# Patient Record
Sex: FEMALE | Race: BLACK OR AFRICAN AMERICAN | Employment: UNEMPLOYED | URBAN - METROPOLITAN AREA
[De-identification: names, ages, dates, MRNs, and addresses within clinical notes are randomized per-mention and may not be internally consistent; named-entity substitution may affect disease eponyms.]

---

## 2017-01-12 ENCOUNTER — APPOINTMENT (OUTPATIENT)
Dept: GENERAL RADIOLOGY | Age: 42
End: 2017-01-12
Attending: EMERGENCY MEDICINE
Payer: MEDICAID

## 2017-01-12 ENCOUNTER — HOSPITAL ENCOUNTER (EMERGENCY)
Age: 42
Discharge: HOME | End: 2017-01-13
Attending: EMERGENCY MEDICINE
Payer: MEDICAID

## 2017-01-12 VITALS
RESPIRATION RATE: 18 BRPM | SYSTOLIC BLOOD PRESSURE: 172 MMHG | BODY MASS INDEX: 25.4 KG/M2 | HEART RATE: 104 BPM | HEIGHT: 62 IN | TEMPERATURE: 98.6 F | DIASTOLIC BLOOD PRESSURE: 101 MMHG | OXYGEN SATURATION: 98 % | WEIGHT: 138 LBS

## 2017-01-12 DIAGNOSIS — R09.81 SINUS CONGESTION: ICD-10-CM
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
## 2017-01-12 DIAGNOSIS — M62.838 NECK MUSCLE SPASM: Primary | ICD-10-CM

## 2017-01-12 DIAGNOSIS — M54.5 LOW BACK PAIN, UNSPECIFIED BACK PAIN LATERALITY, UNSPECIFIED CHRONICITY, WITH SCIATICA PRESENCE UNSPECIFIED: ICD-10-CM

## 2017-01-12 LAB — HCG UR QL: NEGATIVE

## 2017-01-12 PROCEDURE — 74011250637 HC RX REV CODE- 250/637: Performed by: EMERGENCY MEDICINE

## 2017-01-12 PROCEDURE — 81025 URINE PREGNANCY TEST: CPT

## 2017-01-12 PROCEDURE — 72100 X-RAY EXAM L-S SPINE 2/3 VWS: CPT

## 2017-01-12 PROCEDURE — 72050 X-RAY EXAM NECK SPINE 4/5VWS: CPT

## 2017-01-12 PROCEDURE — 99284 EMERGENCY DEPT VISIT MOD MDM: CPT

## 2017-01-12 RX ORDER — DIAZEPAM 5 MG/1
5 TABLET ORAL
Status: COMPLETED | OUTPATIENT
Start: 2017-01-12 | End: 2017-01-12

## 2017-01-12 RX ORDER — NITROGLYCERIN 0.4 MG/1
TABLET SUBLINGUAL
COMMUNITY
End: 2017-10-06

## 2017-01-12 RX ORDER — LISINOPRIL 10 MG/1
TABLET ORAL DAILY
COMMUNITY
End: 2017-05-04

## 2017-01-12 RX ORDER — ASPIRIN 81 MG/1
81 TABLET ORAL DAILY
COMMUNITY
End: 2017-10-06

## 2017-01-12 RX ORDER — TRAMADOL HYDROCHLORIDE 50 MG/1
50 TABLET ORAL
Status: COMPLETED | OUTPATIENT
Start: 2017-01-12 | End: 2017-01-12

## 2017-01-12 RX ORDER — ACETAMINOPHEN 325 MG/1
650 TABLET ORAL ONCE
Status: COMPLETED | OUTPATIENT
Start: 2017-01-12 | End: 2017-01-12

## 2017-01-12 RX ADMIN — TRAMADOL HYDROCHLORIDE 50 MG: 50 TABLET, FILM COATED ORAL at 23:49

## 2017-01-12 RX ADMIN — ACETAMINOPHEN 650 MG: 325 TABLET ORAL at 23:49

## 2017-01-12 RX ADMIN — DIAZEPAM 5 MG: 5 TABLET ORAL at 23:49

## 2017-01-12 NOTE — LETTER
[facility] EMERGENCY DEPT 
[address] [facility] [address] [id number] [phone] Work/School Note Date: 1/12/2017 To Whom It May concern: 
 
Shakila Coulter was seen and treated today in the emergency room by the following provider(s): 
Attending Provider: Ming Dawkins MD. Shakila Coulter may return to work on 01/16/17. Sincerely, Ming Dawkins MD

## 2017-01-13 ENCOUNTER — TELEPHONE (OUTPATIENT)
Dept: CASE MANAGEMENT | Age: 42
End: 2017-01-13

## 2017-01-13 RX ORDER — AZITHROMYCIN 250 MG/1
TABLET, FILM COATED ORAL
Qty: 6 TAB | Refills: 0 | Status: SHIPPED | OUTPATIENT
Start: 2017-01-13 | End: 2017-05-04

## 2017-01-13 RX ORDER — TRAMADOL HYDROCHLORIDE 50 MG/1
50 TABLET ORAL
Qty: 16 TAB | Refills: 0 | Status: SHIPPED | OUTPATIENT
Start: 2017-01-13 | End: 2017-05-04

## 2017-01-13 RX ORDER — METHOCARBAMOL 750 MG/1
750 TABLET, FILM COATED ORAL 3 TIMES DAILY
Qty: 15 TAB | Refills: 0 | Status: SHIPPED | OUTPATIENT
Start: 2017-01-13 | End: 2017-05-04

## 2017-01-13 NOTE — ED NOTES
Pt arrived in ED by way of EMS w/ complaint of lower back pain that radiates to L side of buttocks, neck pain, and head pain X 1 month. Pt states her pain became aggravated when she made a sudden turn of her neck today. Pt is A&O X 4 and appears in no distress. Emergency Department Nursing Plan of Care       The Nursing Plan of Care is developed from the Nursing assessment and Emergency Department Attending provider initial evaluation. The plan of care may be reviewed in the ED Provider note.     The Plan of Care was developed with the following considerations:   Patient / Family readiness to learn indicated by:verbalized understanding  Persons(s) to be included in education: patient  Barriers to Learning/Limitations:No    Signed     Jacqui Miller RN    1/12/2017   11:16 PM
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
## 2017-01-13 NOTE — TELEPHONE ENCOUNTER
Care manager called patient re: after hours consult for PCP referral. Unable to reach patient, left voicemail.     Liliya Collins, MSW  [phone]

## 2017-01-13 NOTE — ED NOTES
Patient has been instructed that they have been given Valium which contains opioids, benzodiazepines, or other sedating drugs. Patient is aware that they  will need to refrain from driving or operating heavy machinery after taking this medication. Patient also instructed that they need to avoid drinking alcohol and using other products containing opioids, benzodiazepines, or other sedating drugs. Patient verbalized understanding.
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
## 2017-04-23 ENCOUNTER — HOSPITAL ENCOUNTER (EMERGENCY)
Age: 42
Discharge: HOME | End: 2017-04-23
Attending: STUDENT IN AN ORGANIZED HEALTH CARE EDUCATION/TRAINING PROGRAM
Payer: MEDICAID

## 2017-04-23 ENCOUNTER — APPOINTMENT (OUTPATIENT)
Dept: CT IMAGING | Age: 42
End: 2017-04-23
Attending: STUDENT IN AN ORGANIZED HEALTH CARE EDUCATION/TRAINING PROGRAM
Payer: MEDICAID

## 2017-04-23 VITALS
WEIGHT: 159.5 LBS | DIASTOLIC BLOOD PRESSURE: 91 MMHG | TEMPERATURE: 97.9 F | HEIGHT: 62 IN | RESPIRATION RATE: 25 BRPM | OXYGEN SATURATION: 98 % | HEART RATE: 86 BPM | SYSTOLIC BLOOD PRESSURE: 146 MMHG | BODY MASS INDEX: 29.35 KG/M2

## 2017-04-23 DIAGNOSIS — M54.10 RADICULOPATHY OF ARM: Primary | ICD-10-CM

## 2017-04-23 DIAGNOSIS — R51.9 ACUTE NONINTRACTABLE HEADACHE, UNSPECIFIED HEADACHE TYPE: ICD-10-CM

## 2017-04-23 LAB
ALBUMIN SERPL BCP-MCNC: 3.7 G/DL (ref 3.5–5)
ALBUMIN/GLOB SERPL: 1 {RATIO} (ref 1.1–2.2)
ALP SERPL-CCNC: 65 U/L (ref 45–117)
ALT SERPL-CCNC: 27 U/L (ref 12–78)
ANION GAP BLD CALC-SCNC: 6 MMOL/L (ref 5–15)
AST SERPL W P-5'-P-CCNC: 16 U/L (ref 15–37)
BASOPHILS # BLD AUTO: 0 K/UL (ref 0–0.1)
BASOPHILS # BLD: 1 % (ref 0–1)
BILIRUB SERPL-MCNC: 0.2 MG/DL (ref 0.2–1)
BUN SERPL-MCNC: 18 MG/DL (ref 6–20)
BUN/CREAT SERPL: 21 (ref 12–20)
CALCIUM SERPL-MCNC: 8.5 MG/DL (ref 8.5–10.1)
CHLORIDE SERPL-SCNC: 104 MMOL/L (ref 97–108)
CO2 SERPL-SCNC: 28 MMOL/L (ref 21–32)
CREAT SERPL-MCNC: 0.87 MG/DL (ref 0.55–1.02)
EOSINOPHIL # BLD: 0.2 K/UL (ref 0–0.4)
EOSINOPHIL NFR BLD: 2 % (ref 0–7)
ERYTHROCYTE [DISTWIDTH] IN BLOOD BY AUTOMATED COUNT: 12.6 % (ref 11.5–14.5)
GLOBULIN SER CALC-MCNC: 3.6 G/DL (ref 2–4)
GLUCOSE BLD STRIP.AUTO-MCNC: 99 MG/DL (ref 65–100)
GLUCOSE SERPL-MCNC: 96 MG/DL (ref 65–100)
HCT VFR BLD AUTO: 36.3 % (ref 35–47)
HGB BLD-MCNC: 12.6 G/DL (ref 11.5–16)
INR BLD: 1 (ref 0.9–1.2)
LYMPHOCYTES # BLD AUTO: 30 % (ref 12–49)
LYMPHOCYTES # BLD: 2.6 K/UL (ref 0.8–3.5)
MCH RBC QN AUTO: 31.6 PG (ref 26–34)
MCHC RBC AUTO-ENTMCNC: 34.7 G/DL (ref 30–36.5)
MCV RBC AUTO: 91 FL (ref 80–99)
MONOCYTES # BLD: 0.6 K/UL (ref 0–1)
MONOCYTES NFR BLD AUTO: 7 % (ref 5–13)
NEUTS SEG # BLD: 5.2 K/UL (ref 1.8–8)
NEUTS SEG NFR BLD AUTO: 60 % (ref 32–75)
PLATELET # BLD AUTO: 347 K/UL (ref 150–400)
POTASSIUM SERPL-SCNC: 3.5 MMOL/L (ref 3.5–5.1)
PROT SERPL-MCNC: 7.3 G/DL (ref 6.4–8.2)
RBC # BLD AUTO: 3.99 M/UL (ref 3.8–5.2)
SERVICE CMNT-IMP: NORMAL
SODIUM SERPL-SCNC: 138 MMOL/L (ref 136–145)
WBC # BLD AUTO: 8.6 K/UL (ref 3.6–11)

## 2017-04-23 PROCEDURE — 85025 COMPLETE CBC W/AUTO DIFF WBC: CPT | Performed by: STUDENT IN AN ORGANIZED HEALTH CARE EDUCATION/TRAINING PROGRAM

## 2017-04-23 PROCEDURE — 70450 CT HEAD/BRAIN W/O DYE: CPT

## 2017-04-23 PROCEDURE — 80053 COMPREHEN METABOLIC PANEL: CPT | Performed by: STUDENT IN AN ORGANIZED HEALTH CARE EDUCATION/TRAINING PROGRAM

## 2017-04-23 PROCEDURE — 36415 COLL VENOUS BLD VENIPUNCTURE: CPT | Performed by: STUDENT IN AN ORGANIZED HEALTH CARE EDUCATION/TRAINING PROGRAM

## 2017-04-23 PROCEDURE — 99285 EMERGENCY DEPT VISIT HI MDM: CPT

## 2017-04-23 PROCEDURE — 85610 PROTHROMBIN TIME: CPT

## 2017-04-23 PROCEDURE — 74011250636 HC RX REV CODE- 250/636: Performed by: STUDENT IN AN ORGANIZED HEALTH CARE EDUCATION/TRAINING PROGRAM

## 2017-04-23 PROCEDURE — 82962 GLUCOSE BLOOD TEST: CPT

## 2017-04-23 PROCEDURE — 96374 THER/PROPH/DIAG INJ IV PUSH: CPT

## 2017-04-23 PROCEDURE — 93005 ELECTROCARDIOGRAM TRACING: CPT

## 2017-04-23 RX ORDER — METOCLOPRAMIDE HYDROCHLORIDE 5 MG/ML
10 INJECTION INTRAMUSCULAR; INTRAVENOUS
Status: COMPLETED | OUTPATIENT
Start: 2017-04-23 | End: 2017-04-23

## 2017-04-23 RX ADMIN — METOCLOPRAMIDE 10 MG: 5 INJECTION, SOLUTION INTRAMUSCULAR; INTRAVENOUS at 19:56

## 2017-04-23 NOTE — ED PROVIDER NOTES
HPI Comments: 37 y.o. female with past medical history significant for HTN, murmur, cardiac arrhythmias, neuropathy, stroke, and chiari malformation who presents from home with chief complaint of numbness. Pt reportedly had left sided facial twitching and headache this morning around 0700. She states that she started developing numbness in her right 4th and 5th fingertips around 1730 (1 hour ago). Pt reports that she has been stuttering, which is unusual, but denies any other speech difficulty. She reports feeling fatigued for the past couple of days. There are no other acute medical concerns at this time. Social hx: everyday smoker, social EtOH use, no drug use  PCP: None    Note written by Susan Wheeler, as dictated by Tito Patel MD 6:52 PM    The history is provided by the patient. No  was used. Past Medical History:   Diagnosis Date    angina     Chiari malformation     Heart abnormalities     leaky heart valve, murmur    Hypertension     Other ill-defined conditions     cardiac arrhythmias    Other ill-defined conditions     neuropathy    Stroke ([facility] [address]     TIA       Past Surgical History:   Procedure Laterality Date    ABDOMEN SURGERY PROC UNLISTED      laporscopic surgery post ruptured fallopian tube    HX ORTHOPAEDIC      screws in left ankle    HX OTHER SURGICAL      surgery to back of head due to fractured skull    NEUROLOGICAL PROCEDURE UNLISTED           History reviewed. No pertinent family history. Social History     Social History    Marital status: SINGLE     Spouse name: N/A    Number of children: N/A    Years of education: N/A     Occupational History    Not on file.      Social History Main Topics    Smoking status: Current Every Day Smoker     Packs/day: 0.25     Years: 10.00    Smokeless tobacco: Not on file    Alcohol use No      Comment: social    Drug use: No    Sexual activity: Yes     Partners: Male     Other Topics Concern  Not on file     Social History Narrative         ALLERGIES: Fioricet [butalbital-acetaminophen-caff] and Toradol [ketorolac]    Review of Systems   Constitutional: Positive for fatigue. Negative for chills, diaphoresis and fever. HENT: Negative for congestion, postnasal drip, rhinorrhea and sore throat. Eyes: Negative for photophobia, discharge, redness and visual disturbance. Respiratory: Negative for cough, chest tightness, shortness of breath and wheezing. Cardiovascular: Negative for chest pain, palpitations and leg swelling. Gastrointestinal: Negative for abdominal distention, abdominal pain, blood in stool, constipation, diarrhea, nausea and vomiting. Genitourinary: Negative for difficulty urinating, dysuria, frequency, hematuria and urgency. Musculoskeletal: Negative for arthralgias, back pain, joint swelling and myalgias. Skin: Negative for color change and rash. Neurological: Positive for speech difficulty (stuttering), numbness (fingers) and headaches. Negative for dizziness, weakness and light-headedness. Facial twitching   Psychiatric/Behavioral: Negative for confusion. The patient is not nervous/anxious. All other systems reviewed and are negative. Vitals:    04/23/17 1848   BP: (!) 157/107   Pulse: 86   Resp: 16   SpO2: 99%   Weight: 72.3 kg (159 lb 8 oz)   Height: 5' 2\" (1.575 m)            Physical Exam   Constitutional: She is oriented to person, place, and time. She appears well-developed and well-nourished. HENT:   Head: Normocephalic and atraumatic. Eyes: Conjunctivae and EOM are normal. Pupils are equal, round, and reactive to light. Neck: Normal range of motion. Neck supple. Cardiovascular: Normal rate, regular rhythm and normal heart sounds. No murmur heard. Pulmonary/Chest: Effort normal and breath sounds normal. No respiratory distress. Abdominal: Soft. Bowel sounds are normal. She exhibits no distension. There is no tenderness.  There is no rebound. Musculoskeletal: Normal range of motion. She exhibits no edema. Neurological: She is alert and oriented to person, place, and time. No cranial nerve deficit. She exhibits normal muscle tone. Coordination normal.   NIH stroke scale: 0   Skin: Skin is warm and dry. No rash noted. Psychiatric: She has a normal mood and affect. Her behavior is normal.   Nursing note and vitals reviewed. Note written by Susan Lazar, as dictated by Tapan Wick MD 6:52 PM    [facility]  ED Course       Procedures  ED EKG interpretation:  Rhythm: normal sinus rhythm; and regular . Rate (approx.): 89; Axis: normal; ST/T wave: normal; no STEMI, no ischemia  Note written by Susan Lazar, as dictated by Tapan Wick MD 6:51 PM    CONSULT NOTE:  7:20 PM Tapan Wick MD spoke with Dr. Herlinda Chao, Consult for Neurology. Discussed available diagnostic tests and clinical findings. He is in agreement with care plans as outlined. Dr. Peri Cantrell recommends discharge home with PCP follow up as pt is not a TPA candidate. Dr. Peri Cantrell states that pt most likely has peripheral neuropathy or radiculopathy.

## 2017-04-23 NOTE — ED TRIAGE NOTES
Pt states that she was having facial twitching on the left side that started this morning and pt has been feeling fatigued for a couple of days. Pt states at 5:30pm she had a sudden onset on right finger numbness. Code stroke initiated.

## 2017-04-24 LAB
ATRIAL RATE: 89 BPM
CALCULATED P AXIS, ECG09: 45 DEGREES
CALCULATED R AXIS, ECG10: 23 DEGREES
CALCULATED T AXIS, ECG11: 24 DEGREES
DIAGNOSIS, 93000: NORMAL
P-R INTERVAL, ECG05: 156 MS
Q-T INTERVAL, ECG07: 376 MS
QRS DURATION, ECG06: 78 MS
QTC CALCULATION (BEZET), ECG08: 457 MS
VENTRICULAR RATE, ECG03: 89 BPM

## 2017-04-24 NOTE — DISCHARGE INSTRUCTIONS
We hope that we have addressed all of your medical concerns. The examination and treatment you received in the Emergency Department were for an emergent problem and were not intended as complete care. It is important that you follow up with your healthcare provider(s) for ongoing care. If your symptoms worsen or do not improve as expected, and you are unable to reach your usual health care provider(s), you should return to the Emergency Department. Today's healthcare is undergoing tremendous change, and patient satisfaction surveys are one of the many tools to assess the quality of medical care. You may receive a survey from the [employer] organization regarding your experience in the Emergency Department. I hope that your experience has been completely positive, particularly the medical care that I provided. As such, please participate in the survey; anything less than excellent does not meet my expectations or intentions. [facility] and 8 [facility] participate in nationally recognized quality of care measures. If your blood pressure is greater than 120/80, as reported below, we urge that you seek medical care to address the potential of high blood pressure, commonly known as hypertension. Hypertension can be hereditary or can be caused by certain medical conditions, pain, stress, or \"white coat syndrome. \"       Please make an appointment with your health care provider(s) for follow up of your Emergency Department visit. VITALS:   Patient Vitals for the past 8 hrs:   Pulse Resp BP SpO2   04/23/17 2015 88 17 (!) 152/105 98 %   04/23/17 1945 80 17 (!) 166/102 99 %   04/23/17 1848 86 16 (!) 157/107 99 %          Thank you for allowing us to provide you with medical care today. We realize that you have many choices for your emergency care needs. Please choose us in the future for any continued health care needs.       Yomaira Mcintyre Mayelin Mccormick, [address] [place], [place] [address].   Office: [phone]            Recent Results (from the past 24 hour(s))   GLUCOSE, POC    Collection Time: 04/23/17  6:48 PM   Result Value Ref Range    Glucose (POC) 99 65 - 100 mg/dL    Performed by Yasmin Garnica    POC INR    Collection Time: 04/23/17  6:50 PM   Result Value Ref Range    INR (POC) 1.0 <1.2     EKG, 12 LEAD, INITIAL    Collection Time: 04/23/17  6:51 PM   Result Value Ref Range    Ventricular Rate 89 BPM    Atrial Rate 89 BPM    P-R Interval 156 ms    QRS Duration 78 ms    Q-T Interval 376 ms    QTC Calculation (Bezet) 457 ms    Calculated P Axis 45 degrees    Calculated R Axis 23 degrees    Calculated T Axis 24 degrees    Diagnosis       Normal sinus rhythm  When compared with ECG of 11-MAY-2015 18:50,  No significant change was found     CBC WITH AUTOMATED DIFF    Collection Time: 04/23/17  6:53 PM   Result Value Ref Range    WBC 8.6 3.6 - 11.0 K/uL    RBC 3.99 3.80 - 5.20 M/uL    HGB 12.6 11.5 - 16.0 g/dL    HCT 36.3 35.0 - 47.0 %    MCV 91.0 80.0 - 99.0 FL    MCH 31.6 26.0 - 34.0 PG    MCHC 34.7 30.0 - 36.5 g/dL    RDW 12.6 11.5 - 14.5 %    PLATELET 808 692 - 229 K/uL    NEUTROPHILS 60 32 - 75 %    LYMPHOCYTES 30 12 - 49 %    MONOCYTES 7 5 - 13 %    EOSINOPHILS 2 0 - 7 %    BASOPHILS 1 0 - 1 %    ABS. NEUTROPHILS 5.2 1.8 - 8.0 K/UL    ABS. LYMPHOCYTES 2.6 0.8 - 3.5 K/UL    ABS. MONOCYTES 0.6 0.0 - 1.0 K/UL    ABS. EOSINOPHILS 0.2 0.0 - 0.4 K/UL    ABS.  BASOPHILS 0.0 0.0 - 0.1 K/UL   METABOLIC PANEL, COMPREHENSIVE    Collection Time: 04/23/17  6:53 PM   Result Value Ref Range    Sodium 138 136 - 145 mmol/L    Potassium 3.5 3.5 - 5.1 mmol/L    Chloride 104 97 - 108 mmol/L    CO2 28 21 - 32 mmol/L    Anion gap 6 5 - 15 mmol/L    Glucose 96 65 - 100 mg/dL    BUN 18 6 - 20 MG/DL    Creatinine 0.87 0.55 - 1.02 MG/DL    BUN/Creatinine ratio 21 (H) 12 - 20      GFR est AA >60 >60 ml/min/1.73m2    GFR est non-AA >60 >60 ml/min/1.73m2    Calcium 8.5 8.5 - 10.1 MG/DL    Bilirubin, total 0.2 0.2 - 1.0 MG/DL    ALT (SGPT) 27 12 - 78 U/L    AST (SGOT) 16 15 - 37 U/L    Alk. phosphatase 65 45 - 117 U/L    Protein, total 7.3 6.4 - 8.2 g/dL    Albumin 3.7 3.5 - 5.0 g/dL    Globulin 3.6 2.0 - 4.0 g/dL    A-G Ratio 1.0 (L) 1.1 - 2.2         Ct Code Neuro Head Wo Contrast    Result Date: 4/23/2017  EXAM: Brain CT without contrast. INDICATION: facial twitching on the left side that started this morning and pt has been feeling fatigued for a couple of days. Pt states at 5:30pm she had a sudden onset on right finger numbness. TECHNIQUE: Noncontrast CT of the brain is performed with 5 mm collimation. Bone algorithm axial and sagittal and coronal reconstructions performed and evaluated. CT dose reduction was achieved through use of a standardized protocol tailored for this examination and automatic exposure control for dose modulation. Adaptive statistical iterative reconstruction (ASIR) was utilized. COMPARISON: CT brain 9/4/2015. FINDINGS: There is no acute intracranial hemorrhage, mass, mass effect or herniation. Ventricular system is normal. The gray-white matter differentiation is well-preserved. The mastoid air cells are well pneumatized. The visualized paranasal sinuses are normal.     IMPRESSION: No acute intracranial hemorrhage, mass or infarct.

## 2017-05-04 ENCOUNTER — HOSPITAL ENCOUNTER (EMERGENCY)
Age: 42
Discharge: HOME | End: 2017-05-04
Attending: EMERGENCY MEDICINE | Admitting: EMERGENCY MEDICINE
Payer: MEDICAID

## 2017-05-04 VITALS
SYSTOLIC BLOOD PRESSURE: 157 MMHG | RESPIRATION RATE: 18 BRPM | HEART RATE: 91 BPM | DIASTOLIC BLOOD PRESSURE: 111 MMHG | WEIGHT: 142 LBS | BODY MASS INDEX: 26.13 KG/M2 | TEMPERATURE: 98.6 F | HEIGHT: 62 IN | OXYGEN SATURATION: 98 %

## 2017-05-04 DIAGNOSIS — J30.2 SEASONAL ALLERGIC RHINITIS, UNSPECIFIED ALLERGIC RHINITIS TRIGGER: Primary | ICD-10-CM

## 2017-05-04 DIAGNOSIS — Z72.0 TOBACCO ABUSE: ICD-10-CM

## 2017-05-04 PROCEDURE — 74011250637 HC RX REV CODE- 250/637: Performed by: EMERGENCY MEDICINE

## 2017-05-04 PROCEDURE — 99283 EMERGENCY DEPT VISIT LOW MDM: CPT

## 2017-05-04 RX ORDER — RISPERIDONE 0.25 MG/1
TABLET, FILM COATED ORAL
COMMUNITY
End: 2017-10-06

## 2017-05-04 RX ORDER — BUTALBITAL, ACETAMINOPHEN AND CAFFEINE 50; 325; 40 MG/1; MG/1; MG/1
1 TABLET ORAL
Status: COMPLETED | OUTPATIENT
Start: 2017-05-04 | End: 2017-05-04

## 2017-05-04 RX ORDER — FEXOFENADINE HCL 60 MG
60 TABLET ORAL 2 TIMES DAILY
Qty: 20 TAB | Status: SHIPPED | OUTPATIENT
Start: 2017-05-04 | End: 2017-06-07

## 2017-05-04 RX ORDER — BUTALBITAL, ASPIRIN, AND CAFFEINE 325; 50; 40 MG/1; MG/1; MG/1
1 CAPSULE ORAL
Qty: 8 CAP | Refills: 0 | Status: SHIPPED | OUTPATIENT
Start: 2017-05-04 | End: 2017-06-14

## 2017-05-04 RX ORDER — METOPROLOL TARTRATE 50 MG/1
50 TABLET ORAL 2 TIMES DAILY
Qty: 60 TAB | Refills: 0 | Status: SHIPPED | OUTPATIENT
Start: 2017-05-04 | End: 2017-10-06

## 2017-05-04 RX ORDER — METOPROLOL TARTRATE 50 MG/1
50 TABLET ORAL
Status: COMPLETED | OUTPATIENT
Start: 2017-05-04 | End: 2017-05-04

## 2017-05-04 RX ORDER — TRAZODONE HYDROCHLORIDE 50 MG/1
TABLET ORAL
COMMUNITY
End: 2017-10-06

## 2017-05-04 RX ORDER — DIVALPROEX SODIUM 125 MG/1
125 TABLET, DELAYED RELEASE ORAL 3 TIMES DAILY
COMMUNITY
End: 2017-10-06

## 2017-05-04 RX ADMIN — BUTALBITAL, ACETAMINOPHEN, AND CAFFEINE 1 TABLET: 50; 325; 40 TABLET ORAL at 07:29

## 2017-05-04 RX ADMIN — METOPROLOL TARTRATE 50 MG: 50 TABLET, FILM COATED ORAL at 07:29

## 2017-05-04 NOTE — DISCHARGE INSTRUCTIONS
Allergies: Care Instructions  Your Care Instructions  Allergies occur when your body's defense system (immune system) overreacts to certain substances. The immune system treats a harmless substance as if it were a harmful germ or virus. Many things can cause this overreaction, including pollens, medicine, food, dust, animal dander, and mold. Allergies can be mild or severe. Mild allergies can be managed with home treatment. But medicine may be needed to prevent problems. Managing your allergies is an important part of staying healthy. Your doctor may suggest that you have allergy testing to help find out what is causing your allergies. When you know what things trigger your symptoms, you can avoid them. This can prevent allergy symptoms and other health problems. For severe allergies that cause reactions that affect your whole body (anaphylactic reactions), your doctor may prescribe a shot of epinephrine to carry with you in case you have a severe reaction. Learn how to give yourself the shot and keep it with you at all times. Make sure it is not . Follow-up care is a key part of your treatment and safety. Be sure to make and go to all appointments, and call your doctor if you are having problems. It's also a good idea to know your test results and keep a list of the medicines you take. How can you care for yourself at home? · If you have been told by your doctor that dust or dust mites are causing your allergy, decrease the dust around your bed:  [facility] sheets, pillowcases, and other bedding in hot water every week. ¨ Use dust-proof covers for pillows, duvets, and mattresses. Avoid plastic covers because they tear easily and do not \"breathe. \" Wash as instructed on the label. ¨ Do not use any blankets and pillows that you do not need. ¨ Use blankets that you can wash in your washing machine. ¨ Consider removing drapes and carpets, which attract and hold dust, from your bedroom.   · If you are allergic to house dust and mites, do not use home humidifiers. Your doctor can suggest ways you can control dust and mites. · Look for signs of cockroaches. Cockroaches cause allergic reactions. Use cockroach baits to get rid of them. Then, clean your home well. Cockroaches like areas where grocery bags, newspapers, empty bottles, or cardboard boxes are stored. Do not keep these inside your home, and keep trash and food containers sealed. Seal off any spots where cockroaches might enter your home. · If you are allergic to mold, get rid of furniture, rugs, and drapes that smell musty. Check for mold in the bathroom. · If you are allergic to outdoor pollen or mold spores, use air-conditioning. Change or clean all filters every month. Keep windows closed. · If you are allergic to pollen, stay inside when pollen counts are high. Use a vacuum  with a HEPA filter or a double-thickness filter at least two times each week. · Stay inside when air pollution is bad. Avoid paint fumes, perfumes, and other strong odors. · Avoid conditions that make your allergies worse. Stay away from smoke. Do not smoke or let anyone else smoke in your house. Do not use fireplaces or wood-burning stoves. · If you are allergic to your pets, change the air filter in your furnace every month. Use high-efficiency filters. · If you are allergic to pet dander, keep pets outside or out of your bedroom. Old carpet and cloth furniture can hold a lot of animal dander. You may need to replace them. When should you call for help? Give an epinephrine shot if:  · You think you are having a severe allergic reaction. · You have symptoms in more than one body area, such as mild nausea and an itchy mouth. After giving an epinephrine shot call 911, even if you feel better. Call 911 if:  · You have symptoms of a severe allergic reaction. These may include:  ¨ Sudden raised, red areas (hives) all over your body.   ¨ Swelling of the throat, mouth, lips, or tongue. ¨ Trouble breathing. ¨ Passing out (losing consciousness). Or you may feel very lightheaded or suddenly feel weak, confused, or restless. · You have been given an epinephrine shot, even if you feel better. Call your doctor now or seek immediate medical care if:  · You have symptoms of an allergic reaction, such as:  ¨ A rash or hives (raised, red areas on the skin). ¨ Itching. ¨ Swelling. ¨ Belly pain, nausea, or vomiting. Watch closely for changes in your health, and be sure to contact your doctor if:  · You do not get better as expected. Where can you learn more? Go to http://augusto-norma.info/. Enter Q287 in the search box to learn more about \"Allergies: Care Instructions. \"  Current as of: February 12, 2016  Content Version: 11.2  © 0475-2464 [employer]. Care instructions adapted under license by [employer] (which disclaims liability or warranty for this information). If you have questions about a medical condition or this instruction, always ask your healthcare professional. [place] [address] any warranty or liability for your use of this information. Seasonal Allergies: Care Instructions  Your Care Instructions  Allergies occur when your body's defense system (immune system) overreacts to certain substances. The immune system treats a harmless substance as if it were a harmful germ or virus. Many things can cause this to happen. Examples include pollens, medicine, food, dust, animal dander, and mold. Your allergies are seasonal if you have symptoms just at certain times of the year. In that case, you are probably allergic to pollens from certain trees, grasses, or weeds. Allergies can be mild or severe. Over-the-counter allergy medicine may help with some symptoms. Read and follow all instructions on the label. Managing your allergies is an important part of staying healthy.  Your doctor may suggest that you have tests to help find the cause of your allergies. When you know what things trigger your symptoms, you can avoid them. This can prevent allergy symptoms and other health problems. In some cases, immunotherapy might help. For this treatment, you get shots or use pills that have a small amount of certain allergens in them. Your body \"gets used to\" the allergen, so you react less to it over time. This kind of treatment may help prevent or reduce some allergy symptoms. Follow-up care is a key part of your treatment and safety. Be sure to make and go to all appointments, and call your doctor if you are having problems. It's also a good idea to know your test results and keep a list of the medicines you take. How can you care for yourself at home? · Be safe with medicines. Take your medicines exactly as prescribed. Call your doctor if you think you are having a problem with your medicine. · During your allergy season, keep windows closed. If you need to use air-conditioning, change or clean all filters every month. Take a shower and change your clothes after you have been outside. · Stay inside when pollen counts are high. Vacuum once or twice a week. Use a vacuum  with a HEPA filter or a double-thickness filter. When should you call for help? Call 911 anytime you think you may need emergency care. For example, call if:  · You have symptoms of a severe allergic reaction. These may include:  ¨ Sudden raised, red areas (hives) all over your body. ¨ Swelling of the throat, mouth, lips, or tongue. ¨ Trouble breathing. ¨ Passing out (losing consciousness). Or you may feel very lightheaded or suddenly feel weak, confused, or restless. Watch closely for changes in your health, and be sure to contact your doctor if:  · You need help controlling your allergies. · You have questions about allergy testing. · You do not get better as expected. Where can you learn more?   Go to http://augusto-norma.info/. Enter J912 in the search box to learn more about \"Seasonal Allergies: Care Instructions. \"  Current as of: February 12, 2016  Content Version: 11.2  © 9867-9604 [employer]. Care instructions adapted under license by "[employer] (which disclaims liability or warranty for this information). If you have questions about a medical condition or this instruction, always ask your healthcare professional. [address] any warranty or liability for your use of this information. Learning About Benefits From Quitting Smoking  How does quitting smoking make you healthier? If you're thinking about quitting smoking, you may have a few reasons to be smoke-free. Your health may be one of them. · When you quit smoking, you lower your risks for cancer, lung disease, heart attack, stroke, blood vessel disease, and blindness from macular degeneration. · When you're smoke-free, you get sick less often, and you heal faster. You are less likely to get colds, flu, bronchitis, and pneumonia. · As a nonsmoker, you may find that your mood is better and you are less stressed. When and how will you feel healthier? Quitting has real health benefits that start from day 1 of being smoke-free. And the longer you stay smoke-free, the healthier you get and the better you feel. The first hours  · After just 20 minutes, your blood pressure and heart rate go down. That means there's less stress on your heart and blood vessels. · Within 12 hours, the level of carbon monoxide in your blood drops back to normal. That makes room for more oxygen. With more oxygen in your body, you may notice that you have more energy than when you smoked. After 2 weeks  · Your lungs start to work better. · Your risk of heart attack starts to drop. After 1 month  · When your lungs are clear, you cough less and breathe deeper, so it's easier to be active.   · Your sense of taste and smell return. That means you can enjoy food more than you have since you started smoking. Over the years  · After 1 year, your risk of heart disease is half what it would be if you kept smoking. · After 5 years, your risk of stroke starts to shrink. Within a few years after that, it's about the same as if you'd never smoked. · After 10 years, your risk of dying from lung cancer is cut by about half. And your risk for many other types of cancer is lower too. How would quitting help others in your life? When you quit smoking, you improve the health of everyone who now breathes in your smoke. · Their heart, lung, and cancer risks drop, much like yours. · They are sick less. For babies and small children, living smoke-free means they're less likely to have ear infections, pneumonia, and bronchitis. · If you're a woman who is or will be pregnant someday, quitting smoking means a healthier . · Children who are close to you are less likely to become adult smokers. Where can you learn more? Go to http://augusto-norma.info/. Enter [id number] [id number] [id number] in the search box to learn more about \"Learning About Benefits From Quitting Smoking. \"  Current as of: May 26, 2016  Content Version: 11.2  © 3111-1570 [employer]. Care instructions adapted under license by [employer] (which disclaims liability or warranty for this information). If you have questions about a medical condition or this instruction, always ask your healthcare professional. [place] [address] any warranty or liability for your use of this information.

## 2017-05-04 NOTE — ED NOTES
....Discharge summary and discharge medications reviewed with patient and appropriate educational materials and side effects teaching were provided. patient  Given 2 paper prescriptions and 1 electronic prescriptions sent to pt's listed pharmacy. Patient (s) verbalized understanding of the importance of discussing medications with his or her physician or clinic they will be following up with. No si/s of acute distress prior to discharge. Patient offered wheelchair from treatment area to hospital entrance, patient refused wheelchair. Pt reported 8/10 sinus/head pain. No other pt complaints. Pt discharged with pt's daughter.

## 2017-05-04 NOTE — ED PROVIDER NOTES
HPI Comments: Luis Escobar, 37 y.o. Female with PMHx of HTN, Arrhythmia, Angina, presents ambulatory to [facility] ED with cc of a constellation of symptoms such as BL ear pain, a sore throat, rhinorrhea, chills, a pressure frontal headache, coughing, and being diaphoretic that has progressively worsened since yesterday. She usually takes 50 mg Metoprolol b.i.d., but has run out so she had none this morning. She denies any known fevers. She also denies a recent PCP follow up or ever seeing a Cardiologist.    PCP: None    Social history significant for: Current Tobacco, Social EtOH, - Illicit Drug Use    There are no other complaints, changes, or physical findings at this time. Written by FABIAN Reveles, as dictated by Kalina Jerome MD.    The history is provided by the patient. No  was used. Past Medical History:   Diagnosis Date    angina     Chiari malformation     Heart abnormalities     leaky heart valve, murmur    Hypertension     Other ill-defined conditions     cardiac arrhythmias    Other ill-defined conditions     neuropathy    Stroke ([address]     TIA       Past Surgical History:   Procedure Laterality Date    ABDOMEN SURGERY PROC UNLISTED      laporscopic surgery post ruptured fallopian tube    HX ORTHOPAEDIC      screws in left ankle    HX OTHER SURGICAL      surgery to back of head due to fractured skull    NEUROLOGICAL PROCEDURE UNLISTED           History reviewed. No pertinent family history. Social History     Social History    Marital status: SINGLE     Spouse name: N/A    Number of children: N/A    Years of education: N/A     Occupational History    Not on file.      Social History Main Topics    Smoking status: Current Every Day Smoker     Packs/day: 0.25     Years: 10.00    Smokeless tobacco: Not on file      Comment: 6 cig/day    Alcohol use Yes      Comment: social    Drug use: No    Sexual activity: Yes     Partners: Male Birth control/ protection: None     Other Topics Concern    Not on file     Social History Narrative         ALLERGIES: Fioricet [butalbital-acetaminophen-caff] and Toradol [ketorolac]    Review of Systems   Constitutional: Positive for chills and diaphoresis. Negative for fever. HENT: Positive for ear pain, rhinorrhea and sore throat. Negative for congestion and sneezing. Eyes: Negative for redness and visual disturbance. Respiratory: Positive for cough. Negative for shortness of breath. Cardiovascular: Negative for leg swelling. Gastrointestinal: Negative for abdominal pain, nausea and vomiting. Genitourinary: Negative for difficulty urinating and frequency. Musculoskeletal: Negative for back pain, myalgias and neck stiffness. Skin: Negative for rash. Neurological: Positive for headaches. Negative for dizziness, syncope and weakness. Hematological: Negative for adenopathy. Patient Vitals for the past 12 hrs:   Temp Pulse Resp BP SpO2   05/04/17 0731 - 91 18 - 98 %   05/04/17 0722 - 87 16 (!) 157/111 99 %   05/04/17 0713 98.6 °F (37 °C) 86 16 (!) 176/108 95 %       Physical Exam   Constitutional: She is oriented to person, place, and time. She appears well-developed and well-nourished. HENT:   Head: Normocephalic and atraumatic. Mouth/Throat: Oropharynx is clear and moist.   Eyes: Conjunctivae and EOM are normal.   Neck: Normal range of motion and full passive range of motion without pain. Neck supple. Cardiovascular: Normal rate, regular rhythm, S1 normal, S2 normal, normal heart sounds, intact distal pulses and normal pulses. No murmur heard. Pulmonary/Chest: Effort normal and breath sounds normal. No respiratory distress. She has no wheezes. Abdominal: Soft. Normal appearance and bowel sounds are normal. She exhibits no distension. There is no tenderness. There is no rebound. Musculoskeletal: Normal range of motion.    Neurological: She is alert and oriented to person, place, and time. She has normal strength. Skin: Skin is warm, dry and intact. No rash noted. Psychiatric: She has a normal mood and affect. Her speech is normal and behavior is normal. Judgment and thought content normal.   Nursing note and vitals reviewed. MDM  Number of Diagnoses or Management Options  Seasonal allergic rhinitis, unspecified allergic rhinitis trigger: Tobacco abuse:   Diagnosis management comments: DDx: Sinusitis, Medication Noncompliance, Allergic Rhinitis, Tobacco Abuse       Amount and/or Complexity of Data Reviewed  Review and summarize past medical records: yes    Patient Progress  Patient progress: stable    ED Course       Procedures     Discussed the risks of smoking and the benefits of smoking cessation as well as the long term sequelae of smoking with the pt. The patient verbalized their understanding. Written by FABIAN Hernandezibsteffany, as dictated by Olga Temple MD.    7:22 AM  The pt was encouraged to follow up with her PCP for blood pressure management and medication refills. The patient conveys her understanding of this importance. Written by FABIAN Hernandez, as dictated by Olga Temple MD.    MEDICATIONS GIVEN:  Medications   butalbital-acetaminophen-caffeine (FIORICET, ESGIC) -40 mg per tablet 1 Tab (1 Tab Oral Given 5/4/17 0729)   metoprolol tartrate (LOPRESSOR) tablet 50 mg (50 mg Oral Given 5/4/17 0729)       IMPRESSION:  1. Seasonal allergic rhinitis, unspecified allergic rhinitis trigger    2. Tobacco abuse        PLAN:  1. Discharge Medication List as of 5/4/2017  7:32 AM      START taking these medications    Details   fexofenadine (ALLEGRA) 60 mg tablet Take 1 Tab by mouth two (2) times a day., Print, Disp-20 Tab, R-        butalbital-aspirin-caffeine (FIORINAL) capsule Take 1 Cap by mouth every four (4) hours as needed for Pain for up to 90 days. Max Daily Amount: 6 Caps.  Maximum dose 6 capsules daily, Print, Disp-8 Cap, R-0         CONTINUE these medications which have CHANGED    Details   metoprolol tartrate (LOPRESSOR) 50 mg tablet Take 1 Tab by mouth two (2) times a day., Normal, Disp-60 Tab, R-0         CONTINUE these medications which have NOT CHANGED    Details   risperiDONE (RISPERDAL) 0.25 mg tablet Take  by mouth., Historical Med      divalproex DR (DEPAKOTE) 125 mg tablet Take 125 mg by mouth three (3) times daily. , Historical Med      traZODone (DESYREL) 50 mg tablet Take  by mouth nightly., Historical Med      sodium chloride (OCEAN) 0.65 % nasal spray 1 [place] by Both Nostrils route as needed for Congestion. Indications: Nasal Congestion, Print, Disp-15 mL, R-0      aspirin delayed-release 81 mg tablet Take 81 mg by mouth daily. , Historical Med      nitroglycerin (NITROSTAT) 0.4 mg SL tablet by SubLINGual route every five (5) minutes as needed for Chest Pain., Historical Med         STOP taking these medications       azithromycin (ZITHROMAX) 250 mg tablet Comments:   Reason for Stopping:         methocarbamol (ROBAXIN) 750 mg tablet Comments:   Reason for Stopping:         traMADol (ULTRAM) 50 mg tablet Comments:   Reason for Stopping:         lisinopril (PRINIVIL, ZESTRIL) 10 mg tablet Comments:   Reason for Stoppin.   Follow-up Information     Follow up With Details Comments Contact Info    Your PCP. ..you need to call to make an appointment       [facility] EMERGENCY DEPT  As needed, If symptoms worsen Johan Tsering  [phone]        Return to ED if worse     Discharge Note:  7:32 AM  The pt is ready for discharge. The pt's signs, symptoms, diagnosis, and discharge instructions have been discussed and pt has conveyed their understanding. The pt is to follow up as recommended or return to ER should their symptoms worsen. Plan has been discussed and pt is in agreement. This note is prepared by Greyson Madden, acting as a Scribe for Naresh Felder MD.    Moo Hong. Miller Goldberg MD: The scribe's documentation has been prepared under my direction and personally reviewed by me in its entirety. I confirm that the notes above accurately reflects all work, treatment, procedures, and medical decision making performed by me.

## 2017-05-04 NOTE — ED NOTES
..  Emergency Department Nursing Plan of Care       The Nursing Plan of Care is developed from the Nursing assessment and Emergency Department Attending provider initial evaluation. The plan of care may be reviewed in the ED Provider note. The Plan of Care was developed with the following considerations:   Patient / Family readiness to learn indicated by:verbalized understanding and appropriate questions asked  Persons(s) to be included in education: patient  Barriers to Learning/Limitations:No    Signed     Noemy Joshi RN    5/4/2017   7:21 AM    .. Rai Ritchie Patient verbalized name and date of birth. Name and date of birth compared to chart to validate information. Patient verbalized that his/her armband contains the correct spelling of name and date of birth.

## 2017-06-07 ENCOUNTER — APPOINTMENT (OUTPATIENT)
Dept: GENERAL RADIOLOGY | Age: 42
End: 2017-06-07
Attending: EMERGENCY MEDICINE
Payer: MEDICAID

## 2017-06-07 ENCOUNTER — HOSPITAL ENCOUNTER (EMERGENCY)
Age: 42
Discharge: HOME | End: 2017-06-07
Attending: EMERGENCY MEDICINE | Admitting: EMERGENCY MEDICINE
Payer: MEDICAID

## 2017-06-07 VITALS
HEART RATE: 92 BPM | TEMPERATURE: 98.3 F | DIASTOLIC BLOOD PRESSURE: 88 MMHG | RESPIRATION RATE: 18 BRPM | SYSTOLIC BLOOD PRESSURE: 137 MMHG | HEIGHT: 62 IN | BODY MASS INDEX: 26.13 KG/M2 | WEIGHT: 142 LBS | OXYGEN SATURATION: 100 %

## 2017-06-07 DIAGNOSIS — S50.01XA CONTUSION OF RIGHT ELBOW, INITIAL ENCOUNTER: Primary | ICD-10-CM

## 2017-06-07 DIAGNOSIS — R09.81 SINUS CONGESTION: ICD-10-CM
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
## 2017-06-07 DIAGNOSIS — R10.32 LEFT GROIN PAIN: ICD-10-CM

## 2017-06-07 DIAGNOSIS — R03.0 ELEVATED BLOOD PRESSURE READING: ICD-10-CM

## 2017-06-07 LAB — HCG UR QL: NEGATIVE

## 2017-06-07 PROCEDURE — 73080 X-RAY EXAM OF ELBOW: CPT

## 2017-06-07 PROCEDURE — 74011250637 HC RX REV CODE- 250/637: Performed by: EMERGENCY MEDICINE

## 2017-06-07 PROCEDURE — 73502 X-RAY EXAM HIP UNI 2-3 VIEWS: CPT

## 2017-06-07 PROCEDURE — 99284 EMERGENCY DEPT VISIT MOD MDM: CPT

## 2017-06-07 PROCEDURE — A4565 SLINGS: HCPCS

## 2017-06-07 PROCEDURE — 81025 URINE PREGNANCY TEST: CPT

## 2017-06-07 RX ORDER — METHOCARBAMOL 500 MG/1
500 TABLET, FILM COATED ORAL 3 TIMES DAILY
Qty: 15 TAB | Refills: 0 | Status: SHIPPED | OUTPATIENT
Start: 2017-06-07 | End: 2017-10-06

## 2017-06-07 RX ORDER — HYDROCODONE BITARTRATE AND ACETAMINOPHEN 5; 325 MG/1; MG/1
1 TABLET ORAL
Status: COMPLETED | OUTPATIENT
Start: 2017-06-07 | End: 2017-06-07

## 2017-06-07 RX ORDER — CLONIDINE HYDROCHLORIDE 0.1 MG/1
0.1 TABLET ORAL
Status: DISCONTINUED | OUTPATIENT
Start: 2017-06-07 | End: 2017-06-07

## 2017-06-07 RX ORDER — FLUTICASONE PROPIONATE 50 MCG
2 SPRAY, SUSPENSION (ML) NASAL DAILY
Qty: 1 BOTTLE | Refills: 0 | Status: SHIPPED | OUTPATIENT
Start: 2017-06-07 | End: 2017-10-06

## 2017-06-07 RX ORDER — FEXOFENADINE HCL 60 MG
60 TABLET ORAL 2 TIMES DAILY
Qty: 10 TAB | Status: SHIPPED | OUTPATIENT
Start: 2017-06-07 | End: 2017-06-14

## 2017-06-07 RX ORDER — TRAMADOL HYDROCHLORIDE AND ACETAMINOPHEN 37.5; 325 MG/1; MG/1
1 TABLET ORAL
Qty: 12 TAB | Refills: 0 | Status: SHIPPED | OUTPATIENT
Start: 2017-06-07 | End: 2017-06-14

## 2017-06-07 RX ORDER — ACETAMINOPHEN 325 MG/1
650 TABLET ORAL ONCE
Status: COMPLETED | OUTPATIENT
Start: 2017-06-07 | End: 2017-06-07

## 2017-06-07 RX ORDER — CLONIDINE HYDROCHLORIDE 0.1 MG/1
0.1 TABLET ORAL
Status: COMPLETED | OUTPATIENT
Start: 2017-06-07 | End: 2017-06-07

## 2017-06-07 RX ADMIN — ACETAMINOPHEN 650 MG: 325 TABLET ORAL at 21:14

## 2017-06-07 RX ADMIN — HYDROCODONE BITARTRATE AND ACETAMINOPHEN 1 TABLET: 5; 325 TABLET ORAL at 22:18

## 2017-06-07 RX ADMIN — CLONIDINE HYDROCHLORIDE 0.1 MG: 0.1 TABLET ORAL at 21:14

## 2017-06-07 NOTE — LETTER
[facility] EMERGENCY DEPT 
[address] [address] [id number] 
[phone] Work/School Note Date: 6/7/2017 To Whom It May concern: 
 
Keaton Vanegas was seen and treated today in the emergency room by the following provider(s): 
Attending Provider: Micah Wolfe MD. Keaton Vanegas may return to work on 06/11/17 or sooner if better. Sincerely, Micah Wolfe MD

## 2017-06-08 NOTE — ED NOTES
Unable to obtain POC urine pregnancy test. Patient stated that she is unable to void at this time. Will attempt again when patient returns from x-ray.

## 2017-06-08 NOTE — DISCHARGE INSTRUCTIONS
Preventing Falls: Care Instructions  Your Care Instructions  Getting around your home safely can be a challenge if you have injuries or health problems that make it easy for you to fall. Loose rugs and furniture in walkways are among the dangers for many older people who have problems walking or who have poor eyesight. People who have conditions such as arthritis, osteoporosis, or dementia also have to be careful not to fall. You can make your home safer with a few simple measures. Follow-up care is a key part of your treatment and safety. Be sure to make and go to all appointments, and call your doctor if you are having problems. It's also a good idea to know your test results and keep a list of the medicines you take. How can you care for yourself at home? Taking care of yourself  · You may get dizzy if you do not drink enough water. To prevent dehydration, drink plenty of fluids, enough so that your urine is light yellow or clear like water. Choose water and other caffeine-free clear liquids. If you have kidney, heart, or liver disease and have to limit fluids, talk with your doctor before you increase the amount of fluids you drink. · Exercise regularly to improve your strength, muscle tone, and balance. Walk if you can. Swimming may be a good choice if you cannot walk easily. · Have your vision and hearing checked each year or any time you notice a change. If you have trouble seeing and hearing, you might not be able to avoid objects and could lose your balance. · Know the side effects of the medicines you take. Ask your doctor or pharmacist whether the medicines you take can affect your balance. Sleeping pills or sedatives can affect your balance. · Limit the amount of alcohol you drink. Alcohol can impair your balance and other senses. · Ask your doctor whether calluses or corns on your feet need to be removed.  If you wear loose-fitting shoes because of calluses or corns, you can lose your balance and fall. · Talk to your doctor if you have numbness in your feet. Preventing falls at home  · Remove raised doorway thresholds, throw rugs, and clutter. Repair loose carpet or raised areas in the floor. · Move furniture and electrical cords to keep them out of walking paths. · Use nonskid floor wax, and wipe up spills right away, especially on ceramic tile floors. · If you use a walker or cane, put rubber tips on it. If you use crutches, clean the bottoms of them regularly with an abrasive pad, such as steel wool. · Keep your house well lit, especially Mireya Hammers, and outside walkways. Use night-lights in areas such as hallways and bathrooms. Add extra light switches or use remote switches (such as switches that go on or off when you clap your hands) to make it easier to turn lights on if you have to get up during the night. · Install sturdy handrails on stairways. · Move items in your cabinets so that the things you use a lot are on the lower shelves (about waist level). · Keep a cordless phone and a flashlight with new batteries by your bed. If possible, put a phone in each of the main rooms of your house, or carry a cell phone in case you fall and cannot reach a phone. Or, you can wear a device around your neck or wrist. You push a button that sends a signal for help. · Wear low-heeled shoes that fit well and give your feet good support. Use footwear with nonskid soles. Check the heels and soles of your shoes for wear. Repair or replace worn heels or soles. · Do not wear socks without shoes on wood floors. · Walk on the grass when the sidewalks are slippery. If you live in an area that gets snow and ice in the winter, sprinkle salt on slippery steps and sidewalks. Preventing falls in the bath  · Install grab bars and nonskid mats inside and outside your shower or tub and near the toilet and sinks. · Use shower chairs and bath benches.   · Use a hand-held shower head that will allow you to sit while showering. · Get into a tub or shower by putting the weaker leg in first. Get out of a tub or shower with your strong side first.  · Repair loose toilet seats and consider installing a raised toilet seat to make getting on and off the toilet easier. · Keep your bathroom door unlocked while you are in the shower. Where can you learn more? Go to http://augusto-norma.info/. Enter 0476 [id number] in the search box to learn more about \"Preventing Falls: Care Instructions. \"  Current as of: August 4, 2016  Content Version: 11.2  © 9349-3707 [employer]. Care instructions adapted under license by [employer] (which disclaims liability or warranty for this information). If you have questions about a medical condition or this instruction, always ask your healthcare professional. [place] [address] any warranty or liability for your use of this information. Contusion: Care Instructions  Your Care Instructions  Contusion is the medical term for a bruise. It is the result of a direct blow or an impact, such as a fall. Contusions are common sports injuries. Most people think of a bruise as a black-and-blue spot. This happens when small blood vessels get torn and leak blood under the skin. But bones, muscles, and organs can also get bruised. This may damage deep tissues but not cause a bruise you can see. The doctor will do a physical exam to find the location of your contusion. You may also have tests to make sure you do not have a more serious injury, such as a broken bone or nerve damage. These may include X-rays or other imaging tests like a CT scan or MRI. Deep-tissue contusions may cause pain and swelling. But if there is no serious damage, they will often get better in a few weeks with home treatment. The doctor has checked you carefully, but problems can develop later.  If you notice any problems or new symptoms, get medical treatment right away.  Follow-up care is a key part of your treatment and safety. Be sure to make and go to all appointments, and call your doctor if you are having problems. It's also a good idea to know your test results and keep a list of the medicines you take. How can you care for yourself at home? · Put ice or a cold pack on the sore area for 10 to 20 minutes at a time to stop swelling. Put a thin cloth between the ice pack and your skin. · Be safe with medicines. Read and follow all instructions on the label. ¨ If the doctor gave you a prescription medicine for pain, take it as prescribed. ¨ If you are not taking a prescription pain medicine, ask your doctor if you can take an over-the-counter medicine. · If you can, prop up the sore area on pillows as much as possible for the next few days. Try to keep the sore area above the level of your heart. When should you call for help? Call your doctor now or seek immediate medical care if:  · Your pain gets worse. · You have new or worse swelling. · You have tingling, weakness, or numbness in the area near the contusion. · The area near the contusion is cold or pale. Watch closely for changes in your health, and be sure to contact your doctor if:  · You do not get better as expected. Where can you learn more? Go to http://augusto-norma.info/. Enter R823 in the search box to learn more about \"Contusion: Care Instructions. \"  Current as of: May 27, 2016  Content Version: 11.2  © 6206-2571 [employer]. Care instructions adapted under license by [employer] (which disclaims liability or warranty for this information). If you have questions about a medical condition or this instruction, always ask your healthcare professional. [place] [address] any warranty or liability for your use of this information. Learning About High Blood Pressure  What is high blood pressure?     Blood pressure is a measure of how hard the blood pushes against the walls of your arteries. It's normal for blood pressure to go up and down throughout the day, but if it stays up, you have high blood pressure. Another name for high blood pressure is hypertension. Two numbers tell you your blood pressure. The first number is the systolic pressure. It shows how hard the blood pushes when your heart is pumping. The second number is the diastolic pressure. It shows how hard the blood pushes between heartbeats, when your heart is relaxed and filling with blood. A blood pressure of less than 120/80 (say \"120 over 80\") is ideal for an adult. High blood pressure is 140/90 or higher. You have high blood pressure if your top number is 140 or higher or your bottom number is 90 or higher, or both. Many people fall into the category in between, called prehypertension. People with prehypertension need to make lifestyle changes to bring their blood pressure down and help prevent or delay high blood pressure. What happens when you have high blood pressure? · Blood flows through your arteries with too much force. Over time, this damages the walls of your arteries. But you can't feel it. High blood pressure usually doesn't cause symptoms. · Fat and calcium start to build up in your arteries. This buildup is called plaque. Plaque makes your arteries narrower and stiffer. Blood can't flow through them as easily. · This lack of good blood flow starts to damage some of the organs in your body. This can lead to problems such as coronary artery disease and heart attack, heart failure, stroke, kidney failure, and eye damage. How can you prevent high blood pressure? · Stay at a healthy weight. · Try to limit how much sodium you eat to less than 2,300 milligrams (mg) a day. If you limit your sodium to 1,500 mg a day, you can lower your blood pressure even more. ¨ Buy foods that are labeled \"unsalted,\" \"sodium-free,\" or \"low-sodium. \" Foods labeled \"reduced-sodium\" and \"light sodium\" may still have too much sodium. ¨ Flavor your food with garlic, lemon juice, onion, vinegar, herbs, and spices instead of salt. Do not use soy sauce, steak sauce, onion salt, garlic salt, mustard, or ketchup on your food. ¨ Use less salt (or none) when recipes call for it. You can often use half the salt a recipe calls for without losing flavor. · Be physically active. Get at least 30 minutes of exercise on most days of the week. Walking is a good choice. You also may want to do other activities, such as running, swimming, cycling, or playing tennis or team sports. · Limit alcohol to 2 drinks a day for men and 1 drink a day for women. · Eat plenty of fruits, vegetables, and low-fat dairy products. Eat less saturated and total fats. How is high blood pressure treated? · Your doctor will suggest making lifestyle changes. For example, your doctor may ask you to eat healthy foods, quit smoking, lose extra weight, and be more active. · If lifestyle changes don't help enough or your blood pressure is very high, you will have to take medicine every day. Follow-up care is a key part of your treatment and safety. Be sure to make and go to all appointments, and call your doctor if you are having problems. It's also a good idea to know your test results and keep a list of the medicines you take. Where can you learn more? Go to http://augusto-norma.info/. Enter P501 in the search box to learn more about \"Learning About High Blood Pressure. \"  Current as of: March 23, 2016  Content Version: 11.2  © 4310-3125 [employer]. Care instructions adapted under license by [employer] (which disclaims liability or warranty for this information). If you have questions about a medical condition or this instruction, always ask your healthcare professional. [place] [address] any warranty or liability for your use of this information.          Upper Respiratory Infection (Cold): Care Instructions  Your Care Instructions    An upper respiratory infection, or URI, is an infection of the nose, sinuses, or throat. URIs are spread by coughs, sneezes, and direct contact. The common cold is the most frequent kind of URI. The flu and sinus infections are other kinds of URIs. Almost all URIs are caused by viruses. Antibiotics won't cure them. But you can treat most infections with home care. This may include drinking lots of fluids and taking over-the-counter pain medicine. You will probably feel better in 4 to 10 days. The doctor has checked you carefully, but problems can develop later. If you notice any problems or new symptoms, get medical treatment right away. Follow-up care is a key part of your treatment and safety. Be sure to make and go to all appointments, and call your doctor if you are having problems. It's also a good idea to know your test results and keep a list of the medicines you take. How can you care for yourself at home? · To prevent dehydration, drink plenty of fluids, enough so that your urine is light yellow or clear like water. Choose water and other caffeine-free clear liquids until you feel better. If you have kidney, heart, or liver disease and have to limit fluids, talk with your doctor before you increase the amount of fluids you drink. · Take an over-the-counter pain medicine, such as acetaminophen (Tylenol), ibuprofen (Advil, Motrin), or naproxen (Aleve). Read and follow all instructions on the label. · Before you use cough and cold medicines, check the label. These medicines may not be safe for young children or for people with certain health problems. · Be careful when taking over-the-counter cold or flu medicines and Tylenol at the same time. Many of these medicines have acetaminophen, which is Tylenol. Read the labels to make sure that you are not taking more than the recommended dose.  Too much acetaminophen (Tylenol) can be harmful. · Get plenty of rest.  · Do not smoke or allow others to smoke around you. If you need help quitting, talk to your doctor about stop-smoking programs and medicines. These can increase your chances of quitting for good. When should you call for help? Call 911 anytime you think you may need emergency care. For example, call if:  · You have severe trouble breathing. Call your doctor now or seek immediate medical care if:  · You seem to be getting much sicker. · You have new or worse trouble breathing. · You have a new or higher fever. · You have a new rash. Watch closely for changes in your health, and be sure to contact your doctor if:  · You have a new symptom, such as a sore throat, an earache, or sinus pain. · You cough more deeply or more often, especially if you notice more mucus or a change in the color of your mucus. · You do not get better as expected. Where can you learn more? Go to http://augusto-norma.info/. Enter K206 in the search box to learn more about \"Upper Respiratory Infection (Cold): Care Instructions. \"  Current as of: June 30, 2016  Content Version: 11.2  © 6891-0613 [employer]. Care instructions adapted under license by [employer] (which disclaims liability or warranty for this information). If you have questions about a medical condition or this instruction, always ask your healthcare professional. [place] [address] any warranty or liability for your use of this information.

## 2017-06-08 NOTE — ED NOTES
Patient (s) given copy of dc instructions and 2 paper script(s) and 1 electronic scripts. Patient (s) verbalized understanding of instructions and script (s). Patient given a current medication reconciliation form and verbalized understanding of their medications. Patient (s) verbalized understanding of the importance of discussing medications with  his or her physician or clinic they will be following up with. Patient alert and oriented and in no acute distress. Patient offered wheelchair from treatment area to hospital entrance, patient declined wheelchair.

## 2017-06-08 NOTE — ED PROVIDER NOTES
HPI Comments: Yolanda Oakes is a 37 y.o. female with PMHx of HTN, presenting ambulatory to ED c/o constant right elbow pain s/p GLF at work yesterday. Pt notes she took naproxen without relief of pain. She reports she was in the kitchen at work yesterday, accidentally slipped, and then had GLF, during which she hit her right elbow. Pt denies hitting her head or LOC. She also c/o left groin/left hip pain for a while. Pt notes pain is worse with carrying heavy things. She also c/o sinus congestion and rhinorrhea for the past several days. Pt reports associated aching, diffuse HA. She notes history of URI. She endorses history of HTN and states she took her medication today. Pt specifically denies right shoulder/wrist pain, right elbow bleeding, and LOC. PCP: None  Social Hx: current every day smoker (0.25 ppd); + EtOH; - drug use. There are no other complaints, changes, or physical findings at this time. Written by FABIAN Valdivia, as dictated by Wei Mandel MD.          The history is provided by the patient. Past Medical History:   Diagnosis Date    angina     Chiari malformation     Heart abnormalities     leaky heart valve, murmur    Hypertension     Other ill-defined conditions     cardiac arrhythmias    Other ill-defined conditions     neuropathy    Stroke ([facility] [address]     TIA       Past Surgical History:   Procedure Laterality Date    ABDOMEN SURGERY PROC UNLISTED      laporscopic surgery post ruptured fallopian tube    HX ORTHOPAEDIC      screws in left ankle    HX OTHER SURGICAL      surgery to back of head due to fractured skull    NEUROLOGICAL PROCEDURE UNLISTED           No family history on file. Social History     Social History    Marital status: SINGLE     Spouse name: N/A    Number of children: N/A    Years of education: N/A     Occupational History    Not on file.      Social History Main Topics    Smoking status: Current Every Day Smoker     Packs/day: 0.25     Years: 10.00    Smokeless tobacco: Not on file      Comment: 6 cig/day    Alcohol use Yes      Comment: social    Drug use: No    Sexual activity: Yes     Partners: Male     Birth control/ protection: None     Other Topics Concern    Not on file     Social History Narrative         ALLERGIES: Toradol [ketorolac]    Review of Systems   Constitutional: Negative for chills and fever. HENT: Positive for congestion and rhinorrhea. Eyes: Negative for visual disturbance. Respiratory: Negative for cough and shortness of breath. Cardiovascular: Negative for chest pain. Gastrointestinal: Negative for abdominal pain, nausea and vomiting. Genitourinary: Negative for difficulty urinating and dysuria. Musculoskeletal: Positive for arthralgias (right elbow). Negative for back pain and neck pain. Denies right shoulder/wrist pain; denies right elbow bleeding; +left groin/hip pain;   Skin: Negative for color change and rash. Neurological: Positive for headaches (diffuse). Negative for dizziness, syncope and weakness. Denies hitting her head; All other systems reviewed and are negative. Vitals:    06/07/17 2050 06/07/17 2114   BP: (!) 178/112 (!) 180/105   Pulse: 98 92   Resp: 18    Temp: 98.3 °F (36.8 °C)    SpO2: 97%    Weight: 64.4 kg (142 lb)    Height: 5' 2\" (1.575 m)             Physical Exam   Constitutional: She is oriented to person, place, and time. She appears well-developed and well-nourished. HENT:   + swollen nasal mucosa; ears are clear;   Neck: Normal range of motion. Cardiovascular: Normal rate, regular rhythm, normal heart sounds and intact distal pulses. Pulmonary/Chest: Effort normal and breath sounds normal.   Abdominal: Soft. Bowel sounds are normal.   Musculoskeletal:   + tenderness to right posterior elbow; tenderness over left groin/hip area; Neurological: She is alert and oriented to person, place, and time. Skin: Skin is warm and dry.    Nursing note and vitals reviewed. MDM  Number of Diagnoses or Management Options  Contusion of right elbow, initial encounter:   Elevated blood pressure reading:   Fall, initial encounter:   Left groin pain:   Sinus congestion:   Diagnosis management comments: DDx: contusion, fracture, URI, sinusitis. Amount and/or Complexity of Data Reviewed  Clinical lab tests: ordered and reviewed  Tests in the radiology section of CPT®: ordered and reviewed  Review and summarize past medical records: yes  Independent visualization of images, tracings, or specimens: yes    Patient Progress  Patient progress: stable    Procedures    LABORATORY TESTS:  Recent Results (from the past 12 hour(s))   HCG URINE, QL. - POC    Collection Time: 06/07/17 10:09 PM   Result Value Ref Range    Pregnancy test,urine (POC) NEGATIVE  NEG         IMAGING RESULTS:  EXAM: XR HIP LT W OR WO PELV 2-3 VWS     INDICATION: Trauma glf yesterday, pain. Additional history: Patient complains of \"falling at work\" one day previously. Patient hypertensive. COMPARISON: X-ray of the left femur, 11/28/2013.     FINDINGS: An AP view of the pelvis and a frogleg lateral view of the left hip  demonstrate no fracture, dislocation or other acute abnormality. Calcifications  in the left side the pelvis suggesting phleboliths. Minimal degenerative change  in both hips and at the pubic symphysis.     IMPRESSION  IMPRESSION: No acute abnormality.                  EXAM: XR ELBOW RT MIN 3 V     INDICATION: fall pain yesterday. Additional history: Patient complains of \"falling at work\" one day previously. Patient hypertensive.   COMPARISON: None.     FINDINGS: Three views of the right elbow demonstrate no fracture, dislocation,  effusion or other acute abnormality.     IMPRESSION  IMPRESSION: No acute abnormality.               MEDICATIONS GIVEN:  Medications   cloNIDine HCl (CATAPRES) tablet 0.1 mg (0.1 mg Oral Given 6/7/17 2114)   acetaminophen (TYLENOL) tablet 650 mg (650 mg Oral Given 6/7/17 2114)   HYDROcodone-acetaminophen (NORCO) 5-325 mg per tablet 1 Tab (1 Tab Oral Given 6/7/17 2218)       IMPRESSION:  1. Contusion of right elbow, initial encounter    2. Left groin pain    3. Fall, initial encounter    4. Elevated blood pressure reading    5. Sinus congestion        PLAN:  1. Current Discharge Medication List      START taking these medications    Details   traMADol-acetaminophen (ULTRACET) 37.5-325 mg per tablet Take 1 Tab by mouth every six (6) hours as needed for Pain. Max Daily Amount: 4 Tabs. Qty: 12 Tab, Refills: 0      methocarbamol (ROBAXIN) 500 mg tablet Take 1 Tab by mouth three (3) times daily. Qty: 15 Tab, Refills: 0      fluticasone (FLONASE) 50 mcg/actuation nasal spray 2 Sprays by Both Nostrils route daily. Qty: 1 Bottle, Refills: 0         CONTINUE these medications which have CHANGED    Details   fexofenadine (ALLEGRA) 60 mg tablet Take 1 Tab by mouth two (2) times a day. Qty: 10 Tab, Refills:              2.   Follow-up Information     Follow up With Details Comments Contact Info    [facility] EMERGENCY DEPT  If symptoms worsen [address] [facility]    [facility] Schedule an appointment as soon as possible for a visit for primary care [address] [place] [address]  [phone]        Return to ED if worse     DISCHARGE NOTE  10:44 PM  The patient has been re-evaluated and is ready for discharge. Reviewed available results with patient. Counseled pt on diagnosis and care plan. Pt has expressed understanding, and all questions have been answered. Pt agrees with plan and agrees to F/U as recommended, or return to the ED if their sxs worsen. Discharge instructions have been provided and explained to the pt, along with reasons to return to the ED.   Written by Sheron Brar, ED Scribe, as dictated by Serafin Hoffman MD.    This note is prepared by Sheron Brar, acting as Scribe for Megan Perez MD.    Megan Perez MD: The scribe's documentation has been prepared under my direction and personally reviewed by me in its entirety. I confirm that the note above accurately reflects all work, treatment, procedures, and medical decision making performed by me.

## 2017-06-08 NOTE — ED NOTES
Patient has been instructed that they have been given Norco 1 TAB PO which contains opioids, benzodiazepines, or other sedating drugs. Patient is aware that they  will need to refrain from driving or operating heavy machinery after taking this medication. Patient also instructed that they need to avoid drinking alcohol and using other products containing opioids, benzodiazepines, or other sedating drugs. Patient verbalized understanding.

## 2017-06-13 ENCOUNTER — HOSPITAL ENCOUNTER (EMERGENCY)
Age: 42
Discharge: HOME | End: 2017-06-14
Attending: EMERGENCY MEDICINE
Payer: MEDICAID

## 2017-06-13 DIAGNOSIS — R51.9 HEADACHE, UNSPECIFIED HEADACHE TYPE: Primary | ICD-10-CM

## 2017-06-13 DIAGNOSIS — E87.6 ACUTE HYPOKALEMIA: ICD-10-CM

## 2017-06-13 DIAGNOSIS — I10 ELEVATED BLOOD PRESSURE READING WITH DIAGNOSIS OF HYPERTENSION: ICD-10-CM

## 2017-06-13 DIAGNOSIS — R07.9 CHEST PAIN, UNSPECIFIED TYPE: ICD-10-CM

## 2017-06-13 PROCEDURE — 99285 EMERGENCY DEPT VISIT HI MDM: CPT

## 2017-06-13 PROCEDURE — 96375 TX/PRO/DX INJ NEW DRUG ADDON: CPT

## 2017-06-13 PROCEDURE — 96365 THER/PROPH/DIAG IV INF INIT: CPT

## 2017-06-13 RX ORDER — DIAZEPAM 10 MG/2ML
2 INJECTION INTRAMUSCULAR
Status: COMPLETED | OUTPATIENT
Start: 2017-06-13 | End: 2017-06-14

## 2017-06-13 RX ORDER — BUTALBITAL, ACETAMINOPHEN AND CAFFEINE 50; 325; 40 MG/1; MG/1; MG/1
1 TABLET ORAL
Status: COMPLETED | OUTPATIENT
Start: 2017-06-13 | End: 2017-06-14

## 2017-06-13 RX ORDER — PROMETHAZINE HYDROCHLORIDE 25 MG/1
25 TABLET ORAL
Status: COMPLETED | OUTPATIENT
Start: 2017-06-13 | End: 2017-06-14

## 2017-06-13 NOTE — LETTER
[facility] EMERGENCY DEPT 
[address] [id number] 
[phone] Work/School Note Date: 6/13/2017 To Whom It May concern: 
 
Shelly Devine was seen and treated today in the emergency room by the following provider(s): 
Attending Provider: Al Rojo MD. Shelly Devine may return to work on 06/16/17 or sooner if better. Sincerely, Al Rojo MD

## 2017-06-14 ENCOUNTER — APPOINTMENT (OUTPATIENT)
Dept: GENERAL RADIOLOGY | Age: 42
End: 2017-06-14
Attending: EMERGENCY MEDICINE
Payer: MEDICAID

## 2017-06-14 ENCOUNTER — APPOINTMENT (OUTPATIENT)
Dept: CT IMAGING | Age: 42
End: 2017-06-14
Attending: EMERGENCY MEDICINE
Payer: MEDICAID

## 2017-06-14 VITALS
TEMPERATURE: 98.2 F | WEIGHT: 142 LBS | RESPIRATION RATE: 16 BRPM | DIASTOLIC BLOOD PRESSURE: 89 MMHG | SYSTOLIC BLOOD PRESSURE: 154 MMHG | HEIGHT: 62 IN | BODY MASS INDEX: 26.13 KG/M2 | HEART RATE: 85 BPM | OXYGEN SATURATION: 97 %

## 2017-06-14 LAB
AMPHET UR QL SCN: NEGATIVE
ANION GAP BLD CALC-SCNC: 6 MMOL/L (ref 5–15)
APPEARANCE UR: CLEAR
BACTERIA URNS QL MICRO: ABNORMAL /HPF
BARBITURATES UR QL SCN: NEGATIVE
BASOPHILS # BLD AUTO: 0 K/UL (ref 0–0.1)
BASOPHILS # BLD: 0 % (ref 0–1)
BENZODIAZ UR QL: NEGATIVE
BILIRUB UR QL: NEGATIVE
BUN SERPL-MCNC: 13 MG/DL (ref 6–20)
BUN/CREAT SERPL: 12 (ref 12–20)
CALCIUM SERPL-MCNC: 8.5 MG/DL (ref 8.5–10.1)
CANNABINOIDS UR QL SCN: NEGATIVE
CHLORIDE SERPL-SCNC: 106 MMOL/L (ref 97–108)
CK MB CFR SERPL CALC: NORMAL % (ref 0–2.5)
CK MB SERPL-MCNC: <1 NG/ML (ref 5–25)
CK SERPL-CCNC: 159 U/L (ref 26–192)
CO2 SERPL-SCNC: 29 MMOL/L (ref 21–32)
COCAINE UR QL SCN: NEGATIVE
COLOR UR: ABNORMAL
CREAT SERPL-MCNC: 1.08 MG/DL (ref 0.55–1.02)
DRUG SCRN COMMENT,DRGCM: NORMAL
EOSINOPHIL # BLD: 0.1 K/UL (ref 0–0.4)
EOSINOPHIL NFR BLD: 1 % (ref 0–7)
EPITH CASTS URNS QL MICRO: ABNORMAL /LPF
ERYTHROCYTE [DISTWIDTH] IN BLOOD BY AUTOMATED COUNT: 12.1 % (ref 11.5–14.5)
GLUCOSE SERPL-MCNC: 124 MG/DL (ref 65–100)
GLUCOSE UR STRIP.AUTO-MCNC: NEGATIVE MG/DL
HCG UR QL: NEGATIVE
HCT VFR BLD AUTO: 35.1 % (ref 35–47)
HGB BLD-MCNC: 12.2 G/DL (ref 11.5–16)
HGB UR QL STRIP: NEGATIVE
KETONES UR QL STRIP.AUTO: NEGATIVE MG/DL
LEUKOCYTE ESTERASE UR QL STRIP.AUTO: NEGATIVE
LYMPHOCYTES # BLD AUTO: 26 % (ref 12–49)
LYMPHOCYTES # BLD: 2.5 K/UL (ref 0.8–3.5)
MAGNESIUM SERPL-MCNC: 1.9 MG/DL (ref 1.6–2.4)
MCH RBC QN AUTO: 31.2 PG (ref 26–34)
MCHC RBC AUTO-ENTMCNC: 34.8 G/DL (ref 30–36.5)
MCV RBC AUTO: 89.8 FL (ref 80–99)
METHADONE UR QL: NEGATIVE
MONOCYTES # BLD: 0.7 K/UL (ref 0–1)
MONOCYTES NFR BLD AUTO: 7 % (ref 5–13)
NEUTS SEG # BLD: 6.3 K/UL (ref 1.8–8)
NEUTS SEG NFR BLD AUTO: 66 % (ref 32–75)
NITRITE UR QL STRIP.AUTO: NEGATIVE
OPIATES UR QL: NEGATIVE
PCP UR QL: NEGATIVE
PH UR STRIP: 7 [PH] (ref 5–8)
PLATELET # BLD AUTO: 428 K/UL (ref 150–400)
POTASSIUM SERPL-SCNC: 3.4 MMOL/L (ref 3.5–5.1)
PROT UR STRIP-MCNC: NEGATIVE MG/DL
RBC # BLD AUTO: 3.91 M/UL (ref 3.8–5.2)
RBC #/AREA URNS HPF: ABNORMAL /HPF (ref 0–5)
SODIUM SERPL-SCNC: 141 MMOL/L (ref 136–145)
SP GR UR REFRACTOMETRY: <1.005 (ref 1–1.03)
TROPONIN I BLD-MCNC: <0.04 NG/ML (ref 0–0.08)
TROPONIN I BLD-MCNC: <0.04 NG/ML (ref 0–0.08)
UA: UC IF INDICATED,UAUC: ABNORMAL
UROBILINOGEN UR QL STRIP.AUTO: 0.2 EU/DL (ref 0.2–1)
WBC # BLD AUTO: 9.5 K/UL (ref 3.6–11)
WBC URNS QL MICRO: ABNORMAL /HPF (ref 0–4)

## 2017-06-14 PROCEDURE — 87086 URINE CULTURE/COLONY COUNT: CPT | Performed by: EMERGENCY MEDICINE

## 2017-06-14 PROCEDURE — 74011250636 HC RX REV CODE- 250/636: Performed by: EMERGENCY MEDICINE

## 2017-06-14 PROCEDURE — 81001 URINALYSIS AUTO W/SCOPE: CPT | Performed by: EMERGENCY MEDICINE

## 2017-06-14 PROCEDURE — 36415 COLL VENOUS BLD VENIPUNCTURE: CPT | Performed by: EMERGENCY MEDICINE

## 2017-06-14 PROCEDURE — 82550 ASSAY OF CK (CPK): CPT | Performed by: EMERGENCY MEDICINE

## 2017-06-14 PROCEDURE — 81025 URINE PREGNANCY TEST: CPT

## 2017-06-14 PROCEDURE — 93005 ELECTROCARDIOGRAM TRACING: CPT

## 2017-06-14 PROCEDURE — 74011250637 HC RX REV CODE- 250/637: Performed by: EMERGENCY MEDICINE

## 2017-06-14 PROCEDURE — 70450 CT HEAD/BRAIN W/O DYE: CPT

## 2017-06-14 PROCEDURE — 80307 DRUG TEST PRSMV CHEM ANLYZR: CPT | Performed by: EMERGENCY MEDICINE

## 2017-06-14 PROCEDURE — 84484 ASSAY OF TROPONIN QUANT: CPT

## 2017-06-14 PROCEDURE — 85025 COMPLETE CBC W/AUTO DIFF WBC: CPT | Performed by: EMERGENCY MEDICINE

## 2017-06-14 PROCEDURE — 80048 BASIC METABOLIC PNL TOTAL CA: CPT | Performed by: EMERGENCY MEDICINE

## 2017-06-14 PROCEDURE — 71010 XR CHEST PORT: CPT

## 2017-06-14 PROCEDURE — 83735 ASSAY OF MAGNESIUM: CPT | Performed by: EMERGENCY MEDICINE

## 2017-06-14 RX ORDER — PROCHLORPERAZINE EDISYLATE 5 MG/ML
5 INJECTION INTRAMUSCULAR; INTRAVENOUS
Status: COMPLETED | OUTPATIENT
Start: 2017-06-14 | End: 2017-06-14

## 2017-06-14 RX ORDER — ZINC GLUCONATE 10 MG
1 LOZENGE ORAL
Qty: 7 TAB | Refills: 0 | Status: SHIPPED | OUTPATIENT
Start: 2017-06-14 | End: 2017-10-06

## 2017-06-14 RX ORDER — BUTALBITAL, ACETAMINOPHEN AND CAFFEINE 300; 40; 50 MG/1; MG/1; MG/1
1 CAPSULE ORAL
Qty: 12 CAP | Refills: 0 | Status: SHIPPED | OUTPATIENT
Start: 2017-06-14 | End: 2017-10-06

## 2017-06-14 RX ORDER — PROMETHAZINE HYDROCHLORIDE 25 MG/1
25 TABLET ORAL
Qty: 9 TAB | Refills: 0 | Status: SHIPPED | OUTPATIENT
Start: 2017-06-14 | End: 2017-10-06

## 2017-06-14 RX ORDER — MAGNESIUM SULFATE 1 G/100ML
1 INJECTION INTRAVENOUS
Status: COMPLETED | OUTPATIENT
Start: 2017-06-14 | End: 2017-06-14

## 2017-06-14 RX ORDER — DIPHENHYDRAMINE HYDROCHLORIDE 50 MG/ML
25 INJECTION, SOLUTION INTRAMUSCULAR; INTRAVENOUS
Status: COMPLETED | OUTPATIENT
Start: 2017-06-14 | End: 2017-06-14

## 2017-06-14 RX ORDER — POTASSIUM CHLORIDE 750 MG/1
10 TABLET, FILM COATED, EXTENDED RELEASE ORAL
Status: COMPLETED | OUTPATIENT
Start: 2017-06-14 | End: 2017-06-14

## 2017-06-14 RX ORDER — HYDRALAZINE HYDROCHLORIDE 20 MG/ML
10 INJECTION INTRAMUSCULAR; INTRAVENOUS
Status: COMPLETED | OUTPATIENT
Start: 2017-06-14 | End: 2017-06-14

## 2017-06-14 RX ORDER — MORPHINE SULFATE 2 MG/ML
2 INJECTION, SOLUTION INTRAMUSCULAR; INTRAVENOUS
Status: COMPLETED | OUTPATIENT
Start: 2017-06-14 | End: 2017-06-14

## 2017-06-14 RX ORDER — HYDRALAZINE HYDROCHLORIDE 20 MG/ML
10 INJECTION INTRAMUSCULAR; INTRAVENOUS
Status: DISCONTINUED | OUTPATIENT
Start: 2017-06-14 | End: 2017-06-14

## 2017-06-14 RX ADMIN — BUTALBITAL, ACETAMINOPHEN, AND CAFFEINE 1 TABLET: 50; 325; 40 TABLET ORAL at 00:06

## 2017-06-14 RX ADMIN — PROCHLORPERAZINE EDISYLATE 5 MG: 5 INJECTION INTRAMUSCULAR; INTRAVENOUS at 02:17

## 2017-06-14 RX ADMIN — DIPHENHYDRAMINE HYDROCHLORIDE 25 MG: 50 INJECTION INTRAMUSCULAR; INTRAVENOUS at 02:17

## 2017-06-14 RX ADMIN — MAGNESIUM SULFATE HEPTAHYDRATE 1 G: 1 INJECTION, SOLUTION INTRAVENOUS at 02:22

## 2017-06-14 RX ADMIN — POTASSIUM CHLORIDE 10 MEQ: 750 TABLET, FILM COATED, EXTENDED RELEASE ORAL at 02:16

## 2017-06-14 RX ADMIN — HYDRALAZINE HYDROCHLORIDE 10 MG: 20 INJECTION, SOLUTION INTRAMUSCULAR; INTRAVENOUS at 00:39

## 2017-06-14 RX ADMIN — PROMETHAZINE HYDROCHLORIDE 25 MG: 25 TABLET ORAL at 00:06

## 2017-06-14 RX ADMIN — DIAZEPAM 2 MG: 5 INJECTION, SOLUTION INTRAMUSCULAR; INTRAVENOUS at 00:39

## 2017-06-14 RX ADMIN — Medication 2 MG: at 00:56

## 2017-06-14 NOTE — ED PROVIDER NOTES
HPI Comments: Jm Syed is a 37 y.o. female with significant PMHx of hypertension, angina, neuropathy, and TIA, presents ambulatory to the ED with c/o acute onset of gradually worsening occipital headache x 1 hour. She states she had mid sternal chest pain at the same time as her headache. She notes the headache radiates into her neck and pain is exacerbated when she moves her head. Pt states she was at work, a  at [employer], when the headache began. She notes taking naproxen without relief. Pt reports she has a history of muscle spasm in her chest and noted similar symptoms today when her headache started. Pt reports she took her blood pressure medication today. Pt denies numbness, tingling, nausea, vomiting, fever, chills, fall, trauma to her head, or syncope. PCP: None    Social Hx: +tobacco (0.25 ppd), +EtOH (social)    There are no other complaints, changes or physical findings at this time. Written by Lina Reich ED Scribe, as dictated by Redd Kothari MD      The history is provided by the patient and medical records. No  was used. Past Medical History:   Diagnosis Date    angina     [facility] at [facility])     Chiari malformation     Heart abnormalities     leaky heart valve, murmur    Hypertension     Other ill-defined conditions     cardiac arrhythmias    Other ill-defined conditions     neuropathy    Stroke ([facility] [address]     TIA       Past Surgical History:   Procedure Laterality Date    ABDOMEN SURGERY PROC UNLISTED      laporscopic surgery post ruptured fallopian tube    HX ORTHOPAEDIC      screws in left ankle    HX OTHER SURGICAL      surgery to back of head due to fractured skull    NEUROLOGICAL PROCEDURE UNLISTED           History reviewed. No pertinent family history.     Social History     Social History    Marital status: SINGLE     Spouse name: N/A    Number of children: N/A    Years of education: N/A     Occupational History    Not on file.     Social History Main Topics    Smoking status: Current Every Day Smoker     Packs/day: 0.25     Years: 10.00    Smokeless tobacco: Not on file      Comment: 6 cig/day    Alcohol use Yes      Comment: social    Drug use: No    Sexual activity: Yes     Partners: Male     Birth control/ protection: None     Other Topics Concern    Not on file     Social History Narrative         ALLERGIES: Toradol [ketorolac]    Review of Systems   Constitutional: Negative for chills and fever. HENT: Negative for congestion and rhinorrhea. - head injury   Eyes: Negative for visual disturbance. Respiratory: Negative for cough and shortness of breath. Cardiovascular: Positive for chest pain (muscle spasm, mid sternal). Gastrointestinal: Negative for abdominal pain, nausea and vomiting. Genitourinary: Negative for difficulty urinating and dysuria. Musculoskeletal: Positive for neck pain (posterior). Negative for arthralgias and back pain. Skin: Negative for color change and rash. Neurological: Positive for headaches (occipital). Negative for dizziness, syncope, weakness and numbness. - tingling       Patient Vitals for the past 12 hrs:   Temp Pulse Resp BP SpO2   06/14/17 0230 - 85 16 - -   06/14/17 0200 - 90 14 - -   06/14/17 0130 - 91 19 154/89 -   06/14/17 0115 - 94 14 (!) 158/95 -   06/14/17 0100 - 97 16 (!) 156/98 -   06/14/17 0055 - 97 16 (!) 158/100 -   06/14/17 0036 - 90 20 (!) 204/120 -   06/14/17 0015 - 89 27 (!) 208/122 -   06/14/17 0011 - 90 16 (!) 211/127 -   06/14/17 0002 - 92 18 (!) 206/132 -   06/13/17 2344 98.2 °F (36.8 °C) 92 18 (!) 224/137 97 %              Physical Exam   Constitutional: She is oriented to person, place, and time. She appears well-developed and well-nourished. HENT:   Right Ear: Tympanic membrane normal.   Left Ear: Tympanic membrane normal.   Mouth/Throat: Oropharynx is clear and moist.   Eyes: Pupils are equal, round, and reactive to light.    Neck: Normal range of motion. Cardiovascular: Normal rate, regular rhythm, normal heart sounds and intact distal pulses. Pulmonary/Chest: Effort normal and breath sounds normal.   Abdominal: Soft. Bowel sounds are normal.   Musculoskeletal:   Paraspinal cervical tenderness. No meningeal signs   Neurological: She is alert and oriented to person, place, and time. No focal or motor deficits   Skin: Skin is warm and dry. Nursing note and vitals reviewed. MDM  Number of Diagnoses or Management Options  Chest pain, unspecified type:   Headache, unspecified headache type:   Uncontrolled hypertension:   Diagnosis management comments:       DDx: radiculopathy, occipital neurologia, uncontrolled hypertension, acute coronary syndrome         Amount and/or Complexity of Data Reviewed  Clinical lab tests: ordered and reviewed  Tests in the radiology section of CPT®: ordered and reviewed  Tests in the medicine section of CPT®: ordered and reviewed  Independent visualization of images, tracings, or specimens: yes    Critical Care  Total time providing critical care: 30-74 minutes    ED Course       Procedures      EKG interpretation: (Preliminary) 00:02  Rhythm: normal sinus rhythm; and regular . Rate (approx.): 88; Axis: normal; MS interval: normal; QRS interval: normal ; ST/T wave: normal; Other findings: anterior infract, borderline ekg; same as previous. Written by FABIAN Peacockibe, as dictated by Maura Lema MD.      Progress Note:  12:47 AM  Nurse informed pt had a negative troponin and the headache has not improved. Progress Note:  1:35 AM  Pt states she would like to go home and she wants to try a different medication for her pain. Will sign out to Arlin Garza MD and write up pt for discharge. Progress Note:  2:19 AM  Pt is feeling better and ready to be discharged.   Written by Nicolasa Martinez ED Scribe, as dictated by Adolph Gong MD.      CRITICAL CARE NOTE :    12:18 AM      IMPENDING DETERIORATION -Cardiovascular and CNS    ASSOCIATED RISK FACTORS - Dysrhythmia, Metabolic changes and CNS Decompensation    MANAGEMENT- Bedside Assessment and Supervision of Care    INTERPRETATION -  CT Scan and Blood Pressure, ecg    INTERVENTIONS - hemodynamic mngmt, iv meds    CASE REVIEW - Nursing and ED Attg Dr Mary Kay Garibay -Improved and Stable    PERFORMED BY - Self        NOTES   :      I have spent 45 minutes of critical care time involved in lab review, consultations with specialist, family decision- making, bedside attention and documentation. During this entire length of time I was immediately available to the patient . Sia Licea MD      SIGN OUT:  1:50 AM  Patient's presentation, labs/imaging and plan of care was reviewed with Kay Ba MD as part of sign out. They will wait for pain to improve and discharge as part of the plan discussed with the patient. Kay Ba MD's assistance in completion of this plan is greatly appreciated but it should be noted that I will be the provider of record for this patient. Sia Licea MD    This note is prepared by Jessica Watson, acting as Scribe for Sia Licea MD.    Sia Licea MD : The scribe's documentation has been prepared under my direction and personally reviewed by me in its entirety. I confirm that the note above accurately reflects all work, treatment, procedures, and medical decision making performed by me.       LABORATORY TESTS:  Recent Results (from the past 12 hour(s))   EKG, 12 LEAD, INITIAL    Collection Time: 06/14/17 12:02 AM   Result Value Ref Range    Ventricular Rate 88 BPM    Atrial Rate 88 BPM    P-R Interval 168 ms    QRS Duration 84 ms    Q-T Interval 366 ms    QTC Calculation (Bezet) 442 ms    Calculated P Axis 38 degrees    Calculated R Axis 30 degrees    Calculated T Axis 25 degrees    Diagnosis       Normal sinus rhythm  Cannot rule out Anterior infarct , age undetermined  Abnormal ECG  When compared with ECG of 23-APR-2017 18:51,  No significant change was found     HCG URINE, QL. - POC    Collection Time: 06/14/17 12:08 AM   Result Value Ref Range    Pregnancy test,urine (POC) NEGATIVE  NEG     CBC WITH AUTOMATED DIFF    Collection Time: 06/14/17 12:23 AM   Result Value Ref Range    WBC 9.5 3.6 - 11.0 K/uL    RBC 3.91 3.80 - 5.20 M/uL    HGB 12.2 11.5 - 16.0 g/dL    HCT 35.1 35.0 - 47.0 %    MCV 89.8 80.0 - 99.0 FL    MCH 31.2 26.0 - 34.0 PG    MCHC 34.8 30.0 - 36.5 g/dL    RDW 12.1 11.5 - 14.5 %    PLATELET 872 (H) 740 - 400 K/uL    NEUTROPHILS 66 32 - 75 %    LYMPHOCYTES 26 12 - 49 %    MONOCYTES 7 5 - 13 %    EOSINOPHILS 1 0 - 7 %    BASOPHILS 0 0 - 1 %    ABS. NEUTROPHILS 6.3 1.8 - 8.0 K/UL    ABS. LYMPHOCYTES 2.5 0.8 - 3.5 K/UL    ABS. MONOCYTES 0.7 0.0 - 1.0 K/UL    ABS. EOSINOPHILS 0.1 0.0 - 0.4 K/UL    ABS.  BASOPHILS 0.0 0.0 - 0.1 K/UL   METABOLIC PANEL, BASIC    Collection Time: 06/14/17 12:23 AM   Result Value Ref Range    Sodium 141 136 - 145 mmol/L    Potassium 3.4 (L) 3.5 - 5.1 mmol/L    Chloride 106 97 - 108 mmol/L    CO2 29 21 - 32 mmol/L    Anion gap 6 5 - 15 mmol/L    Glucose 124 (H) 65 - 100 mg/dL    BUN 13 6 - 20 MG/DL    Creatinine 1.08 (H) 0.55 - 1.02 MG/DL    BUN/Creatinine ratio 12 12 - 20      GFR est AA >60 >60 ml/min/1.73m2    GFR est non-AA 56 (L) >60 ml/min/1.73m2    Calcium 8.5 8.5 - 10.1 MG/DL   DRUG SCREEN, URINE    Collection Time: 06/14/17 12:23 AM   Result Value Ref Range    AMPHETAMINE NEGATIVE  NEG      BARBITURATES NEGATIVE  NEG      BENZODIAZEPINE NEGATIVE  NEG      COCAINE NEGATIVE  NEG      METHADONE NEGATIVE  NEG      OPIATES NEGATIVE  NEG      PCP(PHENCYCLIDINE) NEGATIVE  NEG      THC (TH-CANNABINOL) NEGATIVE  NEG      Drug screen comment (NOTE)    URINALYSIS W/ REFLEX CULTURE    Collection Time: 06/14/17 12:23 AM   Result Value Ref Range    Color YELLOW/STRAW      Appearance CLEAR CLEAR      Specific gravity <1.005 1.003 - 1.030    pH (UA) 7.0 5.0 - 8.0      Protein NEGATIVE  NEG mg/dL    Glucose NEGATIVE  NEG mg/dL    Ketone NEGATIVE  NEG mg/dL    Bilirubin NEGATIVE  NEG      Blood NEGATIVE  NEG      Urobilinogen 0.2 0.2 - 1.0 EU/dL    Nitrites NEGATIVE  NEG      Leukocyte Esterase NEGATIVE  NEG      WBC 0-4 0 - 4 /hpf    RBC 0-5 0 - 5 /hpf    Epithelial cells FEW FEW /lpf    Bacteria 1+ (A) NEG /hpf    UA:UC IF INDICATED URINE CULTURE ORDERED (A) CNI     CK W/ CKMB & INDEX    Collection Time: 06/14/17 12:23 AM   Result Value Ref Range     26 - 192 U/L    CK - MB <1.0 <3.6 NG/ML    CK-MB Index Cannot be calulated 0.0 - 2.5     MAGNESIUM    Collection Time: 06/14/17 12:23 AM   Result Value Ref Range    Magnesium 1.9 1.6 - 2.4 mg/dL   POC TROPONIN-I    Collection Time: 06/14/17 12:23 AM   Result Value Ref Range    Troponin-I (POC) <0.04 0.00 - 0.08 ng/mL   POC TROPONIN-I    Collection Time: 06/14/17  2:32 AM   Result Value Ref Range    Troponin-I (POC) <0.04 0.00 - 0.08 ng/mL   EKG, 12 LEAD, SUBSEQUENT    Collection Time: 06/14/17  2:33 AM   Result Value Ref Range    Ventricular Rate 84 BPM    Atrial Rate 84 BPM    P-R Interval 166 ms    QRS Duration 84 ms    Q-T Interval 388 ms    QTC Calculation (Bezet) 458 ms    Calculated P Axis 36 degrees    Calculated R Axis 10 degrees    Calculated T Axis 6 degrees    Diagnosis       Normal sinus rhythm  Possible Anterior infarct , age undetermined  Abnormal ECG  When compared with ECG of 14-JUN-2017 00:02,  MANUAL COMPARISON REQUIRED, DATA IS UNCONFIRMED         IMAGING RESULTS:  CT HEAD WO CONT   Final Result   INDICATION: post headache, elevated bp, h/o tia, chiari malformation     COMPARISON: April 23.     TECHNIQUE: Unenhanced CT of the head was performed using 5 mm images. Brain and  bone windows were generated. CT dose reduction was achieved through use of a  standardized protocol tailored for this examination and automatic exposure  control for dose modulation.    FINDINGS:  There is no extra-axial fluid collection hemorrhage shift or masses her.     IMPRESSION  IMPRESSION: Negative. XR CHEST PORT   Final Result   CXR Results  (Last 48 hours)               06/14/17 0038  XR CHEST PORT Final result    Impression:   impression: No acute changes. Narrative:  Clinical indication: Chest pain. Portable AP upright view of the chest is obtained. Comparison May 11, 2015. The   heart size is normal. There is no acute infiltrate or shift. MEDICATIONS GIVEN:   Medications   promethazine (PHENERGAN) tablet 25 mg (25 mg Oral Given 6/14/17 0006)   butalbital-acetaminophen-caffeine (FIORICET, ESGIC) -40 mg per tablet 1 Tab (1 Tab Oral Given 6/14/17 0006)   diazePAM (VALIUM) injection 2 mg (2 mg IntraVENous Given 6/14/17 0039)   hydrALAZINE (APRESOLINE) 20 mg/mL injection 10 mg (10 mg IntraVENous Given 6/14/17 0039)   morphine injection 2 mg (2 mg IntraVENous Given 6/14/17 0056)   magnesium sulfate 1 g/100 ml IVPB (premix or compounded) (0 g IntraVENous IV Completed 6/14/17 0335)   potassium chloride SR (KLOR-CON 10) tablet 10 mEq (10 mEq Oral Given 6/14/17 0216)   prochlorperazine (COMPAZINE) injection 5 mg (5 mg IntraVENous Given 6/14/17 0217)   diphenhydrAMINE (BENADRYL) injection 25 mg (25 mg IntraVENous Given 6/14/17 0217)       IMPRESSION:  1. Headache, unspecified headache type    2. Chest pain, unspecified type    3. Elevated blood pressure reading with diagnosis of hypertension    4. Acute hypokalemia        PLAN:  1. Discharge Medication List as of 6/14/2017  1:48 AM      START taking these medications    Details   promethazine (PHENERGAN) 25 mg tablet Take 1 Tab by mouth every eight (8) hours as needed.  Indications: Pain Treatment Adjunct, Normal, Disp-9 Tab, R-0      magnesium 250 mg tab Take 1 Tab by mouth daily as needed., Normal, Disp-7 Tab, R-0      butalbital-acetaminophen-caff (FIORICET) -40 mg per capsule Take 1 Cap by mouth every six (6) hours as needed for Pain or Headache. Max Daily Amount: 4 Caps. Indications: TENSION-TYPE HEADACHE, Print, Disp-12 Cap, R-0         CONTINUE these medications which have NOT CHANGED    Details   methocarbamol (ROBAXIN) 500 mg tablet Take 1 Tab by mouth three (3) times daily. , Normal, Disp-15 Tab, R-0      fluticasone (FLONASE) 50 mcg/actuation nasal spray 2 Sprays by Both Nostrils route daily. , Normal, Disp-1 Bottle, R-0      risperiDONE (RISPERDAL) 0.25 mg tablet Take  by mouth., Historical Med      divalproex DR (DEPAKOTE) 125 mg tablet Take 125 mg by mouth three (3) times daily. , Historical Med      traZODone (DESYREL) 50 mg tablet Take  by mouth nightly., Historical Med      metoprolol tartrate (LOPRESSOR) 50 mg tablet Take 1 Tab by mouth two (2) times a day., Normal, Disp-60 Tab, R-0      sodium chloride (OCEAN) 0.65 % nasal spray 1 [place] by Both Nostrils route as needed for Congestion. Indications: Nasal Congestion, Print, Disp-15 mL, R-0      aspirin delayed-release 81 mg tablet Take 81 mg by mouth daily. , Historical Med      nitroglycerin (NITROSTAT) 0.4 mg SL tablet by SubLINGual route every five (5) minutes as needed for Chest Pain., Historical Med         STOP taking these medications       traMADol-acetaminophen (ULTRACET) 37.5-325 mg per tablet Comments:   Reason for Stopping:         fexofenadine (ALLEGRA) 60 mg tablet Comments:   Reason for Stopping:         butalbital-aspirin-caffeine (FIORINAL) capsule Comments:   Reason for Stoppin.   Follow-up Information     Follow up With Details Comments Contact Info    [facility] EMERGENCY DEPT  If symptoms worsen [address] [facility]  [address] [address]  [phone]    [facility]  [address] [id number] [phone]    [facility]  [address] [place] [address]  [phone]        Return to ED if worse       Discharge Note:  3:45 AM  The pt is ready for discharge. The pt's signs, symptoms, diagnosis, and discharge instructions have been discussed and pt has conveyed their understanding. The pt is to follow up as recommended or return to ER should their symptoms worsen. Plan has been discussed and pt is in agreement. This note is prepared by Cristhian Calderon, acting as a Scribe for Truong Green MD.    Truong Green MD: The scribe's documentation has been prepared under my direction and personally reviewed by me in its entirety. I confirm that the notes above accurately reflects all work, treatment, procedures, and medical decision making performed by me.

## 2017-06-14 NOTE — ED NOTES
Pt in ED w/ complaint of headache X 4 hrs. Pt states she tried OTC Naproxen for her symptoms w/o relief. Pt's BP was elevated in triage. Pt states she has taken her HTN medication as prescribed. Pt is A&O X 4. Emergency Department Nursing Plan of Care       The Nursing Plan of Care is developed from the Nursing assessment and Emergency Department Attending provider initial evaluation. The plan of care may be reviewed in the ED Provider note.     The Plan of Care was developed with the following considerations:   Patient / Family readiness to learn indicated by:verbalized understanding  Persons(s) to be included in education: patient and family  Barriers to Learning/Limitations:No    Signed     Ariel Daigle RN    6/14/2017   12:30 AM

## 2017-06-14 NOTE — ED NOTES
Patient (s) was given copy of dc instructions and one paper script(s) and two electronic scripts. Patient (s) has verbalized understanding of instructions and script (s). Patient was given a current medication reconciliation form and verbalized understanding of their medications. Patient (s) has verbalized understanding of the importance of discussing medications with  his or her physician or clinic they will be following up with. Patient alert and oriented and in no acute distress. Patient offered wheelchair from treatment area to hospital entrance, patient declined wheelchair. Patient left ED with spouse.

## 2017-06-14 NOTE — ED NOTES
Pt resting in bed w/ spouse at pt's bedside, pt appears to be in no distress, will continue to monitor pt.

## 2017-06-14 NOTE — ED NOTES
Pt sleeping in bed w/ spouse at pt's bedside, pt appears to be in no distress, will continue to monitor pt.

## 2017-06-14 NOTE — DISCHARGE INSTRUCTIONS
Head or Face Pain: Care Instructions  Your Care Instructions  Common causes of head or face pain are allergies, stress, and injuries. Other causes include tooth problems and sinus infections. Eating certain foods, such as chocolate or cheese, or drinking certain liquids, such as coffee or cola, can cause head pain for some people. If you have mild head pain, you may not need treatment. It is important to watch your symptoms and talk to your doctor if your pain continues or gets worse. Follow-up care is a key part of your treatment and safety. Be sure to make and go to all appointments, and call your doctor if you are having problems. It's also a good idea to know your test results and keep a list of the medicines you take. How can you care for yourself at home? · Take pain medicines exactly as directed. ¨ If the doctor gave you a prescription medicine for pain, take it as prescribed. ¨ If you are not taking a prescription pain medicine, ask your doctor if you can take an over-the-counter pain medicine. · Take it easy for the next few days or longer if you are not feeling well. · Use a warm, moist towel or heating pad set on low to relax tight muscles in your shoulder and neck. Have someone gently massage your neck and shoulders. · Put ice or a cold pack on the area for 10 to 20 minutes at a time. Put a thin cloth between the ice and your skin. When should you call for help? Call 911 anytime you think you may need emergency care. For example, call if:  · You have twitching, jerking, or a seizure. · You passed out (lost consciousness). · You have symptoms of a stroke. These may include:  ¨ Sudden numbness, tingling, weakness, or loss of movement in your face, arm, or leg, especially on only one side of your body. ¨ Sudden vision changes. ¨ Sudden trouble speaking. ¨ Sudden confusion or trouble understanding simple statements. ¨ Sudden problems with walking or balance.   ¨ A sudden, severe headache that is different from past headaches. · You have jaw pain and pain in your chest, shoulder, neck, or arm. Call your doctor now or seek immediate medical care if:  · You have a fever with a stiff neck or a severe headache. · You have nausea and vomiting, or you cannot keep food or liquids down. Watch closely for changes in your health, and be sure to contact your doctor if:  · Your head or face pain does not get better as expected. Where can you learn more? Go to http://augusto-norma.info/. Enter P568 in the search box to learn more about \"Head or Face Pain: Care Instructions. \"  Current as of: May 27, 2016  Content Version: 11.2  © 4066-7041 [employer]. Care instructions adapted under license by [employer] (which disclaims liability or warranty for this information). If you have questions about a medical condition or this instruction, always ask your healthcare professional. [place] [address] any warranty or liability for your use of this information. Chest Pain: Care Instructions  Your Care Instructions  There are many things that can cause chest pain. Some are not serious and will get better on their own in a few days. But some kinds of chest pain need more testing and treatment. Your doctor may have recommended a follow-up visit in the next 8 to 12 hours. If you are not getting better, you may need more tests or treatment. Even though your doctor has released you, you still need to watch for any problems. The doctor carefully checked you, but sometimes problems can develop later. If you have new symptoms or if your symptoms do not get better, get medical care right away. If you have worse or different chest pain or pressure that lasts more than 5 minutes or you passed out (lost consciousness), call 911 or seek other emergency help right away. A medical visit is only one step in your treatment.  Even if you feel better, you still need to do what your doctor recommends, such as going to all suggested follow-up appointments and taking medicines exactly as directed. This will help you recover and help prevent future problems. How can you care for yourself at home? · Rest until you feel better. · Take your medicine exactly as prescribed. Call your doctor if you think you are having a problem with your medicine. · Do not drive after taking a prescription pain medicine. When should you call for help? Call 911 if:  · You passed out (lost consciousness). · You have severe difficulty breathing. · You have symptoms of a heart attack. These may include:  ¨ Chest pain or pressure, or a strange feeling in your chest.  ¨ Sweating. ¨ Shortness of breath. ¨ Nausea or vomiting. ¨ Pain, pressure, or a strange feeling in your back, neck, jaw, or upper belly or in one or both shoulders or arms. ¨ Lightheadedness or sudden weakness. ¨ A fast or irregular heartbeat. After you call 911, the  may tell you to chew 1 adult-strength or 2 to 4 low-dose aspirin. Wait for an ambulance. Do not try to drive yourself. Call your doctor today if:  · You have any trouble breathing. · Your chest pain gets worse. · You are dizzy or lightheaded, or you feel like you may faint. · You are not getting better as expected. · You are having new or different chest pain. Where can you learn more? Go to http://augusto-norma.info/. Enter A120 in the search box to learn more about \"Chest Pain: Care Instructions. \"  Current as of: May 27, 2016  Content Version: 11.2  © 2135-8751 [employer]. Care instructions adapted under license by [employer] (which disclaims liability or warranty for this information). If you have questions about a medical condition or this instruction, always ask your healthcare professional. [address] any warranty or liability for your use of this information.          High Blood Pressure: Care Instructions  Your Care Instructions  If your blood pressure is usually above 140/90, you have high blood pressure, or hypertension. That means the top number is 140 or higher or the bottom number is 90 or higher, or both. Despite what a lot of people think, high blood pressure usually doesn't cause headaches or make you feel dizzy or lightheaded. It usually has no symptoms. But it does increase your risk for heart attack, stroke, and kidney or eye damage. The higher your blood pressure, the more your risk increases. Your doctor will give you a goal for your blood pressure. Your goal will be based on your health and your age. An example of a goal is to keep your blood pressure below 140/90. Lifestyle changes, such as eating healthy and being active, are always important to help lower blood pressure. You might also take medicine to reach your blood pressure goal.  Follow-up care is a key part of your treatment and safety. Be sure to make and go to all appointments, and call your doctor if you are having problems. It's also a good idea to know your test results and keep a list of the medicines you take. How can you care for yourself at home? Medical treatment  · If you stop taking your medicine, your blood pressure will go back up. You may take one or more types of medicine to lower your blood pressure. Be safe with medicines. Take your medicine exactly as prescribed. Call your doctor if you think you are having a problem with your medicine. · Talk to your doctor before you start taking aspirin every day. Aspirin can help certain people lower their risk of a heart attack or stroke. But taking aspirin isn't right for everyone, because it can cause serious bleeding. · See your doctor regularly. You may need to see the doctor more often at first or until your blood pressure comes down.   · If you are taking blood pressure medicine, talk to your doctor before you take decongestants or anti-inflammatory medicine, such as ibuprofen. Some of these medicines can raise blood pressure. · Learn how to check your blood pressure at home. Lifestyle changes  · Stay at a healthy weight. This is especially important if you put on weight around the waist. Losing even 10 pounds can help you lower your blood pressure. · If your doctor recommends it, get more exercise. Walking is a good choice. Bit by bit, increase the amount you walk every day. Try for at least 30 minutes on most days of the week. You also may want to swim, bike, or do other activities. · Avoid or limit alcohol. Talk to your doctor about whether you can drink any alcohol. · Try to limit how much sodium you eat to less than 2,300 milligrams (mg) a day. Your doctor may ask you to try to eat less than 1,500 mg a day. · Eat plenty of fruits (such as bananas and oranges), vegetables, legumes, whole grains, and low-fat dairy products. · Lower the amount of saturated fat in your diet. Saturated fat is found in animal products such as milk, cheese, and meat. Limiting these foods may help you lose weight and also lower your risk for heart disease. · Do not smoke. Smoking increases your risk for heart attack and stroke. If you need help quitting, talk to your doctor about stop-smoking programs and medicines. These can increase your chances of quitting for good. When should you call for help? Call 911 anytime you think you may need emergency care. This may mean having symptoms that suggest that your blood pressure is causing a serious heart or blood vessel problem. Your blood pressure may be over 180/110. For example, call 911 if:  · You have symptoms of a heart attack. These may include:  ¨ Chest pain or pressure, or a strange feeling in the chest.  ¨ Sweating. ¨ Shortness of breath. ¨ Nausea or vomiting. ¨ Pain, pressure, or a strange feeling in the back, neck, jaw, or upper belly or in one or both shoulders or arms.   ¨ Lightheadedness or sudden weakness. ¨ A fast or irregular heartbeat. · You have symptoms of a stroke. These may include:  ¨ Sudden numbness, tingling, weakness, or loss of movement in your face, arm, or leg, especially on only one side of your body. ¨ Sudden vision changes. ¨ Sudden trouble speaking. ¨ Sudden confusion or trouble understanding simple statements. ¨ Sudden problems with walking or balance. ¨ A sudden, severe headache that is different from past headaches. · You have severe back or belly pain. Do not wait until your blood pressure comes down on its own. Get help right away. Call your doctor now or seek immediate care if:  · Your blood pressure is much higher than normal (such as 180/110 or higher), but you don't have symptoms. · You think high blood pressure is causing symptoms, such as:  ¨ Severe headache. ¨ Blurry vision. Watch closely for changes in your health, and be sure to contact your doctor if:  · Your blood pressure measures 140/90 or higher at least 2 times. That means the top number is 140 or higher or the bottom number is 90 or higher, or both. · You think you may be having side effects from your blood pressure medicine. · Your blood pressure is usually normal, but it goes above normal at least 2 times. Where can you learn more? Go to http://augusto-norma.info/. Enter G935 in the search box to learn more about \"High Blood Pressure: Care Instructions. \"  Current as of: August 8, 2016  Content Version: 11.2  © 6199-5957 [employer]. Care instructions adapted under license by [employer] (which disclaims liability or warranty for this information). If you have questions about a medical condition or this instruction, always ask your healthcare professional. [place] [address] any warranty or liability for your use of this information.

## 2017-06-15 LAB
ATRIAL RATE: 84 BPM
ATRIAL RATE: 88 BPM
BACTERIA SPEC CULT: NORMAL
CALCULATED P AXIS, ECG09: 36 DEGREES
CALCULATED P AXIS, ECG09: 38 DEGREES
CALCULATED R AXIS, ECG10: 10 DEGREES
CALCULATED R AXIS, ECG10: 30 DEGREES
CALCULATED T AXIS, ECG11: 25 DEGREES
CALCULATED T AXIS, ECG11: 6 DEGREES
CC UR VC: NORMAL
DIAGNOSIS, 93000: NORMAL
DIAGNOSIS, 93000: NORMAL
P-R INTERVAL, ECG05: 166 MS
P-R INTERVAL, ECG05: 168 MS
Q-T INTERVAL, ECG07: 366 MS
Q-T INTERVAL, ECG07: 388 MS
QRS DURATION, ECG06: 84 MS
QRS DURATION, ECG06: 84 MS
QTC CALCULATION (BEZET), ECG08: 442 MS
QTC CALCULATION (BEZET), ECG08: 458 MS
SERVICE CMNT-IMP: NORMAL
VENTRICULAR RATE, ECG03: 84 BPM
VENTRICULAR RATE, ECG03: 88 BPM

## 2017-09-28 ENCOUNTER — HOSPITAL ENCOUNTER (OUTPATIENT)
Dept: MAMMOGRAPHY | Age: 42
Discharge: HOME | End: 2017-09-28
Attending: GENERAL PRACTICE
Payer: MEDICAID

## 2017-09-28 DIAGNOSIS — Z12.31 VISIT FOR SCREENING MAMMOGRAM: ICD-10-CM

## 2017-09-28 PROCEDURE — 77067 SCR MAMMO BI INCL CAD: CPT

## 2017-10-06 ENCOUNTER — HOSPITAL ENCOUNTER (EMERGENCY)
Age: 42
Discharge: HOME | End: 2017-10-06
Attending: EMERGENCY MEDICINE
Payer: MEDICAID

## 2017-10-06 ENCOUNTER — APPOINTMENT (OUTPATIENT)
Dept: GENERAL RADIOLOGY | Age: 42
End: 2017-10-06
Attending: NURSE PRACTITIONER
Payer: MEDICAID

## 2017-10-06 VITALS
WEIGHT: 138 LBS | DIASTOLIC BLOOD PRESSURE: 104 MMHG | OXYGEN SATURATION: 95 % | SYSTOLIC BLOOD PRESSURE: 154 MMHG | RESPIRATION RATE: 18 BRPM | HEART RATE: 94 BPM | TEMPERATURE: 98.8 F | BODY MASS INDEX: 25.4 KG/M2 | HEIGHT: 62 IN
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
## 2017-10-06 DIAGNOSIS — S13.9XXA NECK SPRAIN, INITIAL ENCOUNTER: ICD-10-CM

## 2017-10-06 PROCEDURE — 72050 X-RAY EXAM NECK SPINE 4/5VWS: CPT

## 2017-10-06 PROCEDURE — 74011250637 HC RX REV CODE- 250/637: Performed by: NURSE PRACTITIONER

## 2017-10-06 PROCEDURE — 99284 EMERGENCY DEPT VISIT MOD MDM: CPT

## 2017-10-06 RX ORDER — HYDROCODONE BITARTRATE AND ACETAMINOPHEN 5; 325 MG/1; MG/1
1 TABLET ORAL
Status: COMPLETED | OUTPATIENT
Start: 2017-10-06 | End: 2017-10-06

## 2017-10-06 RX ORDER — HYDROCODONE BITARTRATE AND ACETAMINOPHEN 10; 325 MG/1; MG/1
1 TABLET ORAL
Qty: 20 TAB | Refills: 0 | Status: SHIPPED | OUTPATIENT
Start: 2017-10-06 | End: 2017-12-09

## 2017-10-06 RX ORDER — METHOCARBAMOL 500 MG/1
500 TABLET, FILM COATED ORAL 4 TIMES DAILY
Qty: 30 TAB | Refills: 0 | Status: SHIPPED | OUTPATIENT
Start: 2017-10-06 | End: 2017-12-09

## 2017-10-06 RX ADMIN — HYDROCODONE BITARTRATE AND ACETAMINOPHEN 1 TABLET: 5; 325 TABLET ORAL at 18:14

## 2017-10-06 NOTE — ED NOTES
César Hernandez NP at bedside reviewing patient's discharge instructions and reviewing medications. Patient ambulatory home. Patient in no apparent distress.
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
## 2017-10-06 NOTE — ED NOTES
Emergency Department Nursing Plan of Care       The Nursing Plan of Care is developed from the Nursing assessment and Emergency Department Attending provider initial evaluation. The plan of care may be reviewed in the ED Provider note.     The Plan of Care was developed with the following considerations:   Patient / Family readiness to learn indicated by:verbalized understanding  Persons(s) to be included in education: patient  Barriers to Learning/Limitations:No    Signed     Aaliyah Lorenzo RN    10/6/2017   6:06 PM
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
## 2017-10-06 NOTE — DISCHARGE INSTRUCTIONS
Neck Strain: Care Instructions  Your Care Instructions  You have strained the muscles and ligaments in your neck. A sudden, awkward movement can strain the neck. This often occurs with falls or car accidents or during certain sports. Everyday activities like working on a computer or sleeping can also cause neck strain if they force you to hold your neck in an awkward position for a long time. It is common for neck pain to get worse for a day or two after an injury, but it should start to feel better after that. You may have more pain and stiffness for several days before it gets better. This is expected. It may take a few weeks or longer for it to heal completely. Good home treatment can help you get better faster and avoid future neck problems. Follow-up care is a key part of your treatment and safety. Be sure to make and go to all appointments, and call your doctor if you are having problems. It's also a good idea to know your test results and keep a list of the medicines you take. How can you care for yourself at home? · If you were given a neck brace (cervical collar) to limit neck motion, wear it as instructed for as many days as your doctor tells you to. Do not wear it longer than you were told to. Wearing a brace for too long can make neck stiffness worse and weaken the neck muscles. · You can try using heat or ice to see if it helps. ¨ Try using a heating pad on a low or medium setting for 15 to 20 minutes every 2 to 3 hours. Try a warm shower in place of one session with the heating pad. You can also buy single-use heat wraps that last up to 8 hours. ¨ You can also try an ice pack for 10 to 15 minutes every 2 to 3 hours. · Take pain medicines exactly as directed. ¨ If the doctor gave you a prescription medicine for pain, take it as prescribed. ¨ If you are not taking a prescription pain medicine, ask your doctor if you can take an over-the-counter medicine.   · Gently rub the area to relieve pain and help with blood flow. Do not massage the area if it hurts to do so. · Do not do anything that makes the pain worse. Take it easy for a couple of days. You can do your usual activities if they do not hurt your neck or put it at risk for more stress or injury. · Try sleeping on a special neck pillow. Place it under your neck, not under your head. Placing a tightly rolled-up towel under your neck while you sleep will also work. If you use a neck pillow or rolled towel, do not use your regular pillow at the same time. · To prevent future neck pain, do exercises to stretch and strengthen your neck and back. Learn how to use good posture, safe lifting techniques, and proper body mechanics. When should you call for help? Call 911 anytime you think you may need emergency care. For example, call if:  · You are unable to move an arm or a leg at all. Call your doctor now or seek immediate medical care if:  · You have new or worse symptoms in your arms, legs, chest, belly, or buttocks. Symptoms may include:  ¨ Numbness or tingling. ¨ Weakness. ¨ Pain. · You lose bladder or bowel control. Watch closely for changes in your health, and be sure to contact your doctor if:  · You are not getting better as expected. Where can you learn more? Go to http://augusto-norma.info/. Enter M253 in the search box to learn more about \"Neck Strain: Care Instructions. \"  Current as of: March 21, 2017  Content Version: 11.3  © 1566-4332 [employer]. Care instructions adapted under license by [employer] (which disclaims liability or warranty for this information). If you have questions about a medical condition or this instruction, always ask your healthcare professional. [address] any warranty or liability for your use of this information.

## 2017-10-06 NOTE — ED NOTES
Pt walked out of ED, states, \"I'll be right back. \" Pt's friend is in the room along with her belongings.
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
## 2017-11-15 ENCOUNTER — HOSPITAL ENCOUNTER (EMERGENCY)
Age: 42
Discharge: HOME | End: 2017-11-15
Attending: EMERGENCY MEDICINE
Payer: MEDICAID

## 2017-11-15 DIAGNOSIS — R10.2 PELVIC PAIN: Primary | ICD-10-CM

## 2017-11-15 DIAGNOSIS — N89.8 VAGINAL DISCHARGE: ICD-10-CM

## 2017-11-15 DIAGNOSIS — A64 STD (SEXUALLY TRANSMITTED DISEASE): ICD-10-CM

## 2017-11-15 LAB
APPEARANCE UR: CLEAR
BACTERIA URNS QL MICRO: NEGATIVE /HPF
BILIRUB UR QL: NEGATIVE
C TRACH DNA SPEC QL NAA+PROBE: POSITIVE
CLUE CELLS VAG QL WET PREP: NORMAL
COLOR UR: ABNORMAL
EPITH CASTS URNS QL MICRO: ABNORMAL /LPF
GLUCOSE UR STRIP.AUTO-MCNC: NEGATIVE MG/DL
HCG UR QL: NEGATIVE
HGB UR QL STRIP: NEGATIVE
KETONES UR QL STRIP.AUTO: NEGATIVE MG/DL
KOH PREP SPEC: NORMAL
LEUKOCYTE ESTERASE UR QL STRIP.AUTO: ABNORMAL
N GONORRHOEA DNA SPEC QL NAA+PROBE: NEGATIVE
NITRITE UR QL STRIP.AUTO: NEGATIVE
PH UR STRIP: 6 [PH] (ref 5–8)
PROT UR STRIP-MCNC: NEGATIVE MG/DL
RBC #/AREA URNS HPF: ABNORMAL /HPF (ref 0–5)
SAMPLE TYPE: ABNORMAL
SERVICE CMNT-IMP: ABNORMAL
SERVICE CMNT-IMP: NORMAL
SP GR UR REFRACTOMETRY: 1.01 (ref 1–1.03)
SPECIMEN SOURCE: ABNORMAL
T VAGINALIS VAG QL WET PREP: NORMAL
UROBILINOGEN UR QL STRIP.AUTO: 0.2 EU/DL (ref 0.2–1)
WBC URNS QL MICRO: ABNORMAL /HPF (ref 0–4)

## 2017-11-15 PROCEDURE — 81001 URINALYSIS AUTO W/SCOPE: CPT

## 2017-11-15 PROCEDURE — 87210 SMEAR WET MOUNT SALINE/INK: CPT

## 2017-11-15 PROCEDURE — 99283 EMERGENCY DEPT VISIT LOW MDM: CPT

## 2017-11-15 PROCEDURE — 81025 URINE PREGNANCY TEST: CPT

## 2017-11-15 PROCEDURE — 99282 EMERGENCY DEPT VISIT SF MDM: CPT

## 2017-11-15 PROCEDURE — 87491 CHLMYD TRACH DNA AMP PROBE: CPT

## 2017-11-15 RX ORDER — METRONIDAZOLE 500 MG/1
500 TABLET ORAL 2 TIMES DAILY
Qty: 14 TAB | Refills: 0 | Status: SHIPPED | OUTPATIENT
Start: 2017-11-15 | End: 2017-12-09

## 2017-11-15 RX ORDER — CEFTRIAXONE 250 MG/8ML
INJECTION, POWDER, FOR SOLUTION INTRAMUSCULAR; INTRAVENOUS
Status: DISCONTINUED
Start: 2017-11-15 | End: 2017-11-15 | Stop reason: HOSPADM

## 2017-11-15 RX ORDER — AZITHROMYCIN 250 MG/1
TABLET, FILM COATED ORAL
Status: DISCONTINUED
Start: 2017-11-15 | End: 2017-11-15 | Stop reason: HOSPADM

## 2017-11-15 NOTE — LETTER
11/16/2017 Octaviano Garcia [address] [facility] 7 [id number] Dear Ms. Prado You were seen in the Emergency Department of [address] [place] [address] on 11/15/17 and had lab and/or radiology tests performed. We would like to discuss these results with you . Please call the Emergency Department at your earliest convenience at [phone], to speak with one of our providers. The chlamydia from your Emergency Department visit on 11/15/17 was positive. [facility]. Your partner needs to be treated. If you have any questions please contact the Emergency Department at [phone]. Sincerely, SHAWN Morris [facility] EMERGENCY DEPT 
[address] [place] [id number] [phone]

## 2017-11-15 NOTE — ED PROVIDER NOTES
[address] [place] [address]  EMERGENCY DEPARTMENT HISTORY AND PHYSICAL EXAM         Date of Service: 11/15/2017   Patient Name: Sue Martinez   YOB: 1975  Medical Record Number: [id number]    History of Presenting Illness     No chief complaint on file. History Provided By:  patient    Additional History:   Sue Martinez is a 40 y.o. female with PMhx significant for HTN who presents ambulatory to the ED with cc of persistent foul smelling vaginal discharge and vaginal itching x 3 days. She reports associated mild pelvic pain. Pt adds she is concerned for possible STD because she recently had unprotected sexual intercourse with someone other than her  while they were . Pt specifically denies any recent dysuria, difficulty urinating, or N/V/D. Social Hx: + Tobacco, + EtOH, - Illicit Drugs    There are no other complaints, changes or physical findings at this time. Primary Care Provider: Claire Aguirre MD     Past History   Past Medical History:   Past Medical History:   Diagnosis Date    angina     Angina at rest [facility])     Chiari malformation     Heart abnormalities     leaky heart valve, murmur    Hypertension     Other ill-defined conditions(799.89)     cardiac arrhythmias    Other ill-defined conditions(799.89)     neuropathy    Stroke ([facility] [address]     TIA        Past Surgical History:   Past Surgical History:   Procedure Laterality Date    ABDOMEN SURGERY PROC UNLISTED      laporscopic surgery post ruptured fallopian tube    HX ORTHOPAEDIC      screws in left ankle    HX OTHER SURGICAL      surgery to back of head due to fractured skull    NEUROLOGICAL PROCEDURE UNLISTED          Family History:   No family history on file.      Social History:   Social History   Substance Use Topics    Smoking status: Current Every Day Smoker     Packs/day: 0.25     Years: 10.00    Smokeless tobacco: Never Used      Comment: 6 cig/day    Alcohol use Yes Comment: social        Allergies: Allergies   Allergen Reactions    Toradol [Ketorolac] Hives        Review of Systems   Review of Systems   Constitutional: Negative. Negative for chills, fever and unexpected weight change. HENT: Negative. Negative for congestion and trouble swallowing. Eyes: Negative for discharge. Respiratory: Negative. Negative for cough, chest tightness and shortness of breath. Cardiovascular: Negative. Negative for chest pain. Gastrointestinal: Negative. Negative for abdominal distention, abdominal pain, constipation, diarrhea and nausea. Endocrine: Negative. Genitourinary: Positive for pelvic pain and vaginal discharge. Negative for difficulty urinating, dysuria, frequency and urgency. +vaginal itching   Musculoskeletal: Negative. Negative for arthralgias and myalgias. Skin: Negative. Negative for color change. Allergic/Immunologic: Negative. Neurological: Negative. Negative for dizziness, speech difficulty and headaches. Hematological: Negative. Psychiatric/Behavioral: Negative. Negative for agitation and confusion. All other systems reviewed and are negative. Physical Exam  Physical Exam   Constitutional: She is oriented to person, place, and time. She appears well-developed and well-nourished. HENT:   Head: Normocephalic and atraumatic. Eyes: Conjunctivae and EOM are normal.   Neck: Neck supple. Cardiovascular: Normal rate, regular rhythm and intact distal pulses. Pulmonary/Chest: Effort normal. No respiratory distress. Abdominal: Soft. There is no tenderness. Genitourinary:   Genitourinary Comments: Pelvic exam: clumpy thick discharge around cervix. No CMT. No adnexal or uterine tenderness. Musculoskeletal: Normal range of motion. She exhibits no deformity. Neurological: She is alert and oriented to person, place, and time. Skin: Skin is warm and dry. Psychiatric: She has a normal mood and affect.  Her behavior is normal. Thought content normal.   Vitals reviewed. Medical Decision Making   I am the first provider for this patient. I reviewed the vital signs, available nursing notes, past medical history, past surgical history, family history and social history. ED Course:  12:50 AM   Initial assessment performed. The patients presenting problems have been discussed, and they are in agreement with the care plan formulated and outlined with them. I have encouraged them to ask questions as they arise throughout their visit. Progress Notes:  2:14 AM   Reviewed lab results with pt. Will continue to monitor BP.     2:29 AM  Repeat /61. Stable for discharge. Procedure Note - Pelvic Exam:    1:45 AM  Performed by: Cheryl Duncan. Roxanne Palmer MD  Chaperoned by: Reji Cantrell  Pelvic exam was performed using bimanual and speculum. Further findings noted in physical exam.   The procedure took 1-15 minutes, and pt tolerated well.     Diagnostic Study Results   Labs -    Recent Results (from the past 12 hour(s))   WENDY, OTHER SOURCES    Collection Time: 11/15/17  1:34 AM   Result Value Ref Range    Special Requests: NO SPECIAL REQUESTS      KOH NO YEAST SEEN     WET PREP    Collection Time: 11/15/17  1:34 AM   Result Value Ref Range    Clue cells CLUE CELLS PRESENT      Wet prep NO TRICHOMONAS SEEN     URINALYSIS W/MICROSCOPIC    Collection Time: 11/15/17  1:34 AM   Result Value Ref Range    Color YELLOW/STRAW      Appearance CLEAR CLEAR      Specific gravity 1.015 1.003 - 1.030      pH (UA) 6.0 5.0 - 8.0      Protein NEGATIVE  NEG mg/dL    Glucose NEGATIVE  NEG mg/dL    Ketone NEGATIVE  NEG mg/dL    Bilirubin NEGATIVE  NEG      Blood NEGATIVE  NEG      Urobilinogen 0.2 0.2 - 1.0 EU/dL    Nitrites NEGATIVE  NEG      Leukocyte Esterase TRACE (A) NEG      WBC 0-4 0 - 4 /hpf    RBC 0-5 0 - 5 /hpf    Epithelial cells FEW FEW /lpf    Bacteria NEGATIVE  NEG /hpf   HCG URINE, QL    Collection Time: 11/15/17  1:34 AM   Result Value Ref Range HCG urine, Ql. NEGATIVE  NEG         Vital Signs-Reviewed the patient's vital signs. 0050: Temp 98F, /128, Pulse 96, Resp 20, O2 Sat 98%  0200: /124, Resp 18, O2 Sat 100%  0230: 162/61, Resp 18, O2 Sat 100%      Medications Given in the ED:  Medications   Clonidine 0.2mg administered 0135   azithromycin (ZITHROMAX) 250 mg tablet administered 0146   cefTRIAXone (ROCEPHIN) 250 mg injection administered 0145       Diagnosis:  Clinical Impression:   1. Pelvic pain    2. STD (sexually transmitted disease)    3. Vaginal discharge         Plan:  1:   Follow-up Information     Follow up With Details Comments [address], [place] Schedule an appointment as soon as possible for a visit  [address]      [facility] EMERGENCY DEPT  As needed, If symptoms worsen Johan Tsering  [phone]          2:   Discharge Medication List as of 11/15/2017 12:58 AM      START taking these medications    Details   metroNIDAZOLE (FLAGYL) 500 mg tablet Take 1 Tab by mouth two (2) times a day., Normal, Disp-14 Tab, R-0         CONTINUE these medications which have NOT CHANGED    Details   OTHER Historical Med      methocarbamol (ROBAXIN) 500 mg tablet Take 1 Tab by mouth four (4) times daily. , Normal, Disp-30 Tab, R-0      HYDROcodone-acetaminophen (NORCO)  mg tablet Take 1 Tab by mouth every six (6) hours as needed for Pain. Max Daily Amount: 4 Tabs., Print, Disp-20 Tab, R-0           Return to ED if worse. Discharge Note:  2:30 AM  The patient has been re-evaluated and is ready for discharge. Reviewed available results with patient. Counseled patient on diagnosis and care plan. Patient has expressed understanding, and all questions have been answered. Patient agrees with plan and agrees to follow up as recommended, or to return to the ED if their symptoms worsen.  Discharge instructions have been provided and explained to the patient, along with reasons to return to the ED.  _______________________________   Attestations: This note is prepared by Chengodilon Nguyễn, acting as Scribe for Salomón Phelps MD.      The scribe's documentation has been prepared under my direction and personally reviewed by me in its entirety. I confirm that the note above accurately reflects all work, treatment, procedures, and medical decision making performed by me. Salomón Phelps MD  _______________________________     Pt noted to receive care in the ED during monthly downtime. Imaging and lab work may not be readily available in EMR. Hard copy print outs of labs have been reviewed, imaging results (via \"sticky notes\" from Radiologist) have also been reviewed.

## 2017-11-15 NOTE — ED NOTES
The documentation for this period is being entered following the guidelines as defined in the [facility] policy by Yousuf Abdul RN. See paper chart.

## 2017-11-15 NOTE — DISCHARGE INSTRUCTIONS
Exposure to Sexually Transmitted Infections: Care Instructions  Your Care Instructions    Sexually transmitted infections (STIs) are those diseases spread by sexual contact. There are at least 20 different STIs, including chlamydia, gonorrhea, syphilis, and human immunodeficiency virus (HIV), which causes AIDS. Bacteria-caused STIs can be treated and cured. STIs caused by viruses, such as HIV, can be treated but not cured. Some STIs can reduce a woman's chances of getting pregnant in the future. STIs are spread during sexual contact, such as vaginal intercourse and oral or anal sex. Follow-up care is a key part of your treatment and safety. Be sure to make and go to all appointments, and call your doctor if you are having problems. It's also a good idea to know your test results and keep a list of the medicines you take. How can you care for yourself at home? · Your doctor may have given you a shot of antibiotics. If your doctor prescribed antibiotic pills, take them as directed. Do not stop taking them just because you feel better. You need to take the full course of antibiotics. · Do not have sexual contact while you have symptoms of an STI or are being treated for an STI. · Tell your sex partner (or partners) that he or she will need treatment. · If you are a woman, do not douche. Douching changes the normal balance of bacteria in the vagina and may spread an infection up into your reproductive organs. To prevent exposure to STIs in the future  · Use latex condoms every time you have sex. Use them from the beginning to the end of sexual contact. · Talk to your partner before you have sex. Find out if he or she has or is at risk for any STI. Keep in mind that a person may be able to spread an STI even if he or she does not have symptoms. · Do not have sex if you are being treated for an STI. · Do not have sex with anyone who has symptoms of an STI, such as sores on the genitals or mouth.   · Having one sex partner (who does not have STIs and does not have sex with anyone else) is a good way to avoid STIs. When should you call for help? Call your doctor now or seek immediate medical care if:  ? · You have new pain in your belly or pelvis. ? · You have symptoms of a urinary tract infection. These may include:  ¨ Pain or burning when you urinate. ¨ A frequent need to urinate without being able to pass much urine. ¨ Pain in the flank, which is just below the rib cage and above the waist on either side of the back. ¨ Blood in your urine. ¨ A fever. ? · You have new or worsening pain or swelling in the scrotum. ? Watch closely for changes in your health, and be sure to contact your doctor if:  ? · You have unusual vaginal bleeding. ? · You have a discharge from the vagina or penis. ? · You have any new symptoms, such as sores, bumps, rashes, blisters, or warts. ? · You have itching, tingling, pain, or burning in the genital or anal area. ? · You think you may have an STI. Where can you learn more? Go to http://augusto-norma.info/. Enter W060 in the search box to learn more about \"Exposure to Sexually Transmitted Infections: Care Instructions. \"  Current as of: March 20, 2017  Content Version: 11.4  © 6787-3033 [employer]. Care instructions adapted under license by [employer] (which disclaims liability or warranty for this information). If you have questions about a medical condition or this instruction, always ask your healthcare professional. [place] [address] any warranty or liability for your use of this information. Vaginitis: Care Instructions  Your Care Instructions    Vaginitis is soreness or infection of the vagina. This common problem can cause itching and burning. And it can cause a change in vaginal discharge. Sometimes it can cause pain during sex. Vaginitis may be caused by bacteria, yeast, or other germs.  Some infections that cause it are caught from a sexual partner. Bath products, spermicides, and douches can irritate the vagina too. Some women have this problem during and after menopause. A drop in estrogen levels during this time can cause dryness, soreness, and pain during sex. Your doctor can give you medicine to treat an infection. And home care may help you feel better. For certain types of infections, your sex partner must be treated too. Follow-up care is a key part of your treatment and safety. Be sure to make and go to all appointments, and call your doctor if you are having problems. It's also a good idea to know your test results and keep a list of the medicines you take. How can you care for yourself at home? · If your doctor prescribed antibiotics, take them as directed. Do not stop taking them just because you feel better. You need to take the full course of antibiotics. · Take your medicines exactly as prescribed. Call your doctor if you think you are having a problem with your medicine. · Do not eat or drink anything that has alcohol if you are taking metronidazole (Flagyl). · If you have a yeast infection, use over-the-counter products as your doctor tells you to. Or take medicine your doctor prescribes exactly as directed. · Wash your vaginal area daily with water. You also can use a mild, unscented soap if you want. · Do not use scented bath products. And do not use vaginal sprays or douches. · Put a washcloth soaked in cool water on the area to relieve itching. Or you can take cool baths. · If you have dryness because of menopause, use estrogen cream or pills that your doctor prescribes. · Ask your doctor about when it is okay to have sex. · Use a personal lubricant before sex if you have dryness. Examples are Astroglide, K-Y Jelly, and Wet Lubricant Gel. · Ask your doctor if your sex partner also needs treatment. When should you call for help?   Call your doctor now or seek immediate medical care if:  ? · You have a fever and pelvic pain. ? Watch closely for changes in your health, and be sure to contact your doctor if:  ? · You have bleeding other than your period. ? · You do not get better as expected. Where can you learn more? Go to http://augusto-norma.info/. Enter R106 in the search box to learn more about \"Vaginitis: Care Instructions. \"  Current as of: October 13, 2016  Content Version: 11.4  © 5684-8573 [employer]. Care instructions adapted under license by [employer] (which disclaims liability or warranty for this information). If you have questions about a medical condition or this instruction, always ask your healthcare professional. [address] any warranty or liability for your use of this information.

## 2017-12-09 ENCOUNTER — HOSPITAL ENCOUNTER (EMERGENCY)
Age: 42
Discharge: HOME | End: 2017-12-09
Attending: EMERGENCY MEDICINE
Payer: MEDICAID

## 2017-12-09 VITALS
WEIGHT: 129 LBS | RESPIRATION RATE: 18 BRPM | OXYGEN SATURATION: 97 % | SYSTOLIC BLOOD PRESSURE: 167 MMHG | TEMPERATURE: 98.2 F | BODY MASS INDEX: 23.74 KG/M2 | HEIGHT: 62 IN | HEART RATE: 97 BPM | DIASTOLIC BLOOD PRESSURE: 110 MMHG

## 2017-12-09 DIAGNOSIS — Z86.19 H/O CHLAMYDIA INFECTION: Primary | ICD-10-CM

## 2017-12-09 PROCEDURE — 74011250636 HC RX REV CODE- 250/636: Performed by: EMERGENCY MEDICINE

## 2017-12-09 PROCEDURE — 96372 THER/PROPH/DIAG INJ SC/IM: CPT

## 2017-12-09 PROCEDURE — 99283 EMERGENCY DEPT VISIT LOW MDM: CPT

## 2017-12-09 PROCEDURE — 74011250637 HC RX REV CODE- 250/637: Performed by: EMERGENCY MEDICINE

## 2017-12-09 PROCEDURE — 74011000250 HC RX REV CODE- 250: Performed by: EMERGENCY MEDICINE

## 2017-12-09 RX ORDER — PROMETHAZINE HYDROCHLORIDE 25 MG/1
25 TABLET ORAL
Qty: 12 TAB | Refills: 0 | Status: SHIPPED | OUTPATIENT
Start: 2017-12-09 | End: 2017-12-27

## 2017-12-09 RX ORDER — AZITHROMYCIN 250 MG/1
1000 TABLET, FILM COATED ORAL
Status: COMPLETED | OUTPATIENT
Start: 2017-12-09 | End: 2017-12-09

## 2017-12-09 RX ORDER — ASPIRIN 81 MG/1
TABLET ORAL DAILY
COMMUNITY
End: 2019-12-05

## 2017-12-09 RX ORDER — FLUCONAZOLE 150 MG/1
150 TABLET ORAL DAILY
Qty: 1 TAB | Refills: 0 | Status: SHIPPED | OUTPATIENT
Start: 2017-12-09 | End: 2017-12-10

## 2017-12-09 RX ORDER — PROMETHAZINE HYDROCHLORIDE 25 MG/1
25 TABLET ORAL
Status: COMPLETED | OUTPATIENT
Start: 2017-12-09 | End: 2017-12-09

## 2017-12-09 RX ORDER — MICONAZOLE NITRATE 100 MG/1
100 SUPPOSITORY VAGINAL
Qty: 7 SUPPOSITORY | Refills: 0 | Status: SHIPPED | OUTPATIENT
Start: 2017-12-09 | End: 2017-12-16

## 2017-12-09 RX ADMIN — AZITHROMYCIN 1000 MG: 250 TABLET, FILM COATED ORAL at 22:12

## 2017-12-09 RX ADMIN — LIDOCAINE HYDROCHLORIDE 250 MG: 10 INJECTION, SOLUTION EPIDURAL; INFILTRATION; INTRACAUDAL; PERINEURAL at 22:11

## 2017-12-09 RX ADMIN — PROMETHAZINE HYDROCHLORIDE 25 MG: 25 TABLET ORAL at 22:11

## 2017-12-10 NOTE — ED PROVIDER NOTES
EMERGENCY DEPARTMENT HISTORY AND PHYSICAL EXAM      Date: 12/9/2017  Patient Name: Sue Martinez    History of Presenting Illness     Chief Complaint   Patient presents with    Abdominal Pain     Pt with c/o lower abd pain and discharge. Pt states that she was seen here two weeks ago and tested for STDs and she just found the letter from [employer] stating that she is + chlamydia. History Provided By: Patient    HPI: Sue Martinez, 40 y.o. female with PMHx significant for HTN, TIA, angina, leaky heart valve, and murmur, presents ambulatory to the [facility] ED with cc of vaginal discharge with associated mild pelvic pain and nausea. Pt is here for treatment of chlamydia, for which she was seen here on 11/15/17; she was treated in the ED with Zithromax and Rocephin and was discharged with Flagyl. However, pt states she did not see the letter from [email or web address] containing her positive chlamydia results until after she had resumed unprotected sexual intercourse with the same partner that had passed her the STD. Pt thinks she also has a yeast infection and states she has been treating it as well. She specifically denies vomiting, fever, and chills. PCP: Claire Aguirre MD    There are no other complaints, changes, or physical findings at this time. Current Outpatient Prescriptions   Medication Sig Dispense Refill    aspirin delayed-release 81 mg tablet Take  by mouth daily.  OTHER Indications: unknown HTN med      fluconazole (DIFLUCAN) 150 mg tablet Take 1 Tab by mouth daily for 1 day. FDA advises cautious prescribing of oral fluconazole in pregnancy. 1 Tab 0    miconazole (MICOTIN) 100 mg vaginal suppository Insert 1 Suppository into vagina nightly for 7 days. 7 Suppository 0    promethazine (PHENERGAN) 25 mg tablet Take 1 Tab by mouth every six (6) hours as needed.  Indications: nausea 12 Tab 0       Past History     Past Medical History:  Past Medical History:   Diagnosis Date    angina     [facility]ina [facility] rest [facility])     Chiari malformation     Heart abnormalities     leaky heart valve, murmur    Hypertension     Other ill-defined conditions(799.89)     cardiac arrhythmias    Other ill-defined conditions(799.89)     neuropathy    Stroke ([facility] [address]     TIA       Past Surgical History:  Past Surgical History:   Procedure Laterality Date    ABDOMEN SURGERY PROC UNLISTED      laporscopic surgery post ruptured fallopian tube    HX ORTHOPAEDIC      screws in left ankle    HX OTHER SURGICAL      surgery to back of head due to fractured skull    NEUROLOGICAL PROCEDURE UNLISTED         Family History:  History reviewed. No pertinent family history. Social History:  Social History   Substance Use Topics    Smoking status: Current Every Day Smoker     Packs/day: 0.25     Years: 10.00    Smokeless tobacco: Never Used      Comment: 6 cig/day    Alcohol use Yes      Comment: social       Allergies: Allergies   Allergen Reactions    Toradol [Ketorolac] Hives         Review of Systems   Review of Systems   Constitutional: Negative for chills and fever. HENT: Negative for congestion and rhinorrhea. Eyes: Negative for visual disturbance. Respiratory: Negative for cough and shortness of breath. Cardiovascular: Negative for chest pain. Gastrointestinal: Positive for nausea. Negative for abdominal pain and vomiting. Genitourinary: Positive for pelvic pain (mild) and vaginal discharge. Negative for difficulty urinating and dysuria. Musculoskeletal: Negative for arthralgias, back pain and neck pain. Skin: Negative for color change and rash. Neurological: Negative for dizziness, weakness and headaches. All other systems reviewed and are negative. Physical Exam   Physical Exam   Constitutional: She is oriented to person, place, and time. She appears well-developed and well-nourished. Neck: Normal range of motion.    Cardiovascular: Normal rate, regular rhythm, normal heart sounds and intact distal pulses. Pulmonary/Chest: Effort normal and breath sounds normal.   Abdominal: Soft. Bowel sounds are normal.   Neurological: She is alert and oriented to person, place, and time. Skin: Skin is warm and dry. Nursing note and vitals reviewed. Diagnostic Study Results     N/A    Medical Decision Making   I am the first provider for this patient. I reviewed the vital signs, available nursing notes, past medical history, past surgical history, family history and social history. Vital Signs-Reviewed the patient's vital signs. Patient Vitals for the past 12 hrs:   Temp Pulse Resp BP SpO2   12/09/17 2124 98.2 °F (36.8 °C) 97 18 (!) 167/110 97 %       Pulse Oximetry Analysis - 97% on RA    Cardiac Monitor:   Rate: 97 bpm       Records Reviewed: Old Medical Records    Provider Notes (Medical Decision Making):   DDx: Partially treated STD, recurrent STD, UTI, vaginitis. ED Course:   Initial assessment performed. The patients presenting problems have been discussed, and they are in agreement with the care plan formulated and outlined with them. I have encouraged them to ask questions as they arise throughout their visit. Disposition:  DISCHARGE NOTE  10:03 PM  The patient has been re-evaluated and is ready for discharge. Reviewed available results with patient. Counseled patient on diagnosis and care plan. Patient has expressed understanding, and all questions have been answered. Patient agrees with plan and agrees to follow up as recommended, or return to the ED if their symptoms worsen. Discharge instructions have been provided and explained to the patient, along with reasons to return to the ED. PLAN:  1. Discharge Medication List as of 12/9/2017  9:59 PM      START taking these medications    Details   fluconazole (DIFLUCAN) 150 mg tablet Take 1 Tab by mouth daily for 1 day. FDA advises cautious prescribing of oral fluconazole in pregnancy. , Normal, Disp-1 Tab, R-0      miconazole (MICOTIN) 100 mg vaginal suppository Insert 1 Suppository into vagina nightly for 7 days. , Normal, Disp-7 Suppository, R-0      promethazine (PHENERGAN) 25 mg tablet Take 1 Tab by mouth every six (6) hours as needed. Indications: nausea, Normal, Disp-12 Tab, R-0         CONTINUE these medications which have NOT CHANGED    Details   aspirin delayed-release 81 mg tablet Take  by mouth daily. , Historical Med      OTHER Indications: unknown HTN med, Historical Med           2. Follow-up Information     Follow up With Details Comments Contact Info    [facility] EMERGENCY DEPT  If symptoms worsen [address] [facility]    Vahe Jolly MD Schedule an appointment as soon as possible for a visit  [facility] [address]      [address] [facility] Schedule an appointment as soon as possible for a visit  [facility]  [phone]        Return to ED if worse     Diagnosis     Clinical Impression:   1. H/O chlamydia infection        Attestations: This note is prepared by Padma Peralta, acting as Scribe for Bridget Todd MD.      The scribe's documentation has been prepared under my direction and personally reviewed by me in its entirety. I confirm that the note above accurately reflects all work, treatment, procedures, and medical decision making performed by me.   Bridget Todd MD

## 2017-12-10 NOTE — ED NOTES
Pt presents to ED ambulatory complaining of vaginal discharge and testing positive for chlamydia. Pt reports being treated but having unprotected sex with the same partner. Pt is alert and oriented x 4, RR even and unlabored, skin is warm and dry. Assessment completed and pt updated on plan of care. Emergency Department Nursing Plan of Care       The Nursing Plan of Care is developed from the Nursing assessment and Emergency Department Attending provider initial evaluation. The plan of care may be reviewed in the ED Provider note.     The Plan of Care was developed with the following considerations:   Patient / Family readiness to learn indicated by:verbalized understanding  Persons(s) to be included in education: patient  Barriers to Learning/Limitations:No    Signed     Rasheed Blanc RN    12/9/2017   10:23 PM

## 2017-12-10 NOTE — DISCHARGE INSTRUCTIONS
Chlamydia: Care Instructions  Your Care Instructions  Chlamydia is a bacterial infection spread through sexual contact. It is one of the most common sexually transmitted infections (STIs). Most people who get chlamydia do not have symptoms, but they can still infect their sex partners. If chlamydia in women is not treated, it can cause pelvic inflammatory disease (PID), a severe pelvic infection. PID can make it hard for a woman to get pregnant. Antibiotics can cure chlamydia. Both sex partners need treatment to keep from passing the infection back and forth. Certain antibiotics should not be used in pregnancy. If you were not tested for pregnancy during this visit, tell your doctor if you might be pregnant. Follow-up care is a key part of your treatment and safety. Be sure to make and go to all appointments, and call your doctor if you are having problems. It's also a good idea to know your test results and keep a list of the medicines you take. How can you care for yourself at home? · Chlamydia often is treated with a single dose of antibiotics in the doctor's office. If your doctor prescribed antibiotics to take at home, take them as directed. Do not stop taking them just because you feel better. You need to take the full course of antibiotics. · Do not have sex with anyone while you are being treated. If your treatment is a single dose of antibiotics, wait at least 7 days after you take the dose before you have sex. Even if you use a condom, you and your partner may pass the infection back and forth. · Make sure to tell your sex partner or partners that you have chlamydia. They should get treated, even if they do not have symptoms. · Your doctor may have done tests for other STIs. If so, call back in 3 or 4 days for those results. · Your doctor may advise you to be tested again for chlamydia in 3 or 4 months. How can you prevent chlamydia and other STIs in the future?   · Use latex condoms every time you have sex. Use them from the beginning to the end of sexual contact. · Talk to your partner before you have sex. Find out if he or she has or is at risk for chlamydia or any other STI. Keep in mind that a person may be able to spread an STI even if he or she does not have symptoms. · Do not have sex while you are being treated for chlamydia or any other STI. · Do not have sex with anyone who has symptoms of an STI, such as sores on the genitals or mouth. · Having one sex partner (who does not have STIs and does not have sex with anyone else) is a good way to avoid STIs. When should you call for help? Call 911 anytime you think you may need emergency care. For example, call if:  ? · You have sudden, severe pain in your belly or pelvis. ?Call your doctor now or seek immediate medical care if:  ? · You have new belly or pelvic pain. ? · You have a fever. ? · You have new or increased burning or pain with urination, or you cannot urinate. ? · You have pain, swelling, or tenderness in the scrotum. ? Watch closely for changes in your health, and be sure to contact your doctor if:  ? · You have unusual vaginal bleeding. ? · You have a discharge from the vagina or penis. ? · You think you may have been exposed to another STI. ? · Your symptoms get worse or have not improved within 1 week after starting treatment. ? · You have any new symptoms, such as sores, bumps, rashes, blisters, or warts in the genital or anal area. Where can you learn more? Go to http://augusto-norma.info/. Enter E918 in the search box to learn more about \"Chlamydia: Care Instructions. \"  Current as of: March 20, 2017  Content Version: 11.4  © 0291-4728 [employer]. Care instructions adapted under license by [employer] (which disclaims liability or warranty for this information).  If you have questions about a medical condition or this instruction, always ask your healthcare professional. [address] any warranty or liability for your use of this information.

## 2017-12-27 ENCOUNTER — HOSPITAL ENCOUNTER (EMERGENCY)
Age: 42
Discharge: HOME | End: 2017-12-27
Attending: EMERGENCY MEDICINE
Payer: MEDICAID

## 2017-12-27 VITALS
RESPIRATION RATE: 16 BRPM | SYSTOLIC BLOOD PRESSURE: 165 MMHG | DIASTOLIC BLOOD PRESSURE: 109 MMHG | HEART RATE: 99 BPM | WEIGHT: 126.5 LBS | BODY MASS INDEX: 23.28 KG/M2 | TEMPERATURE: 98.8 F | HEIGHT: 62 IN | OXYGEN SATURATION: 99 %

## 2017-12-27 DIAGNOSIS — S61.309A: Primary | ICD-10-CM

## 2017-12-27 DIAGNOSIS — L03.019 FELON OF FINGER: ICD-10-CM

## 2017-12-27 DIAGNOSIS — I10 ESSENTIAL HYPERTENSION: ICD-10-CM

## 2017-12-27 PROCEDURE — 99283 EMERGENCY DEPT VISIT LOW MDM: CPT

## 2017-12-27 RX ORDER — BENAZEPRIL HYDROCHLORIDE AND HYDROCHLOROTHIAZIDE 10; 12.5 MG/1; MG/1
1 TABLET ORAL DAILY
Qty: 30 TAB | Refills: 0 | Status: SHIPPED | OUTPATIENT
Start: 2017-12-27 | End: 2018-01-26

## 2017-12-27 RX ORDER — SULFAMETHOXAZOLE AND TRIMETHOPRIM 800; 160 MG/1; MG/1
1 TABLET ORAL 2 TIMES DAILY
Qty: 14 TAB | Refills: 0 | Status: SHIPPED | OUTPATIENT
Start: 2017-12-27 | End: 2018-01-03

## 2017-12-27 RX ORDER — IBUPROFEN 600 MG/1
600 TABLET ORAL
Qty: 30 TAB | Refills: 0 | Status: SHIPPED | OUTPATIENT
Start: 2017-12-27 | End: 2018-05-08

## 2017-12-27 RX ORDER — CEPHALEXIN 500 MG/1
500 CAPSULE ORAL 4 TIMES DAILY
Qty: 28 CAP | Refills: 0 | Status: SHIPPED | OUTPATIENT
Start: 2017-12-27 | End: 2018-01-03

## 2017-12-27 NOTE — ED TRIAGE NOTES
Patient informed of elevated BP reading today. States she takes medications for HTN but did not take it today. States she is going through a lot of personal stress and forgot to take BP meds today.

## 2017-12-27 NOTE — ED PROVIDER NOTES
EMERGENCY DEPARTMENT HISTORY AND PHYSICAL EXAM      Date: 12/27/2017  Patient Name: Maria Isabel Alfaro    History of Presenting Illness     Chief Complaint   Patient presents with    Finger Pain       History Provided By: Patient    HPI: Maria Isabel Alfaro, 40 y.o. female with PMHx significant for HTN, TIA, and neuropathy, presents ambulatory to the ED with cc of right thumb pain today. Pt reports her \"nail fell off\" her right thumb this morning, and states she has had pain ever since. 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 Pt states she is right hand dominant. She notes she is currently out of her prescribed antihypertensives, and has not had a dose today. Pt states she is allergic to Toradol. She specifically denies fever and chills. Pt reports tobacco, alcohol, and drug use. She denies chance of pregnancy. PCP: Radha Mas MD    There are no other complaints, changes, or physical findings at this time. Current Outpatient Prescriptions   Medication Sig Dispense Refill    aspirin delayed-release 81 mg tablet Take  by mouth daily.       OTHER Indications: unknown HTN med         Past History     Past Medical History:  Past Medical History:   Diagnosis Date    angina     [facility]ina at [facility])     Chiari malformation     Heart abnormalities     leaky heart valve, murmur    Hypertension     Other ill-defined conditions(799.89)     cardiac arrhythmias    Other ill-defined conditions(799.89)     neuropathy    Stroke ([facility] [address]     TIA       Past Surgical History:  Past Surgical History:   Procedure Laterality Date    ABDOMEN SURGERY PROC UNLISTED      laporscopic surgery post ruptured fallopian tube    HX ORTHOPAEDIC      screws in left ankle    HX OTHER SURGICAL      surgery to back of head due to fractured skull    NEUROLOGICAL PROCEDURE UNLISTED         Family History:  History reviewed. No pertinent family history. Social History:  Social History   Substance Use Topics    Smoking status: Current Every Day Smoker     Packs/day: 0.25     Years: 10.00    Smokeless tobacco: Never Used      Comment: 6 cig/day    Alcohol use Yes      Comment: social       Allergies: Allergies   Allergen Reactions    Toradol [Ketorolac] Hives         Review of Systems   Review of Systems   Constitutional: Negative for chills and fever. HENT: Negative for congestion, rhinorrhea, sneezing and sore throat. Eyes: Negative for redness and visual disturbance. Respiratory: Negative for shortness of breath. Cardiovascular: Negative for leg swelling. Gastrointestinal: Negative for abdominal pain, nausea and vomiting. Genitourinary: Negative for difficulty urinating and frequency. Musculoskeletal: Negative for back pain, myalgias and neck stiffness. +right thumb pain   Skin: Negative for rash. Neurological: Negative for dizziness, syncope, weakness and headaches. Hematological: Negative for adenopathy. All other systems reviewed and are negative. Physical Exam   Physical Exam   Constitutional: She is oriented to person, place, and time. She appears well-developed and well-nourished. HENT:   Head: Normocephalic and atraumatic. Mouth/Throat: Oropharynx is clear and moist.   Eyes: Conjunctivae and EOM are normal.   Neck: Normal range of motion and full passive range of motion without pain. Neck supple. Cardiovascular: Normal rate, regular rhythm, S1 normal, S2 normal, normal heart sounds, intact distal pulses and normal pulses. No murmur heard. Pulmonary/Chest: Effort normal and breath sounds normal. No respiratory distress. She has no wheezes. Abdominal: Soft. Normal appearance and bowel sounds are normal. She exhibits no distension. There is no tenderness. There is no rebound. Musculoskeletal: Normal range of motion.    Blister to distal right thumb, with partial right thumbnail avulsion   Neurological: She is alert and oriented to person, place, and time. She has normal strength. Skin: Skin is warm, dry and intact. No rash noted. Psychiatric: She has a normal mood and affect. Her speech is normal and behavior is normal. Judgment and thought content normal.   Nursing note and vitals reviewed. Medical Decision Making   I am the first provider for this patient. I reviewed the vital signs, available nursing notes, past medical history, past surgical history, family history and social history. Vital Signs-Reviewed the patient's vital signs. Patient Vitals for the past 12 hrs:   Temp Pulse Resp BP SpO2   12/27/17 1537 - 95 17 (!) 187/129 97 %   12/27/17 1449 98.8 °F (37.1 °C) 97 17 (!) 179/100 99 %       Pulse Oximetry Analysis - 99% on RA    Records Reviewed: Nursing Notes and Old Medical Records    Provider Notes (Medical Decision Making):     DDx: paronychia felon, cellulitis    ED Course:   Initial assessment performed. The patients presenting problems have been discussed, and they are in agreement with the care plan formulated and outlined with them. I have encouraged them to ask questions as they arise throughout their visit. No drainable abscess. Will treat with antibiotics. Disposition:  DISCHARGE NOTE:  3:13 PM  The patient is ready for discharge. The patients signs, symptoms, diagnosis, and instructions for discharge have been discussed and the pt has conveyed their understanding. The patient is to follow up as recommended or return to the ER should their symptoms worsen. Plan has been discussed and patient has conveyed their agreement. PLAN:  1. Current Discharge Medication List      START taking these medications    Details   trimethoprim-sulfamethoxazole (BACTRIM DS) 160-800 mg per tablet Take 1 Tab by mouth two (2) times a day for 7 days.   Qty: 14 Tab, Refills: 0      cephALEXin (KEFLEX) 500 mg capsule Take 1 Cap by mouth four (4) times daily for 7 days. Qty: 28 Cap, Refills: 0      ibuprofen (MOTRIN) 600 mg tablet Take 1 Tab by mouth every eight (8) hours as needed for Pain. Qty: 30 Tab, Refills: 0      benazepril-hydroCHLOROthiazide (LOTENSIN HCT) 10-12.5 mg per tablet Take 1 Tab by mouth daily for 30 days. Qty: 30 Tab, Refills: 0           2. Follow-up Information     Follow up With Details Comments [address], [place] Schedule an appointment as soon as possible for a visit  [address]      Mary Marroquin MD Schedule an appointment as soon as possible for a visit if symptoms persist [address]  [address] [facility] [address] [facility] EMERGENCY DEPT  As needed, If symptoms worsen [address] [facility]  [phone]        Return to ED if worse     Diagnosis     Clinical Impression:   1. Nontraumatic avulsion of nail plate, initial encounter    2. Felon of finger    3. Essential hypertension        Attestations: This note is prepared by Jasper Blount, acting as Scribe for Tej Lara MD.    Tej Lara MD: The scribe's documentation has been prepared under my direction and personally reviewed by me in its entirety. I confirm that the note above accurately reflects all work, treatment, procedures, and medical decision making performed by me.

## 2017-12-27 NOTE — LETTER
[facility] EMERGENCY DEPT 
[address] [facility] [address] [id number] 
[phone] Work/School Note Date: 12/27/2017 To Whom It May concern: 
 
Asim Kang was seen and treated today in the emergency room by the following provider(s): 
Attending Provider: Ina Arana MD. Asim Kang may return to work on 12/29/2017.  
 
Sincerely, 
 
 
 
 
Ina Arana MD

## 2017-12-27 NOTE — ED NOTES
Emergency Department Nursing Plan of Care       The Nursing Plan of Care is developed from the Nursing assessment and Emergency Department Attending provider initial evaluation. The plan of care may be reviewed in the ED Provider note.     The Plan of Care was developed with the following considerations:   Patient / Family readiness to learn indicated by:verbalized understanding  Persons(s) to be included in education: patient  Barriers to Learning/Limitations:No    Signed     Marium Stauffer RN    12/27/2017   3:03 PM

## 2017-12-27 NOTE — ED NOTES
Pt in with c/o R thumb pain. States that her nail had got a fungus, turned yellow and then fell off. Thumb now swollen and painful. Blister noted to the tip of the digit. Voices no additional needs or concerns.

## 2017-12-27 NOTE — ED NOTES
Dr buddy md, was notified of pt's repeat high BP-ok to discharge. .. Brandy Porter Discharge summary and discharge medications reviewed with patient and appropriate educational materials and side effects teaching were provided. patient  Given 0 paper prescriptions and 4 electronic prescriptions sent to pt's listed pharmacy. Patient (s) verbalized understanding of the importance of discussing medications with his or her physician or clinic they will be following up with. No si/s of acute distress prior to discharge. Patient offered wheelchair from treatment area to hospital entrance, patient refused wheelchair. 10/10 right thumb pain, Nonverbal pain of 0/10. No other pt complaints.

## 2017-12-27 NOTE — DISCHARGE INSTRUCTIONS
Felon Infection: Care Instructions  Your Care Instructions  An infection of the pad of the finger is called a felon. The finger is made up of several small areas of tissue. Because of this, pus from an infection can build up with no place to go. Then the infection can spread deeper into the finger. Sometimes it can spread into the bone. A finger infection can happen after a cut, a scrape, a puncture, or some other injury. Sometimes the cause isn't known. Your finger may be painful and red. Mild finger infections may be treated with antibiotics alone. You also may soak your finger in warm water. If the infection is deeper or there is a lot of pus, the doctor may open the area to drain the pus. This is sometimes done in an operating room. Follow-up care is a key part of your treatment and safety. Be sure to make and go to all appointments, and call your doctor if you are having problems. It's also a good idea to know your test results and keep a list of the medicines you take. How can you care for yourself at home? · If your doctor prescribed antibiotics, take them as directed. Do not stop taking them just because you feel better. You need to take the full course of antibiotics. · Be safe with medicines. Read and follow all instructions on the label. ¨ If the doctor gave you a prescription medicine for pain, take it as prescribed. ¨ If you are not taking a prescription pain medicine, ask your doctor if you can take an over-the-counter medicine. · Prop up your hand on a pillow anytime you sit or lie down during the next 3 days. Try to keep the area above the level of your heart. This will help reduce swelling. · If your doctor told you how to care for your wound, follow your doctor's instructions. If you did not get instructions, follow this general advice:  ¨ Wash the area with clean water 2 times a day. Don't use hydrogen peroxide or alcohol, which can slow healing.   ¨ You may cover the wound with a thin layer of petroleum jelly, such as Vaseline, and a nonstick bandage. · If the area was packed with gauze:  ¨ Go to your follow-up appointments to have the gauze changed or removed. ¨ Your doctor may ask you to remove the gauze. If so, gently pull out all of the gauze when your doctor tells you to. When should you call for help? Call your doctor now or seek immediate medical care if:  ? · You have signs that the infection is getting worse, such as:  ¨ Increased pain, swelling, warmth, or redness. ¨ Red streaks leading from the area. ¨ Pus draining from the area. ¨ A fever. ? Watch closely for changes in your health, and be sure to contact your doctor if:  ? · You do not get better as expected. Where can you learn more? Go to http://augustoApplied DNA Sciencesnorma.info/. Enter B966 in the search box to learn more about \"Felon Infection: Care Instructions. \"  Current as of: March 21, 2017  Content Version: 11.4  © 2677-1076 [employer]. Care instructions adapted under license by [employer] (which disclaims liability or warranty for this information). If you have questions about a medical condition or this instruction, always ask your healthcare professional. [address] any warranty or liability for your use of this information. High Blood Pressure: Care Instructions  Your Care Instructions    If your blood pressure is usually above 140/90, you have high blood pressure, or hypertension. That means the top number is 140 or higher or the bottom number is 90 or higher, or both. Despite what a lot of people think, high blood pressure usually doesn't cause headaches or make you feel dizzy or lightheaded. It usually has no symptoms. But it does increase your risk for heart attack, stroke, and kidney or eye damage. The higher your blood pressure, the more your risk increases. Your doctor will give you a goal for your blood pressure.  Your goal will be based on your health and your age. An example of a goal is to keep your blood pressure below 140/90. Lifestyle changes, such as eating healthy and being active, are always important to help lower blood pressure. You might also take medicine to reach your blood pressure goal.  Follow-up care is a key part of your treatment and safety. Be sure to make and go to all appointments, and call your doctor if you are having problems. It's also a good idea to know your test results and keep a list of the medicines you take. How can you care for yourself at home? Medical treatment  · If you stop taking your medicine, your blood pressure will go back up. You may take one or more types of medicine to lower your blood pressure. Be safe with medicines. Take your medicine exactly as prescribed. Call your doctor if you think you are having a problem with your medicine. · Talk to your doctor before you start taking aspirin every day. Aspirin can help certain people lower their risk of a heart attack or stroke. But taking aspirin isn't right for everyone, because it can cause serious bleeding. · See your doctor regularly. You may need to see the doctor more often at first or until your blood pressure comes down. · If you are taking blood pressure medicine, talk to your doctor before you take decongestants or anti-inflammatory medicine, such as ibuprofen. Some of these medicines can raise blood pressure. · Learn how to check your blood pressure at home. Lifestyle changes  · Stay at a healthy weight. This is especially important if you put on weight around the waist. Losing even 10 pounds can help you lower your blood pressure. · If your doctor recommends it, get more exercise. Walking is a good choice. Bit by bit, increase the amount you walk every day. Try for at least 30 minutes on most days of the week. You also may want to swim, bike, or do other activities. · Avoid or limit alcohol.  Talk to your doctor about whether you can drink any alcohol. · Try to limit how much sodium you eat to less than 2,300 milligrams (mg) a day. Your doctor may ask you to try to eat less than 1,500 mg a day. · Eat plenty of fruits (such as bananas and oranges), vegetables, legumes, whole grains, and low-fat dairy products. · Lower the amount of saturated fat in your diet. Saturated fat is found in animal products such as milk, cheese, and meat. Limiting these foods may help you lose weight and also lower your risk for heart disease. · Do not smoke. Smoking increases your risk for heart attack and stroke. If you need help quitting, talk to your doctor about stop-smoking programs and medicines. These can increase your chances of quitting for good. When should you call for help? Call 911 anytime you think you may need emergency care. This may mean having symptoms that suggest that your blood pressure is causing a serious heart or blood vessel problem. Your blood pressure may be over 180/110. ? For example, call 911 if:  ? · You have symptoms of a heart attack. These may include:  ¨ Chest pain or pressure, or a strange feeling in the chest.  ¨ Sweating. ¨ Shortness of breath. ¨ Nausea or vomiting. ¨ Pain, pressure, or a strange feeling in the back, neck, jaw, or upper belly or in one or both shoulders or arms. ¨ Lightheadedness or sudden weakness. ¨ A fast or irregular heartbeat. ? · You have symptoms of a stroke. These may include:  ¨ Sudden numbness, tingling, weakness, or loss of movement in your face, arm, or leg, especially on only one side of your body. ¨ Sudden vision changes. ¨ Sudden trouble speaking. ¨ Sudden confusion or trouble understanding simple statements. ¨ Sudden problems with walking or balance. ¨ A sudden, severe headache that is different from past headaches. ? · You have severe back or belly pain. ?Do not wait until your blood pressure comes down on its own. Get help right away.   ?Call your doctor now or seek immediate care if:  ? · Your blood pressure is much higher than normal (such as 180/110 or higher), but you don't have symptoms. ? · You think high blood pressure is causing symptoms, such as:  ¨ Severe headache. ¨ Blurry vision. ? Watch closely for changes in your health, and be sure to contact your doctor if:  ? · Your blood pressure measures 140/90 or higher at least 2 times. That means the top number is 140 or higher or the bottom number is 90 or higher, or both. ? · You think you may be having side effects from your blood pressure medicine. ? · Your blood pressure is usually normal, but it goes above normal at least 2 times. Where can you learn more? Go to http://augusto-norma.info/. Enter L414 in the search box to learn more about \"High Blood Pressure: Care Instructions. \"  Current as of: September 21, 2016  Content Version: 11.4  © 0325-6980 [employer]. Care instructions adapted under license by [employer] (which disclaims liability or warranty for this information). If you have questions about a medical condition or this instruction, always ask your healthcare professional. [place] [address] any warranty or liability for your use of this information. Learning About High Blood Pressure  What is high blood pressure? Blood pressure is a measure of how hard the blood pushes against the walls of your arteries. It's normal for blood pressure to go up and down throughout the day, but if it stays up, you have high blood pressure. Another name for high blood pressure is hypertension. Two numbers tell you your blood pressure. The first number is the systolic pressure. It shows how hard the blood pushes when your heart is pumping. The second number is the diastolic pressure. It shows how hard the blood pushes between heartbeats, when your heart is relaxed and filling with blood.   A blood pressure of less than 120/80 (say \"120 over 80\") is ideal for an adult. High blood pressure is 140/90 or higher. You have high blood pressure if your top number is 140 or higher or your bottom number is 90 or higher, or both. Many people fall into the category in between, called prehypertension. People with prehypertension need to make lifestyle changes to bring their blood pressure down and help prevent or delay high blood pressure. What happens when you have high blood pressure? · Blood flows through your arteries with too much force. Over time, this damages the walls of your arteries. But you can't feel it. High blood pressure usually doesn't cause symptoms. · Fat and calcium start to build up in your arteries. This buildup is called plaque. Plaque makes your arteries narrower and stiffer. Blood can't flow through them as easily. · This lack of good blood flow starts to damage some of the organs in your body. This can lead to problems such as coronary artery disease and heart attack, heart failure, stroke, kidney failure, and eye damage. How can you prevent high blood pressure? · Stay at a healthy weight. · Try to limit how much sodium you eat to less than 2,300 milligrams (mg) a day. If you limit your sodium to 1,500 mg a day, you can lower your blood pressure even more. ¨ Buy foods that are labeled \"unsalted,\" \"sodium-free,\" or \"low-sodium. \" Foods labeled \"reduced-sodium\" and \"light sodium\" may still have too much sodium. ¨ Flavor your food with garlic, lemon juice, onion, vinegar, herbs, and spices instead of salt. Do not use soy sauce, steak sauce, onion salt, garlic salt, mustard, or ketchup on your food. ¨ Use less salt (or none) when recipes call for it. You can often use half the salt a recipe calls for without losing flavor. · Be physically active. Get at least 30 minutes of exercise on most days of the week. Walking is a good choice.  You also may want to do other activities, such as running, swimming, cycling, or playing tennis or team sports. · Limit alcohol to 2 drinks a day for men and 1 drink a day for women. · Eat plenty of fruits, vegetables, and low-fat dairy products. Eat less saturated and total fats. How is high blood pressure treated? · Your doctor will suggest making lifestyle changes. For example, your doctor may ask you to eat healthy foods, quit smoking, lose extra weight, and be more active. · If lifestyle changes don't help enough or your blood pressure is very high, you will have to take medicine every day. Follow-up care is a key part of your treatment and safety. Be sure to make and go to all appointments, and call your doctor if you are having problems. It's also a good idea to know your test results and keep a list of the medicines you take. Where can you learn more? Go to http://augusto-norma.info/. Enter P501 in the search box to learn more about \"Learning About High Blood Pressure. \"  Current as of: September 21, 2016  Content Version: 11.4  © 7449-2467 [employer]. Care instructions adapted under license by [employer] (which disclaims liability or warranty for this information). If you have questions about a medical condition or this instruction, always ask your healthcare professional. [place] [address] any warranty or liability for your use of this information.

## 2018-02-14 ENCOUNTER — HOSPITAL ENCOUNTER (EMERGENCY)
Age: 43
Discharge: HOME | End: 2018-02-14
Attending: EMERGENCY MEDICINE
Payer: SELF-PAY

## 2018-02-14 VITALS
HEIGHT: 62 IN | RESPIRATION RATE: 17 BRPM | BODY MASS INDEX: 23.55 KG/M2 | WEIGHT: 128 LBS | TEMPERATURE: 97.9 F | DIASTOLIC BLOOD PRESSURE: 108 MMHG | SYSTOLIC BLOOD PRESSURE: 168 MMHG | OXYGEN SATURATION: 99 %

## 2018-02-14 DIAGNOSIS — H57.9 SENSATION OF FOREIGN BODY IN EYE: Primary | ICD-10-CM

## 2018-02-14 DIAGNOSIS — H10.32 ACUTE CONJUNCTIVITIS OF LEFT EYE, UNSPECIFIED ACUTE CONJUNCTIVITIS TYPE: ICD-10-CM

## 2018-02-14 PROCEDURE — 74011000250 HC RX REV CODE- 250: Performed by: EMERGENCY MEDICINE

## 2018-02-14 PROCEDURE — 99282 EMERGENCY DEPT VISIT SF MDM: CPT

## 2018-02-14 RX ORDER — TETRACAINE HYDROCHLORIDE 5 MG/ML
1 SOLUTION OPHTHALMIC
Status: COMPLETED | OUTPATIENT
Start: 2018-02-14 | End: 2018-02-14

## 2018-02-14 RX ORDER — POLYMYXIN B SULFATE AND TRIMETHOPRIM 1; 10000 MG/ML; [USP'U]/ML
1 SOLUTION OPHTHALMIC EVERY 4 HOURS
Qty: 10 ML | Refills: 0 | Status: SHIPPED | OUTPATIENT
Start: 2018-02-14 | End: 2018-05-08

## 2018-02-14 RX ADMIN — FLUORESCEIN SODIUM 1 STRIP: 1 STRIP OPHTHALMIC at 05:09

## 2018-02-14 RX ADMIN — TETRACAINE HYDROCHLORIDE 1 DROP: 5 SOLUTION OPHTHALMIC at 05:09

## 2018-02-14 NOTE — DISCHARGE INSTRUCTIONS
Pinkeye: Care Instructions  Your Care Instructions    Pinkeye is redness and swelling of the eye surface and the conjunctiva (the lining of the eyelid and the covering of the white part of the eye). Pinkeye is also called conjunctivitis. Pinkeye is often caused by infection with bacteria or a virus. Dry air, allergies, smoke, and chemicals are other common causes. Pinkeye often clears on its own in 7 to 10 days. Antibiotics only help if the pinkeye is caused by bacteria. Pinkeye caused by infection spreads easily. If an allergy or chemical is causing pinkeye, it will not go away unless you can avoid whatever is causing it. Follow-up care is a key part of your treatment and safety. Be sure to make and go to all appointments, and call your doctor if you are having problems. It's also a good idea to know your test results and keep a list of the medicines you take. How can you care for yourself at home? · Wash your hands often. Always wash them before and after you treat pinkeye or touch your eyes or face. · Use moist cotton or a clean, wet cloth to remove crust. Wipe from the inside corner of the eye to the outside. Use a clean part of the cloth for each wipe. · Put cold or warm wet cloths on your eye a few times a day if the eye hurts. · Do not wear contact lenses or eye makeup until the pinkeye is gone. Throw away any eye makeup you were using when you got pinkeye. Clean your contacts and storage case. If you wear disposable contacts, use a new pair when your eye has cleared and it is safe to wear contacts again. · If the doctor gave you antibiotic ointment or eyedrops, use them as directed. Use the medicine for as long as instructed, even if your eye starts looking better soon. Keep the bottle tip clean, and do not let it touch the eye area. · To put in eyedrops or ointment:  ¨ Tilt your head back, and pull your lower eyelid down with one finger.   ¨ Drop or squirt the medicine inside the lower lid.  ¨ Close your eye for 30 to 60 seconds to let the drops or ointment move around. ¨ Do not touch the ointment or dropper tip to your eyelashes or any other surface. · Do not share towels, pillows, or washcloths while you have pinkeye. When should you call for help? Call your doctor now or seek immediate medical care if:  ? · You have pain in your eye, not just irritation on the surface. ? · You have a change in vision or loss of vision. ? · You have an increase in discharge from the eye.   ? · Your eye has not started to improve or begins to get worse within 48 hours after you start using antibiotics. ? · Pinkeye lasts longer than 7 days. ? Watch closely for changes in your health, and be sure to contact your doctor if you have any problems. Where can you learn more? Go to http://augusto-norma.info/. Enter Y392 in the search box to learn more about \"Pinkeye: Care Instructions. \"  Current as of: March 20, 2017  Content Version: 11.4  © 0830-8610 [employer]. Care instructions adapted under license by [employer] (which disclaims liability or warranty for this information). If you have questions about a medical condition or this instruction, always ask your healthcare professional. [place] [address] any warranty or liability for your use of this information. Feeling of an Object in the Eye: Care Instructions  Your Care Instructions    Sometimes people feel like there is something in their eye. This is called a foreign body sensation. A doctor may not find anything wrong with your eye. If you had something very small in your eye, like a speck of dirt, tears may have washed it out. Or you may have a small scratch on the surface of the eye (cornea), which can make it feel as if something is still in your eye. The doctor will check your vision and examine your eye. Your eye may be numbed with drops.  Sometimes a drop of colored fluid is put in the eye. This lets the doctor have a better view of the surface of the eye. You may get drops to put in your eye after you go home. Or you may just need to watch for a change in your symptoms. Follow-up care is a key part of your treatment and safety. Be sure to make and go to all appointments, and call your doctor if you are having problems. It's also a good idea to know your test results and keep a list of the medicines you take. How can you care for yourself at home? · Do not rub your eye. · If the doctor prescribed eyedrops or ointment, use them as directed. Be sure the dropper or bottle tip is clean. · To put in eyedrops or ointment:  ¨ Tilt your head back, and pull your lower eyelid down with one finger. ¨ Drop or squirt the medicine inside the lower lid. ¨ Close your eye for 30 to 60 seconds to let the drops or ointment move around. ¨ Do not touch the ointment or dropper tip to your eyelashes or any other surface. When should you call for help? Call your doctor now or seek immediate medical care if:  ? · You have new or worse eye pain. ? · Light hurts your eye.   ? · You have new or worse redness in your eye.   ? · You have symptoms of an eye infection, such as:  ¨ Pus or thick discharge coming from the eye. ¨ Redness or swelling around the eye. ¨ A fever. ? · You have vision changes. ? Watch closely for changes in your health, and be sure to contact your doctor if:  ? · You do not get better as expected. Where can you learn more? Go to http://augusto-norma.info/. Enter L977 in the search box to learn more about \"Feeling of an Object in the Eye: Care Instructions. \"  Current as of: March 20, 2017  Content Version: 11.4  © 6496-4754 [employer]. Care instructions adapted under license by [employer] (which disclaims liability or warranty for this information).  If you have questions about a medical condition or this instruction, always ask your healthcare professional. [address] any warranty or liability for your use of this information.

## 2018-02-14 NOTE — ED NOTES
Patient refused stefani lens. Dr. Patel  made aware of patient refusal. Dr. Jorge King is at the bedside reviewing the plan of care with the patient.

## 2018-02-14 NOTE — ED NOTES
Patient educated on discharge instructions and one prescription . Patient verbalized understanding of eduction. Patient given discharge instructions and one prescription. Patient ambulated out of ED  No acute distress noted. Emergency Department Nursing Plan of Care       The Nursing Plan of Care is developed from the Nursing assessment and Emergency Department Attending provider initial evaluation. The plan of care may be reviewed in the ED Provider note.     The Plan of Care was developed with the following considerations:   Patient / Family readiness to learn indicated by:verbalized understanding  Persons(s) to be included in education: patient  Barriers to Learning/Limitations:No    Signed     Enoc Garzon RN    2/14/2018   5:40 AM

## 2018-02-14 NOTE — ED PROVIDER NOTES
EMERGENCY DEPARTMENT HISTORY AND PHYSICAL EXAM      Date: 2/14/2018  Patient Name: Cam Morrell    History of Presenting Illness     Chief Complaint   Patient presents with    Foreign Body in Eye     Patient thinks her contact is still her left eye. History Provided By: Patient    HPI: Cam Morrell, 40 y.o. female with PMHx significant for HTN, angina, cardiac arrhythmias, neuropathy, TIA who presents ambulatory to the ED with cc of sudden onset of scratching burning left eye pain that onset PTA. She reports associated left eye redness. Pt denies use of medication to modify her symptoms. She stats that put her contact lenses in earlier today and was unable to get her left contact out when her symptoms onset. Pt denies a hx of similar symptoms. She specifically denies any vision changes, photophobia, HA, nausea, vomiting, fevers, chills or HA.    + tobacco use (0.25 ppd), + EtOH use, - Illicit drug use    PCP: Una Sepulveda MD    There are no other complaints, changes, or physical findings at this time. Current Outpatient Prescriptions   Medication Sig Dispense Refill    trimethoprim-polymyxin b (POLYTRIM) ophthalmic solution Administer 1 Drop to left eye every four (4) hours. 10 mL 0    ibuprofen (MOTRIN) 600 mg tablet Take 1 Tab by mouth every eight (8) hours as needed for Pain. 30 Tab 0    aspirin delayed-release 81 mg tablet Take  by mouth daily.       OTHER Indications: unknown HTN med         Past History     Past Medical History:  Past Medical History:   Diagnosis Date    angina     Angina at rest [facility])     Chiari malformation     Heart abnormalities     leaky heart valve, murmur    Hypertension     Other ill-defined conditions(799.89)     cardiac arrhythmias    Other ill-defined conditions(799.89)     neuropathy    Stroke ([facility] [address]     TIA       Past Surgical History:  Past Surgical History:   Procedure Laterality Date    ABDOMEN SURGERY PROC UNLISTED      laporscopic surgery post ruptured fallopian tube    HX ORTHOPAEDIC      screws in left ankle    HX OTHER SURGICAL      surgery to back of head due to fractured skull    NEUROLOGICAL PROCEDURE UNLISTED         Family History:  No family history on file. Social History:  Social History   Substance Use Topics    Smoking status: Current Every Day Smoker     Packs/day: 0.25     Years: 10.00    Smokeless tobacco: Never Used      Comment: 6 cig/day    Alcohol use Yes      Comment: social       Allergies: Allergies   Allergen Reactions    Toradol [Ketorolac] Hives         Review of Systems   Review of Systems   Constitutional: Negative. Negative for chills, fever and unexpected weight change. HENT: Positive for congestion. Negative for trouble swallowing. Eyes: Positive for pain (left) and redness (left). Negative for photophobia, discharge and visual disturbance. Respiratory: Negative. Negative for cough, chest tightness and shortness of breath. Cardiovascular: Negative. Negative for chest pain. Gastrointestinal: Negative. Negative for abdominal distention, abdominal pain, constipation, diarrhea, nausea and vomiting. Endocrine: Negative. Genitourinary: Negative. Negative for difficulty urinating, dysuria, frequency and urgency. Musculoskeletal: Negative. Negative for arthralgias and myalgias. Skin: Negative. Negative for color change. Allergic/Immunologic: Negative. Neurological: Negative. Negative for dizziness, speech difficulty and headaches. Hematological: Negative. Psychiatric/Behavioral: Negative. Negative for agitation and confusion. All other systems reviewed and are negative. Physical Exam   Physical Exam   Constitutional: She is oriented to person, place, and time. She appears well-developed and well-nourished. HENT:   Head: Normocephalic and atraumatic. Eyes: EOM are normal.   Lateral conjunctival injection   Neck: Neck supple.    Cardiovascular: Normal rate, regular rhythm and intact distal pulses. Pulmonary/Chest: Effort normal. No respiratory distress. Abdominal: Soft. There is no tenderness. Musculoskeletal: Normal range of motion. She exhibits no deformity. Neurological: She is alert and oriented to person, place, and time. Skin: Skin is warm and dry. Psychiatric: She has a normal mood and affect. Her behavior is normal. Thought content normal.   Vitals reviewed. Medical Decision Making   I am the first provider for this patient. I reviewed the vital signs, available nursing notes, past medical history, past surgical history, family history and social history. Vital Signs-Reviewed the patient's vital signs. Patient Vitals for the past 12 hrs:   Temp Resp BP SpO2   02/14/18 0452 97.9 °F (36.6 °C) 17 (!) 168/108 99 %     Records Reviewed: Nursing Notes and Old Medical Records    Provider Notes (Medical Decision Making):     Foreign body, abrasion of the inner eye lid, conjunctivitis    ED Course:   Initial assessment performed. The patients presenting problems have been discussed, and they are in agreement with the care plan formulated and outlined with them. I have encouraged them to ask questions as they arise throughout their visit. Procedure Note - Wood's lamp exam:  4:57 AM  Performed by: Christiane Brower MD  Pts Left eye was anesthetized with tetracaine, stained with fluorescein, and examined with a Wood's lamp, using lid eversion. Foreign body: no  Fluorescein uptake: no. Swabbed under upper and lower eye lids with no foreign boy visualized. The procedure took 1-15 minutes, and pt tolerated well. PROGRESS NOTE:  5:27 AM  Pt has been re-evaluated. Nursing states that pt refused [facility]. Pt stats her eye was crusted over when she woke up. Will cover pt for conjunctivitis.   Written by Memory Friends, ED scribe, as dictated by Christiane Brower MD    Disposition:    DISCHARGE NOTE  5:31 AM  The patient has been re-evaluated and is ready for discharge. Reviewed available results with patient. Counseled patient on diagnosis and care plan. Patient has expressed understanding, and all questions have been answered. Patient agrees with plan and agrees to follow up as recommended, or return to the ED if their symptoms worsen. Discharge instructions have been provided and explained to the patient, along with reasons to return to the ED. PLAN:  1. Current Discharge Medication List      START taking these medications    Details   trimethoprim-polymyxin b (POLYTRIM) ophthalmic solution Administer 1 Drop to left eye every four (4) hours. Qty: 10 mL, Refills: 0           2. Follow-up Information     Follow up With Details Comments [address], [place] Schedule an appointment as soon as possible for a visit As needed [address]      Rad Hurst MD Schedule an appointment as soon as possible for a visit in 1 day  [address] [place] [id number]          Return to ED if worse     Diagnosis     Clinical Impression:   1. Sensation of foreign body in eye    2. Acute conjunctivitis of left eye, unspecified acute conjunctivitis type        Attestations: This note is prepared by Nori Collado, acting as Scribe for Emily Thrasher MD.    Emily Thrasher MD: The scribe's documentation has been prepared under my direction and personally reviewed by me in its entirety. I confirm that the note above accurately reflects all work, treatment, procedures, and medical decision making performed by me.

## 2018-02-14 NOTE — ED NOTES
Patient presented to the ED today for complaints of foreign body in left eye. Patient says symptoms started today. Pateint says she think her contact is in left eye. Gabriela Willis

## 2018-05-08 ENCOUNTER — HOSPITAL ENCOUNTER (EMERGENCY)
Age: 43
Discharge: LEFT BEFORE DISCHARGE | End: 2018-05-08
Attending: EMERGENCY MEDICINE
Payer: MEDICAID

## 2018-05-08 VITALS
DIASTOLIC BLOOD PRESSURE: 108 MMHG | TEMPERATURE: 98.1 F | WEIGHT: 129 LBS | OXYGEN SATURATION: 99 % | BODY MASS INDEX: 23.74 KG/M2 | RESPIRATION RATE: 18 BRPM | HEIGHT: 62 IN | SYSTOLIC BLOOD PRESSURE: 176 MMHG | HEART RATE: 65 BPM

## 2018-05-08 LAB — HCG UR QL: POSITIVE

## 2018-05-08 PROCEDURE — 81025 URINE PREGNANCY TEST: CPT

## 2018-05-08 PROCEDURE — 75810000275 HC EMERGENCY DEPT VISIT NO LEVEL OF CARE
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
## 2018-06-11 ENCOUNTER — HOSPITAL ENCOUNTER (OUTPATIENT)
Dept: MAMMOGRAPHY | Age: 43
Discharge: HOME | End: 2018-06-11
Attending: GENERAL PRACTICE
Payer: MEDICAID

## 2018-06-11 ENCOUNTER — HOSPITAL ENCOUNTER (OUTPATIENT)
Dept: ULTRASOUND IMAGING | Age: 43
Discharge: HOME | End: 2018-06-11
Attending: GENERAL PRACTICE
Payer: MEDICAID

## 2018-06-11 DIAGNOSIS — R92.8 ABNORMAL MAMMOGRAM: ICD-10-CM

## 2018-06-11 PROCEDURE — 76642 ULTRASOUND BREAST LIMITED: CPT

## 2018-06-11 PROCEDURE — 77065 DX MAMMO INCL CAD UNI: CPT

## 2018-08-08 ENCOUNTER — APPOINTMENT (OUTPATIENT)
Dept: GENERAL RADIOLOGY | Age: 43
End: 2018-08-08
Attending: PHYSICIAN ASSISTANT
Payer: MEDICAID

## 2018-08-08 ENCOUNTER — HOSPITAL ENCOUNTER (EMERGENCY)
Age: 43
Discharge: HOME | End: 2018-08-08
Attending: EMERGENCY MEDICINE
Payer: MEDICAID

## 2018-08-08 VITALS
OXYGEN SATURATION: 100 % | SYSTOLIC BLOOD PRESSURE: 177 MMHG | DIASTOLIC BLOOD PRESSURE: 101 MMHG | HEIGHT: 62 IN | BODY MASS INDEX: 23.55 KG/M2 | HEART RATE: 87 BPM | WEIGHT: 128 LBS | TEMPERATURE: 98.7 F | RESPIRATION RATE: 17 BRPM
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
## 2018-08-08 DIAGNOSIS — S39.012A STRAIN OF LUMBAR REGION, INITIAL ENCOUNTER: ICD-10-CM

## 2018-08-08 DIAGNOSIS — S46.811A STRAIN OF RIGHT TRAPEZIUS MUSCLE, INITIAL ENCOUNTER: ICD-10-CM

## 2018-08-08 LAB — HCG UR QL: NEGATIVE

## 2018-08-08 PROCEDURE — 81025 URINE PREGNANCY TEST: CPT

## 2018-08-08 PROCEDURE — 74011250637 HC RX REV CODE- 250/637: Performed by: PHYSICIAN ASSISTANT

## 2018-08-08 PROCEDURE — 72100 X-RAY EXAM L-S SPINE 2/3 VWS: CPT

## 2018-08-08 PROCEDURE — 73030 X-RAY EXAM OF SHOULDER: CPT

## 2018-08-08 PROCEDURE — 72220 X-RAY EXAM SACRUM TAILBONE: CPT

## 2018-08-08 PROCEDURE — 99283 EMERGENCY DEPT VISIT LOW MDM: CPT

## 2018-08-08 RX ORDER — METHOCARBAMOL 500 MG/1
500 TABLET, FILM COATED ORAL 3 TIMES DAILY
Qty: 15 TAB | Refills: 0 | Status: SHIPPED | OUTPATIENT
Start: 2018-08-08 | End: 2019-11-18

## 2018-08-08 RX ORDER — ACETAMINOPHEN 325 MG/1
650 TABLET ORAL
Status: COMPLETED | OUTPATIENT
Start: 2018-08-08 | End: 2018-08-08

## 2018-08-08 RX ORDER — ACETAMINOPHEN 325 MG/1
650 TABLET ORAL
Qty: 20 TAB | Refills: 0 | Status: SHIPPED | OUTPATIENT
Start: 2018-08-08 | End: 2019-05-29

## 2018-08-08 RX ADMIN — ACETAMINOPHEN 650 MG: 325 TABLET, FILM COATED ORAL at 12:24
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
## 2018-08-08 NOTE — DISCHARGE INSTRUCTIONS
Back Strain: Care Instructions  Your Care Instructions    Back strain happens when you overstretch, or pull, a muscle in your back. You may hurt your back in an accident or when you exercise or lift something. Most back pain will get better with rest and time. You can take care of yourself at home to help your back heal.  Follow-up care is a key part of your treatment and safety. Be sure to make and go to all appointments, and call your doctor if you are having problems. It's also a good idea to know your test results and keep a list of the medicines you take. How can you care for yourself at home? · Try to stay as active as you can, but stop or reduce any activity that causes pain. · Put ice or a cold pack on the sore muscle for 10 to 20 minutes at a time to stop swelling. Try this every 1 to 2 hours for 3 days (when you are awake) or until the swelling goes down. Put a thin cloth between the ice pack and your skin. · After 2 or 3 days, apply a heating pad on low or a warm cloth to your back. Some doctors suggest that you go back and forth between hot and cold treatments. · Take pain medicines exactly as directed. ¨ If the doctor gave you a prescription medicine for pain, take it as prescribed. ¨ If you are not taking a prescription pain medicine, ask your doctor if you can take an over-the-counter medicine. · Try sleeping on your side with a pillow between your legs. Or put a pillow under your knees when you lie on your back. These measures can ease pain in your lower back. · Return to your usual level of activity slowly. When should you call for help? Call 911 anytime you think you may need emergency care. For example, call if:    · You are unable to move a leg at all.   [facility] your doctor now or seek immediate medical care if:    · You have new or worse symptoms in your legs, belly, or buttocks. Symptoms may include:  ¨ Numbness or tingling. ¨ Weakness.   ¨ Pain.     · You lose bladder or bowel control.    Watch closely for changes in your health, and be sure to contact your doctor if:    · You have a fever, lose weight, or don't feel well.     · You are not getting better as expected. Where can you learn more? Go to http://augusto-norma.info/. Enter G402 in the search box to learn more about \"Back Strain: Care Instructions. \"  Current as of: November 29, 2017  Content Version: 11.7  © 6821-8853 "[employer]. Care instructions adapted under license by [employer] (which disclaims liability or warranty for this information). If you have questions about a medical condition or this instruction, always ask your healthcare professional. [address] any warranty or liability for your use of this information. Motor Vehicle Accident: Care Instructions  Your Care Instructions    You were seen by a doctor after a motor vehicle accident. Because of the accident, you may be sore for several days. Over the next few days, you may hurt more than you did just after the accident. The doctor has checked you carefully, but problems can develop later. If you notice any problems or new symptoms, get medical treatment right away. Follow-up care is a key part of your treatment and safety. Be sure to make and go to all appointments, and call your doctor if you are having problems. It's also a good idea to know your test results and keep a list of the medicines you take. How can you care for yourself at home? · Keep track of any new symptoms or changes in your symptoms. · Take it easy for the next few days, or longer if you are not feeling well. Do not try to do too much. · Put ice or a cold pack on any sore areas for 10 to 20 minutes at a time to stop swelling. Put a thin cloth between the ice pack and your skin. Do this several times a day for the first 2 days. · Be safe with medicines. Take pain medicines exactly as directed.   ¨ If the doctor gave you a prescription medicine for pain, take it as prescribed. ¨ If you are not taking a prescription pain medicine, ask your doctor if you can take an over-the-counter medicine. · Do not drive after taking a prescription pain medicine. · Do not do anything that makes the pain worse. · Do not drink any alcohol for 24 hours or until your doctor tells you it is okay. When should you call for help? Call 911 if:    · You passed out (lost consciousness).    Call your doctor now or seek immediate medical care if:    · You have new or worse belly pain.     · You have new or worse trouble breathing.     · You have new or worse head pain.     · You have new pain, or your pain gets worse.     · You have new symptoms, such as numbness or vomiting.    Watch closely for changes in your health, and be sure to contact your doctor if:    · You are not getting better as expected. Where can you learn more? Go to http://augusto-norma.info/. Enter Z755 in the search box to learn more about \"Motor Vehicle Accident: Care Instructions. \"  Current as of: November 20, 2017  Content Version: 11.7  © 1836-8988 [employer]. Care instructions adapted under license by [employer] (which disclaims liability or warranty for this information). If you have questions about a medical condition or this instruction, always ask your healthcare professional. [address] any warranty or liability for your use of this information.

## 2018-08-08 NOTE — LETTER
[facility] EMERGENCY DEPT 
[address] [facility] [address] [phone] 
[phone] Work/School Note Date: 8/8/2018 To Whom It May concern: 
 
Yahaira Rodriguez was seen and treated today in the emergency room by the following provider(s): 
Attending Provider: Suzie Lemus MD 
Physician Assistant: Chela Galicia. Yahaira Rodriguez may return to work on 8/10/2018. Sincerely, ARCHIE Galicia

## 2018-11-05 ENCOUNTER — EMERGENCY (EMERGENCY)
Facility: HOSPITAL | Age: 43
LOS: 1 days | Discharge: HOME | End: 2018-11-05
Attending: EMERGENCY MEDICINE | Admitting: EMERGENCY MEDICINE
Payer: MEDICAID

## 2018-11-05 VITALS
DIASTOLIC BLOOD PRESSURE: 107 MMHG | OXYGEN SATURATION: 100 % | TEMPERATURE: 99 F | SYSTOLIC BLOOD PRESSURE: 151 MMHG | RESPIRATION RATE: 18 BRPM | HEART RATE: 74 BPM

## 2018-11-05 VITALS
TEMPERATURE: 98 F | HEART RATE: 88 BPM | SYSTOLIC BLOOD PRESSURE: 164 MMHG | DIASTOLIC BLOOD PRESSURE: 108 MMHG | RESPIRATION RATE: 18 BRPM | OXYGEN SATURATION: 100 %

## 2018-11-05 DIAGNOSIS — F60.9 PERSONALITY DISORDER, UNSPECIFIED: ICD-10-CM

## 2018-11-05 DIAGNOSIS — F43.21 ADJUSTMENT DISORDER WITH DEPRESSED MOOD: ICD-10-CM

## 2018-11-05 DIAGNOSIS — F43.10 POST-TRAUMATIC STRESS DISORDER, UNSPECIFIED: ICD-10-CM

## 2018-11-05 PROCEDURE — 99283 EMERGENCY DEPT VISIT LOW MDM: CPT

## 2018-11-05 PROCEDURE — 90792 PSYCH DIAG EVAL W/MED SRVCS: CPT

## 2018-11-05 RX ORDER — ACETAMINOPHEN 500 MG
650 TABLET ORAL ONCE
Qty: 0 | Refills: 0 | Status: COMPLETED | OUTPATIENT
Start: 2018-11-05 | End: 2018-11-05

## 2018-11-05 RX ORDER — RISPERIDONE 4 MG/1
1 TABLET ORAL ONCE
Qty: 0 | Refills: 0 | Status: COMPLETED | OUTPATIENT
Start: 2018-11-05 | End: 2018-11-05

## 2018-11-05 RX ORDER — AMLODIPINE BESYLATE 2.5 MG/1
5 TABLET ORAL ONCE
Qty: 0 | Refills: 0 | Status: COMPLETED | OUTPATIENT
Start: 2018-11-05 | End: 2018-11-05

## 2018-11-05 RX ORDER — IBUPROFEN 200 MG
600 TABLET ORAL ONCE
Qty: 0 | Refills: 0 | Status: DISCONTINUED | OUTPATIENT
Start: 2018-11-05 | End: 2018-11-09

## 2018-11-05 RX ORDER — METOPROLOL TARTRATE 50 MG
50 TABLET ORAL ONCE
Qty: 0 | Refills: 0 | Status: COMPLETED | OUTPATIENT
Start: 2018-11-05 | End: 2018-11-05

## 2018-11-05 RX ADMIN — Medication 650 MILLIGRAM(S): at 17:20

## 2018-11-05 RX ADMIN — AMLODIPINE BESYLATE 5 MILLIGRAM(S): 2.5 TABLET ORAL at 17:20

## 2018-11-05 RX ADMIN — Medication 650 MILLIGRAM(S): at 18:41

## 2018-11-05 RX ADMIN — Medication 50 MILLIGRAM(S): at 17:20

## 2018-11-05 RX ADMIN — RISPERIDONE 1 MILLIGRAM(S): 4 TABLET ORAL at 15:30

## 2018-11-05 RX ADMIN — Medication 1 TABLET(S): at 19:38

## 2018-11-05 RX ADMIN — Medication 1 TABLET(S): at 18:45

## 2018-11-05 NOTE — ED BEHAVIORAL HEALTH ASSESSMENT NOTE - OTHER PAST PSYCHIATRIC HISTORY (INCLUDE DETAILS REGARDING ONSET, COURSE OF ILLNESS, INPATIENT/OUTPATIENT TREATMENT)
multiple prior admissions, carries a diagnosis of "schizoaffective bipolar", multiple medication trials, multiple suicide attempts/gestures. last medication regimen risperdal ?5mg, depakote, hydroxyzine

## 2018-11-05 NOTE — ED BEHAVIORAL HEALTH ASSESSMENT NOTE - PROFESSIONAL COLLATERAL RELATIONSHIP
[facility] room

## 2018-11-05 NOTE — ED BEHAVIORAL HEALTH ASSESSMENT NOTE - DESCRIPTION
calm, cooperative, requesting medications, given Risperdal 1mg PO  Vital Signs Last 24 Hrs  T(C): 36.7 (05 Nov 2018 11:28), Max: 36.7 (05 Nov 2018 11:28)  T(F): 98.1 (05 Nov 2018 11:28), Max: 98.1 (05 Nov 2018 11:28)  HR: 88 (05 Nov 2018 11:28) (88 - 88)  BP: 164/108 (05 Nov 2018 11:28) (164/108 - 164/108)  BP(mean): --  RR: 18 (05 Nov 2018 11:28) (18 - 18)  SpO2: 100% (05 Nov 2018 11:28) (100% - 100%) HTN lives in shelter. unemployed.

## 2018-11-05 NOTE — ED BEHAVIORAL HEALTH ASSESSMENT NOTE - RISK ASSESSMENT
acute risks is lack of outpatient treatment, living in shelter. chronic risks include trauma history, history of substance abuse, history of arrest, history of multiple admissions and suicide attempts/gesture. protective factors include no current substance abuse, no known access to weapons, no current suicidal ideation or homicidal ideation, no global insomnia or acute anxiety, no acute mood or psychotic episode. Chronic risk is moderate but imminent risk is low at this time. Appropriate for outpatient level of care.

## 2018-11-05 NOTE — ED PROVIDER NOTE - PROGRESS NOTE DETAILS
Saige ARAIZA: Patient reassessed - she is complaining of a tension headache, states that she usually takes Fioricet or excedrin for her headaches. No chest pain, sob, focal weakness. Patient was given her home meds for her high blood pressure. She was seen by psych/ SW. She is stable for discharge after receiving her medication for headache.

## 2018-11-05 NOTE — ED BEHAVIORAL HEALTH NOTE - BEHAVIORAL HEALTH NOTE
Patient is a 43 year old female who was brought in to the ED by EMS.  Writer met with patient who states she was sent to the emergency room by her 17 year old son's high school at [facility] because she was crying.  She reports a history of crack/cocaine use but has been clean since 2010.  She reports  treatment history for PTSD and Bipolar disorder.  She states she was living in [place] and moved to [place] September 2018.  She states she had her treatment providers in [facility] recommend she not go to a large shelter in [place] because she cannot be in a loud environment.  Patient was placed in [facility] in [place].  She states it is a big house with other women who are using drugs outside.  She states the house is too loud and is requesting assistance in obtaining quieter housing.  She reports going to [facility] for chest pains Sept 25, 2018 and was discharged, she reports going to [facility] 11/1/18 seeking treatment and alternate housing.  She states [facility] gave her one dose of Depakote and Risperdal and discharged her.  Pt states she has twisted screws in her foot that need to come out.    Writer called [facility] psychiatric emergency room  to obtain collateral regarding November 1, 2018 visit.  Writer spoke to Hakan hurtado who provided the following information.  Patient presented to the ED 10/31/18 for foot and leg pain complaining of pins in her ankle that needed to be removed.  She reported taking Lisinopril and Metropolol for blood pressure.    He states patient presented on 11/1/18 was seen by psych liason and resident for stress and anxiety.  She reported traveling to multiple [facility] wanting to seek single placement occupancy.   Currently residing in [facility].  Pt disclosed a History of schizoaffective bipolar type.  Pt reported Prescriptions Depakote 500, Hydroxyzine, benadryl 50mg were at local pharmacy but hadn't picked them up yet.  She was given hydroxyzine in the ED with good effect and discharged.    Writer called [facility]  and spoke to psychologist Brittney Cardozo who states. Pt went in for a meeting with her 17 year old son.  Pt was withdrawn, tearful reporting difficulty focussing and difficulty completing ADL's.   Pt reported a history of abuse and substance abuse.  Pt reported being abstinent for 8 years.  Pt reported being in an unstable living situation and triggered by drugs and alcohol in the surrounding area to her shelter.  Pt told her she needed counseling.  She denied suicidal ideation and reported, "i'm on the edge".
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
## 2018-11-05 NOTE — ED BEHAVIORAL HEALTH NOTE - BEHAVIORAL HEALTH NOTE
Writer provided patient with a [email or web address] referral list, and information for [facility], along with psychoeducation regarding services offered. She stated she is able to reach her shelter. She was worried about missing an appointment at [facility] today, and being admitted into her shelter in [place]. Writer advised her to show staff at the shelter he hospital discharge papers to be admitted at the shelter if there is a question of being provided with her bed for tonight.
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
## 2018-11-05 NOTE — ED PROVIDER NOTE - MEDICAL DECISION MAKING DETAILS
here for depression/ not able to take meds. pt w/no other complaints - denied cp/sob/f/c/n/v/headache/rash. discussed with psych. psych to evaluate.

## 2018-11-05 NOTE — ED PROVIDER NOTE - OBJECTIVE STATEMENT
Patient is a 44 yo F with history of HTN, cardiac arrythmia, angina here for evaluation of depression. Patient went to her son's school today and spoke to a psychologist in an effort to get help. She stats she feels overwhelmed, difficult to get up in the morning. She has been in a shelter and feels that people have been 'messing' with her. She also states she has been out of her medications for about 2 months. She states she is supposed to be on Depakote, Risperdal, and ativan. She states she has had difficulty getting her medications because they were sent under the wrong name. Patient is a 42 yo F with history of HTN, cardiac arrythmia, angina here for evaluation of depression. Patient went to her son's school today and spoke to a psychologist in an effort to get help. She stats she feels overwhelmed, difficult to get up in the morning and 'feels on edge'. She has been in a shelter and feels that people have been 'messing' with her. She also states she has been out of her medications for about 2 months. She states she is supposed to be on Depakote, Risperdal, and ativan. She states she has had difficulty getting her medications because they were sent under the wrong name.
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
## 2018-11-08 ENCOUNTER — OUTPATIENT (OUTPATIENT)
Dept: OUTPATIENT SERVICES | Facility: HOSPITAL | Age: 43
LOS: 1 days | Discharge: HOME | End: 2018-11-08

## 2018-11-08 PROBLEM — I10 ESSENTIAL (PRIMARY) HYPERTENSION: Chronic | Status: ACTIVE | Noted: 2018-11-05

## 2018-11-14 DIAGNOSIS — F14.90 COCAINE USE, UNSPECIFIED, UNCOMPLICATED: ICD-10-CM

## 2018-11-14 PROBLEM — Z00.00 ENCOUNTER FOR PREVENTIVE HEALTH EXAMINATION: Status: ACTIVE | Noted: 2018-11-14

## 2018-11-28 ENCOUNTER — APPOINTMENT (OUTPATIENT)
Dept: ORTHOPEDIC SURGERY | Facility: CLINIC | Age: 43
End: 2018-11-28

## 2018-12-03 ENCOUNTER — EMERGENCY (EMERGENCY)
Facility: HOSPITAL | Age: 43
LOS: 1 days | Discharge: HOME | End: 2018-12-03
Attending: EMERGENCY MEDICINE | Admitting: EMERGENCY MEDICINE
Payer: SELF-PAY

## 2018-12-03 VITALS
HEART RATE: 93 BPM | TEMPERATURE: 98 F | DIASTOLIC BLOOD PRESSURE: 91 MMHG | HEIGHT: 62 IN | SYSTOLIC BLOOD PRESSURE: 182 MMHG | OXYGEN SATURATION: 98 % | RESPIRATION RATE: 16 BRPM | WEIGHT: 128.09 LBS

## 2018-12-03 VITALS — HEART RATE: 79 BPM | DIASTOLIC BLOOD PRESSURE: 98 MMHG | SYSTOLIC BLOOD PRESSURE: 165 MMHG

## 2018-12-03 DIAGNOSIS — M54.5 LOW BACK PAIN: ICD-10-CM

## 2018-12-03 PROCEDURE — 99283 EMERGENCY DEPT VISIT LOW MDM: CPT

## 2018-12-03 RX ORDER — RISPERIDONE 4 MG/1
0 TABLET ORAL
Qty: 0 | Refills: 0 | COMMUNITY

## 2018-12-03 RX ORDER — METOPROLOL TARTRATE 50 MG
0 TABLET ORAL
Qty: 0 | Refills: 0 | COMMUNITY

## 2018-12-03 RX ORDER — OXYCODONE AND ACETAMINOPHEN 5; 325 MG/1; MG/1
2 TABLET ORAL ONCE
Qty: 0 | Refills: 0 | Status: DISCONTINUED | OUTPATIENT
Start: 2018-12-03 | End: 2018-12-03

## 2018-12-03 RX ORDER — DIPHENHYDRAMINE HCL 50 MG
25 CAPSULE ORAL ONCE
Qty: 0 | Refills: 0 | Status: COMPLETED | OUTPATIENT
Start: 2018-12-03 | End: 2018-12-03

## 2018-12-03 RX ORDER — LIDOCAINE 4 G/100G
1 CREAM TOPICAL ONCE
Qty: 0 | Refills: 0 | Status: COMPLETED | OUTPATIENT
Start: 2018-12-03 | End: 2018-12-03

## 2018-12-03 RX ORDER — ASPIRIN/CALCIUM CARB/MAGNESIUM 324 MG
0 TABLET ORAL
Qty: 0 | Refills: 0 | COMMUNITY

## 2018-12-03 RX ORDER — HYDROXYZINE HCL 10 MG
0 TABLET ORAL
Qty: 0 | Refills: 0 | COMMUNITY

## 2018-12-03 RX ORDER — AMLODIPINE BESYLATE 2.5 MG/1
0 TABLET ORAL
Qty: 0 | Refills: 0 | COMMUNITY

## 2018-12-03 RX ORDER — CYCLOBENZAPRINE HYDROCHLORIDE 10 MG/1
1 TABLET, FILM COATED ORAL
Qty: 15 | Refills: 0 | OUTPATIENT
Start: 2018-12-03 | End: 2018-12-07

## 2018-12-03 RX ADMIN — Medication 25 MILLIGRAM(S): at 20:59

## 2018-12-03 RX ADMIN — Medication 60 MILLIGRAM(S): at 20:16

## 2018-12-03 RX ADMIN — OXYCODONE AND ACETAMINOPHEN 2 TABLET(S): 5; 325 TABLET ORAL at 20:16

## 2018-12-03 RX ADMIN — LIDOCAINE 1 PATCH: 4 CREAM TOPICAL at 20:17
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
## 2018-12-03 NOTE — ED PROVIDER NOTE - MUSCULOSKELETAL, MLM
no vert tenderness or step off deformities. soft tissue tenderness to left lumbar paraspinals and left gluteals. full ROM of back and lower extremities

## 2018-12-03 NOTE — ED PROVIDER NOTE - MEDICAL DECISION MAKING DETAILS
left sided low back pain since yesterday, worse today. worse with movement. no red flags. suspect musculoskeletal in nature. declines injection. will tx with oral percocet, prednisone, and lidocaine patch left sided low back pain since yesterday, worse today. worse with movement. no red flags. do not suspect infection or cord compression. suspect musculoskeletal in nature. declines injection. will tx with oral percocet, prednisone, and lidocaine patch

## 2018-12-03 NOTE — ED PROVIDER NOTE - NEUROLOGICAL, MLM
Alert and oriented, no focal deficits, no motor or sensory deficits. sensation to lower ext equal bilaterally. no toe or foot drop

## 2018-12-03 NOTE — ED ADULT NURSE NOTE - NSIMPLEMENTINTERV_GEN_ALL_ED
Implemented All Universal Safety Interventions:  [place] to call system. Call bell, personal items and telephone within reach. Instruct patient to call for assistance. Room bathroom lighting operational. Non-slip footwear when patient is off stretcher. Physically safe environment: no spills, clutter or unnecessary equipment. Stretcher in lowest position, wheels locked, appropriate side rails in place.

## 2018-12-03 NOTE — ED ADULT NURSE NOTE - OBJECTIVE STATEMENT
Presents to ED via amb. Pt c/o lower back pain since yesterday. Non radiating. Denies trauma. More painful with certain positions.

## 2018-12-03 NOTE — ED PROVIDER NOTE - ATTENDING CONTRIBUTION TO CARE
Miguelangel Valderrama MD: I have personally performed a face to face diagnostic evaluation on this patient.  I have reviewed the PA note and agree with the history, exam, and plan of care, except as noted.  History and Exam by me shows same findings as documented  Attending Note: Patient with back pain, mostly with movement. History of same. No signs or symptoms of spinal cord involvement. Agree with plan

## 2018-12-03 NOTE — ED PROVIDER NOTE - PROGRESS NOTE DETAILS
pain overall improved after percocet, prednisone, and lidocaine patch. full ROM. ambulatory with steady gait. eating and drinking. does not appear to be in any distress.  blood pressure rechecked at discharge and elevated at 177/97. no headache, chest pain, blurred vision, or dizziness. will continue to closely monitor and follow up with PCP. referral provided. has metoprolol and amlodipine at home

## 2018-12-03 NOTE — ED PROVIDER NOTE - CHPI ED SYMPTOMS NEG
no bowel dysfunction/no bladder dysfunction/no neck tenderness/no numbness/no difficulty bearing weight/no motor function loss/no tingling/no fatigue/no anorexia/no constipation

## 2018-12-05 ENCOUNTER — APPOINTMENT (OUTPATIENT)
Dept: ORTHOPEDIC SURGERY | Facility: CLINIC | Age: 43
End: 2018-12-05

## 2018-12-19 ENCOUNTER — EMERGENCY (EMERGENCY)
Facility: HOSPITAL | Age: 43
LOS: 1 days | Discharge: HOME | End: 2018-12-19
Attending: EMERGENCY MEDICINE | Admitting: EMERGENCY MEDICINE
Payer: SELF-PAY

## 2018-12-19 VITALS
SYSTOLIC BLOOD PRESSURE: 175 MMHG | OXYGEN SATURATION: 97 % | HEIGHT: 62 IN | DIASTOLIC BLOOD PRESSURE: 111 MMHG | WEIGHT: 119.93 LBS | TEMPERATURE: 98 F | HEART RATE: 105 BPM | RESPIRATION RATE: 18 BRPM

## 2018-12-19 PROCEDURE — 99284 EMERGENCY DEPT VISIT MOD MDM: CPT

## 2018-12-19 PROCEDURE — 99283 EMERGENCY DEPT VISIT LOW MDM: CPT | Mod: 25

## 2018-12-19 PROCEDURE — 71046 X-RAY EXAM CHEST 2 VIEWS: CPT

## 2018-12-19 PROCEDURE — 71046 X-RAY EXAM CHEST 2 VIEWS: CPT | Mod: 26

## 2018-12-19 RX ORDER — ACETAMINOPHEN 500 MG
650 TABLET ORAL ONCE
Qty: 0 | Refills: 0 | Status: COMPLETED | OUTPATIENT
Start: 2018-12-19 | End: 2018-12-19

## 2018-12-19 RX ORDER — CYCLOBENZAPRINE HYDROCHLORIDE 10 MG/1
10 TABLET, FILM COATED ORAL ONCE
Qty: 0 | Refills: 0 | Status: COMPLETED | OUTPATIENT
Start: 2018-12-19 | End: 2018-12-19

## 2018-12-19 RX ORDER — CYCLOBENZAPRINE HYDROCHLORIDE 10 MG/1
1 TABLET, FILM COATED ORAL
Qty: 15 | Refills: 0 | OUTPATIENT
Start: 2018-12-19 | End: 2018-12-23

## 2018-12-19 RX ADMIN — Medication 100 MILLIGRAM(S): at 06:04

## 2018-12-19 RX ADMIN — CYCLOBENZAPRINE HYDROCHLORIDE 10 MILLIGRAM(S): 10 TABLET, FILM COATED ORAL at 06:04

## 2018-12-19 RX ADMIN — Medication 650 MILLIGRAM(S): at 06:04

## 2018-12-19 RX ADMIN — Medication 650 MILLIGRAM(S): at 06:27

## 2018-12-19 NOTE — ED PROVIDER NOTE - OBJECTIVE STATEMENT
44yo female bib ems with back pain and cough.  pt states she was doing heavy lifting and tonite started to cough and felt a pull in her back, left sided, lower, radiating to buttock, no sob, no fever, chills, pt took alieve with no relief.

## 2018-12-19 NOTE — ED ADULT NURSE NOTE - NSIMPLEMENTINTERV_GEN_ALL_ED
Implemented All Universal Safety Interventions:  [place] to call system. Call bell, personal items and telephone within reach. Instruct patient to call for assistance. Room bathroom lighting operational. Non-slip footwear when patient is off stretcher. Physically safe environment: no spills, clutter or unnecessary equipment. Stretcher in lowest position, wheels locked, appropriate side rails in place.

## 2018-12-19 NOTE — ED ADULT NURSE NOTE - CHPI ED NUR SYMPTOMS NEG
no bladder dysfunction/no bowel dysfunction/no motor function loss/no anorexia/no tingling/no constipation/no difficulty bearing weight/no fatigue/no neck tenderness/no numbness

## 2018-12-19 NOTE — ED ADULT NURSE NOTE - OBJECTIVE STATEMENT
43 yr old female c/o left lower back pain today and cough; pt states she was doing heavy lifting yesterday.

## 2019-01-24 ENCOUNTER — APPOINTMENT (OUTPATIENT)
Dept: ORTHOPEDIC SURGERY | Facility: CLINIC | Age: 44
End: 2019-01-24
Payer: MEDICAID

## 2019-01-24 VITALS
BODY MASS INDEX: 23.55 KG/M2 | WEIGHT: 128 LBS | HEIGHT: 62 IN | SYSTOLIC BLOOD PRESSURE: 176 MMHG | HEART RATE: 87 BPM | DIASTOLIC BLOOD PRESSURE: 116 MMHG

## 2019-01-24 DIAGNOSIS — S82.892D OTHER FRACTURE OF LEFT LOWER LEG, SUBSEQUENT ENCOUNTER FOR CLOSED FRACTURE WITH ROUTINE HEALING: ICD-10-CM

## 2019-01-24 DIAGNOSIS — M21.6X2 OTHER ACQUIRED DEFORMITIES OF LEFT FOOT: ICD-10-CM

## 2019-01-24 DIAGNOSIS — M24.9 JOINT DERANGEMENT, UNSPECIFIED: ICD-10-CM

## 2019-01-24 DIAGNOSIS — M25.572 PAIN IN LEFT ANKLE AND JOINTS OF LEFT FOOT: ICD-10-CM

## 2019-01-24 PROCEDURE — 73610 X-RAY EXAM OF ANKLE: CPT | Mod: LT

## 2019-01-24 PROCEDURE — 99203 OFFICE O/P NEW LOW 30 MIN: CPT

## 2019-02-04 PROBLEM — M21.6X2 GASTROCNEMIUS EQUINUS OF LEFT LOWER EXTREMITY: Status: ACTIVE | Noted: 2019-02-04

## 2019-02-04 PROBLEM — M24.9 INTERNAL DERANGEMENT OF ANKLE: Status: ACTIVE | Noted: 2019-02-04

## 2019-02-04 PROBLEM — S82.892D CLOSED FRACTURE OF LEFT ANKLE WITH ROUTINE HEALING, SUBSEQUENT ENCOUNTER: Status: ACTIVE | Noted: 2019-01-24

## 2019-05-29 ENCOUNTER — HOSPITAL ENCOUNTER (EMERGENCY)
Age: 44
Discharge: HOME | End: 2019-05-29
Attending: EMERGENCY MEDICINE
Payer: SELF-PAY

## 2019-05-29 VITALS
TEMPERATURE: 98.1 F | HEIGHT: 62 IN | RESPIRATION RATE: 16 BRPM | DIASTOLIC BLOOD PRESSURE: 110 MMHG | SYSTOLIC BLOOD PRESSURE: 185 MMHG | OXYGEN SATURATION: 96 % | HEART RATE: 89 BPM | BODY MASS INDEX: 26.13 KG/M2 | WEIGHT: 142 LBS

## 2019-05-29 DIAGNOSIS — I10 ESSENTIAL HYPERTENSION: ICD-10-CM

## 2019-05-29 DIAGNOSIS — Z20.2 POSSIBLE EXPOSURE TO STD: ICD-10-CM

## 2019-05-29 DIAGNOSIS — N76.0 BV (BACTERIAL VAGINOSIS): Primary | ICD-10-CM

## 2019-05-29 DIAGNOSIS — B96.89 BV (BACTERIAL VAGINOSIS): Primary | ICD-10-CM

## 2019-05-29 LAB
APPEARANCE UR: CLEAR
BACTERIA URNS QL MICRO: NEGATIVE /HPF
BILIRUB UR QL: NEGATIVE
CLUE CELLS VAG QL WET PREP: NORMAL
COLOR UR: NORMAL
EPITH CASTS URNS QL MICRO: NORMAL /LPF
GLUCOSE UR STRIP.AUTO-MCNC: NEGATIVE MG/DL
HCG UR QL: NEGATIVE
HGB UR QL STRIP: NEGATIVE
KETONES UR QL STRIP.AUTO: NEGATIVE MG/DL
KOH PREP SPEC: NORMAL
LEUKOCYTE ESTERASE UR QL STRIP.AUTO: NEGATIVE
NITRITE UR QL STRIP.AUTO: NEGATIVE
PH UR STRIP: 6.5 [PH] (ref 5–8)
PROT UR STRIP-MCNC: NEGATIVE MG/DL
RBC #/AREA URNS HPF: NORMAL /HPF (ref 0–5)
SERVICE CMNT-IMP: NORMAL
SP GR UR REFRACTOMETRY: <1.005 (ref 1–1.03)
T VAGINALIS VAG QL WET PREP: NORMAL
UA: UC IF INDICATED,UAUC: NORMAL
UROBILINOGEN UR QL STRIP.AUTO: 0.2 EU/DL (ref 0.2–1)
WBC URNS QL MICRO: NORMAL /HPF (ref 0–4)

## 2019-05-29 PROCEDURE — 87210 SMEAR WET MOUNT SALINE/INK: CPT

## 2019-05-29 PROCEDURE — 96372 THER/PROPH/DIAG INJ SC/IM: CPT

## 2019-05-29 PROCEDURE — 74011250637 HC RX REV CODE- 250/637: Performed by: PHYSICIAN ASSISTANT

## 2019-05-29 PROCEDURE — 87491 CHLMYD TRACH DNA AMP PROBE: CPT

## 2019-05-29 PROCEDURE — 81001 URINALYSIS AUTO W/SCOPE: CPT

## 2019-05-29 PROCEDURE — 99284 EMERGENCY DEPT VISIT MOD MDM: CPT

## 2019-05-29 PROCEDURE — 81025 URINE PREGNANCY TEST: CPT

## 2019-05-29 PROCEDURE — 74011250636 HC RX REV CODE- 250/636: Performed by: PHYSICIAN ASSISTANT

## 2019-05-29 RX ORDER — METRONIDAZOLE 500 MG/1
500 TABLET ORAL 2 TIMES DAILY
Qty: 14 TAB | Refills: 0 | Status: SHIPPED | OUTPATIENT
Start: 2019-05-29 | End: 2019-06-05

## 2019-05-29 RX ORDER — CLONIDINE HYDROCHLORIDE 0.1 MG/1
0.1 TABLET ORAL
Status: COMPLETED | OUTPATIENT
Start: 2019-05-29 | End: 2019-05-29

## 2019-05-29 RX ORDER — AZITHROMYCIN 500 MG/1
1000 TABLET, FILM COATED ORAL
Status: COMPLETED | OUTPATIENT
Start: 2019-05-29 | End: 2019-05-29

## 2019-05-29 RX ORDER — AMLODIPINE BESYLATE 10 MG/1
10 TABLET ORAL DAILY
Qty: 10 TAB | Refills: 0 | Status: SHIPPED | OUTPATIENT
Start: 2019-05-29 | End: 2019-06-08

## 2019-05-29 RX ADMIN — LIDOCAINE HYDROCHLORIDE 250 MG: 10 INJECTION, SOLUTION EPIDURAL; INFILTRATION; INTRACAUDAL; PERINEURAL at 22:36

## 2019-05-29 RX ADMIN — CLONIDINE HYDROCHLORIDE 0.1 MG: 0.1 TABLET ORAL at 22:36

## 2019-05-29 RX ADMIN — AZITHROMYCIN 1000 MG: 500 TABLET, FILM COATED ORAL at 22:35

## 2019-05-30 NOTE — ED TRIAGE NOTES
Pt presents to the ED with c/o pelvic pain and discharge x 3 days. Pt stated being told that \"her  has had an infection for several years and shes just finding out. \" reports burning when she pees. Pt stated she took OTC medications without relief. Reports a history of BV. Pt reports having HTN but isnt taking any medications due to not having insurance.
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
## 2019-05-30 NOTE — DISCHARGE INSTRUCTIONS
Patient Education        Exposure to Sexually Transmitted Infections: Care Instructions  Your Care Instructions  Sexually transmitted infections (STIs) are those diseases spread by sexual contact. There are at least 20 different STIs, including chlamydia, gonorrhea, syphilis, and human immunodeficiency virus (HIV), which causes AIDS. Bacteria-caused STIs can be treated and cured. STIs caused by viruses, such as HIV, can be treated but not cured. Some STIs can reduce a woman's chances of getting pregnant in the future. STIs are spread during sexual contact, such as vaginal intercourse and oral or anal sex. Follow-up care is a key part of your treatment and safety. Be sure to make and go to all appointments, and call your doctor if you are having problems. It's also a good idea to know your test results and keep a list of the medicines you take. How can you care for yourself at home? · Your doctor may have given you a shot of antibiotics. If your doctor prescribed antibiotic pills, take them as directed. Do not stop taking them just because you feel better. You need to take the full course of antibiotics. · Do not have sexual contact while you have symptoms of an STI or are being treated for an STI. · Tell your sex partner (or partners) that he or she will need treatment. · If you are a woman, do not douche. Douching changes the normal balance of bacteria in the vagina and may spread an infection up into your reproductive organs. To prevent exposure to STIs in the future  · Use latex condoms every time you have sex. Use them from the beginning to the end of sexual contact. · Talk to your partner before you have sex. Find out if he or she has or is at risk for any STI. Keep in mind that a person may be able to spread an STI even if he or she does not have symptoms. · Do not have sex if you are being treated for an STI.   · Do not have sex with anyone who has symptoms of an STI, such as sores on the genitals or mouth.  · Having one sex partner (who does not have STIs and does not have sex with anyone else) is a good way to avoid STIs. When should you call for help? Call your doctor now or seek immediate medical care if:    · You have new pain in your belly or pelvis.     · You have symptoms of a urinary tract infection. These may include:  ? Pain or burning when you urinate. ? A frequent need to urinate without being able to pass much urine. ? Pain in the flank, which is just below the rib cage and above the waist on either side of the back. ? Blood in your urine. ? A fever.     · You have new or worsening pain or swelling in the scrotum.    Watch closely for changes in your health, and be sure to contact your doctor if:    · You have unusual vaginal bleeding.     · You have a discharge from the vagina or penis.     · You have any new symptoms, such as sores, bumps, rashes, blisters, or warts.     · You have itching, tingling, pain, or burning in the genital or anal area.     · You think you may have an STI. Where can you learn more? Go to http://augusto-norma.info/. Enter X547 in the search box to learn more about \"Exposure to Sexually Transmitted Infections: Care Instructions. \"  Current as of: September 11, 2018  Content Version: 11.9  © 2871-5655 [employer]. Care instructions adapted under license by [employer] (which disclaims liability or warranty for this information). If you have questions about a medical condition or this instruction, always ask your healthcare professional. [place] [address] any warranty or liability for your use of this information. Patient Education        Bacterial Vaginosis: Care Instructions  Your Care Instructions    Bacterial vaginosis is a type of vaginal infection. It is caused by excess growth of certain bacteria that are normally found in the vagina.  Symptoms can include itching, swelling, pain when you urinate or have sex, and a gray or yellow discharge with a \"fishy\" odor. It is not considered an infection that is spread through sexual contact. Although symptoms can be annoying and uncomfortable, bacterial vaginosis does not usually cause other health problems. However, if you have it while you are pregnant, it can cause complications. While the infection may go away on its own, most doctors use antibiotics to treat it. You may have been prescribed pills or vaginal cream. With treatment, bacterial vaginosis usually clears up in 5 to 7 days. Follow-up care is a key part of your treatment and safety. Be sure to make and go to all appointments, and call your doctor if you are having problems. It's also a good idea to know your test results and keep a list of the medicines you take. How can you care for yourself at home? · Take your antibiotics as directed. Do not stop taking them just because you feel better. You need to take the full course of antibiotics. · Do not eat or drink anything that contains alcohol if you are taking metronidazole (Flagyl). · Keep using your medicine if you start your period. Use pads instead of tampons while using a vaginal cream or suppository. Tampons can absorb the medicine. · Wear loose cotton clothing. Do not wear nylon and other materials that hold body heat and moisture close to the skin. · Do not scratch. Relieve itching with a cold pack or a cool bath. · Do not wash your vaginal area more than once a day. Use plain water or a mild, unscented soap. Do not douche. When should you call for help? Watch closely for changes in your health, and be sure to contact your doctor if:    · You have unexpected vaginal bleeding.     · You have a fever.     · You have new or increased pain in your vagina or pelvis.     · You are not getting better after 1 week.     · Your symptoms return after you finish the course of your medicine. Where can you learn more?   Go to http://augusto-norma.info/. Ky Batista in the search box to learn more about \"Bacterial Vaginosis: Care Instructions. \"  Current as of: May 14, 2018  Content Version: 11.9  © 6788-6853 [employer]. Care instructions adapted under license by [employer] (which disclaims liability or warranty for this information). If you have questions about a medical condition or this instruction, always ask your healthcare professional. [address] any warranty or liability for your use of this information.

## 2019-05-31 LAB
C TRACH DNA SPEC QL NAA+PROBE: NEGATIVE
N GONORRHOEA DNA SPEC QL NAA+PROBE: NEGATIVE
SAMPLE TYPE: NORMAL
SERVICE CMNT-IMP: NORMAL
SPECIMEN SOURCE: NORMAL

## 2019-09-11 ENCOUNTER — HOSPITAL ENCOUNTER (EMERGENCY)
Age: 44
Discharge: HOME | End: 2019-09-11
Attending: EMERGENCY MEDICINE
Payer: SELF-PAY

## 2019-09-11 ENCOUNTER — APPOINTMENT (OUTPATIENT)
Dept: CT IMAGING | Age: 44
End: 2019-09-11
Attending: NURSE PRACTITIONER
Payer: SELF-PAY

## 2019-09-11 VITALS
HEIGHT: 62 IN | TEMPERATURE: 98.7 F | DIASTOLIC BLOOD PRESSURE: 104 MMHG | SYSTOLIC BLOOD PRESSURE: 156 MMHG | BODY MASS INDEX: 26.13 KG/M2 | HEART RATE: 89 BPM | WEIGHT: 142 LBS | OXYGEN SATURATION: 99 % | RESPIRATION RATE: 19 BRPM

## 2019-09-11 DIAGNOSIS — I10 ESSENTIAL HYPERTENSION: Primary | ICD-10-CM

## 2019-09-11 LAB
ALBUMIN SERPL-MCNC: 3.6 G/DL (ref 3.5–5)
ALBUMIN/GLOB SERPL: 1 {RATIO} (ref 1.1–2.2)
ALP SERPL-CCNC: 55 U/L (ref 45–117)
ALT SERPL-CCNC: 21 U/L (ref 12–78)
ANION GAP SERPL CALC-SCNC: 9 MMOL/L (ref 5–15)
APPEARANCE UR: CLEAR
AST SERPL-CCNC: 14 U/L (ref 15–37)
BACTERIA URNS QL MICRO: NEGATIVE /HPF
BASOPHILS # BLD: 0 K/UL (ref 0–0.1)
BASOPHILS NFR BLD: 0 % (ref 0–1)
BILIRUB SERPL-MCNC: 0.3 MG/DL (ref 0.2–1)
BILIRUB UR QL: NEGATIVE
BUN SERPL-MCNC: 19 MG/DL (ref 6–20)
BUN/CREAT SERPL: 19 (ref 12–20)
CALCIUM SERPL-MCNC: 9.1 MG/DL (ref 8.5–10.1)
CHLORIDE SERPL-SCNC: 106 MMOL/L (ref 97–108)
CLUE CELLS VAG QL WET PREP: NORMAL
CO2 SERPL-SCNC: 26 MMOL/L (ref 21–32)
COLOR UR: NORMAL
CREAT SERPL-MCNC: 1.02 MG/DL (ref 0.55–1.02)
DIFFERENTIAL METHOD BLD: NORMAL
EOSINOPHIL # BLD: 0 K/UL (ref 0–0.4)
EOSINOPHIL NFR BLD: 0 % (ref 0–7)
EPITH CASTS URNS QL MICRO: NORMAL /LPF
ERYTHROCYTE [DISTWIDTH] IN BLOOD BY AUTOMATED COUNT: 12.2 % (ref 11.5–14.5)
GLOBULIN SER CALC-MCNC: 3.5 G/DL (ref 2–4)
GLUCOSE SERPL-MCNC: 96 MG/DL (ref 65–100)
GLUCOSE UR STRIP.AUTO-MCNC: NEGATIVE MG/DL
HCG UR QL: NEGATIVE
HCT VFR BLD AUTO: 37.3 % (ref 35–47)
HGB BLD-MCNC: 12.9 G/DL (ref 11.5–16)
HGB UR QL STRIP: NEGATIVE
IMM GRANULOCYTES # BLD AUTO: 0 K/UL (ref 0–0.04)
IMM GRANULOCYTES NFR BLD AUTO: 0 % (ref 0–0.5)
KETONES UR QL STRIP.AUTO: NEGATIVE MG/DL
KOH PREP SPEC: NORMAL
LEUKOCYTE ESTERASE UR QL STRIP.AUTO: NEGATIVE
LYMPHOCYTES # BLD: 2.2 K/UL (ref 0.8–3.5)
LYMPHOCYTES NFR BLD: 27 % (ref 12–49)
MCH RBC QN AUTO: 32.1 PG (ref 26–34)
MCHC RBC AUTO-ENTMCNC: 34.6 G/DL (ref 30–36.5)
MCV RBC AUTO: 92.8 FL (ref 80–99)
MONOCYTES # BLD: 0.6 K/UL (ref 0–1)
MONOCYTES NFR BLD: 8 % (ref 5–13)
NEUTS SEG # BLD: 5.4 K/UL (ref 1.8–8)
NEUTS SEG NFR BLD: 65 % (ref 32–75)
NITRITE UR QL STRIP.AUTO: NEGATIVE
NRBC # BLD: 0 K/UL (ref 0–0.01)
NRBC BLD-RTO: 0 PER 100 WBC
PH UR STRIP: 5 [PH] (ref 5–8)
PLATELET # BLD AUTO: 357 K/UL (ref 150–400)
PMV BLD AUTO: 9.8 FL (ref 8.9–12.9)
POTASSIUM SERPL-SCNC: 3.9 MMOL/L (ref 3.5–5.1)
PROT SERPL-MCNC: 7.1 G/DL (ref 6.4–8.2)
PROT UR STRIP-MCNC: NEGATIVE MG/DL
RBC # BLD AUTO: 4.02 M/UL (ref 3.8–5.2)
RBC #/AREA URNS HPF: NORMAL /HPF (ref 0–5)
SERVICE CMNT-IMP: NORMAL
SODIUM SERPL-SCNC: 141 MMOL/L (ref 136–145)
SP GR UR REFRACTOMETRY: 1.02 (ref 1–1.03)
T VAGINALIS VAG QL WET PREP: NORMAL
UA: UC IF INDICATED,UAUC: NORMAL
UROBILINOGEN UR QL STRIP.AUTO: 0.2 EU/DL (ref 0.2–1)
WBC # BLD AUTO: 8.3 K/UL (ref 3.6–11)
WBC URNS QL MICRO: NORMAL /HPF (ref 0–4)

## 2019-09-11 PROCEDURE — 99285 EMERGENCY DEPT VISIT HI MDM: CPT

## 2019-09-11 PROCEDURE — 81025 URINE PREGNANCY TEST: CPT

## 2019-09-11 PROCEDURE — 74177 CT ABD & PELVIS W/CONTRAST: CPT

## 2019-09-11 PROCEDURE — 96374 THER/PROPH/DIAG INJ IV PUSH: CPT

## 2019-09-11 PROCEDURE — 87210 SMEAR WET MOUNT SALINE/INK: CPT

## 2019-09-11 PROCEDURE — 74011250636 HC RX REV CODE- 250/636: Performed by: NURSE PRACTITIONER

## 2019-09-11 PROCEDURE — 80053 COMPREHEN METABOLIC PANEL: CPT

## 2019-09-11 PROCEDURE — 87491 CHLMYD TRACH DNA AMP PROBE: CPT

## 2019-09-11 PROCEDURE — 36415 COLL VENOUS BLD VENIPUNCTURE: CPT

## 2019-09-11 PROCEDURE — 81001 URINALYSIS AUTO W/SCOPE: CPT

## 2019-09-11 PROCEDURE — 74011636320 HC RX REV CODE- 636/320: Performed by: EMERGENCY MEDICINE

## 2019-09-11 PROCEDURE — 85025 COMPLETE CBC W/AUTO DIFF WBC: CPT

## 2019-09-11 RX ORDER — AMLODIPINE BESYLATE 10 MG/1
10 TABLET ORAL DAILY
Qty: 20 TAB | Refills: 0 | Status: SHIPPED | OUTPATIENT
Start: 2019-09-11 | End: 2019-10-01

## 2019-09-11 RX ORDER — LABETALOL HYDROCHLORIDE 5 MG/ML
20 INJECTION, SOLUTION INTRAVENOUS
Status: DISCONTINUED | OUTPATIENT
Start: 2019-09-11 | End: 2019-09-11

## 2019-09-11 RX ORDER — LABETALOL HCL 20 MG/4 ML
10 SYRINGE (ML) INTRAVENOUS
Status: COMPLETED | OUTPATIENT
Start: 2019-09-11 | End: 2019-09-11

## 2019-09-11 RX ORDER — CLONIDINE HYDROCHLORIDE 0.1 MG/1
0.2 TABLET ORAL
Status: DISCONTINUED | OUTPATIENT
Start: 2019-09-11 | End: 2019-09-11

## 2019-09-11 RX ORDER — SODIUM CHLORIDE 0.9 % (FLUSH) 0.9 %
10 SYRINGE (ML) INJECTION
Status: COMPLETED | OUTPATIENT
Start: 2019-09-11 | End: 2019-09-11

## 2019-09-11 RX ADMIN — SODIUM CHLORIDE 1000 ML: 900 INJECTION, SOLUTION INTRAVENOUS at 15:34

## 2019-09-11 RX ADMIN — Medication 10 ML: at 16:33

## 2019-09-11 RX ADMIN — IOPAMIDOL 100 ML: 755 INJECTION, SOLUTION INTRAVENOUS at 16:33

## 2019-09-11 RX ADMIN — LABETALOL 20 MG/4 ML (5 MG/ML) INTRAVENOUS SYRINGE 10 MG: at 15:38

## 2019-09-11 NOTE — ED NOTES
Pt reporting lower abd pain and vaginal discharge x2wks. Pt also hypertensive in triage. Pt diagnosed with HTN but not taking any meds because of financial reasons. Pt reports thick white vaginal discharge. Denies odor to discharge but reports odor to urine. Abd is distended upon inspection. Last BM today normal. Pt is passing flatus. Emergency Department Nursing Plan of Care       The Nursing Plan of Care is developed from the Nursing assessment and Emergency Department Attending provider initial evaluation. The plan of care may be reviewed in the ED Provider note.     The Plan of Care was developed with the following considerations:   Patient / Family readiness to learn indicated by:verbalized understanding  Persons(s) to be included in education: patient  Barriers to Learning/Limitations:No    Signed     Socorro Mathur RN    9/11/2019   5:17 PM

## 2019-09-11 NOTE — ED PROVIDER NOTES
EMERGENCY DEPARTMENT HISTORY AND PHYSICAL EXAM      Date: 9/11/2019  Patient Name: Miguelito Reed    History of Presenting Illness     Chief Complaint   Patient presents with    Vaginal Discharge    Hypertension       History Provided By: Patient    HPI: Miguelito Reed, 38 y.o. female presents by POV to the ED with cc of elevated blood pressure and yellowish discharge. Patient states over the past couple of months she has been experiencing extreme headaches and muscle tension in her neck. Patient states she has been taking multiple doses of over-the-counter Aleve and other types of tension medications per patient however, the Aleve and other medications only last for a few hours. Patient also states she had a TIA last month and was discharged from the hospital.  However, states she has not followed up or been back to the hospital or doctor's office because she does not have insurance. Patient also describes a yellowish discharge without odor or itching. She also complains of abdominal bloating and distention with intermittent pain for approximately 1 week. States the discharge symptoms started approximately 1 week ago as well. There are no other complaints, changes, or physical findings at this time. PCP: None    No current facility-administered medications on file prior to encounter. No current outpatient medications on file prior to encounter. Past History     Past Medical History:  History reviewed. No pertinent past medical history. Past Surgical History:  History reviewed. No pertinent surgical history. Family History:  History reviewed. No pertinent family history. Social History:  Social History     Tobacco Use    Smoking status: Not on file   Substance Use Topics    Alcohol use: Not on file    Drug use: Not on file       Allergies:   Allergies   Allergen Reactions    Toradol [Ketorolac] Itching         Review of Systems   Review of Systems   Constitutional: Negative for chills and fever. HENT: Negative for congestion, rhinorrhea and sore throat. Respiratory: Negative for cough and shortness of breath. Cardiovascular: Negative for chest pain. Gastrointestinal: Negative for abdominal pain, nausea and vomiting. Endocrine: Negative for polyuria. Genitourinary: Negative for dysuria and frequency. Musculoskeletal: Positive for myalgias and neck pain. Negative for arthralgias. Skin: Negative for rash. Neurological: Positive for headaches. Negative for dizziness and weakness. All other systems reviewed and are negative. Physical Exam   Physical Exam   Constitutional: She is oriented to person, place, and time. She appears well-developed and well-nourished. No distress. 38 y.o. female   HENT:   Head: Normocephalic and atraumatic. Eyes: Pupils are equal, round, and reactive to light. Conjunctivae are normal.   Neck: Normal range of motion. Neck supple. No JVD present. No tracheal deviation present. No thyromegaly present. Cardiovascular: Normal rate, regular rhythm and normal heart sounds. No murmur heard. Pulmonary/Chest: Effort normal and breath sounds normal. No stridor. No respiratory distress. She has no wheezes. Abdominal: Soft. Bowel sounds are normal. She exhibits distension. She exhibits no mass. There is no tenderness. There is no rebound and no guarding. Musculoskeletal: Normal range of motion. She exhibits no edema, tenderness or deformity. Neurological: She is alert and oriented to person, place, and time. No cranial nerve deficit. Coordination normal.   Skin: Skin is warm and dry. No rash noted. She is not diaphoretic. No erythema. No pallor. Psychiatric: She has a normal mood and affect. Her behavior is normal.   Nursing note and vitals reviewed.       Diagnostic Study Results     Labs -     Recent Results (from the past 12 hour(s))   WENDY, OTHER SOURCES    Collection Time: 09/11/19  3:13 PM   Result Value Ref Range    Special Requests: NO SPECIAL REQUESTS      KOH NO YEAST SEEN     WET PREP    Collection Time: 09/11/19  3:13 PM   Result Value Ref Range    Clue cells CLUE CELLS ABSENT      Wet prep NO TRICHOMONAS SEEN     URINALYSIS W/ REFLEX CULTURE    Collection Time: 09/11/19  3:13 PM   Result Value Ref Range    Color YELLOW/STRAW      Appearance CLEAR CLEAR      Specific gravity 1.025 1.003 - 1.030      pH (UA) 5.0 5.0 - 8.0      Protein NEGATIVE  NEG mg/dL    Glucose NEGATIVE  NEG mg/dL    Ketone NEGATIVE  NEG mg/dL    Bilirubin NEGATIVE  NEG      Blood NEGATIVE  NEG      Urobilinogen 0.2 0.2 - 1.0 EU/dL    Nitrites NEGATIVE  NEG      Leukocyte Esterase NEGATIVE  NEG      WBC 0-4 0 - 4 /hpf    RBC 0-5 0 - 5 /hpf    Epithelial cells FEW FEW /lpf    Bacteria NEGATIVE  NEG /hpf    UA:UC IF INDICATED CULTURE NOT INDICATED BY UA RESULT CNI     HCG URINE, QL. - POC    Collection Time: 09/11/19  3:15 PM   Result Value Ref Range    Pregnancy test,urine (POC) NEGATIVE  NEG     CBC WITH AUTOMATED DIFF    Collection Time: 09/11/19  3:30 PM   Result Value Ref Range    WBC 8.3 3.6 - 11.0 K/uL    RBC 4.02 3.80 - 5.20 M/uL    HGB 12.9 11.5 - 16.0 g/dL    HCT 37.3 35.0 - 47.0 %    MCV 92.8 80.0 - 99.0 FL    MCH 32.1 26.0 - 34.0 PG    MCHC 34.6 30.0 - 36.5 g/dL    RDW 12.2 11.5 - 14.5 %    PLATELET 468 823 - 618 K/uL    MPV 9.8 8.9 - 12.9 FL    NRBC 0.0 0  WBC    ABSOLUTE NRBC 0.00 0.00 - 0.01 K/uL    NEUTROPHILS 65 32 - 75 %    LYMPHOCYTES 27 12 - 49 %    MONOCYTES 8 5 - 13 %    EOSINOPHILS 0 0 - 7 %    BASOPHILS 0 0 - 1 %    IMMATURE GRANULOCYTES 0 0.0 - 0.5 %    ABS. NEUTROPHILS 5.4 1.8 - 8.0 K/UL    ABS. LYMPHOCYTES 2.2 0.8 - 3.5 K/UL    ABS. MONOCYTES 0.6 0.0 - 1.0 K/UL    ABS. EOSINOPHILS 0.0 0.0 - 0.4 K/UL    ABS. BASOPHILS 0.0 0.0 - 0.1 K/UL    ABS. IMM.  GRANS. 0.0 0.00 - 0.04 K/UL    DF AUTOMATED     METABOLIC PANEL, COMPREHENSIVE    Collection Time: 09/11/19  3:30 PM   Result Value Ref Range    Sodium 141 136 - 145 mmol/L Potassium 3.9 3.5 - 5.1 mmol/L    Chloride 106 97 - 108 mmol/L    CO2 26 21 - 32 mmol/L    Anion gap 9 5 - 15 mmol/L    Glucose 96 65 - 100 mg/dL    BUN 19 6 - 20 MG/DL    Creatinine 1.02 0.55 - 1.02 MG/DL    BUN/Creatinine ratio 19 12 - 20      GFR est AA >60 >60 ml/min/1.73m2    GFR est non-AA 59 (L) >60 ml/min/1.73m2    Calcium 9.1 8.5 - 10.1 MG/DL    Bilirubin, total 0.3 0.2 - 1.0 MG/DL    ALT (SGPT) 21 12 - 78 U/L    AST (SGOT) 14 (L) 15 - 37 U/L    Alk. phosphatase 55 45 - 117 U/L    Protein, total 7.1 6.4 - 8.2 g/dL    Albumin 3.6 3.5 - 5.0 g/dL    Globulin 3.5 2.0 - 4.0 g/dL    A-G Ratio 1.0 (L) 1.1 - 2.2         Radiologic Studies -   CT ABD PELV W CONT   Final Result   IMPRESSION:   No acute abnormality        CT Results  (Last 48 hours)               09/11/19 1636  CT ABD PELV W CONT Final result    Impression:  IMPRESSION:   No acute abnormality       Narrative:  EXAM: CT ABD PELV W CONT       INDICATION: Abdominal distension       COMPARISON: None        CONTRAST: 100 mL of Isovue-370. TECHNIQUE:    Following the uneventful intravenous administration of contrast, thin axial   images were obtained through the abdomen and pelvis. Coronal and sagittal   reconstructions were generated. Oral contrast was not administered. CT dose   reduction was achieved through use of a standardized protocol tailored for this   examination and automatic exposure control for dose modulation. FINDINGS:    LUNG BASES: Atelectasis left base   INCIDENTALLY IMAGED HEART AND MEDIASTINUM: Unremarkable. LIVER: 7 mm hypodensity segment 5-8 measures 46 Hounsfield units. It is   indeterminate   GALLBLADDER: Unremarkable. SPLEEN: No mass. PANCREAS: No mass or ductal dilatation. ADRENALS: Unremarkable. KIDNEYS: No mass, calculus, or hydronephrosis. STOMACH: Unremarkable. SMALL BOWEL: No dilatation or wall thickening. COLON: No dilatation or wall thickening. APPENDIX: Unremarkable.    PERITONEUM: No ascites or pneumoperitoneum. RETROPERITONEUM: No lymphadenopathy or aortic aneurysm. REPRODUCTIVE ORGANS: Uterus is normal in size. Small follicle right ovary. No   adnexal mass   URINARY BLADDER: No mass or calculus. BONES: No destructive bone lesion. ADDITIONAL COMMENTS: N/A               CXR Results  (Last 48 hours)    None            Medical Decision Making   I am the first provider for this patient. I reviewed the vital signs, available nursing notes, past medical history, past surgical history, family history and social history. Vital Signs-Reviewed the patient's vital signs. Patient Vitals for the past 12 hrs:   Temp Pulse Resp BP SpO2   09/11/19 1615 -- 89 19 (!) 156/104 99 %   09/11/19 1600 -- 88 19 (!) 163/114 100 %   09/11/19 1554 -- 88 22 (!) 168/113 98 %   09/11/19 1456 98.7 °F (37.1 °C) 93 18 (!) 202/129 98 %       Records Reviewed: Nursing Notes, Old Medical Records, Previous Radiology Studies and Previous Laboratory Studies    Provider Notes (Medical Decision Making):   Differential diagnoses include STI infection, vaginal yeast infection, bacterial vaginosis, acute appendicitis, constipation. ED Course:   Initial assessment performed. The patients presenting problems have been discussed, and they are in agreement with the care plan formulated and outlined with them. I have encouraged them to ask questions as they arise throughout their visit. Discussed negative CT and lab results with the patient. I advised patient to follow-up with her primary care doctor in OB/GYN if symptoms of discharge were persistent. Also discussed elevated blood pressure with the patient. Patient states she is supposed to be on hypertension medications, she does not know the name of them, but she states she does not have insurance and cannot afford her medications or to see the doctor at this time.   I discussed the risk of  Having elevated blood pressure such as cardiovascular disease, heart attack, stroke symptoms, kidney injury, etc. also advised patient to discontinue use of medications such as Aleve, Advil, and ibuprofen or any other NSAIDs. Educated that taking NSAIDs with elevated blood pressure could put her at higher risk for symptoms related to myocardial infarction, stroke, kidney disease. Advised patient she should begin as soon as possible on the Norvasc 10 mg daily that is prescribed in the emergency department today and to follow-up with her primary care doctor within 3 to 5 days for further evaluation of her vaginal discharge and elevated blood pressure. Also advised patient to keep a log of her blood pressure daily at the same time so that she could verify it when blood pressures are elevated. Advised patient that if she could not get into see a medical provider within the next 30 days that she should come back to the emergency room for medication refill. Patient acknowledged and agree. Critical Care Time: None    Disposition:  DISCHARGE NOTE:  5:13 PM  The pt is ready for discharge. The pt's signs, symptoms, diagnosis, and discharge instructions have been discussed and pt has conveyed their understanding. The pt is to follow up as recommended or return to ER should their symptoms worsen. Plan has been discussed and pt is in agreement. Jairo Villalpando NP  9/11/2019      PLAN:  1. Current Discharge Medication List      START taking these medications    Details   amLODIPine (NORVASC) 10 mg tablet Take 1 Tab by mouth daily for 20 days. Qty: 20 Tab, Refills: 0           2.    Follow-up Information     Follow up With Specialties Details Why [address] [facility] EMERGENCY DEPT Emergency Medicine Go in 1 week As needed, If symptoms worsen, For wound re-check [address] [facility] Internal Medicine Schedule an appointment as soon as possible for a visit in 3 days As needed, If symptoms worsen, For wound re-check [address] [facility] [address]    Taylor Tesfaye MD Obstetrics & Gynecology Schedule an appointment as soon as possible for a visit in 2 days As needed, If symptoms worsen, For wound re-check [facility] [address]  [phone]          Return to ED if worse     Diagnosis     Clinical Impression:   1. Essential hypertension          Please note that this dictation was completed with [employer], the computer voice recognition software. Quite often unanticipated grammatical, syntax, homophones, and other interpretive errors are inadvertently transcribed by the computer software. Please disregards these errors. Please excuse any errors that have escaped final proofreading. This note will not be viewable in [address]

## 2019-09-11 NOTE — ED NOTES
Patient given copy of dc instructions and 1 script(s). Patient verbalized understanding of instructions and script (s). Patient given a current medication reconciliation form and verbalized understanding of their medications. Patient verbalized understanding of the importance of discussing medications with  his or her physician or clinic they will be following up with. Patient alert and oriented and in no acute distress. Patient discharged home ambulatory.

## 2019-09-11 NOTE — DISCHARGE INSTRUCTIONS
Patient Education        DASH Diet: Care Instructions  Your Care Instructions    The DASH diet is an eating plan that can help lower your blood pressure. DASH stands for Dietary Approaches to Stop Hypertension. Hypertension is high blood pressure. The DASH diet focuses on eating foods that are high in calcium, potassium, and magnesium. These nutrients can lower blood pressure. The foods that are highest in these nutrients are fruits, vegetables, low-fat dairy products, nuts, seeds, and legumes. But taking calcium, potassium, and magnesium supplements instead of eating foods that are high in those nutrients does not have the same effect. The DASH diet also includes whole grains, fish, and poultry. The DASH diet is one of several lifestyle changes your doctor may recommend to lower your high blood pressure. Your doctor may also want you to decrease the amount of sodium in your diet. Lowering sodium while following the DASH diet can lower blood pressure even further than just the DASH diet alone. Follow-up care is a key part of your treatment and safety. Be sure to make and go to all appointments, and call your doctor if you are having problems. It's also a good idea to know your test results and keep a list of the medicines you take. How can you care for yourself at home? Following the DASH diet  · Eat 4 to 5 servings of fruit each day. A serving is 1 medium-sized piece of fruit, ½ cup chopped or canned fruit, 1/4 cup dried fruit, or 4 ounces (½ cup) of fruit juice. Choose fruit more often than fruit juice. · Eat 4 to 5 servings of vegetables each day. A serving is 1 cup of lettuce or raw leafy vegetables, ½ cup of chopped or cooked vegetables, or 4 ounces (½ cup) of vegetable juice. Choose vegetables more often than vegetable juice. · Get 2 to 3 servings of low-fat and fat-free dairy each day. A serving is 8 ounces of milk, 1 cup of yogurt, or 1 ½ ounces of cheese. · Eat 6 to 8 servings of grains each day.  A serving is 1 slice of bread, 1 ounce of dry cereal, or ½ cup of cooked rice, pasta, or cooked cereal. Try to choose whole-grain products as much as possible. · Limit lean meat, poultry, and fish to 2 servings each day. A serving is 3 ounces, about the size of a deck of cards. · Eat 4 to 5 servings of nuts, seeds, and legumes (cooked dried beans, lentils, and split peas) each week. A serving is 1/3 cup of nuts, 2 tablespoons of seeds, or ½ cup of cooked beans or peas. · Limit fats and oils to 2 to 3 servings each day. A serving is 1 teaspoon of vegetable oil or 2 tablespoons of salad dressing. · Limit sweets and added sugars to 5 servings or less a week. A serving is 1 tablespoon jelly or jam, ½ cup sorbet, or 1 cup of lemonade. · Eat less than 2,300 milligrams (mg) of sodium a day. If you limit your sodium to 1,500 mg a day, you can lower your blood pressure even more. Tips for success  · Start small. Do not try to make dramatic changes to your diet all at once. You might feel that you are missing out on your favorite foods and then be more likely to not follow the plan. Make small changes, and stick with them. Once those changes become habit, add a few more changes. · Try some of the following:  ? Make it a goal to eat a fruit or vegetable at every meal and at snacks. This will make it easy to get the recommended amount of fruits and vegetables each day. ? Try yogurt topped with fruit and nuts for a snack or healthy dessert. ? Add lettuce, tomato, cucumber, and onion to sandwiches. ? Combine a ready-made pizza crust with low-fat mozzarella cheese and lots of vegetable toppings. Try using tomatoes, squash, spinach, broccoli, carrots, cauliflower, and onions. ? Have a variety of cut-up vegetables with a low-fat dip as an appetizer instead of chips and dip. ? Sprinkle sunflower seeds or chopped almonds over salads. Or try adding chopped walnuts or almonds to cooked vegetables.   ? Try some vegetarian meals using beans and peas. Add garbanzo or kidney beans to salads. Make burritos and tacos with mashed henry beans or black beans. Where can you learn more? Go to http://augusto-norma.info/. Enter C285 in the search box to learn more about \"DASH Diet: Care Instructions. \"  Current as of: July 22, 2018  Content Version: 12.1  © 3253-9773 [employer]. Care instructions adapted under license by [employer] (which disclaims liability or warranty for this information). If you have questions about a medical condition or this instruction, always ask your healthcare professional. [address] any warranty or liability for your use of this information. Patient Education        Home Blood Pressure Test: About This Test  What is it? A home blood pressure test allows you to keep track of your blood pressure at home. Blood pressure is a measure of the force of blood against the walls of your arteries. Blood pressure readings include two numbers, such as 130/80 (say \"130 over 80\"). The first number is the systolic pressure. The second number is the diastolic pressure. Why is this test done? You may do this test at home to:  · Find out if you have high blood pressure. · Track your blood pressure if you have high blood pressure. · Track how well medicine is working to reduce high blood pressure. · Check how lifestyle changes, such as weight loss and exercise, are affecting blood pressure. How can you prepare for the test?  · Do not use caffeine, tobacco, or medicines known to raise blood pressure (such as nasal decongestant sprays) for at least 30 minutes before taking your blood pressure. · Do not exercise for at least 30 minutes before taking your blood pressure. What happens before the test?  Take your blood pressure while you feel comfortable and relaxed.  Sit quietly with both feet on the floor for at least 5 minutes before the test.  What happens during the test?  · Sit with your arm slightly bent and resting on a table so that your upper arm is at the same level as your heart. · Roll up your sleeve or take off your shirt to expose your upper arm. · Wrap the blood pressure cuff around your upper arm so that the lower edge of the cuff is about 1 inch above the bend of your elbow. Proceed with the following steps depending on if you are using an automatic or manual pressure monitor. Automatic blood pressure monitors  · Press the on/off button on the automatic monitor and wait until the ready-to-measure \"heart\" symbol appears next to zero in the display window. · Press the start button. The cuff will inflate and deflate by itself. · Your blood pressure numbers will appear on the screen. · Write your numbers in your log book, along with the date and time. Manual blood pressure monitors  · Place the earpieces of a stethoscope in your ears, and place the bell of the stethoscope over the artery, just below the cuff. · Close the valve on the rubber inflating bulb. · Squeeze the bulb rapidly with your opposite hand to inflate the cuff until the dial or column of mercury reads about 30 mm Hg higher than your usual systolic pressure. If you do not know your usual pressure, inflate the cuff to 210 mm Hg or until the pulse at your wrist disappears. · Open the pressure valve just slightly by twisting or pressing the valve on the bulb. · As you watch the pressure slowly fall, note the level on the dial at which you first start to hear a pulsing or tapping sound through the stethoscope. This is your systolic blood pressure. · Continue letting the air out slowly. The sounds will become muffled and will finally disappear. Note the pressure when the sounds completely disappear. This is your diastolic blood pressure. Let out all the remaining air. · Write your numbers in your log book, along with the date and time.   What else should you know about the test?  It is more accurate to take the average of several readings made throughout the day than to rely on a single reading. It's normal for blood pressure to go up and down throughout the day. Follow-up care is a key part of your treatment and safety. Be sure to make and go to all appointments, and call your doctor if you are having problems. It's also a good idea to keep a list of the medicines you take. Where can you learn more? Go to http://augusto-norma.info/. Enter C427 in the search box to learn more about \"Home Blood Pressure Test: About This Test.\"  Current as of: July 22, 2018  Content Version: 12.1  © 1670-4256 [employer]. Care instructions adapted under license by [employer] (which disclaims liability or warranty for this information). If you have questions about a medical condition or this instruction, always ask your healthcare professional. [place] [address] any warranty or liability for your use of this information. Patient Education        Learning About Diuretics for High Blood Pressure  Introduction  Diuretics help to lower blood pressure. This reduces your risk of a heart attack and stroke. It also reduces your risk of kidney disease. Diuretics cause your kidneys to remove sodium and water. They also relax the blood vessel walls. These help lower your blood pressure. Examples  · Chlorthalidone  · Hydrochlorothiazide  Possible side effects  There are some common side effects. They are:  · Too little potassium. · Feeling dizzy. · Rash. · Urinating a lot. · High blood sugar. (But this is not common.)  You may have other side effects. Check the information that comes with your medicine. What to know about taking this medicine  · You may take other medicines for blood pressure. Diuretics can help those work better. They can also prevent extra fluid in your body. · You may need to take potassium pills.  Or you may have to watch how much potassium is in your food. Ask your doctor about this. · You may need blood tests to check your kidneys and your potassium level. · Take your medicines exactly as prescribed. Call your doctor if you think you are having a problem with your medicine. · Check with your doctor or pharmacist before you use any other medicines. This includes over-the-counter medicines. Make sure your doctor knows all of the medicines, vitamins, herbal products, and supplements you take. Taking some medicines together can cause problems. Where can you learn more? Go to http://augusto-norma.info/. Enter E516 in the search box to learn more about \"Learning About Diuretics for High Blood Pressure. \"  Current as of: July 22, 2018  Content Version: 12.1  © 8078-7210 [employer], [employer]. Care instructions adapted under license by [employer] (which disclaims liability or warranty for this information). If you have questions about a medical condition or this instruction, always ask your healthcare professional. [place] [address] any warranty or liability for your use of this information. Patient Education        High Blood Pressure: Care Instructions  Overview    It's normal for blood pressure to go up and down throughout the day. But if it stays up, you have high blood pressure. Another name for high blood pressure is hypertension. Despite what a lot of people think, high blood pressure usually doesn't cause headaches or make you feel dizzy or lightheaded. It usually has no symptoms. But it does increase your risk of stroke, heart attack, and other problems. You and your doctor will talk about your risks of these problems based on your blood pressure. Your doctor will give you a goal for your blood pressure. Your goal will be based on your health and your age. Lifestyle changes, such as eating healthy and being active, are always important to help lower blood pressure.  You might also take medicine to reach your blood pressure goal.  Follow-up care is a key part of your treatment and safety. Be sure to make and go to all appointments, and call your doctor if you are having problems. It's also a good idea to know your test results and keep a list of the medicines you take. How can you care for yourself at home? Medical treatment  · If you stop taking your medicine, your blood pressure will go back up. You may take one or more types of medicine to lower your blood pressure. Be safe with medicines. Take your medicine exactly as prescribed. Call your doctor if you think you are having a problem with your medicine. · Talk to your doctor before you start taking aspirin every day. Aspirin can help certain people lower their risk of a heart attack or stroke. But taking aspirin isn't right for everyone, because it can cause serious bleeding. · See your doctor regularly. You may need to see the doctor more often at first or until your blood pressure comes down. · If you are taking blood pressure medicine, talk to your doctor before you take decongestants or anti-inflammatory medicine, such as ibuprofen. Some of these medicines can raise blood pressure. · Learn how to check your blood pressure at home. Lifestyle changes  · Stay at a healthy weight. This is especially important if you put on weight around the waist. Losing even 10 pounds can help you lower your blood pressure. · If your doctor recommends it, get more exercise. Walking is a good choice. Bit by bit, increase the amount you walk every day. Try for at least 30 minutes on most days of the week. You also may want to swim, bike, or do other activities. · Avoid or limit alcohol. Talk to your doctor about whether you can drink any alcohol. · Try to limit how much sodium you eat to less than 2,300 milligrams (mg) a day. Your doctor may ask you to try to eat less than 1,500 mg a day.   · Eat plenty of fruits (such as bananas and oranges), vegetables, legumes, whole grains, and low-fat dairy products. · Lower the amount of saturated fat in your diet. Saturated fat is found in animal products such as milk, cheese, and meat. Limiting these foods may help you lose weight and also lower your risk for heart disease. · Do not smoke. Smoking increases your risk for heart attack and stroke. If you need help quitting, talk to your doctor about stop-smoking programs and medicines. These can increase your chances of quitting for good. When should you call for help? Call 911 anytime you think you may need emergency care. This may mean having symptoms that suggest that your blood pressure is causing a serious heart or blood vessel problem. Your blood pressure may be over 180/120.   For example, call 911 if:    · You have symptoms of a heart attack. These may include:  ? Chest pain or pressure, or a strange feeling in the chest.  ? Sweating. ? Shortness of breath. ? Nausea or vomiting. ? Pain, pressure, or a strange feeling in the back, neck, jaw, or upper belly or in one or both shoulders or arms. ? Lightheadedness or sudden weakness. ? A fast or irregular heartbeat.     · You have symptoms of a stroke. These may include:  ? Sudden numbness, tingling, weakness, or loss of movement in your face, arm, or leg, especially on only one side of your body. ? Sudden vision changes. ? Sudden trouble speaking. ? Sudden confusion or trouble understanding simple statements. ? Sudden problems with walking or balance. ? A sudden, severe headache that is different from past headaches.     · You have severe back or belly pain.    Do not wait until your blood pressure comes down on its own.  Get help right away.   Call your doctor now or seek immediate care if:    · Your blood pressure is much higher than normal (such as 180/120 or higher), but you don't have symptoms.     · You think high blood pressure is causing symptoms, such as:  ? Severe headache.  ? Blurry vision.    Watch closely for changes in your health, and be sure to contact your doctor if:    · Your blood pressure measures higher than your doctor recommends at least 2 times. That means the top number is higher or the bottom number is higher, or both.     · You think you may be having side effects from your blood pressure medicine. Where can you learn more? Go to http://augusto-norma.info/. Enter X353 in the search box to learn more about \"High Blood Pressure: Care Instructions. \"  Current as of: July 22, 2018  Content Version: 12.1  © 2500-9675 Healthwise[employer]. Care instructions adapted under license by [employer] (which disclaims liability or warranty for this information). If you have questions about a medical condition or this instruction, always ask your healthcare professional. [address] any warranty or liability for your use of this information.

## 2019-09-11 NOTE — LETTER
NOTIFICATION RETURN TO WORK / SCHOOL 
 
9/11/2019 5:19 PM 
 
Ms. Nitza Barker [address] [place] [id number] To Whom It May Concern: 
 
Anne Jewell is currently under the care of [facility] EMERGENCY [facility]PT. She will return to work/school on: 09/12/2019 If there are questions or concerns please have the patient contact our office.  
 
 
 
Sincerely, 
 
 
Tabatha GOMES

## 2019-09-11 NOTE — ED TRIAGE NOTES
Pt reports lower abdominal pain and pelvic pain. Pt reports white thick vaginal discharge. Pt reports urinary odor. Pt reports taking 2 metronidazole.

## 2019-11-18 ENCOUNTER — APPOINTMENT (OUTPATIENT)
Dept: CT IMAGING | Age: 44
End: 2019-11-18
Attending: EMERGENCY MEDICINE
Payer: MEDICAID

## 2019-11-18 ENCOUNTER — HOSPITAL ENCOUNTER (EMERGENCY)
Age: 44
Discharge: HOME | End: 2019-11-18
Attending: EMERGENCY MEDICINE | Admitting: EMERGENCY MEDICINE
Payer: MEDICAID

## 2019-11-18 ENCOUNTER — APPOINTMENT (OUTPATIENT)
Dept: GENERAL RADIOLOGY | Age: 44
End: 2019-11-18
Attending: EMERGENCY MEDICINE
Payer: MEDICAID

## 2019-11-18 VITALS
TEMPERATURE: 98.7 F | WEIGHT: 143.5 LBS | BODY MASS INDEX: 26.41 KG/M2 | OXYGEN SATURATION: 95 % | HEART RATE: 90 BPM | SYSTOLIC BLOOD PRESSURE: 179 MMHG | DIASTOLIC BLOOD PRESSURE: 116 MMHG | RESPIRATION RATE: 17 BRPM | HEIGHT: 62 IN

## 2019-11-18 DIAGNOSIS — Z91.14 NON COMPLIANCE W MEDICATION REGIMEN: ICD-10-CM

## 2019-11-18 DIAGNOSIS — I10 ESSENTIAL HYPERTENSION: ICD-10-CM

## 2019-11-18 DIAGNOSIS — G44.209 ACUTE NON INTRACTABLE TENSION-TYPE HEADACHE: Primary | ICD-10-CM

## 2019-11-18 LAB
ALBUMIN SERPL-MCNC: 3.6 G/DL (ref 3.5–5)
ALBUMIN/GLOB SERPL: 1 {RATIO} (ref 1.1–2.2)
ALP SERPL-CCNC: 59 U/L (ref 45–117)
ALT SERPL-CCNC: 23 U/L (ref 12–78)
ANION GAP SERPL CALC-SCNC: 6 MMOL/L (ref 5–15)
AST SERPL-CCNC: 16 U/L (ref 15–37)
BASOPHILS # BLD: 0 K/UL (ref 0–0.1)
BASOPHILS NFR BLD: 0 % (ref 0–1)
BILIRUB SERPL-MCNC: 0.2 MG/DL (ref 0.2–1)
BUN SERPL-MCNC: 19 MG/DL (ref 6–20)
BUN/CREAT SERPL: 17 (ref 12–20)
CALCIUM SERPL-MCNC: 8.8 MG/DL (ref 8.5–10.1)
CHLORIDE SERPL-SCNC: 106 MMOL/L (ref 97–108)
CO2 SERPL-SCNC: 27 MMOL/L (ref 21–32)
CREAT SERPL-MCNC: 1.12 MG/DL (ref 0.55–1.02)
DIFFERENTIAL METHOD BLD: ABNORMAL
EOSINOPHIL # BLD: 0 K/UL (ref 0–0.4)
EOSINOPHIL NFR BLD: 0 % (ref 0–7)
ERYTHROCYTE [DISTWIDTH] IN BLOOD BY AUTOMATED COUNT: 11.9 % (ref 11.5–14.5)
GLOBULIN SER CALC-MCNC: 3.5 G/DL (ref 2–4)
GLUCOSE SERPL-MCNC: 110 MG/DL (ref 65–100)
HCG UR QL: NEGATIVE
HCT VFR BLD AUTO: 33.9 % (ref 35–47)
HGB BLD-MCNC: 11.8 G/DL (ref 11.5–16)
IMM GRANULOCYTES # BLD AUTO: 0 K/UL (ref 0–0.04)
IMM GRANULOCYTES NFR BLD AUTO: 0 % (ref 0–0.5)
LYMPHOCYTES # BLD: 2.7 K/UL (ref 0.8–3.5)
LYMPHOCYTES NFR BLD: 30 % (ref 12–49)
MCH RBC QN AUTO: 32 PG (ref 26–34)
MCHC RBC AUTO-ENTMCNC: 34.8 G/DL (ref 30–36.5)
MCV RBC AUTO: 91.9 FL (ref 80–99)
MONOCYTES # BLD: 0.7 K/UL (ref 0–1)
MONOCYTES NFR BLD: 7 % (ref 5–13)
NEUTS SEG # BLD: 5.8 K/UL (ref 1.8–8)
NEUTS SEG NFR BLD: 63 % (ref 32–75)
NRBC # BLD: 0 K/UL (ref 0–0.01)
NRBC BLD-RTO: 0 PER 100 WBC
PLATELET # BLD AUTO: 405 K/UL (ref 150–400)
PMV BLD AUTO: 9.5 FL (ref 8.9–12.9)
POTASSIUM SERPL-SCNC: 3.4 MMOL/L (ref 3.5–5.1)
PROT SERPL-MCNC: 7.1 G/DL (ref 6.4–8.2)
RBC # BLD AUTO: 3.69 M/UL (ref 3.8–5.2)
SODIUM SERPL-SCNC: 139 MMOL/L (ref 136–145)
TROPONIN I SERPL-MCNC: <0.05 NG/ML
WBC # BLD AUTO: 9.3 K/UL (ref 3.6–11)

## 2019-11-18 PROCEDURE — 81025 URINE PREGNANCY TEST: CPT

## 2019-11-18 PROCEDURE — 84484 ASSAY OF TROPONIN QUANT: CPT

## 2019-11-18 PROCEDURE — 74011250636 HC RX REV CODE- 250/636: Performed by: EMERGENCY MEDICINE

## 2019-11-18 PROCEDURE — 93005 ELECTROCARDIOGRAM TRACING: CPT

## 2019-11-18 PROCEDURE — 36415 COLL VENOUS BLD VENIPUNCTURE: CPT

## 2019-11-18 PROCEDURE — 96374 THER/PROPH/DIAG INJ IV PUSH: CPT

## 2019-11-18 PROCEDURE — 71046 X-RAY EXAM CHEST 2 VIEWS: CPT

## 2019-11-18 PROCEDURE — 99285 EMERGENCY DEPT VISIT HI MDM: CPT

## 2019-11-18 PROCEDURE — 80053 COMPREHEN METABOLIC PANEL: CPT

## 2019-11-18 PROCEDURE — 96375 TX/PRO/DX INJ NEW DRUG ADDON: CPT

## 2019-11-18 PROCEDURE — 74011250637 HC RX REV CODE- 250/637: Performed by: EMERGENCY MEDICINE

## 2019-11-18 PROCEDURE — 70450 CT HEAD/BRAIN W/O DYE: CPT

## 2019-11-18 PROCEDURE — 85025 COMPLETE CBC W/AUTO DIFF WBC: CPT

## 2019-11-18 RX ORDER — METOPROLOL TARTRATE 50 MG/1
50 TABLET ORAL
Status: COMPLETED | OUTPATIENT
Start: 2019-11-18 | End: 2019-11-18

## 2019-11-18 RX ORDER — PROCHLORPERAZINE EDISYLATE 5 MG/ML
10 INJECTION INTRAMUSCULAR; INTRAVENOUS
Status: COMPLETED | OUTPATIENT
Start: 2019-11-18 | End: 2019-11-18

## 2019-11-18 RX ORDER — DIPHENHYDRAMINE HYDROCHLORIDE 50 MG/ML
25 INJECTION, SOLUTION INTRAMUSCULAR; INTRAVENOUS
Status: COMPLETED | OUTPATIENT
Start: 2019-11-18 | End: 2019-11-18

## 2019-11-18 RX ORDER — METOPROLOL TARTRATE 25 MG/1
25 TABLET, FILM COATED ORAL 2 TIMES DAILY
Qty: 60 TAB | Refills: 0 | Status: SHIPPED | OUTPATIENT
Start: 2019-11-18 | End: 2020-01-13

## 2019-11-18 RX ORDER — ACETAMINOPHEN 500 MG
1000 TABLET ORAL ONCE
Status: COMPLETED | OUTPATIENT
Start: 2019-11-18 | End: 2019-11-18

## 2019-11-18 RX ORDER — AMLODIPINE BESYLATE 10 MG/1
10 TABLET ORAL DAILY
Qty: 30 TAB | Refills: 0 | Status: SHIPPED | OUTPATIENT
Start: 2019-11-18 | End: 2020-01-13

## 2019-11-18 RX ORDER — AMLODIPINE BESYLATE 5 MG/1
10 TABLET ORAL
Status: COMPLETED | OUTPATIENT
Start: 2019-11-18 | End: 2019-11-18

## 2019-11-18 RX ADMIN — DIPHENHYDRAMINE HYDROCHLORIDE 25 MG: 50 INJECTION, SOLUTION INTRAMUSCULAR; INTRAVENOUS at 20:09

## 2019-11-18 RX ADMIN — ACETAMINOPHEN 1000 MG: 500 TABLET, FILM COATED ORAL at 19:29

## 2019-11-18 RX ADMIN — SODIUM CHLORIDE 1000 ML: 900 INJECTION, SOLUTION INTRAVENOUS at 20:10

## 2019-11-18 RX ADMIN — AMLODIPINE BESYLATE 10 MG: 5 TABLET ORAL at 19:29

## 2019-11-18 RX ADMIN — PROCHLORPERAZINE EDISYLATE 10 MG: 5 INJECTION INTRAMUSCULAR; INTRAVENOUS at 20:09

## 2019-11-18 RX ADMIN — METOPROLOL TARTRATE 50 MG: 50 TABLET, FILM COATED ORAL at 21:00

## 2019-11-18 NOTE — ED PROVIDER NOTES
EMERGENCY DEPARTMENT HISTORY AND PHYSICAL EXAM      Date: 11/18/2019  Patient Name: Viry Roth    History of Presenting Illness     Chief Complaint   Patient presents with    Headache     pt c/o headache,chest pain,dizzy, x 2 days,       History Provided By: Patient    HPI: Viry Roth, 38 y.o. female with PMHx significant for hypertension, TIA, heart murmur, angina, who presents with a chief complaint of headache, chest pain, and dizziness. Patient states that she last had an episode of midsternal chest pain yesterday but none today. She does have a history of headaches but states that her headache today is worse than normal.  She describes it as a throbbing pressure behind her right eye and in the back of her head which is been progressively worsening for the last few hours. She did not take any medications for her symptoms. He supposed to take antihypertensives but has not taken them in quite some time due to insurance issues. Also notes intermittent palpitations which are chronic and have not changed. Denies any one-sided weakness, speech difficulty or vision changes. Does note some numbness in her R fingers which has been intermittent for \"a while. \"      PCP: Lorena Kincaid MD    There are no other complaints, changes, or physical findings at this time. Current Outpatient Medications   Medication Sig Dispense Refill    amLODIPine (NORVASC) 10 mg tablet Take 1 Tab by mouth daily. 30 Tab 0    metoprolol tartrate (LOPRESSOR) 25 mg tablet Take 1 Tab by mouth two (2) times a day. 60 Tab 0    aspirin delayed-release 81 mg tablet Take  by mouth daily.        Past History     Past Medical History:  Past Medical History:   Diagnosis Date    angina     Angina at rest [facility])     Breast pain     since 2016, right breast, on and off    Chiari malformation     Heart abnormalities     leaky heart valve, murmur    Hypertension     Other ill-defined conditions(799.89)     cardiac arrhythmias    Other ill-defined conditions(799.89)     neuropathy    Stroke ([facility] [address]     TIA     Past Surgical History:  Past Surgical History:   Procedure Laterality Date    ABDOMEN SURGERY PROC UNLISTED      laporscopic surgery post ruptured fallopian tube    HX ORTHOPAEDIC      screws in left ankle    HX OTHER SURGICAL      surgery to back of head due to fractured skull    NEUROLOGICAL PROCEDURE UNLISTED       Family History:  History reviewed. No pertinent family history. Social History:  Social History     Tobacco Use    Smoking status: Current Some Day Smoker     Packs/day: 0.25     Years: 10.00     Pack years: 2.50    Smokeless tobacco: Current User    Tobacco comment: 6 cig/day   Substance Use Topics    Alcohol use: Yes     Comment: social    Drug use: No     Allergies: Allergies   Allergen Reactions    Toradol [Ketorolac] Hives     Review of Systems   Review of Systems   Constitutional: Negative for chills and fever. HENT: Negative for congestion, rhinorrhea and sore throat. Respiratory: Negative for cough and shortness of breath. Cardiovascular: Positive for chest pain and palpitations. Gastrointestinal: Negative for abdominal pain, nausea and vomiting. Genitourinary: Negative for dysuria and urgency. Skin: Negative for rash. Neurological: Positive for dizziness, numbness and headaches. Negative for light-headedness. All other systems reviewed and are negative. Physical Exam   Physical Exam   Constitutional: She is oriented to person, place, and time. She appears well-developed and well-nourished. No distress. HENT:   Head: Normocephalic and atraumatic. Eyes: Pupils are equal, round, and reactive to light. Conjunctivae and EOM are normal.   Neck: Normal range of motion. Cardiovascular: Normal rate, regular rhythm and intact distal pulses. Pulmonary/Chest: Effort normal and breath sounds normal. No stridor. No respiratory distress. Abdominal: Soft. She exhibits no distension.  There is no tenderness. Musculoskeletal: Normal range of motion. Neurological: She is alert and oriented to person, place, and time. She has normal strength. No cranial nerve deficit or sensory deficit. GCS eye subscore is 4. GCS verbal subscore is 5. GCS motor subscore is 6. No pronator drift, normal finger to nose, walks with a steady gait   Skin: Skin is warm and dry. Psychiatric: She has a normal mood and affect. Nursing note and vitals reviewed. Diagnostic Study Results   Labs -     Recent Results (from the past 12 hour(s))   EKG, 12 LEAD, INITIAL    Collection Time: 11/18/19  6:59 PM   Result Value Ref Range    Ventricular Rate 95 BPM    Atrial Rate 95 BPM    P-R Interval 158 ms    QRS Duration 80 ms    Q-T Interval 364 ms    QTC Calculation (Bezet) 457 ms    Calculated P Axis 49 degrees    Calculated R Axis 33 degrees    Calculated T Axis 12 degrees    Diagnosis       Normal sinus rhythm  Normal ECG  When compared with ECG of 14-JUN-2017 02:33,  Nonspecific T wave abnormality, improved in Anterolateral leads     CBC WITH AUTOMATED DIFF    Collection Time: 11/18/19  7:16 PM   Result Value Ref Range    WBC 9.3 3.6 - 11.0 K/uL    RBC 3.69 (L) 3.80 - 5.20 M/uL    HGB 11.8 11.5 - 16.0 g/dL    HCT 33.9 (L) 35.0 - 47.0 %    MCV 91.9 80.0 - 99.0 FL    MCH 32.0 26.0 - 34.0 PG    MCHC 34.8 30.0 - 36.5 g/dL    RDW 11.9 11.5 - 14.5 %    PLATELET 988 (H) 569 - 400 K/uL    MPV 9.5 8.9 - 12.9 FL    NRBC 0.0 0  WBC    ABSOLUTE NRBC 0.00 0.00 - 0.01 K/uL    NEUTROPHILS 63 32 - 75 %    LYMPHOCYTES 30 12 - 49 %    MONOCYTES 7 5 - 13 %    EOSINOPHILS 0 0 - 7 %    BASOPHILS 0 0 - 1 %    IMMATURE GRANULOCYTES 0 0.0 - 0.5 %    ABS. NEUTROPHILS 5.8 1.8 - 8.0 K/UL    ABS. LYMPHOCYTES 2.7 0.8 - 3.5 K/UL    ABS. MONOCYTES 0.7 0.0 - 1.0 K/UL    ABS. EOSINOPHILS 0.0 0.0 - 0.4 K/UL    ABS. BASOPHILS 0.0 0.0 - 0.1 K/UL    ABS. IMM.  GRANS. 0.0 0.00 - 0.04 K/UL    DF AUTOMATED     METABOLIC PANEL, COMPREHENSIVE    Collection Time: 11/18/19  7:16 PM   Result Value Ref Range    Sodium 139 136 - 145 mmol/L    Potassium 3.4 (L) 3.5 - 5.1 mmol/L    Chloride 106 97 - 108 mmol/L    CO2 27 21 - 32 mmol/L    Anion gap 6 5 - 15 mmol/L    Glucose 110 (H) 65 - 100 mg/dL    BUN 19 6 - 20 MG/DL    Creatinine 1.12 (H) 0.55 - 1.02 MG/DL    BUN/Creatinine ratio 17 12 - 20      GFR est AA >60 >60 ml/min/1.73m2    GFR est non-AA 53 (L) >60 ml/min/1.73m2    Calcium 8.8 8.5 - 10.1 MG/DL    Bilirubin, total 0.2 0.2 - 1.0 MG/DL    ALT (SGPT) 23 12 - 78 U/L    AST (SGOT) 16 15 - 37 U/L    Alk. phosphatase 59 45 - 117 U/L    Protein, total 7.1 6.4 - 8.2 g/dL    Albumin 3.6 3.5 - 5.0 g/dL    Globulin 3.5 2.0 - 4.0 g/dL    A-G Ratio 1.0 (L) 1.1 - 2.2     TROPONIN I    Collection Time: 11/18/19  7:16 PM   Result Value Ref Range    Troponin-I, Qt. <0.05 <0.05 ng/mL   HCG URINE, QL. - POC    Collection Time: 11/18/19  7:28 PM   Result Value Ref Range    Pregnancy test,urine (POC) NEGATIVE  NEG         Radiologic Studies -   XR CHEST PA LAT   Final Result   Impression: No acute process or change compared to the prior exam.         CT HEAD WO CONT   Final Result   IMPRESSION: No acute process or change compared to the prior exam.              Xr Chest Pa Lat    Result Date: 11/18/2019  Impression: No acute process or change compared to the prior exam.     Ct Head Wo Cont    Result Date: 11/18/2019  IMPRESSION: No acute process or change compared to the prior exam.     Medical Decision Making   I am the first provider for this patient. I reviewed the vital signs, available nursing notes, past medical history, past surgical history, family history and social history. Vital Signs-Reviewed the patient's vital signs.   Patient Vitals for the past 12 hrs:   Temp Pulse Resp BP SpO2   11/18/19 2134 -- 90 17 -- 95 %   11/18/19 2100 -- 97 22 (!) 179/116 98 %   11/18/19 2034 -- 91 -- (!) 184/118 --   11/18/19 1929 -- 90 -- (!) 189/111 --   11/18/19 1850 98.7 °F (37.1 °C) 97 17 (!) 211/122 98 %       Pulse Oximetry Analysis - 95% on RA    Cardiac Monitor:   Rate: 90 bpm  Rhythm: Normal Sinus Rhythm      ED EKG interpretation:  Rhythm: normal sinus rhythm; and regular . Rate (approx.): 95; Axis: normal; P wave: normal; QRS interval: normal ; ST/T wave: normal; Other findings: normal. This EKG was interpreted by LYUDMILA Cuba MD,ED Provider. Records Reviewed: Nursing Notes and Old Medical Records    Provider Notes (Medical Decision Making):   Patient presents with headache, elevated blood pressure. Currently not having any chest pain or shortness of breath. No nausea, or vomiting. No abdominal pain. On exam, she is overall well-appearing. Her neuro exam is nonfocal.  Given significantly elevated blood pressure in the setting of recent chest pain and headache will check basic lab work, troponin, EKG, CT head, chest x-ray. We will treat her headache and get antihypertensives here. On review of records patient is previously been prescribed Toprol and amlodipine    ED Course:   Initial assessment performed. The patients presenting problems have been discussed, and they are in agreement with the care plan formulated and outlined with them. I have encouraged them to ask questions as they arise throughout their visit. ED Course as of Nov 18 2156   Mon Nov 18, 2019 2114 Feeling better, BP improved. Discussed the importance of follow up with PCP and taking BP meds at home    [LANA]      ED Course User Index  Pelon Ulrich MD     Discussed the metoprolol is on the $4 list at [facility] and that amlodipine is free at [facility]. Critical Care:  none    Disposition:  Discharge Note:  9:17 PM  The patient has been re-evaluated and is ready for discharge. Reviewed available results with patient. Counseled patient on diagnosis and care plan. Patient has expressed understanding, and all questions have been answered.  Patient agrees with plan and agrees to follow up as recommended, or to return to the ED if their symptoms worsen. Discharge instructions have been provided and explained to the patient, along with reasons to return to the ED. PLAN:  1. Discharge Medication List as of 11/18/2019  9:17 PM      START taking these medications    Details   amLODIPine (NORVASC) 10 mg tablet Take 1 Tab by mouth daily. , Print, Disp-30 Tab, R-0      metoprolol tartrate (LOPRESSOR) 25 mg tablet Take 1 Tab by mouth two (2) times a day., Print, Disp-60 Tab, R-0         CONTINUE these medications which have NOT CHANGED    Details   aspirin delayed-release 81 mg tablet Take  by mouth daily. , Historical Med           2. Follow-up Information     Follow up With Specialties Details Why [address]  \[facility]\"", [address] [facility] [address]    Robson Velasco MD General Practice   [address] [address] [facility] B[facility]    [address] [address]  [phone]    [facility] EMERGENCY DEPT Emergency Medicine  As needed, If symptoms worsen [address] [facility]  [phone]        Return to ED if worse     Diagnosis     Clinical Impression:   1. Acute non intractable tension-type headache    2. Essential hypertension    3. Non compliance w medication regimen        This note will not be viewable in [employer]. Please note that this dictation was completed with [employer], the computer voice recognition software. Quite often unanticipated grammatical, syntax, homophones, and other interpretive errors are inadvertently transcribed by the computer software. Please disregard these errors.   Please excuse any errors that have escaped final proofreading

## 2019-11-18 NOTE — ED TRIAGE NOTES
Pt BP high,hx of HTN,previos mini stroke,pt not taking BP meds for a while due to health insurance,ED provider Dr Chago Clemons made aware.

## 2019-11-19 LAB
ATRIAL RATE: 95 BPM
CALCULATED P AXIS, ECG09: 49 DEGREES
CALCULATED R AXIS, ECG10: 33 DEGREES
CALCULATED T AXIS, ECG11: 12 DEGREES
DIAGNOSIS, 93000: NORMAL
P-R INTERVAL, ECG05: 158 MS
Q-T INTERVAL, ECG07: 364 MS
QRS DURATION, ECG06: 80 MS
QTC CALCULATION (BEZET), ECG08: 457 MS
VENTRICULAR RATE, ECG03: 95 BPM

## 2019-11-19 NOTE — ED NOTES
Patient has been instructed that they have been given benadryl which contains opioids, benzodiazepines, or other sedating drugs. Patient is aware that they  will need to refrain from driving or operating heavy machinery after taking this medication. Patient also instructed that they need to avoid drinking alcohol and using other products containing opioids, benzodiazepines, or other sedating drugs. Patient verbalized understanding. Pt's boyfriend is at bedside and is driving her home.

## 2019-11-19 NOTE — DISCHARGE INSTRUCTIONS

## 2019-11-19 NOTE — ED NOTES
Pt presents ambulatory to ED complaining of L sided chest pain (intermittent, pt reports hx of angina), R sided numbness in fingers  x 1 day, and R-sided posterior headache x 2 hours (pt reports hx of migraines, but \"this feels different\"). Pt has not taken BP medications in 3 + months. Pt also reporting a painful cyst in R breast. Pt hypertensive in triage and in room. Pt is alert and oriented x 4, RR even and unlabored, skin is warm and dry. Assesment completed and pt updated on plan of care. Emergency Department Nursing Plan of Care       The Nursing Plan of Care is developed from the Nursing assessment and Emergency Department Attending provider initial evaluation. The plan of care may be reviewed in the ED Provider note.     The Plan of Care was developed with the following considerations:   Patient / Family readiness to learn indicated by:verbalized understanding  Persons(s) to be included in education: patient  Barriers to Learning/Limitations:No    Signed     [address], RN    11/18/2019   7:05 PM

## 2019-11-28 ENCOUNTER — HOSPITAL ENCOUNTER (EMERGENCY)
Age: 44
Discharge: HOME | End: 2019-11-29
Attending: EMERGENCY MEDICINE
Payer: SELF-PAY

## 2019-11-28 ENCOUNTER — APPOINTMENT (OUTPATIENT)
Dept: GENERAL RADIOLOGY | Age: 44
End: 2019-11-28
Attending: EMERGENCY MEDICINE
Payer: SELF-PAY

## 2019-11-28 VITALS
TEMPERATURE: 98 F | OXYGEN SATURATION: 98 % | SYSTOLIC BLOOD PRESSURE: 162 MMHG | HEART RATE: 86 BPM | BODY MASS INDEX: 26.87 KG/M2 | HEIGHT: 62 IN | RESPIRATION RATE: 18 BRPM | DIASTOLIC BLOOD PRESSURE: 106 MMHG | WEIGHT: 146 LBS

## 2019-11-28 DIAGNOSIS — I10 HYPERTENSION, UNSPECIFIED TYPE: ICD-10-CM

## 2019-11-28 DIAGNOSIS — R07.89 CHEST WALL PAIN: Primary | ICD-10-CM

## 2019-11-28 LAB
ALBUMIN SERPL-MCNC: 3.8 G/DL (ref 3.4–5)
ALBUMIN/GLOB SERPL: 1.2 {RATIO} (ref 0.8–1.7)
ALP SERPL-CCNC: 61 U/L (ref 45–117)
ALT SERPL-CCNC: 25 U/L (ref 13–56)
ANION GAP SERPL CALC-SCNC: 10 MMOL/L (ref 3–18)
AST SERPL-CCNC: 18 U/L (ref 10–38)
BASOPHILS # BLD: 0 K/UL (ref 0–0.1)
BASOPHILS NFR BLD: 0 % (ref 0–2)
BILIRUB SERPL-MCNC: 0.3 MG/DL (ref 0.2–1)
BUN SERPL-MCNC: 15 MG/DL (ref 7–18)
BUN/CREAT SERPL: 14 (ref 12–20)
CALCIUM SERPL-MCNC: 8.7 MG/DL (ref 8.5–10.1)
CHLORIDE SERPL-SCNC: 106 MMOL/L (ref 100–111)
CK MB CFR SERPL CALC: 0.8 % (ref 0–4)
CK MB SERPL-MCNC: 1.5 NG/ML (ref 5–25)
CK SERPL-CCNC: 188 U/L (ref 26–192)
CO2 SERPL-SCNC: 24 MMOL/L (ref 21–32)
CREAT SERPL-MCNC: 1.11 MG/DL (ref 0.6–1.3)
D DIMER PPP FEU-MCNC: 0.28 UG/ML(FEU)
DIFFERENTIAL METHOD BLD: ABNORMAL
EOSINOPHIL # BLD: 0.1 K/UL (ref 0–0.4)
EOSINOPHIL NFR BLD: 1 % (ref 0–5)
ERYTHROCYTE [DISTWIDTH] IN BLOOD BY AUTOMATED COUNT: 12.4 % (ref 11.6–14.5)
GLOBULIN SER CALC-MCNC: 3.3 G/DL (ref 2–4)
GLUCOSE SERPL-MCNC: 95 MG/DL (ref 74–99)
HCG SERPL QL: NEGATIVE
HCT VFR BLD AUTO: 35.4 % (ref 35–45)
HGB BLD-MCNC: 12.2 G/DL (ref 12–16)
LYMPHOCYTES # BLD: 2.4 K/UL (ref 0.9–3.6)
LYMPHOCYTES NFR BLD: 35 % (ref 21–52)
MCH RBC QN AUTO: 31.2 PG (ref 24–34)
MCHC RBC AUTO-ENTMCNC: 34.5 G/DL (ref 31–37)
MCV RBC AUTO: 90.5 FL (ref 74–97)
MONOCYTES # BLD: 0.6 K/UL (ref 0.05–1.2)
MONOCYTES NFR BLD: 8 % (ref 3–10)
NEUTS SEG # BLD: 3.8 K/UL (ref 1.8–8)
NEUTS SEG NFR BLD: 56 % (ref 40–73)
PLATELET # BLD AUTO: 418 K/UL (ref 135–420)
PMV BLD AUTO: 9.3 FL (ref 9.2–11.8)
POTASSIUM SERPL-SCNC: 3.7 MMOL/L (ref 3.5–5.5)
PROT SERPL-MCNC: 7.1 G/DL (ref 6.4–8.2)
RBC # BLD AUTO: 3.91 M/UL (ref 4.2–5.3)
SODIUM SERPL-SCNC: 140 MMOL/L (ref 136–145)
TROPONIN I SERPL-MCNC: <0.02 NG/ML (ref 0–0.04)
WBC # BLD AUTO: 7 K/UL (ref 4.6–13.2)

## 2019-11-28 PROCEDURE — 85025 COMPLETE CBC W/AUTO DIFF WBC: CPT

## 2019-11-28 PROCEDURE — 84703 CHORIONIC GONADOTROPIN ASSAY: CPT

## 2019-11-28 PROCEDURE — 80053 COMPREHEN METABOLIC PANEL: CPT

## 2019-11-28 PROCEDURE — 82550 ASSAY OF CK (CPK): CPT

## 2019-11-28 PROCEDURE — 99284 EMERGENCY DEPT VISIT MOD MDM: CPT

## 2019-11-28 PROCEDURE — 85379 FIBRIN DEGRADATION QUANT: CPT

## 2019-11-28 PROCEDURE — 71046 X-RAY EXAM CHEST 2 VIEWS: CPT

## 2019-11-28 PROCEDURE — 93005 ELECTROCARDIOGRAM TRACING: CPT

## 2019-11-28 RX ORDER — KETOROLAC TROMETHAMINE 30 MG/ML
10 INJECTION, SOLUTION INTRAMUSCULAR; INTRAVENOUS ONCE
Status: DISCONTINUED | OUTPATIENT
Start: 2019-11-28 | End: 2019-11-29

## 2019-11-28 RX ORDER — ACETAMINOPHEN 500 MG
1000 TABLET ORAL
Qty: 30 TAB | Refills: 0 | Status: SHIPPED | OUTPATIENT
Start: 2019-11-28

## 2019-11-28 RX ORDER — LIDOCAINE 50 MG/G
PATCH TOPICAL
Qty: 1 EACH | Refills: 0 | Status: SHIPPED | OUTPATIENT
Start: 2019-11-28

## 2019-11-28 RX ORDER — AMLODIPINE BESYLATE 5 MG/1
5 TABLET ORAL DAILY
Qty: 30 TAB | Refills: 0 | Status: SHIPPED | OUTPATIENT
Start: 2019-11-28 | End: 2019-12-28

## 2019-11-28 RX ORDER — LIDOCAINE 4 G/100G
1 PATCH TOPICAL EVERY 24 HOURS
Status: DISCONTINUED | OUTPATIENT
Start: 2019-11-28 | End: 2019-11-29

## 2019-11-28 RX ORDER — AMLODIPINE BESYLATE 5 MG/1
5 TABLET ORAL
Status: DISCONTINUED | OUTPATIENT
Start: 2019-11-28 | End: 2019-11-29

## 2019-11-28 RX ORDER — IBUPROFEN 400 MG/1
400 TABLET ORAL
Qty: 20 TAB | Refills: 0 | Status: SHIPPED | OUTPATIENT
Start: 2019-11-28

## 2019-11-28 RX ORDER — DIPHENHYDRAMINE HCL 25 MG
25 CAPSULE ORAL ONCE
Status: DISCONTINUED | OUTPATIENT
Start: 2019-11-28 | End: 2019-11-29

## 2019-11-29 NOTE — DISCHARGE INSTRUCTIONS
Please follow up at the [facility] clinic. Your blood pressure was high. Please continue taking Amlodipine and follow up with your PCP or the [facility] clinic for blood pressure mangement. Thank you for allowing us to participate in your care. Please call your primary care doctor to schedule a follow up appointment as soon as possible. You should schedule any additional follow up appointments as directed in your discharge paperwork. If you are having difficulty connecting to the care or services you need please ask us about our  program.    Please return to the ER if your symptoms persist, get worse, or if you have other concerns. We are always available to re-evaluate you and to make sure you are doing OK!

## 2019-11-29 NOTE — ED PROVIDER NOTES
EMERGENCY DEPARTMENT HISTORY AND PHYSICAL EXAM    Date: 11/28/2019  Patient Name: Yanelis Will    History of Presenting Illness     Chief Complaint   Patient presents with    Chest Pain         History Provided By: Patient    2010  Yanelis Will is a 38 y.o. female with PMHX of angina, HTN, murmur who presents to the emergency department C/O right pleuritic chest pain. She states she has had pain for 2 weeks under her right breast and that she can feel a lump that is inferior to her breast.  Patient describes pain along right costal margin that goes anterior to lateral.  Also complains some numbness in her shoulder. States shortness of breath is only secondary to pain from deep inspiration. Denies PE risk factors. Denies trauma. PCP: None        Past History     Past Medical History:  History reviewed. No pertinent past medical history. Past Surgical History:  History reviewed. No pertinent surgical history. Family History:  History reviewed. No pertinent family history. Social History:  Social History     Tobacco Use    Smoking status: Never Smoker    Smokeless tobacco: Never Used   Substance Use Topics    Alcohol use: Not Currently    Drug use: Never       Allergies: Allergies   Allergen Reactions    Toradol [Ketorolac] Itching         Review of Systems   Review of Systems   Constitutional: Negative for fatigue and fever. Respiratory: Positive for shortness of breath (2/2 pain). Cardiovascular: Positive for chest pain. Negative for palpitations and leg swelling. Gastrointestinal: Negative for abdominal pain. All other systems reviewed and are negative.         Physical Exam     Vitals:    11/28/19 1946 11/28/19 2030   BP: (!) 166/106 (!) 162/106   Pulse: 94 86   Resp: 15 18   Temp: 98 °F (36.7 °C)    SpO2: 96% 98%   Weight: 66.2 kg (146 lb)    Height: 5' 2\" (1.575 m)      Physical Exam    Nursing notes and vital signs reviewed    Constitutional: Non toxic appearing, no acute distress  Head: Normocephalic, Atraumatic  Eyes: Pupils are equal, round, and reactive to light, EOMI  Neck: Supple  Cardiovascular: Regular rate and rhythm,  Chest: Normal work of breathing and chest excursion bilaterally, tenderness over right anterior inferior costal margin, no erythema, crepitus, or signs of trauma  Lungs: Clear to ausculation bilaterally  Abdomen: Soft, non tender, non distended, normoactive bowel sounds  Back: No evidence of trauma or deformity  Extremities: No evidence of trauma or deformity, no LE edema  Skin: Warm and dry, normal cap refill  Neuro: Alert and appropriate, CN intact, normal speech,  Psychiatric: Normal mood and affect      Diagnostic Study Results     Labs -     Recent Results (from the past 12 hour(s))   EKG, 12 LEAD, INITIAL    Collection Time: 11/28/19  7:42 PM   Result Value Ref Range    Ventricular Rate 89 BPM    Atrial Rate 89 BPM    P-R Interval 152 ms    QRS Duration 80 ms    Q-T Interval 380 ms    QTC Calculation (Bezet) 462 ms    Calculated P Axis 46 degrees    Calculated R Axis 30 degrees    Calculated T Axis 37 degrees    Diagnosis       Normal sinus rhythm  Normal ECG  No previous ECGs available     CBC WITH AUTOMATED DIFF    Collection Time: 11/28/19  8:00 PM   Result Value Ref Range    WBC 7.0 4.6 - 13.2 K/uL    RBC 3.91 (L) 4.20 - 5.30 M/uL    HGB 12.2 12.0 - 16.0 g/dL    HCT 35.4 35.0 - 45.0 %    MCV 90.5 74.0 - 97.0 FL    MCH 31.2 24.0 - 34.0 PG    MCHC 34.5 31.0 - 37.0 g/dL    RDW 12.4 11.6 - 14.5 %    PLATELET 941 496 - 838 K/uL    MPV 9.3 9.2 - 11.8 FL    NEUTROPHILS 56 40 - 73 %    LYMPHOCYTES 35 21 - 52 %    MONOCYTES 8 3 - 10 %    EOSINOPHILS 1 0 - 5 %    BASOPHILS 0 0 - 2 %    ABS. NEUTROPHILS 3.8 1.8 - 8.0 K/UL    ABS. LYMPHOCYTES 2.4 0.9 - 3.6 K/UL    ABS. MONOCYTES 0.6 0.05 - 1.2 K/UL    ABS. EOSINOPHILS 0.1 0.0 - 0.4 K/UL    ABS.  BASOPHILS 0.0 0.0 - 0.1 K/UL    DF AUTOMATED     METABOLIC PANEL, COMPREHENSIVE    Collection Time: 11/28/19  8:00 PM Result Value Ref Range    Sodium 140 136 - 145 mmol/L    Potassium 3.7 3.5 - 5.5 mmol/L    Chloride 106 100 - 111 mmol/L    CO2 24 21 - 32 mmol/L    Anion gap 10 3.0 - 18 mmol/L    Glucose 95 74 - 99 mg/dL    BUN 15 7.0 - 18 MG/DL    Creatinine 1.11 0.6 - 1.3 MG/DL    BUN/Creatinine ratio 14 12 - 20      GFR est AA >60 >60 ml/min/1.73m2    GFR est non-AA 54 (L) >60 ml/min/1.73m2    Calcium 8.7 8.5 - 10.1 MG/DL    Bilirubin, total 0.3 0.2 - 1.0 MG/DL    ALT (SGPT) 25 13 - 56 U/L    AST (SGOT) 18 10 - 38 U/L    Alk. phosphatase 61 45 - 117 U/L    Protein, total 7.1 6.4 - 8.2 g/dL    Albumin 3.8 3.4 - 5.0 g/dL    Globulin 3.3 2.0 - 4.0 g/dL    A-G Ratio 1.2 0.8 - 1.7     CARDIAC PANEL,(CK, CKMB & TROPONIN)    Collection Time: 11/28/19  8:00 PM   Result Value Ref Range     26 - 192 U/L    CK - MB 1.5 <3.6 ng/ml    CK-MB Index 0.8 0.0 - 4.0 %    Troponin-I, QT <0.02 0.0 - 0.045 NG/ML   D DIMER    Collection Time: 11/28/19  8:00 PM   Result Value Ref Range    D DIMER 0.28 <0.46 ug/ml(FEU)   HCG QL SERUM    Collection Time: 11/28/19  8:00 PM   Result Value Ref Range    HCG, Ql. NEGATIVE  NEG         Radiologic Studies -   XR CHEST PA LAT    (Results Pending)     CT Results  (Last 48 hours)    None        CXR Results  (Last 48 hours)    None          Medications given in the ED-  Medications   ketorolac (TORADOL) injection 10 mg (has no administration in time range)   lidocaine 4 % patch 1 Patch (has no administration in time range)   diphenhydrAMINE (BENADRYL) capsule 25 mg (has no administration in time range)   amLODIPine (NORVASC) tablet 5 mg (has no administration in time range)         Medical Decision Making   I am the first provider for this patient. I reviewed the vital signs, available nursing notes, past medical history, past surgical history, family history and social history. Vital Signs-Reviewed the patient's vital signs.     Pulse Oximetry Analysis - 100% on RA     Cardiac Monitor:  Rate: 89 bpm  Rhythm: NSR    EKG interpretation: (Preliminary)  EKG read by Dr. Octaviano Reyes at 0496   Sinus rhythm, rate of 89, normal intervals, normal axis, unremarkable EKG    Records Reviewed: Nursing Notes    Provider Notes (Medical Decision Making): Alvin Ennis is a 38 y.o. female right intercostal pain. Will check labs, d-dimer. Heart Score of 1 low suspicion for ACS. Procedures:  Procedures    ED Course:   8:53 PM  Shows no acute pathology per my read. Blood work is benign. D-dimer negative. Will treat patient for chest wall pain. No indication for to set cardiac rule out as symptoms have been ongoing for 2 weeks. Will refer to primary care for patient's uncontrolled hypertension. She was previously prescribed amlodipine in the ER which I will restart her on.    9:29 PM  Notified by nursing staff that patient did not wait for discharge paperwork. Patient ripped out IV and left emergency department without notifying anyone. I had previously counseled patient about her test results and that she has been ruled out for threatening conditions such as an acute coronary event and PE. Patient was very upset that there was no diagnosis for the \"lump\" on her ribs. Patient exam was completely benign. Advised patient that she should follow-up with her primary care provider and should restart amlodipine. Unfortunately patient left before receiving any paperwork or her prescriptions. Diagnosis and Disposition     Critical Care:     DISCHARGE NOTE:    [place] [facility]  results have been reviewed with her. She has been counseled regarding her diagnosis, treatment, and plan. She verbally conveys understanding and agreement of the signs, symptoms, diagnosis, treatment and prognosis and additionally agrees to follow up as discussed. She also agrees with the care-plan and conveys that all of her questions have been answered.   I have also provided discharge instructions for her that include: educational information regarding their diagnosis and treatment, and list of reasons why they would want to return to the ED prior to their follow-up appointment, should her condition change. She has been provided with education for proper emergency department utilization. CLINICAL IMPRESSION:    1. Chest wall pain    2. Hypertension, unspecified type        PLAN:  1. D/C Home  2. Current Discharge Medication List      START taking these medications    Details   acetaminophen (TYLENOL) 500 mg tablet Take 2 Tabs by mouth every eight (8) hours as needed for Pain. Qty: 30 Tab, Refills: 0      ibuprofen (MOTRIN) 400 mg tablet Take 1 Tab by mouth every six (6) hours as needed for Pain. Qty: 20 Tab, Refills: 0      lidocaine (LIDODERM) 5 % Apply patch to the affected area for 12 hours a day and remove for 12 hours a day. Qty: 1 Each, Refills: 0      amLODIPine (NORVASC) 5 mg tablet Take 1 Tab by mouth daily for 30 days. Qty: 30 Tab, Refills: 0           3. Follow-up Information     Follow up With Specialties Details Why [address]    [facility] Emergency Medicine  If symptoms worsen [address] [address]  [phone]    [address] [facility]    [id number] [place] [address], [address] [place] [address] Tremayne Vel[address]  [phone]        _______________________________      Please note that this dictation was completed with [employer], the computer voice recognition software. Quite often unanticipated grammatical, syntax, homophones, and other interpretive errors are inadvertently transcribed by the computer software. Please disregard these errors. Please excuse any errors that have escaped final proofreading.

## 2019-12-01 LAB
ATRIAL RATE: 89 BPM
CALCULATED P AXIS, ECG09: 46 DEGREES
CALCULATED R AXIS, ECG10: 30 DEGREES
CALCULATED T AXIS, ECG11: 37 DEGREES
DIAGNOSIS, 93000: NORMAL
P-R INTERVAL, ECG05: 152 MS
Q-T INTERVAL, ECG07: 380 MS
QRS DURATION, ECG06: 80 MS
QTC CALCULATION (BEZET), ECG08: 462 MS
VENTRICULAR RATE, ECG03: 89 BPM

## 2019-12-03 ENCOUNTER — HOSPITAL ENCOUNTER (EMERGENCY)
Age: 44
Discharge: HOME | End: 2019-12-03
Attending: EMERGENCY MEDICINE
Payer: MEDICAID

## 2019-12-03 VITALS
DIASTOLIC BLOOD PRESSURE: 104 MMHG | TEMPERATURE: 98.5 F | BODY MASS INDEX: 26.87 KG/M2 | SYSTOLIC BLOOD PRESSURE: 154 MMHG | OXYGEN SATURATION: 95 % | WEIGHT: 146 LBS | HEART RATE: 86 BPM | HEIGHT: 62 IN | RESPIRATION RATE: 16 BRPM

## 2019-12-03 DIAGNOSIS — N64.4 BREAST PAIN, RIGHT: Primary | ICD-10-CM

## 2019-12-03 PROCEDURE — 99282 EMERGENCY DEPT VISIT SF MDM: CPT

## 2019-12-03 RX ORDER — NAPROXEN 500 MG/1
500 TABLET ORAL 2 TIMES DAILY WITH MEALS
Qty: 10 TAB | Refills: 0 | Status: SHIPPED | OUTPATIENT
Start: 2019-12-03 | End: 2019-12-05

## 2019-12-03 RX ORDER — AMOXICILLIN AND CLAVULANATE POTASSIUM 875; 125 MG/1; MG/1
1 TABLET, FILM COATED ORAL 2 TIMES DAILY
Qty: 14 TAB | Refills: 0 | Status: SHIPPED | OUTPATIENT
Start: 2019-12-03 | End: 2019-12-05

## 2019-12-03 NOTE — BSMART NOTE
Patient presents to the ER due to a lump with pain and admitted to staff that she has had suicidal ideation recently and was in agreement to speak to a counselor. Patient reports a long psychiatric history and she is currently not on medication but would like to get back into services. She reports that she has struggled with mood swings, highs and lows, and impulsive behavior. Based on descriptions it sounds like Bipolar though the patient reports she has had multiple diagnoses over the years. She reports not current thoughts of harming herself or others or hallucinations. She reports suicidal ideation a few weeks ago but her boyfriend helped keep her safe. She is asking for referrals for counseling and medication and recommendations for shelter. Discussed [facility] which has rapid access as well as [facility] that takes first come first served daily or she can schedule an appointment. Also recommended [facility] for medical care and dental until patient gets insurance. Also discussed [facility] for housing needs. Patient denies needing admission or other services other than the referrals.     Odalis Robbins 425

## 2019-12-03 NOTE — ED PROVIDER NOTES
EMERGENCY DEPARTMENT HISTORY AND PHYSICAL EXAM      Date: 12/3/2019  Patient Name: Dixon Bustos    History of Presenting Illness     Chief Complaint   Patient presents with    Breast pain       History Provided By: Patient    HPI: Dixon Bustos, 38 y.o. female with PMHx of breast pain since 2016, heart. Patient states that she has had pain under her right breast for the past couple of days and has intensified. She states that she feels some type of wart under her right breast and she denies any nipple discharge, nipple abnormalities at this time in comparison with her left. She does state that she has had a mammogram within the past 2 years was mention as normal.  She does state that she has a family history of breast cancer. Denies any other acute complaints at this time. Please note for examination and HPI were completed I did introduce myself as the physician assistant. Please note that this dictation was completed with [employer] voice recognition software. Quite often unanticipated grammatical, syntax, homophones, and other interpretive errors are inadvertently transcribed by the computer software. Please disregard these errors. Please excuse any errors that have escaped final proofreading. For further clarification on any chart please contact myself. Thank you. There are no other complaints, changes, or physical findings at this time. PCP: Mynor Hicks MD    No current facility-administered medications on file prior to encounter. Current Outpatient Medications on File Prior to Encounter   Medication Sig Dispense Refill    amLODIPine (NORVASC) 10 mg tablet Take 1 Tab by mouth daily. 30 Tab 0    metoprolol tartrate (LOPRESSOR) 25 mg tablet Take 1 Tab by mouth two (2) times a day. 60 Tab 0    aspirin delayed-release 81 mg tablet Take  by mouth daily.          Past History     Past Medical History:  Past Medical History:   Diagnosis Date    angina     [facility] [address] [address]     Breast pain     since 2016, right breast, on and off    Chiari malformation     Heart abnormalities     leaky heart valve, murmur    Hypertension     Other ill-defined conditions(799.89)     cardiac arrhythmias    Other ill-defined conditions(799.89)     neuropathy    Stroke ([facility] [address]     TIA       Past Surgical History:  Past Surgical History:   Procedure Laterality Date    ABDOMEN SURGERY PROC UNLISTED      laporscopic surgery post ruptured fallopian tube    HX ORTHOPAEDIC      screws in left ankle    HX OTHER SURGICAL      surgery to back of head due to fractured skull    NEUROLOGICAL PROCEDURE UNLISTED         Family History:  No family history on file. Social History:  Social History     Tobacco Use    Smoking status: Current Some Day Smoker     Packs/day: 0.25     Years: 10.00     Pack years: 2.50    Smokeless tobacco: Current User    Tobacco comment: 6 cig/day   Substance Use Topics    Alcohol use: Yes     Comment: social    Drug use: No       Allergies: Allergies   Allergen Reactions    Toradol [Ketorolac] Hives         Review of Systems   Review of Systems   Musculoskeletal:        Right breast pain   All other systems reviewed and are negative. Physical Exam   Physical Exam  Vitals signs and nursing note reviewed. Exam conducted with a chaperone present. Constitutional:       Appearance: She is well-developed. HENT:      Head: Normocephalic and atraumatic. Eyes:      Conjunctiva/sclera: Conjunctivae normal.      Pupils: Pupils are equal, round, and reactive to light. Neck:      Musculoskeletal: Normal range of motion and neck supple. Thyroid: No thyromegaly. Cardiovascular:      Rate and Rhythm: Normal rate and regular rhythm. Heart sounds: Normal heart sounds. No murmur. Pulmonary:      Effort: Pulmonary effort is normal. No respiratory distress. Breath sounds: Normal breath sounds. No stridor. No wheezing. Comments:  There is no obvious area of abscess formation. Patient does have some tenderness over under her right breast with slight palpable cord appreciated to the right lateral aspect of right breast.  There is no evidence of any cellulitis, mastitis, peau d'orange, nipple discharge or any inverted nipple. There is no appreciable lymph nodes noted to the axilla. RN Ramona Almaguer was present during all aspects of breast examination. Abdominal:      General: Bowel sounds are normal.      Palpations: Abdomen is soft. Tenderness: There is no tenderness. Musculoskeletal: Normal range of motion. General: No tenderness. Lymphadenopathy:      Cervical: No cervical adenopathy. Skin:     General: Skin is warm. Neurological:      Mental Status: She is alert and oriented to person, place, and time. Deep Tendon Reflexes: Reflexes are normal and symmetric. Psychiatric:         Judgment: Judgment normal.         Diagnostic Study Results     Labs -   No results found for this or any previous visit (from the past 12 hour(s)). Radiologic Studies -   No orders to display     CT Results  (Last 48 hours)    None        CXR Results  (Last 48 hours)    None            Medical Decision Making   I am the first provider for this patient. I reviewed the vital signs, available nursing notes, past medical history, past surgical history, family history and social history. Vital Signs-Reviewed the patient's vital signs. Patient Vitals for the past 12 hrs:   Temp Pulse Resp BP SpO2   12/03/19 1606 98.5 °F (36.9 °C) 86 16 (!) 154/104 95 %       Records Reviewed: Nursing Notes    Provider Notes (Medical Decision Making): At this time I did advise that there is no evidence of any appreciable abscess formation at this time and will go ahead and instruct patient to use warm compresses, antibiotics for any underlying abscess and follow-up with OB/GYN and breast surgeon.   We did give patient consultation to case management which patient did have questions answered about following up with specialist.  At this time patient was given follow-up with specialist and also mammogram under case management discharged in stable condition to follow-up on outpatient basis with OB/GYN. ED Course:   Initial assessment performed. The patients presenting problems have been discussed, and they are in agreement with the care plan formulated and outlined with them. I have encouraged them to ask questions as they arise throughout their visit. Critical Care Time: None    Disposition:  Disposition for home    PLAN:  1. Current Discharge Medication List        2. Follow-up Information     Follow up With Specialties Details Why Gaston Valadez MD Obstetrics & Gynecology On 12/4/2019 I will call you with appointment date and time. [id number] [place] [address]  On 12/6/2019 Your appointment time is 9:30, Bring  INS card, picture ID,and discharge papers, Please keep this appointment [place] [id number] [phone]        Return to ED if worse     Diagnosis     Clinical Impression: No diagnosis found. Please note that this dictation was completed with [employer], the computer voice recognition software. Quite often unanticipated grammatical, syntax, homophones, and other interpretive errors are inadvertently transcribed by the computer software. Please disregards these errors. Please excuse any errors that have escaped final proofreading. This note will not be viewable in [address]

## 2019-12-03 NOTE — ED NOTES
BSMART provided patient with resources for mental health follow up.  scheduled a mammogram for the patient next week and will schedule PCP follow up. PA discharged patient. She left with a friend in no acute distress.
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
## 2019-12-03 NOTE — DISCHARGE INSTRUCTIONS
Patient Education        Breast Pain: Care Instructions  Your Care Instructions    Breast tenderness and pain may come and go with your monthly periods (cyclic), or it may not follow any pattern (noncyclic). Breast pain is rarely caused by a serious health problem. You may need tests to find the cause. Follow-up care is a key part of your treatment and safety. Be sure to make and go to all appointments, and call your doctor if you are having problems. It's also a good idea to know your test results and keep a list of the medicines you take. How can you care for yourself at home? · If your doctor gave you medicine, take it exactly as prescribed. Call your doctor if you think you are having a problem with your medicine. · Take an over-the-counter pain medicine, such as acetaminophen (Tylenol), ibuprofen (Advil, Motrin), or naproxen (Aleve), to relieve pain and swelling. Read and follow all instructions on the label. · Do not take two or more pain medicines at the same time unless the doctor told you to. Many pain medicines have acetaminophen, which is Tylenol. Too much acetaminophen (Tylenol) can be harmful. · Wear a supportive bra, such as a sports bra or a jog bra. · Cut down on the amount of fat in your diet. If you need help planning healthy meals, see a dietitian. · Get at least 30 minutes of exercise on most days of the week. Walking is a good choice. You also may want to do other activities, such as running, swimming, cycling, or playing tennis or team sports. · Keep a healthy sleep pattern. Go to bed at the same time every night, and get up at the same time every day. When should you call for help? Call your doctor now or seek immediate medical care if:    · You have new changes in a breast, such as:  ? A lump or thickening in your breast or armpit. ? A change in the breast's size or shape. ? Skin changes, such as dimples or puckers. ? Nipple discharge.   ? A change in the color or feel of the skin of your breast or the darker area around the nipple (areola). ? A change in the shape of the nipple (it may look like it's being pulled into the breast).     · You have symptoms of a breast infection, such as:  ? Increased pain, swelling, redness, or warmth around a breast.  ? Red streaks extending from the breast.  ? Pus draining from a breast.  ? A fever.    Watch closely for changes in your health, and be sure to contact your doctor if:    · Your breast pain does not get better after 1 week.     · You have a lump or thickening in your breast or armpit. Where can you learn more? Go to http://augusto-norma.info/. Enter A001 in the search box to learn more about \"Breast Pain: Care Instructions. \"  Current as of: June 26, 2019  Content Version: 12.2  © 7279-0118 [employer]. Care instructions adapted under license by [employer] (which disclaims liability or warranty for this information). If you have questions about a medical condition or this instruction, always ask your healthcare professional. [address] any warranty or liability for your use of this information.
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
## 2019-12-05 ENCOUNTER — HOSPITAL ENCOUNTER (EMERGENCY)
Age: 44
Discharge: HOME | End: 2019-12-05
Attending: EMERGENCY MEDICINE
Payer: MEDICAID

## 2019-12-05 VITALS
OXYGEN SATURATION: 98 % | DIASTOLIC BLOOD PRESSURE: 102 MMHG | HEIGHT: 62 IN | TEMPERATURE: 98.2 F | SYSTOLIC BLOOD PRESSURE: 143 MMHG | HEART RATE: 69 BPM | RESPIRATION RATE: 16 BRPM | WEIGHT: 146 LBS | BODY MASS INDEX: 26.87 KG/M2

## 2019-12-05 DIAGNOSIS — R11.2 NON-INTRACTABLE VOMITING WITH NAUSEA, UNSPECIFIED VOMITING TYPE: Primary | ICD-10-CM

## 2019-12-05 DIAGNOSIS — R19.7 DIARRHEA, UNSPECIFIED TYPE: ICD-10-CM

## 2019-12-05 LAB
ALBUMIN SERPL-MCNC: 3.9 G/DL (ref 3.5–5)
ALBUMIN/GLOB SERPL: 1.1 {RATIO} (ref 1.1–2.2)
ALP SERPL-CCNC: 57 U/L (ref 45–117)
ALT SERPL-CCNC: 28 U/L (ref 12–78)
ANION GAP SERPL CALC-SCNC: 8 MMOL/L (ref 5–15)
APPEARANCE UR: CLEAR
AST SERPL-CCNC: 24 U/L (ref 15–37)
BASOPHILS # BLD: 0 K/UL (ref 0–0.1)
BASOPHILS NFR BLD: 1 % (ref 0–1)
BILIRUB SERPL-MCNC: 0.4 MG/DL (ref 0.2–1)
BILIRUB UR QL: NEGATIVE
BUN SERPL-MCNC: 14 MG/DL (ref 6–20)
BUN/CREAT SERPL: 16 (ref 12–20)
CALCIUM SERPL-MCNC: 8.6 MG/DL (ref 8.5–10.1)
CHLORIDE SERPL-SCNC: 104 MMOL/L (ref 97–108)
CO2 SERPL-SCNC: 28 MMOL/L (ref 21–32)
COLOR UR: NORMAL
CREAT SERPL-MCNC: 0.89 MG/DL (ref 0.55–1.02)
DIFFERENTIAL METHOD BLD: ABNORMAL
EOSINOPHIL # BLD: 0 K/UL (ref 0–0.4)
EOSINOPHIL NFR BLD: 0 % (ref 0–7)
ERYTHROCYTE [DISTWIDTH] IN BLOOD BY AUTOMATED COUNT: 11.8 % (ref 11.5–14.5)
GLOBULIN SER CALC-MCNC: 3.4 G/DL (ref 2–4)
GLUCOSE SERPL-MCNC: 101 MG/DL (ref 65–100)
GLUCOSE UR STRIP.AUTO-MCNC: NEGATIVE MG/DL
HCG UR QL: NEGATIVE
HCT VFR BLD AUTO: 37 % (ref 35–47)
HGB BLD-MCNC: 12.9 G/DL (ref 11.5–16)
HGB UR QL STRIP: NEGATIVE
IMM GRANULOCYTES # BLD AUTO: 0 K/UL (ref 0–0.04)
IMM GRANULOCYTES NFR BLD AUTO: 0 % (ref 0–0.5)
KETONES UR QL STRIP.AUTO: NEGATIVE MG/DL
LEUKOCYTE ESTERASE UR QL STRIP.AUTO: NEGATIVE
LIPASE SERPL-CCNC: 100 U/L (ref 73–393)
LYMPHOCYTES # BLD: 1.8 K/UL (ref 0.8–3.5)
LYMPHOCYTES NFR BLD: 24 % (ref 12–49)
MCH RBC QN AUTO: 31.8 PG (ref 26–34)
MCHC RBC AUTO-ENTMCNC: 34.9 G/DL (ref 30–36.5)
MCV RBC AUTO: 91.1 FL (ref 80–99)
MONOCYTES # BLD: 0.6 K/UL (ref 0–1)
MONOCYTES NFR BLD: 8 % (ref 5–13)
NEUTS SEG # BLD: 5 K/UL (ref 1.8–8)
NEUTS SEG NFR BLD: 67 % (ref 32–75)
NITRITE UR QL STRIP.AUTO: NEGATIVE
NRBC # BLD: 0 K/UL (ref 0–0.01)
NRBC BLD-RTO: 0 PER 100 WBC
PH UR STRIP: 6.5 [PH] (ref 5–8)
PLATELET # BLD AUTO: 401 K/UL (ref 150–400)
PMV BLD AUTO: 9.6 FL (ref 8.9–12.9)
POTASSIUM SERPL-SCNC: 3.9 MMOL/L (ref 3.5–5.1)
PROT SERPL-MCNC: 7.3 G/DL (ref 6.4–8.2)
PROT UR STRIP-MCNC: NEGATIVE MG/DL
RBC # BLD AUTO: 4.06 M/UL (ref 3.8–5.2)
SODIUM SERPL-SCNC: 140 MMOL/L (ref 136–145)
SP GR UR REFRACTOMETRY: 1.02 (ref 1–1.03)
UROBILINOGEN UR QL STRIP.AUTO: 0.2 EU/DL (ref 0.2–1)
WBC # BLD AUTO: 7.5 K/UL (ref 3.6–11)

## 2019-12-05 PROCEDURE — 85025 COMPLETE CBC W/AUTO DIFF WBC: CPT

## 2019-12-05 PROCEDURE — 81003 URINALYSIS AUTO W/O SCOPE: CPT

## 2019-12-05 PROCEDURE — 99284 EMERGENCY DEPT VISIT MOD MDM: CPT

## 2019-12-05 PROCEDURE — 36415 COLL VENOUS BLD VENIPUNCTURE: CPT

## 2019-12-05 PROCEDURE — 96360 HYDRATION IV INFUSION INIT: CPT

## 2019-12-05 PROCEDURE — 74011250636 HC RX REV CODE- 250/636: Performed by: EMERGENCY MEDICINE

## 2019-12-05 PROCEDURE — 74011250637 HC RX REV CODE- 250/637: Performed by: EMERGENCY MEDICINE

## 2019-12-05 PROCEDURE — 81025 URINE PREGNANCY TEST: CPT

## 2019-12-05 PROCEDURE — 83690 ASSAY OF LIPASE: CPT

## 2019-12-05 PROCEDURE — 80053 COMPREHEN METABOLIC PANEL: CPT

## 2019-12-05 PROCEDURE — 96374 THER/PROPH/DIAG INJ IV PUSH: CPT

## 2019-12-05 RX ORDER — ONDANSETRON 4 MG/1
4 TABLET, ORALLY DISINTEGRATING ORAL
Status: COMPLETED | OUTPATIENT
Start: 2019-12-05 | End: 2019-12-05

## 2019-12-05 RX ORDER — ONDANSETRON 4 MG/1
4 TABLET, ORALLY DISINTEGRATING ORAL
Qty: 10 TAB | Refills: 0 | OUTPATIENT
Start: 2019-12-05 | End: 2020-01-13

## 2019-12-05 RX ADMIN — SODIUM CHLORIDE 1000 ML: 900 INJECTION, SOLUTION INTRAVENOUS at 09:16

## 2019-12-05 RX ADMIN — ONDANSETRON 4 MG: 4 TABLET, ORALLY DISINTEGRATING ORAL at 08:43

## 2019-12-05 NOTE — ED NOTES
Emergency Department Nursing Plan of Care       The Nursing Plan of Care is developed from the Nursing assessment and Emergency Department Attending provider initial evaluation. The plan of care may be reviewed in the ED Provider note.     The Plan of Care was developed with the following considerations:   Patient / Family readiness to learn indicated by:verbalized understanding  Persons(s) to be included in education: patient  Barriers to Learning/Limitations:No    Signed     Darin Serrano RN    12/5/2019   10:14 AM

## 2019-12-05 NOTE — PROGRESS NOTES
CM receive consult. CM able to schedule appointment for mammogram for 12/6/2019 @ 8:30am inform patient and voice understanding.     [address] [place] [address]  [phone]

## 2019-12-05 NOTE — ED NOTES
Pt ambulated to restroom with no assistance, pt alert and oriented, in no acute distress, resting in bed with bed in low position and wheels locked, call bell in reach.

## 2019-12-05 NOTE — DISCHARGE INSTRUCTIONS

## 2019-12-05 NOTE — PROGRESS NOTES
Spiritual Care Partner Volunteer visited patient in ED at [facility] on 12/05/2019.   Documented by:  Mason Steward, [facility], Spiritual Care Intern

## 2019-12-05 NOTE — ED NOTES
Pt given printed discharge instructions and 1 script(s). Pt verbalized understanding of instructions and script(s). Pt verbalized importance of following up with PCP and for her mammogram, both tomorrow morning at [facility]. Pt alert and oriented, in no acute distress, ambulatory with self.

## 2019-12-06 ENCOUNTER — HOSPITAL ENCOUNTER (OUTPATIENT)
Dept: MAMMOGRAPHY | Age: 44
Discharge: HOME | End: 2019-12-06
Attending: GENERAL PRACTICE

## 2019-12-06 DIAGNOSIS — Z12.31 VISIT FOR SCREENING MAMMOGRAM: ICD-10-CM

## 2019-12-07 NOTE — ED PROVIDER NOTES
EMERGENCY DEPARTMENT HISTORY AND PHYSICAL EXAM      Date: 12/5/2019  Patient Name: Adebayo Mares    History of Presenting Illness     Chief Complaint   Patient presents with    Abdominal Pain     N/V/D after eating scallops. According to pt store called to inform her that the food was removed from the shelve       History Provided By: Patient    HPI: Adebayo Mares, 38 y.o. female with PMHx significant for no medical history, presents by private vehicle to the ED with cc of nausea vomiting and diarrhea. This is a 31-year-old female who believes she got ill from eating seafood that may have been bad. She has no urinary symptoms. She has no abdominal pain. She was seen here he yesterday for a nodule on her breast that had nothing to do with this nausea vomiting diarrhea illness of today. There are no other complaints, changes, or physical findings at this time. PCP: Gilma Meek MD    Current Outpatient Medications   Medication Sig Dispense Refill    ondansetron (ZOFRAN ODT) 4 mg disintegrating tablet Take 1 Tab by mouth every eight (8) hours as needed for Nausea. 10 Tab 0    amLODIPine (NORVASC) 10 mg tablet Take 1 Tab by mouth daily. 30 Tab 0    metoprolol tartrate (LOPRESSOR) 25 mg tablet Take 1 Tab by mouth two (2) times a day.  61 Tab 0       Past History     Past Medical History:  Past Medical History:   Diagnosis Date    angina     Angina at rest [facility])     Breast pain     since 2016, right breast, on and off    Chiari malformation     Heart abnormalities     leaky heart valve, murmur    Hypertension     Other ill-defined conditions(799.89)     cardiac arrhythmias    Other ill-defined conditions(799.89)     neuropathy    Stroke ([facility] [address]     TIA       Past Surgical History:  Past Surgical History:   Procedure Laterality Date    ABDOMEN SURGERY PROC UNLISTED      laporscopic surgery post ruptured fallopian tube    HX ORTHOPAEDIC      screws in left ankle    HX OTHER SURGICAL surgery to back of head due to fractured skull    NEUROLOGICAL PROCEDURE UNLISTED         Family History:  History reviewed. No pertinent family history. Social History:  Social History     Tobacco Use    Smoking status: Current Some Day Smoker     Packs/day: 0.25     Years: 10.00     Pack years: 2.50    Smokeless tobacco: Current User    Tobacco comment: 6 cig/day   Substance Use Topics    Alcohol use: Yes     Comment: social    Drug use: No       Allergies: Allergies   Allergen Reactions    Toradol [Ketorolac] Hives         Review of Systems   Review of Systems   Constitutional: Negative for chills and fever. HENT: Negative for congestion, rhinorrhea, sneezing and sore throat. Respiratory: Negative for shortness of breath. Cardiovascular: Negative for chest pain. Gastrointestinal: Positive for diarrhea, nausea and vomiting. Negative for abdominal pain. Musculoskeletal: Negative for back pain, myalgias and neck stiffness. Skin: Negative for rash. Neurological: Negative for dizziness, weakness and headaches. All other systems reviewed and are negative. Physical Exam   Physical Exam  Vitals signs and nursing note reviewed. Constitutional:       Appearance: Normal appearance. She is well-developed. HENT:      Head: Normocephalic and atraumatic. Eyes:      Conjunctiva/sclera: Conjunctivae normal.   Neck:      Musculoskeletal: Full passive range of motion without pain, normal range of motion and neck supple. Cardiovascular:      Rate and Rhythm: Normal rate and regular rhythm. Pulses: Normal pulses. Heart sounds: Normal heart sounds, S1 normal and S2 normal. No murmur. Pulmonary:      Effort: Pulmonary effort is normal. No respiratory distress. Breath sounds: Normal breath sounds. No wheezing. Abdominal:      General: Bowel sounds are normal. There is no distension. Palpations: Abdomen is soft. Tenderness: There is no tenderness. There is no rebound. Musculoskeletal: Normal range of motion. Skin:     General: Skin is warm and dry. Findings: No rash. Neurological:      Mental Status: She is alert and oriented to person, place, and time. Psychiatric:         Speech: Speech normal.         Behavior: Behavior normal.         Thought Content: Thought content normal.         Judgment: Judgment normal.         Diagnostic Study Results     Labs -  Results for Charli Sargent (MRN [id number]) as of 12/6/2019 19:05   Ref.  Range 12/5/2019 09:14 12/5/2019 10:35   Appearance Latest Ref Range: CLEAR   CLEAR    Specific gravity Latest Ref Range: 1.003 - 1.030   1.020    pH (UA) Latest Ref Range: 5.0 - 8.0   6.5    Protein Latest Ref Range: NEG mg/dL NEGATIVE    Glucose Latest Ref Range: NEG mg/dL NEGATIVE    Ketone Latest Ref Range: NEG mg/dL NEGATIVE    Blood Latest Ref Range: NEG   NEGATIVE    Bilirubin Latest Ref Range: NEG   NEGATIVE    Urobilinogen Latest Ref Range: 0.2 - 1.0 EU/dL 0.2    Nitrites Latest Ref Range: NEG   NEGATIVE    Leukocyte Esterase Latest Ref Range: NEG   NEGATIVE    Sodium Latest Ref Range: 136 - 145 mmol/L 140    Potassium Latest Ref Range: 3.5 - 5.1 mmol/L 3.9    Chloride Latest Ref Range: 97 - 108 mmol/L 104    CO2 Latest Ref Range: 21 - 32 mmol/L 28    Anion gap Latest Ref Range: 5 - 15 mmol/L 8    Glucose Latest Ref Range: 65 - 100 mg/dL 101 (H)    BUN Latest Ref Range: 6 - 20 MG/DL 14    Creatinine Latest Ref Range: 0.55 - 1.02 MG/DL 0.89    BUN/Creatinine ratio Latest Ref Range: 12 - 20   16    Calcium Latest Ref Range: 8.5 - 10.1 MG/DL 8.6    GFR est non-AA Latest Ref Range: >60 ml/min/1.73m2 >60    GFR est AA Latest Ref Range: >60 ml/min/1.73m2 >60    Bilirubin, total Latest Ref Range: 0.2 - 1.0 MG/DL 0.4    Protein, total Latest Ref Range: 6.4 - 8.2 g/dL 7.3    Albumin Latest Ref Range: 3.5 - 5.0 g/dL 3.9    Globulin Latest Ref Range: 2.0 - 4.0 g/dL 3.4    A-G Ratio Latest Ref Range: 1.1 - 2.2   1.1    ALT (SGPT) Latest Ref Range: 12 - 78 U/L 28    AST Latest Ref Range: 15 - 37 U/L 24    Alk. phosphatase Latest Ref Range: 45 - 117 U/L 57    Lipase Latest Ref Range: 73 - 393 U/L 100      Radiologic Studies -   No orders to display     CT Results  (Last 48 hours)    None        CXR Results  (Last 48 hours)    None            Medical Decision Making   I am the first provider for this patient. I reviewed the vital signs, available nursing notes, past medical history, past surgical history, family history and social history. Vital Signs-Reviewed the patient's vital signs. Visit Vitals  BP (!) 143/102 (BP 1 Location: Left arm, BP Patient Position: At rest;Sitting)   Pulse 69   Temp 98.2 °F (36.8 °C)   Resp 16   Ht 5' 2\" (1.575 m)   Wt 66.2 kg (146 lb)   SpO2 98%   BMI 26.70 kg/m²         Records Reviewed: Nursing Notes    Provider Notes (Medical Decision Making):   Food poisoning versus gastroenteritis. ED Course:   Initial assessment performed. The patients presenting problems have been discussed, and they are in agreement with the care plan formulated and outlined with them. I have encouraged them to ask questions as they arise throughout their visit. Patient's labs were noted to be normal and she felt much better after liter of normal saline and some Zofran. She was discharged home with prescription for Zofran. Disposition:  Patient informed of results of workup and is comfortable with discharge to home to follow up with PCP. They are instructed to return as needed for worsening condition. PLAN:  1. Discharge Medication List as of 12/5/2019 10:50 AM        2.    Follow-up Information     Follow up With Specialties Details Why Contact Info    Farhat Briseno MD General Practice On 12/6/2019 Your appointment time is 9:30, Please keep this appointment, Bring  INS card, picture ID,and discharge papers [address] [phone]      [address] Breast Surgery On 12/6/2019 Your appointment time is 8:30, Please arrive 15 mins early, Bring  INS card, picture ID,and discharge papers, Please keep this appointment [address] [place] [address]  [phone]        Return to ED if worse     Diagnosis     Clinical Impression:   1. Non-intractable vomiting with nausea, unspecified vomiting type    2.  Diarrhea, unspecified type

## 2020-01-13 ENCOUNTER — HOSPITAL ENCOUNTER (EMERGENCY)
Age: 45
Discharge: HOME | End: 2020-01-13
Attending: EMERGENCY MEDICINE
Payer: COMMERCIAL

## 2020-01-13 VITALS
WEIGHT: 140.87 LBS | HEIGHT: 62 IN | TEMPERATURE: 98.5 F | OXYGEN SATURATION: 100 % | BODY MASS INDEX: 25.92 KG/M2 | SYSTOLIC BLOOD PRESSURE: 160 MMHG | HEART RATE: 80 BPM | RESPIRATION RATE: 14 BRPM | DIASTOLIC BLOOD PRESSURE: 107 MMHG

## 2020-01-13 DIAGNOSIS — F41.8 ANXIETY ASSOCIATED WITH DEPRESSION: Primary | ICD-10-CM

## 2020-01-13 DIAGNOSIS — I10 ESSENTIAL HYPERTENSION: ICD-10-CM

## 2020-01-13 PROCEDURE — 99282 EMERGENCY DEPT VISIT SF MDM: CPT

## 2020-01-13 RX ORDER — METOPROLOL TARTRATE 25 MG/1
25 TABLET, FILM COATED ORAL 2 TIMES DAILY
Qty: 60 TAB | Refills: 0 | Status: ON HOLD | OUTPATIENT
Start: 2020-01-13 | End: 2021-05-11 | Stop reason: SDUPTHER

## 2020-01-13 RX ORDER — HYDROXYZINE 25 MG/1
25 TABLET, FILM COATED ORAL
Qty: 15 TAB | Refills: 0 | Status: SHIPPED | OUTPATIENT
Start: 2020-01-13 | End: 2020-07-21 | Stop reason: SDUPTHER

## 2020-01-13 RX ORDER — AMLODIPINE BESYLATE 10 MG/1
10 TABLET ORAL DAILY
Qty: 30 TAB | Refills: 0 | Status: ON HOLD | OUTPATIENT
Start: 2020-01-13 | End: 2021-05-11 | Stop reason: SDUPTHER

## 2020-01-13 NOTE — DISCHARGE INSTRUCTIONS
Patient Education        Learning About Anxiety Disorders  What are anxiety disorders? Anxiety disorders are a type of medical problem. They cause severe anxiety. When you feel anxious, you feel that something bad is about to happen. This feeling interferes with your life. These disorders include:  · Generalized anxiety disorder. You feel worried and stressed about many everyday events and activities. This goes on for several months and disrupts your life on most days. · Panic disorder. You have repeated panic attacks. A panic attack is a sudden, intense fear or anxiety. It may make you feel short of breath. Your heart may pound. · Social anxiety disorder. You feel very anxious about what you will say or do in front of people. For example, you may be scared to talk or eat in public. This problem affects your daily life. · Phobias. You are very scared of a specific object, situation, or activity. For example, you may fear spiders, high places, or small spaces. What are the symptoms? Generalized anxiety disorder  Symptoms may include:  · Feeling worried and stressed about many things almost every day. · Feeling tired or irritable. You may have a hard time concentrating. · Having headaches or muscle aches. · Having a hard time getting to sleep or staying asleep. Panic disorder  You may have repeated panic attacks when there is no reason for feeling afraid. You may change your daily activities because you worry that you will have another attack. Symptoms may include:  · Intense fear, terror, or anxiety. · Trouble breathing or very fast breathing. · Chest pain or tightness. · A heartbeat that races or is not regular. Social anxiety disorder  Symptoms may include:  · Fear about a social situation, such as eating in front of others or speaking in public. You may worry a lot. Or you may be afraid that something bad will happen. · Anxiety that can cause you to blush, sweat, and feel shaky.   · A heartbeat that is faster than normal.  · A hard time focusing. Phobias  Symptoms may include:  · More fear than most people of being around an object, being in a situation, or doing an activity. You might also be stressed about the chance of being around the thing you fear. · Worry about losing control, panicking, fainting, or having physical symptoms like a faster heartbeat when you are around the situation or object. How are these disorders treated? Anxiety disorders can be treated with medicines or counseling. A combination of both may be used. Medicines may include:  · Antidepressants. These may help your symptoms by keeping chemicals in your brain in balance. · Benzodiazepines. These may give you short-term relief of your symptoms. Some people use cognitive-behavioral therapy. A therapist helps you learn to change stressful or bad thoughts into helpful thoughts. Lead a healthy lifestyle  A healthy lifestyle may help you feel better. · Get at least 30 minutes of exercise on most days of the week. Walking is a good choice. · Eat a healthy diet. Include fruits, vegetables, lean proteins, and whole grains in your diet each day. · Try to go to bed at the same time every night. Try for 8 hours of sleep a night. · Find ways to manage stress. Try relaxation exercises. · Avoid alcohol and illegal drugs. Follow-up care is a key part of your treatment and safety. Be sure to make and go to all appointments, and call your doctor if you are having problems. It's also a good idea to know your test results and keep a list of the medicines you take. Where can you learn more? Go to http://augusto-norma.info/. Enter R772 in the search box to learn more about \"Learning About Anxiety Disorders. \"  Current as of: May 28, 2019  Content Version: 12.2  © 2660-9679 [employer].  Care instructions adapted under license by [employer] (which disclaims liability or warranty for this information). If you have questions about a medical condition or this instruction, always ask your healthcare professional. [place] [address] any warranty or liability for your use of this information.

## 2020-01-13 NOTE — ED TRIAGE NOTES
Reports anxiety and depression related to life circumstances. Denies any thoughts of harming self or others.

## 2020-01-13 NOTE — PROGRESS NOTES
CM receives consult patient needs follow-up appointment for PCP and Mental Health. Patient providers are with [facility]. Dr. Sameer Manrique [phone] CM schedule PCP for 1/15/20 @ 1045. Attempt to schedule appointment with Mental Health department but unable CM left VM to call back or the patient.     [address] [place] [address]  [phone]

## 2020-01-13 NOTE — ED NOTES
States she has been off her medications for awhile and is seeking resources so she can get back on the medications that work for her.

## 2020-01-13 NOTE — PROGRESS NOTES
Spiritual Care Partner Volunteer visited patient in ED at [address] [place] [address] on 01/13/2020.   Documented by:  Jhonathan Morales, [facility], Spiritual Care Intern

## 2020-01-13 NOTE — ED PROVIDER NOTES
EMERGENCY DEPARTMENT HISTORY AND PHYSICAL EXAM    Date: 1/13/2020  Patient Name: Freddie Bergeron    History of Presenting Illness     Chief Complaint   Patient presents with    Anxiety         History Provided By: Patient  HPI: Freddie Bergeron is a 38 y.o. female with a PMH of Hypertension, TIA, neuropathy, anxiety, depression who presents with anxiety. Patient reports she feels overwhelmed anxious and depressed. States she has a lot going on. Patient reports a history of depression and anxiety stating that she was on hydroxyzine and risperidone and Wellbutrin in the past.  States she did have a psychiatrist she saw however she moved to [place]. States she moved back and did not have insurance. States she last took medication greater than 2 months ago. Reports being seen in the past by crisis and also had a  but no longer has this. States she called multiple locations that provide mental health counseling today. He states 1 of the offices called her prior to this provider walking into the room. Patient also does not have a PCP stating her doctor's office close. PCP: None    Current Outpatient Medications   Medication Sig Dispense Refill    hydrOXYzine HCl (ATARAX) 25 mg tablet Take 1 Tab by mouth every eight (8) hours as needed for Anxiety. 15 Tab 0    amLODIPine (NORVASC) 10 mg tablet Take 1 Tab by mouth daily. 30 Tab 0    metoprolol tartrate (LOPRESSOR) 25 mg tablet Take 1 Tab by mouth two (2) times a day. 60 Tab 0    risperidone (RISPERDAL PO) Take  by mouth.  bupropion HCl (WELLBUTRIN SR PO) Take  by mouth.          Past History     Past Medical History:  Past Medical History:   Diagnosis Date    angina     [facility]ina at rest [facility])     Breast pain     since 2016, right breast, on and off    Chiari malformation     Heart abnormalities     leaky heart valve, murmur    Hypertension     Other ill-defined conditions(799.89)     cardiac arrhythmias    Other ill-defined conditions(799.89)     neuropathy    Psychiatric disorder     anxiety and depression    Stroke ([facility] [address]     TIA       Past Surgical History:  Past Surgical History:   Procedure Laterality Date    ABDOMEN SURGERY PROC UNLISTED      laporscopic surgery post ruptured fallopian tube    HX ORTHOPAEDIC      screws in left ankle    HX OTHER SURGICAL      surgery to back of head due to fractured skull    NEUROLOGICAL PROCEDURE UNLISTED         Family History:  History reviewed. No pertinent family history. Social History:  Social History     Tobacco Use    Smoking status: Current Some Day Smoker     Packs/day: 0.25     Years: 10.00     Pack years: 2.50    Smokeless tobacco: Current User    Tobacco comment: 6 cig/day   Substance Use Topics    Alcohol use: Yes     Comment: social    Drug use: No       Allergies: Allergies   Allergen Reactions    Toradol [Ketorolac] Hives         Review of Systems   Review of Systems   Constitutional: Negative for appetite change, chills, fatigue and fever. HENT: Negative for congestion, ear pain and rhinorrhea. Eyes: Negative for pain and itching. Respiratory: Negative for cough, chest tightness, shortness of breath and wheezing. Cardiovascular: Negative for chest pain, palpitations and leg swelling. Gastrointestinal: Negative for abdominal pain, nausea and vomiting. Genitourinary: Negative for dysuria, frequency and urgency. Musculoskeletal: Negative for arthralgias, back pain and joint swelling. Skin: Negative for color change and rash. Neurological: Negative for dizziness, numbness and headaches. Psychiatric/Behavioral: Positive for decreased concentration and sleep disturbance. Negative for agitation, behavioral problems, hallucinations and suicidal ideas. The patient is nervous/anxious. All other systems reviewed and are negative.       Physical Exam     Vitals:    01/13/20 1235 01/13/20 1251   BP:  (!) 160/107   Pulse:  80   Resp:  14   Temp:  98.5 °F (36.9 °C)   SpO2:  100%   Weight: 63.9 kg (140 lb 14 oz)    Height: 5' 2.4\" (1.585 m)      Physical Exam  Vitals signs and nursing note reviewed. Constitutional:       General: She is not in acute distress. Appearance: She is well-developed. HENT:      Head: Normocephalic and atraumatic. Right Ear: Tympanic membrane and ear canal normal.      Left Ear: Tympanic membrane and ear canal normal.      Nose: Nose normal.      Mouth/Throat:      Mouth: Mucous membranes are moist.      Pharynx: Oropharynx is clear. Cardiovascular:      Rate and Rhythm: Normal rate and regular rhythm. Heart sounds: Normal heart sounds. Pulmonary:      Effort: Pulmonary effort is normal.      Breath sounds: Normal breath sounds. Skin:     General: Skin is warm and dry. Neurological:      Mental Status: She is alert and oriented to person, place, and time. Deep Tendon Reflexes: Reflexes are normal and symmetric. Psychiatric:         Attention and Perception: Attention and perception normal.         Mood and Affect: Mood is depressed. Affect is flat. Speech: Speech normal.         Behavior: Behavior normal. Behavior is cooperative. Thought Content: Thought content normal.         Cognition and Memory: Cognition and memory normal.         Judgment: Judgment normal.           Diagnostic Study Results     Labs -   No results found for this or any previous visit (from the past 12 hour(s)). Radiologic Studies -   No orders to display     CT Results  (Last 48 hours)    None        CXR Results  (Last 48 hours)    None            Medical Decision Making   I am the first provider for this patient. I reviewed the vital signs, available nursing notes, past medical history, past surgical history, family history and social history. Vital Signs-Reviewed the patient's vital signs.     Records Reviewed: Nursing Notes, Old Medical Records, Previous Radiology Studies and Previous Laboratory Studies 50-year-old female with complaints of depression and anxiety exhibiting flat affect. Patient reports having a pending appointment with an unknown psychiatric clinic at this time that notified her during this visit. Will DC home with hydroxyzine in addition to BP refill.  provided PCP referral and and patient informed. Disposition:  Discharge     DISCHARGE NOTE:   1:42 PM        Care plan outlined and precautions discussed. Patient has no new complaints, changes, or physical findings. . All of pt's questions and concerns were addressed. Patient was instructed and agrees to follow up with PCP, as well as to return to the ED upon further deterioration. Patient is ready to go home. Follow-up Information     Follow up With Specialties Details Why [facility]  On 1/15/2020 Dr. Cachorro Denise, , Your appointment time is 10:30, Please arrive 15 mins early, Bring  INS card, picture ID,and discharge papers [address]    [facility]    [facility] facility will call you for an appointment date and time to see someone a the office for anxiety. [id number] [address] [place] [address]          Current Discharge Medication List      CONTINUE these medications which have CHANGED    Details   hydrOXYzine HCl (ATARAX) 25 mg tablet Take 1 Tab by mouth every eight (8) hours as needed for Anxiety. Qty: 15 Tab, Refills: 0      amLODIPine (NORVASC) 10 mg tablet Take 1 Tab by mouth daily. Qty: 30 Tab, Refills: 0      metoprolol tartrate (LOPRESSOR) 25 mg tablet Take 1 Tab by mouth two (2) times a day. Qty: 60 Tab, Refills: 0         CONTINUE these medications which have NOT CHANGED    Details   risperidone (RISPERDAL PO) Take  by mouth. bupropion HCl (WELLBUTRIN SR PO) Take  by mouth.          STOP taking these medications       ondansetron (ZOFRAN ODT) 4 mg disintegrating tablet Comments: Reason for Stopping:               Provider Notes (Medical Decision Making):   DDX: Anxiety, depression, situational stress, hypertension    Procedures:  Procedures    Please note that this dictation was completed with Dragon, computer voice recognition software. Quite often unanticipated grammatical, syntax, homophones, and other interpretive errors are inadvertently transcribed by the computer software. Please disregard these errors. Additionally, please excuse any errors that have escaped final proofreading. Diagnosis     Clinical Impression:   1. Anxiety associated with depression    2.  Essential hypertension

## 2020-07-15 ENCOUNTER — HOSPITAL ENCOUNTER (OUTPATIENT)
Dept: NON INVASIVE DIAGNOSTICS | Age: 45
Discharge: HOME | End: 2020-07-15
Attending: FAMILY MEDICINE
Payer: MEDICAID

## 2020-07-15 DIAGNOSIS — Q24.8 ABNORMALITY OF HEART VALVE: ICD-10-CM

## 2020-07-15 LAB
ECHO AO ROOT DIAM: 3.21 CM
ECHO AR MAX VEL PISA: 476.49 CM/S
ECHO AV AREA PEAK VELOCITY: 2.26 CM2
ECHO AV AREA PEAK VELOCITY: 2.29 CM2
ECHO AV PEAK GRADIENT: 9.07 MMHG
ECHO AV PEAK VELOCITY: 150.37 CM/S
ECHO AV REGURGITANT PHT: 0.56 S
ECHO EST RA PRESSURE: 10 MMHG
ECHO LA AREA 4C: 9.34 CM2
ECHO LA MAJOR AXIS: 1.84 CM
ECHO LA TO AORTIC ROOT RATIO: 1.64
ECHO LA VOL 4C: 18.04 ML (ref 22–52)
ECHO LV INTERNAL DIMENSION DIASTOLIC: 5.1 CM (ref 3.9–5.3)
ECHO LV INTERNAL DIMENSION SYSTOLIC: 3.69 CM
ECHO LV IVSD: 1.41 CM (ref 0.6–0.9)
ECHO LV MASS 2D: 224.7 G (ref 67–162)
ECHO LV POSTERIOR WALL DIASTOLIC: 0.87 CM (ref 0.6–0.9)
ECHO LVOT DIAM: 1.81 CM
ECHO LVOT PEAK GRADIENT: 6.97 MMHG
ECHO LVOT PEAK GRADIENT: 7.14 MMHG
ECHO LVOT PEAK VELOCITY: 131.98 CM/S
ECHO LVOT PEAK VELOCITY: 133.59 CM/S
ECHO LVOT SV: 74.2 ML
ECHO LVOT VTI: 28.81 CM
ECHO MV A VELOCITY: 67.49 CM/S
ECHO MV A VELOCITY: 7.54 CM/S
ECHO MV AREA VTI: 2.11 CM2
ECHO MV E DECELERATION TIME (DT): 0.18 S
ECHO MV E VELOCITY: 11.09 CM/S
ECHO MV E VELOCITY: 74.5 CM/S
ECHO MV MAX VELOCITY: 101.97 CM/S
ECHO MV MEAN GRADIENT: 1.69 MMHG
ECHO MV PEAK GRADIENT: 4.16 MMHG
ECHO MV VTI: 35.09 CM
ECHO PV REGURGITANT MAX VELOCITY: 149.7 CM/S
ECHO RA AREA 4C: 8.78 CM2
ECHO RIGHT VENTRICULAR SYSTOLIC PRESSURE (RVSP): 36.94 MMHG
ECHO TV REGURGITANT MAX VELOCITY: 259.53 CM/S
ECHO TV REGURGITANT PEAK GRADIENT: 26.94 MMHG
LVOT MG: 4.11 MMHG

## 2020-07-15 PROCEDURE — 93306 TTE W/DOPPLER COMPLETE: CPT

## 2020-07-21 ENCOUNTER — HOSPITAL ENCOUNTER (EMERGENCY)
Age: 45
Discharge: HOME | End: 2020-07-21
Attending: EMERGENCY MEDICINE
Payer: MEDICAID

## 2020-07-21 ENCOUNTER — APPOINTMENT (OUTPATIENT)
Dept: CT IMAGING | Age: 45
End: 2020-07-21
Attending: EMERGENCY MEDICINE
Payer: MEDICAID

## 2020-07-21 VITALS
TEMPERATURE: 98.5 F | HEART RATE: 77 BPM | HEIGHT: 62 IN | DIASTOLIC BLOOD PRESSURE: 78 MMHG | RESPIRATION RATE: 16 BRPM | BODY MASS INDEX: 25.76 KG/M2 | WEIGHT: 140 LBS | SYSTOLIC BLOOD PRESSURE: 123 MMHG | OXYGEN SATURATION: 99 %

## 2020-07-21 DIAGNOSIS — R51.9 FRONTAL HEADACHE: Primary | ICD-10-CM

## 2020-07-21 PROCEDURE — 74011250637 HC RX REV CODE- 250/637: Performed by: EMERGENCY MEDICINE

## 2020-07-21 PROCEDURE — 99283 EMERGENCY DEPT VISIT LOW MDM: CPT

## 2020-07-21 PROCEDURE — 70450 CT HEAD/BRAIN W/O DYE: CPT

## 2020-07-21 RX ORDER — ACETAMINOPHEN 325 MG/1
650 TABLET ORAL ONCE
Status: COMPLETED | OUTPATIENT
Start: 2020-07-21 | End: 2020-07-21

## 2020-07-21 RX ORDER — DIAZEPAM 5 MG/1
2.5 TABLET ORAL
Status: COMPLETED | OUTPATIENT
Start: 2020-07-21 | End: 2020-07-21

## 2020-07-21 RX ORDER — HYDROXYZINE 25 MG/1
25 TABLET, FILM COATED ORAL
Qty: 15 TAB | Refills: 0 | Status: SHIPPED | OUTPATIENT
Start: 2020-07-21 | End: 2020-12-29 | Stop reason: ALTCHOICE

## 2020-07-21 RX ADMIN — DIAZEPAM 2.5 MG: 5 TABLET ORAL at 16:34

## 2020-07-21 RX ADMIN — ACETAMINOPHEN 650 MG: 325 TABLET, FILM COATED ORAL at 16:34

## 2020-07-21 NOTE — ED TRIAGE NOTES
Pt reports right sided headache starting last night and spreading to bilateral temples this morning.

## 2020-07-21 NOTE — ED NOTES
Patient has been instructed that they have been given valium 2.5 mg which contains opioids, benzodiazepines, or other sedating drugs. Patient is aware that they  will need to refrain from driving or operating heavy machinery after taking this medication. Patient also instructed that they need to avoid drinking alcohol and using other products containing opioids, benzodiazepines, or other sedating drugs. Patient verbalized understanding. Pt is getting a ride home.

## 2020-07-21 NOTE — ED NOTES
Discharge instructions were given to the patient by Silviano Cancino RN. The patient left the Emergency Department ambulatory, alert and oriented and in no acute distress with 1 prescriptions. The patient was encouraged to call or return to the ED for worsening issues or problems and was encouraged to schedule a follow up appointment for continuing care. The patient verbalized understanding of discharge instructions and prescriptions, all questions were answered. The patient has no further concerns at this time.

## 2020-07-21 NOTE — ED PROVIDER NOTES
EMERGENCY DEPARTMENT HISTORY AND PHYSICAL EXAM      Date: 7/21/2020  Patient Name: Angelica Maya  Patient Age and Sex: 41 y.o. female    History of Presenting Illness     Chief Complaint   Patient presents with    Headache       History Provided By: Patient    Ability to gather history was limited by:     HPI: Angelica Maya, 41 y.o. female with history of hypertension, anxiety, depression, TIA, tension headache, complains of acute onset right frontal headache that started last night while watching television, has been waxing and waning since then. Currently symptoms are mild. No nausea or vomiting. No specific neuro complaints such as speech changes, weakness etc.  She also had a sense of palpitations, racing heart, and coughing last night, which is currently resolving. Location:    Quality:      Severity:    Duration:   Timing:      Context:    Modifying factors:   Associated symptoms:       The patient's medical, surgical, family, and social history on file were reviewed by me today.       Past Medical History:   Diagnosis Date    angina     [facility]ina at rest [facility])     Breast pain     since 2016, right breast, on and off    Chiari malformation     Heart abnormalities     leaky heart valve, murmur    Hypertension     Other ill-defined conditions(799.89)     cardiac arrhythmias    Other ill-defined conditions(799.89)     neuropathy    Psychiatric disorder     anxiety and depression    Stroke ([facility] [address]     TIA     Past Surgical History:   Procedure Laterality Date    ABDOMEN SURGERY PROC UNLISTED      laporscopic surgery post ruptured fallopian tube    HX GYN      HX ORTHOPAEDIC      screws in left ankle    HX OTHER SURGICAL      surgery to back of head due to fractured skull    NEUROLOGICAL PROCEDURE UNLISTED         PCP: None    Past History     Past Medical History:  Past Medical History:   Diagnosis Date    angina     Angina at rest [facility])     Breast pain     since 2016, right breast, on and off    Chiari malformation     Heart abnormalities     leaky heart valve, murmur    Hypertension     Other ill-defined conditions(799.89)     cardiac arrhythmias    Other ill-defined conditions(799.89)     neuropathy    Psychiatric disorder     anxiety and depression    Stroke ([facility] [address]     TIA       Past Surgical History:  Past Surgical History:   Procedure Laterality Date    ABDOMEN SURGERY PROC UNLISTED      laporscopic surgery post ruptured fallopian tube    HX GYN      HX ORTHOPAEDIC      screws in left ankle    HX OTHER SURGICAL      surgery to back of head due to fractured skull    NEUROLOGICAL PROCEDURE UNLISTED         Family History:  History reviewed. No pertinent family history. Social History:  Social History     Tobacco Use    Smoking status: Current Some Day Smoker     Packs/day: 0.25     Years: 10.00     Pack years: 2.50    Smokeless tobacco: Current User    Tobacco comment: 6 cig/day   Substance Use Topics    Alcohol use: Yes     Comment: social    Drug use: No       Allergies: Allergies   Allergen Reactions    Toradol [Ketorolac] Hives       Current Medications:  No current facility-administered medications on file prior to encounter. Current Outpatient Medications on File Prior to Encounter   Medication Sig Dispense Refill    amLODIPine (NORVASC) 10 mg tablet Take 1 Tab by mouth daily. 30 Tab 0    metoprolol tartrate (LOPRESSOR) 25 mg tablet Take 1 Tab by mouth two (2) times a day. 60 Tab 0    risperidone (RISPERDAL PO) Take  by mouth.  bupropion HCl (WELLBUTRIN SR PO) Take  by mouth.  [DISCONTINUED] hydrOXYzine HCl (ATARAX) 25 mg tablet Take 1 Tab by mouth every eight (8) hours as needed for Anxiety. 15 Tab 0       Review of Systems   Review of Systems   Constitutional: Negative for fatigue and fever. Respiratory: Positive for cough. Cardiovascular: Positive for palpitations. Negative for chest pain. Gastrointestinal: Negative for abdominal pain. Neurological: Positive for headaches. All other systems reviewed and are negative. Physical Exam   Vital Signs  Patient Vitals for the past 8 hrs:   Temp Pulse Resp BP SpO2   07/21/20 1526 98.5 °F (36.9 °C) 77 16 123/78 99 %          Physical Exam  Vitals signs and nursing note reviewed. Constitutional:       General: She is not in acute distress. Appearance: Normal appearance. She is well-developed. She is not ill-appearing. HENT:      Head: Normocephalic and atraumatic. Mouth/Throat:      Mouth: Mucous membranes are moist.   Eyes:      General:         Right eye: No discharge. Left eye: No discharge. Conjunctiva/sclera: Conjunctivae normal.   Neck:      Musculoskeletal: Normal range of motion and neck supple. Cardiovascular:      Rate and Rhythm: Normal rate and regular rhythm. Heart sounds: Normal heart sounds. No murmur. Pulmonary:      Effort: Pulmonary effort is normal. No respiratory distress. Breath sounds: Normal breath sounds. No wheezing. Abdominal:      General: There is no distension. Palpations: Abdomen is soft. Tenderness: There is no abdominal tenderness. Musculoskeletal: Normal range of motion. General: No deformity. Skin:     General: Skin is warm and dry. Findings: No rash. Neurological:      General: No focal deficit present. Mental Status: She is alert and oriented to person, place, and time. GCS: GCS eye subscore is 4. GCS verbal subscore is 5. GCS motor subscore is 6. Cranial Nerves: Cranial nerves are intact. No cranial nerve deficit or facial asymmetry. Sensory: Sensation is intact. No sensory deficit. Motor: No weakness. Coordination: Coordination is intact. Finger-Nose-Finger Test normal.   Psychiatric:         Mood and Affect: Mood is anxious. Speech: Speech is rapid and pressured.          Behavior: Behavior normal.         Cognition and Memory: Cognition normal. Diagnostic Study Results   Labs  No results found for this or any previous visit (from the past 24 hour(s)). Radiologic Studies  CT HEAD WO CONT   Final Result   IMPRESSION:    Unremarkable CT of the head. CT Results  (Last 48 hours)               07/21/20 1701  CT HEAD WO CONT Final result    Impression:  IMPRESSION:    Unremarkable CT of the head. Narrative:  EXAM: CT HEAD WO CONT       INDICATION: Acute onset right frontal headache starting last night       COMPARISON: 2019. CONTRAST: None. TECHNIQUE: Unenhanced CT of the head was performed using 5 mm images. Brain and   bone windows were generated. Coronal and sagittal reformats. CT dose reduction   was achieved through use of a standardized protocol tailored for this   examination and automatic exposure control for dose modulation. FINDINGS:   The ventricles and sulci are normal in size, shape and configuration. . There is   no significant white matter disease. There is no intracranial hemorrhage,   extra-axial collection, or mass effect. The basilar cisterns are open. No CT   evidence of acute infarct. The bone windows demonstrate no abnormalities. The visualized portions of the   paranasal sinuses and mastoid air cells are clear. CXR Results  (Last 48 hours)    None          Procedures   Procedures    Medical Decision Making     I reviewed the patient's most recent Emergency Dept notes and diagnostic tests  in formulating my MDM on today's visit. Provider Notes (Medical Decision Making):   81-year-old female complaining of acute onset frontal headache with palpitations, chest tightness, and cough that started last night, improving today. On exam she seems somewhat anxious, with pressured speech. She has a normal neurologic exam.  Normal cardiopulmonary exam, no tachycardia. Regular rate and rhythm. Clinical concern for dangerous type of headache such as ICH or SAH is low. No clinical concern for CVA. Will obtain noncontrast head CT. No indication for CT or LP based on my H&P. Carolina Briggs MD  4:23 PM        Social History     Tobacco Use    Smoking status: Current Some Day Smoker     Packs/day: 0.25     Years: 10.00     Pack years: 2.50    Smokeless tobacco: Current User    Tobacco comment: 6 cig/day   Substance Use Topics    Alcohol use: Yes     Comment: social    Drug use: No     Patient Vitals for the past 4 hrs:   Temp Pulse Resp BP SpO2   07/21/20 1526 98.5 °F (36.9 °C) 77 16 123/78 99 %            Consults:      Medications Administered during ED course:  Medications   acetaminophen (TYLENOL) tablet 650 mg (650 mg Oral Given 7/21/20 1634)   diazePAM (VALIUM) tablet 2.5 mg (2.5 mg Oral Given 7/21/20 1634)          Current Discharge Medication List      CONTINUE these medications which have CHANGED    Details   hydrOXYzine HCL (ATARAX) 25 mg tablet Take 1 Tab by mouth every eight (8) hours as needed for Anxiety. Qty: 15 Tab, Refills: 0                Diagnosis and Disposition     Disposition:  Discharged    Clinical Impression:   1. Frontal headache        Attestation:  I personally performed the services described in this documentation on this date 7/21/2020 for patient Fitz Schulz. Carolina Briggs MD        I was the first provider for this patient on this visit. To the best of my ability I reviewed relevant prior medical records, electrocardiograms, laboratories, and radiologic studies. The patient's presenting problems were discussed, and the patient was in agreement with the care plan formulated and outlined with them. Carolina Briggs MD    Please note that this dictation was completed with Dragon voice recognition software. Quite often unanticipated grammatical, syntax, homophones, and other interpretive errors are inadvertently transcribed by the computer software.  Please disregard these errors and excuse any errors that have escaped final proofreading.

## 2020-07-21 NOTE — ED NOTES
Pt presents to ED ambulatory complaining of headache behind her right eye since this morning. Patient states its been hurting intermittantly for two weeks. Patient denies taking anything for pain. Pt is alert and oriented x 4, RR even and unlabored, skin is warm and dry. Assessment completed and pt updated on plan of care. Call bell in reach. Emergency Department Nursing Plan of Care       The Nursing Plan of Care is developed from the Nursing assessment and Emergency Department Attending provider initial evaluation. The plan of care may be reviewed in the ED Provider note.     The Plan of Care was developed with the following considerations:   Patient / Family readiness to learn indicated by:verbalized understanding  Persons(s) to be included in education: patient  Barriers to Learning/Limitations:No    Signed     Sindy Hernandez, ZULLY    7/21/2020   3:49 PM

## 2020-07-22 ENCOUNTER — PATIENT OUTREACH (OUTPATIENT)
Dept: CASE MANAGEMENT | Age: 45
End: 2020-07-22

## 2020-07-22 NOTE — PROGRESS NOTES
Patient contacted regarding recent discharge and COVID-19 risk. Discussed COVID-19 related testing which was not done at this time. Test results were not done. Patient informed of results, if available? n/a    Care Transition Nurse/ Ambulatory Care Manager contacted the patient by telephone to perform post discharge assessment. Verified name and  with patient as identifiers. Patient has following risk factors of: hypertension, anxiety, depression, TIA, tension headache, complains of acute onset right frontal headach . CTN/ACM reviewed discharge instructions, medical action plan and red flags related to discharge diagnosis. Reviewed and educated them on any new and changed medications related to discharge diagnosis. Advised obtaining a 90-day supply of all daily and as-needed medications. Advance Care Planning:   Does patient have an Advance Directive: decision makers updated     Education provided regarding infection prevention, and signs and symptoms of COVID-19 and when to seek medical attention with patient who verbalized understanding. Discussed exposure protocols and quarantine from [address] you at higher risk for severe illness  and given an opportunity for questions and concerns. The patient agrees to contact the COVID-19 hotline [phone] or PCP office for questions related to their healthcare. CTN/ACM provided contact information for future reference. From CDC: Are you at higher risk for severe illness?  Wash your hands often.  Avoid close contact (6 feet, which is about two arm lengths) with people who are sick.  Put distance between yourself and other people if COVID-19 is spreading in your community.  Clean and disinfect frequently touched surfaces.  Avoid all cruise travel and non-essential air travel.  Call your healthcare professional if you have concerns about COVID-19 and your underlying condition or if you are sick.     For more information on steps you can take to protect yourself, see CDC's How to Protect Yourself      Patient/family/caregiver given information for [employer] Loop and agrees to enroll no  Patient's preferred e-mail:    Patient's preferred phone number:   Based on Loop alert triggers, patient will be contacted by nurse care manager for worsening symptoms. Plan for follow-up call in 7-14 days based on severity of symptoms and risk factors. Patient to call for follow up.

## 2020-08-05 ENCOUNTER — PATIENT OUTREACH (OUTPATIENT)
Dept: CASE MANAGEMENT | Age: 45
End: 2020-08-05

## 2020-08-05 NOTE — PROGRESS NOTES
Patient resolved from Transition of Care episode on 8/5/20  Discussed COVID-19 related testing which was not done at this time. Test results were not done. Patient informed of results, if available? n/a     Patient/family has been provided the following resources and education related to COVID-19:                         Signs, symptoms and red flags related to COVID-19            CDC exposure and quarantine guidelines            [employer] exposure contact - [phone]            Contact for their local Department of Health                 Patient currently reports that the following symptoms have improved:  .headache, chest pain    No further outreach scheduled with this CTN/ACM/LPN/HC/ MA. Episode of Care resolved. Patient has this CTN/ACM/LPN/HC/MA contact information if future needs arise.

## 2020-09-01 ENCOUNTER — HOSPITAL ENCOUNTER (EMERGENCY)
Age: 45
Discharge: HOME | End: 2020-09-01
Attending: EMERGENCY MEDICINE
Payer: MEDICAID

## 2020-09-01 VITALS
HEART RATE: 72 BPM | OXYGEN SATURATION: 98 % | WEIGHT: 140 LBS | RESPIRATION RATE: 18 BRPM | HEIGHT: 62 IN | TEMPERATURE: 98.3 F | BODY MASS INDEX: 25.76 KG/M2 | DIASTOLIC BLOOD PRESSURE: 69 MMHG | SYSTOLIC BLOOD PRESSURE: 141 MMHG

## 2020-09-01 DIAGNOSIS — N76.0 BV (BACTERIAL VAGINOSIS): Primary | ICD-10-CM

## 2020-09-01 DIAGNOSIS — B96.89 BV (BACTERIAL VAGINOSIS): Primary | ICD-10-CM

## 2020-09-01 LAB
APPEARANCE UR: CLEAR
BACTERIA URNS QL MICRO: NEGATIVE /HPF
BILIRUB UR QL: NEGATIVE
CLUE CELLS VAG QL WET PREP: NORMAL
COLOR UR: NORMAL
EPITH CASTS URNS QL MICRO: NORMAL /LPF
GLUCOSE UR STRIP.AUTO-MCNC: NEGATIVE MG/DL
HCG UR QL: NEGATIVE
HGB UR QL STRIP: NEGATIVE
KETONES UR QL STRIP.AUTO: NEGATIVE MG/DL
KOH PREP SPEC: NORMAL
LEUKOCYTE ESTERASE UR QL STRIP.AUTO: NEGATIVE
NITRITE UR QL STRIP.AUTO: NEGATIVE
PH UR STRIP: 5.5 [PH] (ref 5–8)
PROT UR STRIP-MCNC: NEGATIVE MG/DL
RBC #/AREA URNS HPF: NORMAL /HPF (ref 0–5)
SERVICE CMNT-IMP: NORMAL
SP GR UR REFRACTOMETRY: 1.02 (ref 1–1.03)
T VAGINALIS VAG QL WET PREP: NORMAL
UA: UC IF INDICATED,UAUC: NORMAL
UROBILINOGEN UR QL STRIP.AUTO: 0.2 EU/DL (ref 0.2–1)
WBC URNS QL MICRO: NORMAL /HPF (ref 0–4)

## 2020-09-01 PROCEDURE — 81001 URINALYSIS AUTO W/SCOPE: CPT

## 2020-09-01 PROCEDURE — 87210 SMEAR WET MOUNT SALINE/INK: CPT

## 2020-09-01 PROCEDURE — 81025 URINE PREGNANCY TEST: CPT

## 2020-09-01 PROCEDURE — 87491 CHLMYD TRACH DNA AMP PROBE: CPT

## 2020-09-01 PROCEDURE — 99284 EMERGENCY DEPT VISIT MOD MDM: CPT

## 2020-09-01 RX ORDER — HYDROCHLOROTHIAZIDE 12.5 MG/1
12.5 CAPSULE ORAL DAILY
COMMUNITY
End: 2021-01-19

## 2020-09-01 RX ORDER — METRONIDAZOLE 500 MG/1
500 TABLET ORAL 2 TIMES DAILY
Qty: 20 TAB | Refills: 0 | Status: SHIPPED | OUTPATIENT
Start: 2020-09-01 | End: 2021-01-19

## 2020-09-01 NOTE — ED NOTES
Discharge instructions were given to the patient by Mode Reyes RN. The patient left the Emergency Department ambulatory, alert and oriented and in no acute distress with 1 prescription. The patient was encouraged to call or return to the ED for worsening issues or problems and was encouraged to schedule a follow up appointment for continuing care. The patient verbalized understanding of discharge instructions and prescriptions, all questions were answered. The patient has no further concerns at this time.

## 2020-09-01 NOTE — ED NOTES
Pt presents to ED ambulatory complaining of concern for tampon in vagina x 2 days. No tampon or other foreign object visualized in vagina during pelvic exam. Pt is alert and oriented x 4, RR even and unlabored, skin is warm and dry. Assessment completed and pt updated on plan of care. Call bell in reach. Emergency Department Nursing Plan of Care       The Nursing Plan of Care is developed from the Nursing assessment and Emergency Department Attending provider initial evaluation. The plan of care may be reviewed in the ED Provider note.     The Plan of Care was developed with the following considerations:   Patient / Family readiness to learn indicated by:verbalized understanding  Persons(s) to be included in education: patient  Barriers to Learning/Limitations:No    Signed     Marzena Sanchez    9/1/2020   6:09 PM

## 2020-09-01 NOTE — ED PROVIDER NOTES
EMERGENCY DEPARTMENT HISTORY AND PHYSICAL EXAM      Date: 9/1/2020  Patient Name: Duyen Elias  Patient Age and Sex: 41 y.o. female     History of Presenting Illness     Chief Complaint   Patient presents with    Foreign Body in Vagina       History Provided By: Patient    HPI: Duyen Elias is a 56-year-old female presenting for concerns for foreign body in vagina and vaginal discharge. Patient states that she had her menstrual cycle last week and is not sure if she removed the tampon because beginning today started to have vaginal discharge, some smell down there, and some discomfort in her vagina. Patient denies any nausea, vomiting, abdominal pain, fevers. Patient states that it does burn a little bit when she urinates. There are no other complaints, changes, or physical findings at this time. PCP: None    No current facility-administered medications on file prior to encounter. Current Outpatient Medications on File Prior to Encounter   Medication Sig Dispense Refill    hydroCHLOROthiazide (MICROZIDE) 12.5 mg capsule Take 12.5 mg by mouth daily.  hydrOXYzine HCL (ATARAX) 25 mg tablet Take 1 Tab by mouth every eight (8) hours as needed for Anxiety. 15 Tab 0    amLODIPine (NORVASC) 10 mg tablet Take 1 Tab by mouth daily. 30 Tab 0    metoprolol tartrate (LOPRESSOR) 25 mg tablet Take 1 Tab by mouth two (2) times a day. 60 Tab 0    risperidone (RISPERDAL PO) Take  by mouth.  bupropion HCl (WELLBUTRIN SR PO) Take  by mouth.          Past History     Past Medical History:  Past Medical History:   Diagnosis Date    angina     [facility]ina [facility] rest [facility])     Breast pain     since 2016, right breast, on and off    Chiari malformation     Heart abnormalities     leaky heart valve, murmur    Hypertension     Other ill-defined conditions(799.89)     cardiac arrhythmias    Other ill-defined conditions(799.89)     neuropathy    Psychiatric disorder     anxiety and depression    [facility] TIA       Past Surgical History:  Past Surgical History:   Procedure Laterality Date    ABDOMEN SURGERY PROC UNLISTED      laporscopic surgery post ruptured fallopian tube    HX GYN      HX ORTHOPAEDIC      screws in left ankle    HX OTHER SURGICAL      surgery to back of head due to fractured skull    NEUROLOGICAL PROCEDURE UNLISTED         Family History:  History reviewed. No pertinent family history. Social History:  Social History     Tobacco Use    Smoking status: Current Some Day Smoker     Packs/day: 0.25     Years: 10.00     Pack years: 2.50    Smokeless tobacco: Current User    Tobacco comment: 6 cig/day   Substance Use Topics    Alcohol use: Yes     Comment: social    Drug use: No       Allergies: Allergies   Allergen Reactions    Toradol [Ketorolac] Hives         Review of Systems   Review of Systems   Constitutional: Negative for chills and fever. Respiratory: Negative for cough and shortness of breath. Cardiovascular: Negative for chest pain. Gastrointestinal: Negative for abdominal pain, constipation, diarrhea, nausea and vomiting. Genitourinary: Positive for dysuria, vaginal discharge and vaginal pain. Negative for frequency and hematuria. Neurological: Negative for weakness and numbness. All other systems reviewed and are negative. Physical Exam   Physical Exam  Vitals signs and nursing note reviewed. Constitutional:       Appearance: She is well-developed. HENT:      Head: Normocephalic and atraumatic. Nose: Nose normal.      Mouth/Throat:      Mouth: Mucous membranes are moist.   Eyes:      Extraocular Movements: Extraocular movements intact. Conjunctiva/sclera: Conjunctivae normal.   Neck:      Musculoskeletal: Normal range of motion and neck supple. Cardiovascular:      Rate and Rhythm: Normal rate and regular rhythm. Pulmonary:      Effort: Pulmonary effort is normal. No respiratory distress. Breath sounds: Normal breath sounds. Abdominal:      General: There is no distension. Palpations: Abdomen is soft. Tenderness: There is no abdominal tenderness. Genitourinary:     General: Normal vulva. Vagina: Vaginal discharge present. Comments: No foreign body seen  Musculoskeletal: Normal range of motion. Skin:     General: Skin is warm and dry. Neurological:      General: No focal deficit present. Mental Status: She is alert and oriented to person, place, and time. Mental status is at baseline. Psychiatric:         Mood and Affect: Mood normal.          Diagnostic Study Results     Labs -     Recent Results (from the past 12 hour(s))   WENDY, OTHER SOURCES    Collection Time: 09/01/20  5:21 PM    Specimen: Vaginal Specimen;  Other   Result Value Ref Range    Special Requests: NO SPECIAL REQUESTS      KOH NO YEAST SEEN     WET PREP    Collection Time: 09/01/20  5:21 PM    Specimen: Miscellaneous sample   Result Value Ref Range    Clue cells CLUE CELLS PRESENT      Wet prep NO TRICHOMONAS SEEN     URINALYSIS W/ REFLEX CULTURE    Collection Time: 09/01/20  5:21 PM    Specimen: Urine   Result Value Ref Range    Color YELLOW/STRAW      Appearance CLEAR CLEAR      Specific gravity 1.020 1.003 - 1.030      pH (UA) 5.5 5.0 - 8.0      Protein Negative NEG mg/dL    Glucose Negative NEG mg/dL    Ketone Negative NEG mg/dL    Bilirubin Negative NEG      Blood Negative NEG      Urobilinogen 0.2 0.2 - 1.0 EU/dL    Nitrites Negative NEG      Leukocyte Esterase Negative NEG      WBC 0-4 0 - 4 /hpf    RBC 0-5 0 - 5 /hpf    Epithelial cells FEW FEW /lpf    Bacteria Negative NEG /hpf    UA:UC IF INDICATED CULTURE NOT INDICATED BY UA RESULT CNI     HCG URINE, QL. - POC    Collection Time: 09/01/20  5:51 PM   Result Value Ref Range    Pregnancy test,urine (POC) Negative NEG         Radiologic Studies -   No orders to display     CT Results  (Last 48 hours)    None        CXR Results  (Last 48 hours)    None            Medical Decision Making   I am the first provider for this patient. I reviewed the vital signs, available nursing notes, past medical history, past surgical history, family history and social history. Vital Signs-Reviewed the patient's vital signs. Patient Vitals for the past 12 hrs:   Temp Pulse Resp BP SpO2   09/01/20 1704 98.3 °F (36.8 °C) 78 16 (!) 145/92 95 %       Records Reviewed: Nursing Notes and Old Medical Records    Provider Notes (Medical Decision Making):   Patient presenting for vaginal discharge and vaginal plain. After doing physical exam, no foreign body seen. Does have vaginal discharge which could be normal or STD or BV, yeast infection. Will get pelvic swabs as well as urinalysis to rule out UTI. ED Course:   Initial assessment performed. The patients presenting problems have been discussed, and they are in agreement with the care plan formulated and outlined with them. I have encouraged them to ask questions as they arise throughout their visit. ED Course as of Sep 01 1811   Tue Sep 01, 2020   1718 Procedure Note - Pelvic Exam:    5:19 PM  Performed by: Don Flanagan  Chaperoned by: Tim Henry RN  Pelvic exam was performed using bimanual and speculum. Exam showed a white thick discharge but no foreign body such as tampon noted. The procedure took 1-15 minutes, and pt tolerated well      [JS]      ED Course User Index  [JS] Hima Steward MD     Critical Care Time:   0    Disposition:  Discharge Note:  The patient has been re-evaluated and is ready for discharge. Reviewed available results with patient. Counseled patient on diagnosis and care plan. Patient has expressed understanding, and all questions have been answered. Patient agrees with plan and agrees to follow up as recommended, or to return to the ED if their symptoms worsen. Discharge instructions have been provided and explained to the patient, along with reasons to return to the ED.       PLAN:  Current Discharge Medication List START taking these medications    Details   metroNIDAZOLE (FlagyL) 500 mg tablet Take 1 Tab by mouth two (2) times a day. Qty: 20 Tab, Refills: 0           2. Follow-up Information     Follow up With Specialties Details Why [address] [facility] EMERGENCY DEPT Emergency Medicine  If symptoms worsen [address]        3. Return to ED if worse     Diagnosis     Clinical Impression:   1. BV (bacterial vaginosis)        Attestations:    Kiersten Dejesus M.D. Please note that this dictation was completed with [employer], the [employer] voice recognition software. Quite often unanticipated grammatical, syntax, homophones, and other interpretive errors are inadvertently transcribed by the computer software. Please disregard these errors. Please excuse any errors that have escaped final proofreading. Thank you.

## 2020-11-06 NOTE — ED PROVIDER NOTE - CPE EDP CARDIAC NORM
Â·     Pre-Operative Medication Instructions     Unless otherwise instructed, please follow these instructions. 7 Days before procedure STOP taking:  Â· All multivitamins containing Vitamin E   Â· All herbal supplements  Â· Fish Oil   Â· Non-steroidal anti-inflammatory (NSAID) medications such as: Ibuprofen, Naproxen (unless directed by your doctor's office)  Â· Aspirin for pain relief. If you are prescribed Aspirin because of a heart or other medical condition, check with your doctor. 24 Hours before procedure:  Tylenol (acetaminophen) may be taken until midnight. ON THE DAY OF YOUR PROCEDURE, take your prescribed medications with a small sip of water EXCEPT those listed below:    Special Medication Instructions: Take your last dose of NSAIDs/ Aspirin Products 7 days before procedure, not including the day of your procedure. Take your last dose of Birth Control Pill 14 days before procedure, not including the day of your procedure. Day of procedure:  Â· BRING any prescribed inhalers with you. Â· TAKE your Albuterol ,Flovent Inhaler and Omeprazole  the morning of surgery. Â· DO NOT bring medication(s) to your procedure, unless you are instructed. Take your Inhaler. DO NOT take the following medications on the day of your procedure: Do not take your Birth control pill. Hold this medication as stated above. Patient Education     Low-Fat Cooking Tips  To eat less fat, you may need to learn some new ways to cook. But that doesn't mean you have to eat bland, boring food. And it doesn't mean cooking needs to take any more time. Here are some tips for cooking and seasoning foods with less fat. Broil fish instead of frying it. And sprinkle on herbs to add flavor. Try new cooking methods  Â· Broil, roast, bake, steam, or microwave fish, chicken, turkey, and other meats. Â· Remove skin from chicken and turkey and trim extra fat from meat before cooking.   Â· Sprinkle herbs on meat, chicken, and fish, and in soups. Â· Cook in broth instead of fat. Â· Use nonstick cooking sprays or nonstick pans. Â· Steam or microwave vegetables without adding fat. Serve with herbs, lemon juice, vinegar, or fat-free butter-flavored powder. Â· To flavor beans and rice, add chopped onions, garlic, and peppers. Â· Chill soups and stews. Before reheating and serving, skim off the fat. Â· When you add fat, use canola or olive oil instead of butter or lard. Lighten up your recipes  Â· In soups and sauces: Replace whole milk or cream with low-fat milk, evaporated fat-free milk, or nonfat dry milk. Â· In puddings and other desserts: Replace whole milk or cream with low-fat milk or fat-free condensed milk. Â· To make dips and toppings: Use low-fat or nonfat cottage cheese or sour cream.  Â· To make salad dressings: Use nonfat yogurt or low-fat buttermilk. Â· In place of 1 whole egg in recipes: Use 2 egg whites or 1/4 cup egg substitute. Â· In place of regular cheese: Use fat-free or reduced-fat cheese. Erika last reviewed this educational content on 5/1/2020  Â© 5574-6124 [employer] [address]. All rights reserved. This information is not intended as a substitute for professional medical care. Always follow your healthcare professional's instructions. Patient Education     Low-Fat Diet    A low-fat diet will help you lose weight. It also can lower cholesterol and prevent symptoms of gallbladder disease. The average American diet contains up to 50% fat. This means that 50% of all calories come from fat (about 80 grams to 100 grams of fat per day). Choosing normal portions of foods from the list below can help lower your fat intake. Experts recommend that only 20% to 35% of your daily calories come from fat. The remaining 65% to 80% of calories will come from protein and carbohydrates. This is much healthier for you.   Breads  Ok: Whole-wheat or rye bread, dalton or soda crackers, jeromy toast, plain rolls, bagels, English muffins  Avoid: Rolls and breads containing whole milk or egg; waffles, pancakes, biscuits, corn bread; cheese crackers, other flavored crackers, pastries, doughnuts  Cereals  Ok: Oatmeal, whole-wheat, bran, multigrain, rice  Avoid: Granola or other cereals that have oil, coconut, or more than 2 grams of fat per serving  Cheese and eggs  Ok: Cheeses labeled low-fat; 3 whole eggs per week; egg whites and egg substitutes as desired  Avoid: All other cheeses  Desserts  Ok: Gelatin, slushy, jonathan food cake, meringues, nonfat yogurt, and puddings or sherbet made with nonfat milk  Avoid: Any other store-bought desserts, or desserts that have fat, whole milk, cream, chocolate, and coconut  Drinks  Ok: Nonfat milk, coffee, tea, fizzy (carbonated) drinks  Avoid: Whole and reduced-fat milk, evaporated and condensed milk, hot chocolate mixes, milk shakes, malts, eggnog  Fats  Ok: You may have up to 3 teaspoons of fat daily. This can be butter, margarine, mayonnaise, or healthy oils (canola or olive)  Avoid: Cream, nondairy creams, cream cheese, gravies, and cream sauces  Fruits  Ok: All fruits made without fat  Avoid: Coconut, olives  Meats, poultry, fish  Ok: Limit meat to 6 ounces daily (broiled, roasted, baked, grilled, or boiled). Buy lean cuts, and trim off the fat. Try beef, fish, lamb, pork, and canned fish packed in water; also chicken and turkey with the skin removed.   Avoid: Fried meats, fish, or poultry; fried eggs, and fish canned in oils; fatty meats such as rosales, sausage, corned beef, hot dogs, and lunch meats; meats with gravies and sauces  Potatoes, beans, pasta  Ok: Dried beans, split peas, lentils, potatoes, rice, pasta made without added fat  Avoid: [place] fries, potato chips, potatoes prepared with butter, refried beans  Soups  Ok: Clear broth soups without fat and with allowed vegetables  Avoid: Cream-based soups  Vegetables  Ok: Fresh, frozen, canned or dried vegetables, all made without added fat  Avoid: Fried vegetables and those prepared with butter, cream, sauces  Miscellaneous  Ok: Salt, sugar, jelly, hard candy, marshmallows, honey, syrup, spices and herbs, mustard, ketchup, lemon, and vinegar. Try to limit sweets and added sugars. Avoid: Chocolate, nuts, coconut, and cream candies; sunflower, sesame, and other seeds; fried foods; cream sauces and gravies; pizza  Date Last Reviewed: 8/1/2016  Â© 4544-3311 The [address] [facility]. [address]. All rights reserved. This information is not intended as a substitute for professional medical care. Always follow your healthcare professional's instructions. Review with your Surgical Team your Discharge Medication Instructions    Use a back up birth control such as condoms while holding your birth control pills around the time of your Surgery. Please speak with the Prescriber of your birth control pills about birth control alternatives and their effectiveness. DO NOT DRIVE OR OPERATE ANY HEAVY MACHINERY AND / OR PERFORM ANY TASK THAT REQUIRE YOUR DETAILED ATTENTION IF YOU ARE SLEEPY, DROWSY OR GROGGY. normal...

## 2020-12-29 ENCOUNTER — HOSPITAL ENCOUNTER (EMERGENCY)
Age: 45
Discharge: HOME | End: 2020-12-29
Attending: EMERGENCY MEDICINE | Admitting: EMERGENCY MEDICINE
Payer: MEDICAID

## 2020-12-29 ENCOUNTER — APPOINTMENT (OUTPATIENT)
Dept: CT IMAGING | Age: 45
End: 2020-12-29
Attending: NURSE PRACTITIONER
Payer: MEDICAID

## 2020-12-29 VITALS
HEART RATE: 69 BPM | RESPIRATION RATE: 18 BRPM | WEIGHT: 136 LBS | SYSTOLIC BLOOD PRESSURE: 158 MMHG | HEIGHT: 62 IN | TEMPERATURE: 98.7 F | OXYGEN SATURATION: 100 % | DIASTOLIC BLOOD PRESSURE: 80 MMHG | BODY MASS INDEX: 25.03 KG/M2

## 2020-12-29 DIAGNOSIS — R10.9 FLANK PAIN: ICD-10-CM

## 2020-12-29 DIAGNOSIS — N76.0 VAGINITIS AND VULVOVAGINITIS: ICD-10-CM

## 2020-12-29 DIAGNOSIS — R10.2 PELVIC PAIN: Primary | ICD-10-CM

## 2020-12-29 LAB
ALBUMIN SERPL-MCNC: 3.5 G/DL (ref 3.5–5)
ALBUMIN/GLOB SERPL: 1 {RATIO} (ref 1.1–2.2)
ALP SERPL-CCNC: 51 U/L (ref 45–117)
ALT SERPL-CCNC: 24 U/L (ref 12–78)
ANION GAP SERPL CALC-SCNC: 8 MMOL/L (ref 5–15)
APPEARANCE UR: CLEAR
AST SERPL-CCNC: 15 U/L (ref 15–37)
BACTERIA URNS QL MICRO: NEGATIVE /HPF
BASOPHILS # BLD: 0 K/UL (ref 0–0.1)
BASOPHILS NFR BLD: 0 % (ref 0–1)
BILIRUB SERPL-MCNC: 0.2 MG/DL (ref 0.2–1)
BILIRUB UR QL: NEGATIVE
BUN SERPL-MCNC: 17 MG/DL (ref 6–20)
BUN/CREAT SERPL: 16 (ref 12–20)
CALCIUM SERPL-MCNC: 8.7 MG/DL (ref 8.5–10.1)
CHLORIDE SERPL-SCNC: 104 MMOL/L (ref 97–108)
CLUE CELLS VAG QL WET PREP: NORMAL
CO2 SERPL-SCNC: 27 MMOL/L (ref 21–32)
COLOR UR: NORMAL
CREAT SERPL-MCNC: 1.08 MG/DL (ref 0.55–1.02)
DIFFERENTIAL METHOD BLD: NORMAL
EOSINOPHIL # BLD: 0 K/UL (ref 0–0.4)
EOSINOPHIL NFR BLD: 0 % (ref 0–7)
EPITH CASTS URNS QL MICRO: NORMAL /LPF
ERYTHROCYTE [DISTWIDTH] IN BLOOD BY AUTOMATED COUNT: 11.9 % (ref 11.5–14.5)
GLOBULIN SER CALC-MCNC: 3.4 G/DL (ref 2–4)
GLUCOSE SERPL-MCNC: 101 MG/DL (ref 65–100)
GLUCOSE UR STRIP.AUTO-MCNC: NEGATIVE MG/DL
HCG UR QL: NEGATIVE
HCT VFR BLD AUTO: 36.1 % (ref 35–47)
HGB BLD-MCNC: 12.6 G/DL (ref 11.5–16)
HGB UR QL STRIP: NEGATIVE
IMM GRANULOCYTES # BLD AUTO: 0 K/UL (ref 0–0.04)
IMM GRANULOCYTES NFR BLD AUTO: 0 % (ref 0–0.5)
KETONES UR QL STRIP.AUTO: NEGATIVE MG/DL
KOH PREP SPEC: NORMAL
LEUKOCYTE ESTERASE UR QL STRIP.AUTO: NEGATIVE
LYMPHOCYTES # BLD: 1.9 K/UL (ref 0.8–3.5)
LYMPHOCYTES NFR BLD: 22 % (ref 12–49)
MCH RBC QN AUTO: 31.7 PG (ref 26–34)
MCHC RBC AUTO-ENTMCNC: 34.9 G/DL (ref 30–36.5)
MCV RBC AUTO: 90.7 FL (ref 80–99)
MONOCYTES # BLD: 0.5 K/UL (ref 0–1)
MONOCYTES NFR BLD: 6 % (ref 5–13)
NEUTS SEG # BLD: 6 K/UL (ref 1.8–8)
NEUTS SEG NFR BLD: 72 % (ref 32–75)
NITRITE UR QL STRIP.AUTO: NEGATIVE
NRBC # BLD: 0 K/UL (ref 0–0.01)
NRBC BLD-RTO: 0 PER 100 WBC
PH UR STRIP: 5.5 [PH] (ref 5–8)
PLATELET # BLD AUTO: 350 K/UL (ref 150–400)
PMV BLD AUTO: 9.5 FL (ref 8.9–12.9)
POTASSIUM SERPL-SCNC: 4.1 MMOL/L (ref 3.5–5.1)
PROT SERPL-MCNC: 6.9 G/DL (ref 6.4–8.2)
PROT UR STRIP-MCNC: NEGATIVE MG/DL
RBC # BLD AUTO: 3.98 M/UL (ref 3.8–5.2)
RBC #/AREA URNS HPF: NORMAL /HPF (ref 0–5)
SERVICE CMNT-IMP: NORMAL
SODIUM SERPL-SCNC: 139 MMOL/L (ref 136–145)
SP GR UR REFRACTOMETRY: 1.03 (ref 1–1.03)
T VAGINALIS VAG QL WET PREP: NORMAL
UA: UC IF INDICATED,UAUC: NORMAL
UROBILINOGEN UR QL STRIP.AUTO: 0.2 EU/DL (ref 0.2–1)
WBC # BLD AUTO: 8.4 K/UL (ref 3.6–11)
WBC URNS QL MICRO: NORMAL /HPF (ref 0–4)

## 2020-12-29 PROCEDURE — 81025 URINE PREGNANCY TEST: CPT

## 2020-12-29 PROCEDURE — 74177 CT ABD & PELVIS W/CONTRAST: CPT

## 2020-12-29 PROCEDURE — 96374 THER/PROPH/DIAG INJ IV PUSH: CPT

## 2020-12-29 PROCEDURE — 87210 SMEAR WET MOUNT SALINE/INK: CPT

## 2020-12-29 PROCEDURE — 36415 COLL VENOUS BLD VENIPUNCTURE: CPT

## 2020-12-29 PROCEDURE — 74011250636 HC RX REV CODE- 250/636: Performed by: NURSE PRACTITIONER

## 2020-12-29 PROCEDURE — 74011000636 HC RX REV CODE- 636: Performed by: EMERGENCY MEDICINE

## 2020-12-29 PROCEDURE — 87491 CHLMYD TRACH DNA AMP PROBE: CPT

## 2020-12-29 PROCEDURE — 81001 URINALYSIS AUTO W/SCOPE: CPT

## 2020-12-29 PROCEDURE — 85025 COMPLETE CBC W/AUTO DIFF WBC: CPT

## 2020-12-29 PROCEDURE — 99284 EMERGENCY DEPT VISIT MOD MDM: CPT

## 2020-12-29 PROCEDURE — 80053 COMPREHEN METABOLIC PANEL: CPT

## 2020-12-29 RX ORDER — FLUCONAZOLE 150 MG/1
150 TABLET ORAL DAILY
Qty: 1 TAB | Refills: 0 | Status: SHIPPED | OUTPATIENT
Start: 2020-12-29 | End: 2020-12-30

## 2020-12-29 RX ORDER — MORPHINE SULFATE 4 MG/ML
4 INJECTION INTRAVENOUS ONCE
Status: COMPLETED | OUTPATIENT
Start: 2020-12-29 | End: 2020-12-29

## 2020-12-29 RX ORDER — SODIUM CHLORIDE 0.9 % (FLUSH) 0.9 %
5-10 SYRINGE (ML) INJECTION
Status: COMPLETED | OUTPATIENT
Start: 2020-12-29 | End: 2020-12-29

## 2020-12-29 RX ORDER — TRAMADOL HYDROCHLORIDE 50 MG/1
50 TABLET ORAL
Qty: 4 TAB | Refills: 0 | Status: SHIPPED | OUTPATIENT
Start: 2020-12-29 | End: 2021-01-01

## 2020-12-29 RX ORDER — METHOCARBAMOL 500 MG/1
500 TABLET, FILM COATED ORAL 4 TIMES DAILY
Qty: 20 TAB | Refills: 0 | Status: SHIPPED | OUTPATIENT
Start: 2020-12-29 | End: 2021-01-19

## 2020-12-29 RX ORDER — MORPHINE SULFATE 4 MG/ML
4 INJECTION, SOLUTION INTRAMUSCULAR; INTRAVENOUS ONCE
Status: DISCONTINUED | OUTPATIENT
Start: 2020-12-29 | End: 2020-12-29

## 2020-12-29 RX ORDER — ONDANSETRON 8 MG/1
8 TABLET, ORALLY DISINTEGRATING ORAL
Qty: 12 TAB | Refills: 0 | Status: SHIPPED | OUTPATIENT
Start: 2020-12-29 | End: 2021-01-19

## 2020-12-29 RX ADMIN — SODIUM CHLORIDE 1000 ML: 9 INJECTION, SOLUTION INTRAVENOUS at 14:55

## 2020-12-29 RX ADMIN — MORPHINE SULFATE 4 MG: 4 INJECTION INTRAVENOUS at 14:56

## 2020-12-29 RX ADMIN — Medication 10 ML: at 15:56

## 2020-12-29 RX ADMIN — IOPAMIDOL 100 ML: 755 INJECTION, SOLUTION INTRAVENOUS at 15:56

## 2020-12-29 NOTE — ED TRIAGE NOTES
Pt reports that she thinks she has a kidney infection, yeast infection, she reports trying some home remedies for the pain and having diarrhea yesterday.  Pelvic and low back pain x a week

## 2020-12-29 NOTE — ED NOTES
Hourly rounding completed on this pt. Offered assistance for toileting or hygiene at this time. Pt is up-to-date on plan of care. No pain interventions required at this time. Warm blanket offered, call bell within reach, safety precautions in place, bed locked and in the lowest position.

## 2020-12-29 NOTE — ED PROVIDER NOTES
EMERGENCY DEPARTMENT HISTORY AND PHYSICAL EXAM      Date: 12/29/2020  Patient Name: Lyndon Han    History of Presenting Illness     Chief Complaint   Patient presents with    Pelvic Pain    Back Pain       History Provided By: Patient    Additional History (Context): Lyndon Han is a 41 y.o. female with HTN, CVA  who presents with back and pelvic pain. Patient initially states symptoms began 1 week ago but then states it has been going on for some time. Patient reports pain to left mid back. Patient states she thinks she has a kidney infection stating she has similar symptoms and was treated with antibiotic approximately 3 to 6 months ago. Patient states since then her symptoms are intermittent. In addition she thinks she has a yeast infection stating that she had vaginal discharge. She treated herself with over-the-counter Monistat with no relief. Patient also reports treating herself with over-the-counter urinary infection medicines. Denies fever but reports chills the other day. Patient also states she had an episode of diarrhea today. PCP: None    Current Outpatient Medications   Medication Sig Dispense Refill    ondansetron (Zofran ODT) 8 mg disintegrating tablet Take 1 Tab by mouth every eight (8) hours as needed for Nausea or Vomiting. 12 Tab 0    traMADoL (Ultram) 50 mg tablet Take 1 Tab by mouth every six (6) hours as needed for Pain for up to 3 days. Max Daily Amount: 200 mg. 4 Tab 0    methocarbamoL (Robaxin) 500 mg tablet Take 1 Tab by mouth four (4) times daily. 20 Tab 0    fluconazole (DIFLUCAN) 150 mg tablet Take 1 Tab by mouth daily for 1 day. FDA advises cautious prescribing of oral fluconazole in pregnancy. 1 Tab 0    hydroCHLOROthiazide (MICROZIDE) 12.5 mg capsule Take 12.5 mg by mouth daily.  metroNIDAZOLE (FlagyL) 500 mg tablet Take 1 Tab by mouth two (2) times a day. 20 Tab 0    risperidone (RISPERDAL PO) Take  by mouth.       bupropion HCl (WELLBUTRIN SR PO) Take  by mouth.  amLODIPine (NORVASC) 10 mg tablet Take 1 Tab by mouth daily. 30 Tab 0    metoprolol tartrate (LOPRESSOR) 25 mg tablet Take 1 Tab by mouth two (2) times a day. 61 Tab 0       Past History     Past Medical History:  Past Medical History:   Diagnosis Date    angina     [facility])     Breast pain     since 2016, right breast, on and off    Chiari malformation     Heart abnormalities     leaky heart valve, murmur    Hypertension     Other ill-defined conditions(799.89)     cardiac arrhythmias    Other ill-defined conditions(799.89)     neuropathy    Psychiatric disorder     anxiety and depression    Stroke ([facility] [address]     TIA       Past Surgical History:  Past Surgical History:   Procedure Laterality Date    ABDOMEN SURGERY PROC UNLISTED      laporscopic surgery post ruptured fallopian tube    HX GYN      HX ORTHOPAEDIC      screws in left ankle    HX OTHER SURGICAL      surgery to back of head due to fractured skull    NEUROLOGICAL PROCEDURE UNLISTED         Family History:  History reviewed. No pertinent family history. Social History:  Social History     Tobacco Use    Smoking status: Current Some Day Smoker     Packs/day: 0.25     Years: 10.00     Pack years: 2.50    Smokeless tobacco: Current User    Tobacco comment: 6 cig/day   Substance Use Topics    Alcohol use: Yes     Comment: social    Drug use: No       Allergies: Allergies   Allergen Reactions    Toradol [Ketorolac] Hives         Review of Systems   Review of Systems   Constitutional: Positive for chills. Negative for appetite change, fatigue and fever. HENT: Negative for congestion, ear pain and rhinorrhea. Eyes: Negative for pain and itching. Respiratory: Negative for cough, chest tightness, shortness of breath and wheezing. Cardiovascular: Negative for chest pain, palpitations and leg swelling. Gastrointestinal: Positive for diarrhea. Negative for abdominal pain, nausea and vomiting. Genitourinary: Positive for flank pain, pelvic pain and vaginal discharge. Negative for dysuria, frequency, hematuria, urgency, vaginal bleeding and vaginal pain. Musculoskeletal: Positive for back pain. Negative for arthralgias and joint swelling. Skin: Negative for color change and rash. Neurological: Negative for dizziness, numbness and headaches. All other systems reviewed and are negative. Physical Exam     Vitals:    12/29/20 1500 12/29/20 1515 12/29/20 1630 12/29/20 1645   BP: (!) 196/106 (!) 172/86 (!) 160/82 (!) 158/80   Pulse:   70 69   Resp:   18 18   Temp:       SpO2: 100% 99% 100% 100%   Weight:       Height:         Physical Exam  Vitals signs and nursing note reviewed. Constitutional:       General: She is not in acute distress. Appearance: She is well-developed. She is not ill-appearing. HENT:      Head: Normocephalic and atraumatic. Right Ear: Tympanic membrane and ear canal normal.      Left Ear: Tympanic membrane and ear canal normal.      Nose: Nose normal.      Mouth/Throat:      Mouth: Mucous membranes are moist.      Pharynx: Oropharynx is clear. No oropharyngeal exudate or posterior oropharyngeal erythema. Eyes:      Extraocular Movements: Extraocular movements intact. Conjunctiva/sclera: Conjunctivae normal.      Pupils: Pupils are equal, round, and reactive to light. Neck:      Musculoskeletal: Normal range of motion and neck supple. Cardiovascular:      Rate and Rhythm: Normal rate and regular rhythm. Pulses: Normal pulses. Heart sounds: Normal heart sounds. Pulmonary:      Effort: Pulmonary effort is normal.      Breath sounds: Normal breath sounds. Abdominal:      General: Abdomen is flat. Bowel sounds are normal.      Palpations: Abdomen is soft. Tenderness: There is abdominal tenderness in the right lower quadrant and left lower quadrant. There is left CVA tenderness. There is no right CVA tenderness, guarding or rebound. Musculoskeletal: Normal range of motion. Skin:     General: Skin is warm and dry. Neurological:      Mental Status: She is alert and oriented to person, place, and time. Diagnostic Study Results     Labs -     Recent Results (from the past 12 hour(s))   URINALYSIS W/ REFLEX CULTURE    Collection Time: 12/29/20  1:46 PM    Specimen: Urine   Result Value Ref Range    Color YELLOW/STRAW      Appearance CLEAR CLEAR      Specific gravity 1.030 1.003 - 1.030      pH (UA) 5.5 5.0 - 8.0      Protein Negative NEG mg/dL    Glucose Negative NEG mg/dL    Ketone Negative NEG mg/dL    Bilirubin Negative NEG      Blood Negative NEG      Urobilinogen 0.2 0.2 - 1.0 EU/dL    Nitrites Negative NEG      Leukocyte Esterase Negative NEG      WBC 0-4 0 - 4 /hpf    RBC 0-5 0 - 5 /hpf    Epithelial cells FEW FEW /lpf    Bacteria Negative NEG /hpf    UA:UC IF INDICATED CULTURE NOT INDICATED BY UA RESULT CNI     HCG URINE, QL. - POC    Collection Time: 12/29/20  1:50 PM   Result Value Ref Range    Pregnancy test,urine (POC) Negative NEG     WET PREP    Collection Time: 12/29/20  2:04 PM    Specimen: Miscellaneous sample   Result Value Ref Range    Clue cells CLUE CELLS ABSENT      Wet prep NO TRICHOMONAS SEEN     KOH, OTHER SOURCES    Collection Time: 12/29/20  2:04 PM    Specimen: Vagina;  Other   Result Value Ref Range    Special Requests: NO SPECIAL REQUESTS      KOH NO YEAST SEEN     METABOLIC PANEL, COMPREHENSIVE    Collection Time: 12/29/20  2:51 PM   Result Value Ref Range    Sodium 139 136 - 145 mmol/L    Potassium 4.1 3.5 - 5.1 mmol/L    Chloride 104 97 - 108 mmol/L    CO2 27 21 - 32 mmol/L    Anion gap 8 5 - 15 mmol/L    Glucose 101 (H) 65 - 100 mg/dL    BUN 17 6 - 20 MG/DL    Creatinine 1.08 (H) 0.55 - 1.02 MG/DL    BUN/Creatinine ratio 16 12 - 20      GFR est AA >60 >60 ml/min/1.73m2    GFR est non-AA 55 (L) >60 ml/min/1.73m2    Calcium 8.7 8.5 - 10.1 MG/DL    Bilirubin, total 0.2 0.2 - 1.0 MG/DL    ALT (SGPT) 24 12 - 78 U/L    AST (SGOT) 15 15 - 37 U/L    Alk. phosphatase 51 45 - 117 U/L    Protein, total 6.9 6.4 - 8.2 g/dL    Albumin 3.5 3.5 - 5.0 g/dL    Globulin 3.4 2.0 - 4.0 g/dL    A-G Ratio 1.0 (L) 1.1 - 2.2     CBC WITH AUTOMATED DIFF    Collection Time: 12/29/20  2:51 PM   Result Value Ref Range    WBC 8.4 3.6 - 11.0 K/uL    RBC 3.98 3.80 - 5.20 M/uL    HGB 12.6 11.5 - 16.0 g/dL    HCT 36.1 35.0 - 47.0 %    MCV 90.7 80.0 - 99.0 FL    MCH 31.7 26.0 - 34.0 PG    MCHC 34.9 30.0 - 36.5 g/dL    RDW 11.9 11.5 - 14.5 %    PLATELET 944 626 - 488 K/uL    MPV 9.5 8.9 - 12.9 FL    NRBC 0.0 0  WBC    ABSOLUTE NRBC 0.00 0.00 - 0.01 K/uL    NEUTROPHILS 72 32 - 75 %    LYMPHOCYTES 22 12 - 49 %    MONOCYTES 6 5 - 13 %    EOSINOPHILS 0 0 - 7 %    BASOPHILS 0 0 - 1 %    IMMATURE GRANULOCYTES 0 0.0 - 0.5 %    ABS. NEUTROPHILS 6.0 1.8 - 8.0 K/UL    ABS. LYMPHOCYTES 1.9 0.8 - 3.5 K/UL    ABS. MONOCYTES 0.5 0.0 - 1.0 K/UL    ABS. EOSINOPHILS 0.0 0.0 - 0.4 K/UL    ABS. BASOPHILS 0.0 0.0 - 0.1 K/UL    ABS. IMM. GRANS. 0.0 0.00 - 0.04 K/UL    DF AUTOMATED         Radiologic Studies -   CT ABD PELV W CONT   Final Result   IMPRESSION:   1. No acute intra-abdominal pathology. No definite renal or ureteral stones are   identified. 2.  Incidental findings as above        CT Results  (Last 48 hours)               12/29/20 1556  CT ABD PELV W CONT Final result    Impression:  IMPRESSION:   1. No acute intra-abdominal pathology. No definite renal or ureteral stones are   identified. 2.  Incidental findings as above       Narrative:  EXAM: CT ABD PELV W CONT       INDICATION: L flank pain and lower abd pain ; r/o kidney stone       COMPARISON: No comparisons        CONTRAST: 100 mL of Isovue-370. TECHNIQUE:    Following the uneventful intravenous administration of contrast, thin axial   images were obtained through the abdomen and pelvis. Coronal and sagittal   reconstructions were generated.  Oral contrast was not administered. CT dose   reduction was achieved through use of a standardized protocol tailored for this   examination and automatic exposure control for dose modulation. FINDINGS:    LOWER THORAX: No significant abnormality in the incidentally imaged lower chest.   LIVER: Small hypodensity in the central liver too small to characterize. BILIARY TREE: Gallbladder is within normal limits. CBD is not dilated. SPLEEN: within normal limits. PANCREAS: No mass or ductal dilatation. ADRENALS: Unremarkable. KIDNEYS: No mass, calculus, or hydronephrosis. STOMACH: Unremarkable. SMALL BOWEL: No dilatation or wall thickening. COLON: No dilatation or wall thickening. APPENDIX: Unremarkable   PERITONEUM: No ascites or pneumoperitoneum. RETROPERITONEUM: No lymphadenopathy or aortic aneurysm. REPRODUCTIVE ORGANS: Unremarkable   URINARY BLADDER: No mass or calculus. BONES: No destructive bone lesion. ABDOMINAL WALL: No mass or hernia. ADDITIONAL COMMENTS: N/A               CXR Results  (Last 48 hours)    None            Medical Decision Making   I am the first provider for this patient. I reviewed the vital signs, available nursing notes, past medical history, past surgical history, family history and social history. Vital Signs-Reviewed the patient's vital signs. Pulse Oximetry Analysis    78-year-old female with complaints of pelvic and back pain exhibitin.g left CVA tenderness in addition to pelvic pain. Patient offered pelvic exam but she prefers to self swab. Will obtain UA and genital swabs. Records Reviewed: Nursing Notes, Old Medical Records, Previous Radiology Studies and Previous Laboratory Studies    ED Course:   ED Course as of Dec 29 1732   Tue Dec 29, 2020   1432 UA and genital swabs unremarkable. Pt is lying over the stretcher in pain. Plan to obtain labs and CT abd pelvis to r/o kidney stone.  In addition will evaluate for diverticulosis since pt has reported an episode of diarrhea     [NA]   1650 Imaging unremarkable. Plan to d/c home with tramadol and robaxin for pain possible musculoskeletal. Advised 2-3 days before STD results. WIll provide diflucan since pt reports persistent vaginal sx. [NA]      ED Course User Index  [NA] Vasiliy Macedo NP         Disposition:  Discharge     DISCHARGE NOTE:     Pt has been reexamined. Patient has no new complaints, changes, or physical findings. Care plan outlined and precautions discussed. All of pt's questions and concerns were addressed. Patient was instructed and agrees to follow up with PCP, as well as to return to the ED upon further deterioration. Patient is ready to go home. Follow-up Information     Follow up With Specialties Details Why [address]  Call in 1 week  [address]  [place], [address]  [phone]          Discharge Medication List as of 12/29/2020  4:47 PM      START taking these medications    Details   ondansetron (Zofran ODT) 8 mg disintegrating tablet Take 1 Tab by mouth every eight (8) hours as needed for Nausea or Vomiting., Normal, Disp-12 Tab, R-0      traMADoL (Ultram) 50 mg tablet Take 1 Tab by mouth every six (6) hours as needed for Pain for up to 3 days. Max Daily Amount: 200 mg., Normal, Disp-4 Tab, R-0         CONTINUE these medications which have NOT CHANGED    Details   hydroCHLOROthiazide (MICROZIDE) 12.5 mg capsule Take 12.5 mg by mouth daily. , Historical Med      metroNIDAZOLE (FlagyL) 500 mg tablet Take 1 Tab by mouth two (2) times a day., Normal, Disp-20 Tab,R-0      hydrOXYzine HCL (ATARAX) 25 mg tablet Take 1 Tab by mouth every eight (8) hours as needed for Anxiety., Normal, Disp-15 Tab,R-0      risperidone (RISPERDAL PO) Take  by mouth., Historical Med      bupropion HCl (WELLBUTRIN SR PO) Take  by mouth., Historical Med      amLODIPine (NORVASC) 10 mg tablet Take 1 Tab by mouth daily. , Normal, Disp-30 Tab, R-0      metoprolol tartrate (LOPRESSOR) 25 mg tablet Take 1 Tab by mouth two (2) times a day., Normal, Disp-60 Tab, R-0             Provider Notes (Medical Decision Making):   DDX: Chlamydia, Gonorrhea, BV, Candidiasis, Trichomonas, UTI, pyelonephritis, kidney stones, PID        Diagnosis     Clinical Impression:   1. Pelvic pain    2. Flank pain    3.  Vaginitis and vulvovaginitis

## 2021-01-02 NOTE — DISCHARGE INSTRUCTIONS
Patient Education        Bacterial Vaginosis: Care Instructions  Overview     Bacterial vaginosis is a type of vaginal infection. It is caused by excess growth of certain bacteria that are normally found in the vagina. Symptoms can include itching, swelling, pain when you urinate or have sex, and a gray or yellow discharge with a \"fishy\" odor. It is not considered an infection that is spread through sexual contact. Symptoms can be annoying and uncomfortable. But bacterial vaginosis does not usually cause other health problems. However, if you have it while you are pregnant, it can cause complications. While the infection may go away on its own, most doctors use antibiotics to treat it. You may have been prescribed pills or vaginal cream. With treatment, bacterial vaginosis usually clears up in 5 to 7 days. Follow-up care is a key part of your treatment and safety. Be sure to make and go to all appointments, and call your doctor if you are having problems. It's also a good idea to know your test results and keep a list of the medicines you take. How can you care for yourself at home? · Take your antibiotics as directed. Do not stop taking them just because you feel better. You need to take the full course of antibiotics. · Do not eat or drink anything that contains alcohol if you are taking metronidazole or tinidazole. · Keep using your medicine if you start your period. Use pads instead of tampons while using a vaginal cream or suppository. Tampons can absorb the medicine. · Wear loose cotton clothing. Do not wear nylon and other materials that hold body heat and moisture close to the skin. · Do not scratch. Relieve itching with a cold pack or a cool bath. · Do not wash your vaginal area more than once a day. Use plain water or a mild, unscented soap. Do not douche. When should you call for help?   Watch closely for changes in your health, and be sure to contact your doctor if:  · You have unexpected vaginal bleeding. · You have a fever. · You have new or increased pain in your vagina or pelvis. · You are not getting better after 1 week. · Your symptoms return after you finish the course of your medicine. Where can you learn more? Go to http://www.gray.com/  Enter X360 in the search box to learn more about \"Bacterial Vaginosis: Care Instructions. \"  Current as of: November 8, 2019               Content Version: 12.5  © 2968-7617 [employer]wise, [employer]. Care instructions adapted under license by [employer] (which disclaims liability or warranty for this information). If you have questions about a medical condition or this instruction, always ask your healthcare professional. [address] any warranty or liability for your use of this information. + COVID

## 2021-01-19 ENCOUNTER — HOSPITAL ENCOUNTER (EMERGENCY)
Age: 46
Discharge: HOME | End: 2021-01-19
Attending: PHYSICIAN ASSISTANT
Payer: MEDICAID

## 2021-01-19 ENCOUNTER — APPOINTMENT (OUTPATIENT)
Dept: GENERAL RADIOLOGY | Age: 46
End: 2021-01-19
Attending: PHYSICIAN ASSISTANT
Payer: MEDICAID

## 2021-01-19 VITALS
TEMPERATURE: 98.5 F | SYSTOLIC BLOOD PRESSURE: 152 MMHG | OXYGEN SATURATION: 100 % | DIASTOLIC BLOOD PRESSURE: 107 MMHG | HEART RATE: 82 BPM | BODY MASS INDEX: 25.03 KG/M2 | WEIGHT: 136.02 LBS | HEIGHT: 62 IN | RESPIRATION RATE: 18 BRPM

## 2021-01-19 DIAGNOSIS — I10 ESSENTIAL HYPERTENSION: ICD-10-CM

## 2021-01-19 DIAGNOSIS — R07.81 RIB PAIN ON RIGHT SIDE: Primary | ICD-10-CM

## 2021-01-19 DIAGNOSIS — F17.200 SMOKING ADDICTION: ICD-10-CM

## 2021-01-19 PROCEDURE — 71101 X-RAY EXAM UNILAT RIBS/CHEST: CPT

## 2021-01-19 PROCEDURE — 99283 EMERGENCY DEPT VISIT LOW MDM: CPT

## 2021-01-19 RX ORDER — ONDANSETRON 4 MG/1
4 TABLET, ORALLY DISINTEGRATING ORAL
Qty: 10 TAB | Refills: 0 | Status: ON HOLD | OUTPATIENT
Start: 2021-01-19 | End: 2021-05-06

## 2021-01-19 RX ORDER — IBUPROFEN 600 MG/1
600 TABLET ORAL
Qty: 20 TAB | Refills: 0 | Status: ON HOLD | OUTPATIENT
Start: 2021-01-19 | End: 2021-05-06

## 2021-01-19 RX ORDER — TRAMADOL HYDROCHLORIDE 50 MG/1
50 TABLET ORAL
Qty: 6 TAB | Refills: 0 | Status: SHIPPED | OUTPATIENT
Start: 2021-01-19 | End: 2021-01-22

## 2021-01-19 NOTE — ED NOTES
Pt given printed discharge instructions and 2 script(s). Pt verbalized understanding of instructions and script(s). Pt verbalized importance of following up with PCP to continue her BP management. Pt alert and oriented, in no acute distress, ambulatory with self. Pt has set up her My Chart.

## 2021-01-19 NOTE — ED PROVIDER NOTES
EMERGENCY DEPARTMENT HISTORY AND PHYSICAL EXAM      Date: 1/19/2021  Patient Name: Galindo Stewart    History of Presenting Illness     Chief Complaint   Patient presents with    Breast pain       History Provided By: Patient    HPI: Galindo Stewart, 41 y.o. female with his history of TIA, hypertension and others as below presents ambulatory to the ED with cc of 2 days of 8 out of 10 constant, aching right lower rib pain and tenderness to the skin overlying her right lower ribs that is worse with movement and palpation and for which there has been no injury or fever. She goes on to describe a long history of intermittent right breast pain and states that the pain has seemed to move to the skin below her breast.  There has been no chest pain or shortness of breath. 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 There are no other complaints, changes, or physical findings at this time. PCP: None    Current Outpatient Medications   Medication Sig Dispense Refill    ibuprofen (MOTRIN) 600 mg tablet Take 1 Tab by mouth every six (6) hours as needed for Pain. 20 Tab 0    traMADoL (Ultram) 50 mg tablet Take 1 Tab by mouth every eight (8) hours as needed for Pain for up to 3 days. Max Daily Amount: 150 mg. 6 Tab 0    ondansetron (Zofran ODT) 4 mg disintegrating tablet Take 1 Tab by mouth every eight (8) hours as needed for Nausea. 10 Tab 0    amLODIPine (NORVASC) 10 mg tablet Take 1 Tab by mouth daily. 30 Tab 0    metoprolol tartrate (LOPRESSOR) 25 mg tablet Take 1 Tab by mouth two (2) times a day.  61 Tab 0     Past History     Past Medical History:  Past Medical History:   Diagnosis Date    angina     [facility] at rest [facility])     Breast pain     since 2016, right breast, on and off    Chiari malformation     Heart abnormalities     leaky heart valve, murmur    Hypertension     Other ill-defined conditions(629.89)     cardiac arrhythmias    Other ill-defined conditions(799.89)     neuropathy    Psychiatric disorder     anxiety and depression    Stroke ([facility] [address]     TIA       Past Surgical History:  Past Surgical History:   Procedure Laterality Date    HX GYN      HX ORTHOPAEDIC      screws in left ankle    HX OTHER SURGICAL      surgery to back of head due to fractured skull    NEUROLOGICAL PROCEDURE UNLISTED      NE ABDOMEN SURGERY PROC UNLISTED      laporscopic surgery post ruptured fallopian tube       Family History:  History reviewed. No pertinent family history. Social History:  Social History     Tobacco Use    Smoking status: Current Every Day Smoker     Packs/day: 0.25     Years: 23.00     Pack years: 5.75    Smokeless tobacco: Current User    Tobacco comment: 1-2 cigs/day   Substance Use Topics    Alcohol use: Yes     Comment: social    Drug use: No       Allergies: Allergies   Allergen Reactions    Toradol [Ketorolac] Hives     Review of Systems   Review of Systems   Constitutional: Negative for fatigue and fever. HENT: Negative for ear pain and sore throat. Eyes: Negative for pain, redness and visual disturbance. Respiratory: Negative for cough and shortness of breath. Right lower rib pain   Cardiovascular: Negative for chest pain and palpitations. Gastrointestinal: Negative for abdominal pain, nausea and vomiting. Genitourinary: Negative for dysuria, frequency and urgency. Musculoskeletal: Negative for back pain, gait problem, neck pain and neck stiffness. Skin: Negative for rash and wound. Neurological: Negative for dizziness, weakness, light-headedness, numbness and headaches. Physical Exam   Physical Exam  Vitals signs and nursing note reviewed. Constitutional:       General: She is not in acute distress. Appearance: She is well-developed. She is not toxic-appearing. HENT:      Head: Normocephalic and atraumatic. Jaw: No trismus.       Right Ear: External ear normal.      Left Ear: External ear normal.      Nose: Nose normal. Mouth/Throat:      Pharynx: Uvula midline. Eyes:      General: No scleral icterus. Conjunctiva/sclera: Conjunctivae normal.      Pupils: Pupils are equal, round, and reactive to light. Neck:      Musculoskeletal: Full passive range of motion without pain and normal range of motion. Cardiovascular:      Rate and Rhythm: Normal rate and regular rhythm. Pulmonary:      Effort: Pulmonary effort is normal. No tachypnea, accessory muscle usage or respiratory distress. Breath sounds: No decreased breath sounds or wheezing. Chest:      Chest wall: Tenderness present. No mass. Comments:   Symmetric, full chest wall expansion with deep inspiration. Breathing unlabored; lungs are clear  No bruising, redness or swelling  No palpable mass underlying the skin is appreciated  Right sided rib and skin tenderness    Abdominal:      Palpations: Abdomen is soft. Tenderness: There is no abdominal tenderness. Musculoskeletal: Normal range of motion. Skin:     Findings: No rash. Neurological:      Mental Status: She is alert and oriented to person, place, and time. She is not disoriented. GCS: GCS eye subscore is 4. GCS verbal subscore is 5. GCS motor subscore is 6. Cranial Nerves: No cranial nerve deficit. Psychiatric:         Speech: Speech normal.       Diagnostic Study Results     Labs -   No results found for this or any previous visit (from the past 12 hour(s)). Radiologic Studies -   XR RIBS RT W PA CXR MIN 3 V   Final Result   No acute fracture or dislocation. CT Results  (Last 48 hours)    None        CXR Results  (Last 48 hours)    None        Medical Decision Making   I am the first provider for this patient. I reviewed the vital signs, available nursing notes, past medical history, past surgical history, family history and social history. Vital Signs-Reviewed the patient's vital signs.   Patient Vitals for the past 12 hrs:   Temp Pulse Resp BP SpO2   01/19/21 1002 -- 82 18 (!) 152/107 100 %   01/19/21 0927 98.5 °F (36.9 °C) 89 17 (!) 163/106 100 %       Pulse Oximetry Analysis - 100% on RA    Records Reviewed: Nursing Notes, Old Medical Records, Previous Radiology Studies and Previous Laboratory Studies    Provider Notes (Medical Decision Making): Afebrile; well-appearing. Examination of the skin overlying the right lower ribs yields no obvious mass or other abnormality. The most significant finding is tenderness to the skin over the lower ribs. Chest x-ray is negative for acute process. Believe safe to defer additional testing in favor of pain management and outpatient referral.  Return precautions for fever, shortness of breath or any other concerns. TOBACCO COUNSELING:  Spent 1-5 minutes discussing the risks of smoking and the benefits of smoking cessation as well as the long term sequelae of smoking with the pt who verbalized his understanding. Reviewed strategies for success, including gradually decreasing the number of cigarettes smoked a day. HYPERTENSION COUNSELING:  Patient denies chest pain, headache, shortness of breath. Patient is made aware of their elevated blood pressure and is instructed to follow up this week with their Primary Care for a recheck. Patient is counseled regarding consequences of chronic, uncontrolled hypertension including kidney disease, heart disease, stroke or even death. Patient states their understanding. ED Course:   Initial assessment performed. The patients presenting problems have been discussed, and they are in agreement with the care plan formulated and outlined with them. I have encouraged them to ask questions as they arise throughout their visit. Disposition:  Discharge    PLAN:  1.    Discharge Medication List as of 1/19/2021 10:10 AM      START taking these medications    Details   ibuprofen (MOTRIN) 600 mg tablet Take 1 Tab by mouth every six (6) hours as needed for Pain., Normal, Disp-20 Tab, R-0 traMADoL (Ultram) 50 mg tablet Take 1 Tab by mouth every eight (8) hours as needed for Pain for up to 3 days. Max Daily Amount: 150 mg., Normal, Disp-6 Tab, R-0         CONTINUE these medications which have NOT CHANGED    Details   amLODIPine (NORVASC) 10 mg tablet Take 1 Tab by mouth daily. , Normal, Disp-30 Tab, R-0      metoprolol tartrate (LOPRESSOR) 25 mg tablet Take 1 Tab by mouth two (2) times a day., Normal, Disp-60 Tab, R-0           2. Follow-up Information     Follow up With Specialties Details Why Contact Info    [facility]  Schedule an appointment as soon as possible for a visit  PRIMARY CARE: call to schedule follow up [address]   [phone]        Return to ED if worse     Diagnosis     Clinical Impression:   1. Rib pain on right side    2. Essential hypertension    3.  Smoking addiction

## 2021-01-29 ENCOUNTER — TRANSCRIBE ORDER (OUTPATIENT)
Dept: SCHEDULING | Age: 46
End: 2021-01-29

## 2021-01-29 DIAGNOSIS — Z12.31 VISIT FOR SCREENING MAMMOGRAM: Primary | ICD-10-CM

## 2021-02-01 ENCOUNTER — HOSPITAL ENCOUNTER (OUTPATIENT)
Dept: MAMMOGRAPHY | Age: 46
Discharge: HOME | End: 2021-02-01
Attending: FAMILY MEDICINE
Payer: MEDICAID

## 2021-02-01 DIAGNOSIS — Z12.31 VISIT FOR SCREENING MAMMOGRAM: ICD-10-CM

## 2021-02-01 PROCEDURE — 77067 SCR MAMMO BI INCL CAD: CPT

## 2021-02-05 ENCOUNTER — TRANSCRIBE ORDER (OUTPATIENT)
Dept: SCHEDULING | Age: 46
End: 2021-02-05

## 2021-02-05 DIAGNOSIS — N64.4 BREAST PAIN: Primary | ICD-10-CM

## 2021-02-09 ENCOUNTER — HOSPITAL ENCOUNTER (OUTPATIENT)
Dept: MAMMOGRAPHY | Age: 46
Discharge: HOME | End: 2021-02-09
Attending: FAMILY MEDICINE
Payer: MEDICAID

## 2021-02-09 ENCOUNTER — HOSPITAL ENCOUNTER (EMERGENCY)
Age: 46
Discharge: HOME | End: 2021-02-09
Attending: EMERGENCY MEDICINE
Payer: MEDICAID

## 2021-02-09 ENCOUNTER — APPOINTMENT (OUTPATIENT)
Dept: ULTRASOUND IMAGING | Age: 46
End: 2021-02-09
Attending: NURSE PRACTITIONER
Payer: MEDICAID

## 2021-02-09 VITALS
HEART RATE: 61 BPM | BODY MASS INDEX: 25.03 KG/M2 | WEIGHT: 136 LBS | HEIGHT: 62 IN | OXYGEN SATURATION: 99 % | RESPIRATION RATE: 16 BRPM | DIASTOLIC BLOOD PRESSURE: 93 MMHG | TEMPERATURE: 98.1 F | SYSTOLIC BLOOD PRESSURE: 160 MMHG

## 2021-02-09 DIAGNOSIS — N64.4 BREAST PAIN: ICD-10-CM

## 2021-02-09 DIAGNOSIS — R10.11 ABDOMINAL PAIN, RIGHT UPPER QUADRANT: Primary | ICD-10-CM

## 2021-02-09 LAB
ALBUMIN SERPL-MCNC: 3.4 G/DL (ref 3.5–5)
ALBUMIN/GLOB SERPL: 1.1 {RATIO} (ref 1.1–2.2)
ALP SERPL-CCNC: 49 U/L (ref 45–117)
ALT SERPL-CCNC: 30 U/L (ref 12–78)
ANION GAP SERPL CALC-SCNC: 5 MMOL/L (ref 5–15)
APPEARANCE UR: ABNORMAL
AST SERPL-CCNC: 19 U/L (ref 15–37)
BACTERIA URNS QL MICRO: NEGATIVE /HPF
BASOPHILS # BLD: 0 K/UL (ref 0–0.1)
BASOPHILS NFR BLD: 1 % (ref 0–1)
BILIRUB SERPL-MCNC: 0.3 MG/DL (ref 0.2–1)
BILIRUB UR QL: NEGATIVE
BUN SERPL-MCNC: 16 MG/DL (ref 6–20)
BUN/CREAT SERPL: 15 (ref 12–20)
CALCIUM SERPL-MCNC: 8.1 MG/DL (ref 8.5–10.1)
CHLORIDE SERPL-SCNC: 107 MMOL/L (ref 97–108)
CO2 SERPL-SCNC: 30 MMOL/L (ref 21–32)
COLOR UR: ABNORMAL
CREAT SERPL-MCNC: 1.08 MG/DL (ref 0.55–1.02)
DIFFERENTIAL METHOD BLD: ABNORMAL
EOSINOPHIL # BLD: 0 K/UL (ref 0–0.4)
EOSINOPHIL NFR BLD: 0 % (ref 0–7)
EPITH CASTS URNS QL MICRO: ABNORMAL /LPF
ERYTHROCYTE [DISTWIDTH] IN BLOOD BY AUTOMATED COUNT: 12 % (ref 11.5–14.5)
GLOBULIN SER CALC-MCNC: 3.2 G/DL (ref 2–4)
GLUCOSE SERPL-MCNC: 102 MG/DL (ref 65–100)
GLUCOSE UR STRIP.AUTO-MCNC: NEGATIVE MG/DL
HCG SERPL QL: NEGATIVE
HCT VFR BLD AUTO: 36.2 % (ref 35–47)
HGB BLD-MCNC: 12.7 G/DL (ref 11.5–16)
HGB UR QL STRIP: NEGATIVE
IMM GRANULOCYTES # BLD AUTO: 0 K/UL (ref 0–0.04)
IMM GRANULOCYTES NFR BLD AUTO: 0 % (ref 0–0.5)
KETONES UR QL STRIP.AUTO: NEGATIVE MG/DL
LEUKOCYTE ESTERASE UR QL STRIP.AUTO: NEGATIVE
LIPASE SERPL-CCNC: 102 U/L (ref 73–393)
LYMPHOCYTES # BLD: 2 K/UL (ref 0.8–3.5)
LYMPHOCYTES NFR BLD: 26 % (ref 12–49)
MCH RBC QN AUTO: 32 PG (ref 26–34)
MCHC RBC AUTO-ENTMCNC: 35.1 G/DL (ref 30–36.5)
MCV RBC AUTO: 91.2 FL (ref 80–99)
MONOCYTES # BLD: 0.6 K/UL (ref 0–1)
MONOCYTES NFR BLD: 7 % (ref 5–13)
NEUTS SEG # BLD: 5.1 K/UL (ref 1.8–8)
NEUTS SEG NFR BLD: 66 % (ref 32–75)
NITRITE UR QL STRIP.AUTO: NEGATIVE
NRBC # BLD: 0 K/UL (ref 0–0.01)
NRBC BLD-RTO: 0 PER 100 WBC
PH UR STRIP: 7 [PH] (ref 5–8)
PLATELET # BLD AUTO: 445 K/UL (ref 150–400)
PMV BLD AUTO: 9.3 FL (ref 8.9–12.9)
POTASSIUM SERPL-SCNC: 3.7 MMOL/L (ref 3.5–5.1)
PROT SERPL-MCNC: 6.6 G/DL (ref 6.4–8.2)
PROT UR STRIP-MCNC: NEGATIVE MG/DL
RBC # BLD AUTO: 3.97 M/UL (ref 3.8–5.2)
RBC #/AREA URNS HPF: ABNORMAL /HPF (ref 0–5)
SODIUM SERPL-SCNC: 142 MMOL/L (ref 136–145)
SP GR UR REFRACTOMETRY: 1.02 (ref 1–1.03)
UA: UC IF INDICATED,UAUC: ABNORMAL
UROBILINOGEN UR QL STRIP.AUTO: 0.2 EU/DL (ref 0.2–1)
WBC # BLD AUTO: 7.7 K/UL (ref 3.6–11)
WBC URNS QL MICRO: ABNORMAL /HPF (ref 0–4)

## 2021-02-09 PROCEDURE — 99283 EMERGENCY DEPT VISIT LOW MDM: CPT

## 2021-02-09 PROCEDURE — 81001 URINALYSIS AUTO W/SCOPE: CPT

## 2021-02-09 PROCEDURE — 74011250636 HC RX REV CODE- 250/636: Performed by: NURSE PRACTITIONER

## 2021-02-09 PROCEDURE — 76705 ECHO EXAM OF ABDOMEN: CPT

## 2021-02-09 PROCEDURE — 84703 CHORIONIC GONADOTROPIN ASSAY: CPT

## 2021-02-09 PROCEDURE — 36415 COLL VENOUS BLD VENIPUNCTURE: CPT

## 2021-02-09 PROCEDURE — 85025 COMPLETE CBC W/AUTO DIFF WBC: CPT

## 2021-02-09 PROCEDURE — 80053 COMPREHEN METABOLIC PANEL: CPT

## 2021-02-09 PROCEDURE — 96374 THER/PROPH/DIAG INJ IV PUSH: CPT

## 2021-02-09 PROCEDURE — 76642 ULTRASOUND BREAST LIMITED: CPT

## 2021-02-09 PROCEDURE — 83690 ASSAY OF LIPASE: CPT

## 2021-02-09 RX ORDER — HYDROCHLOROTHIAZIDE 12.5 MG/1
CAPSULE ORAL
Status: ON HOLD | COMMUNITY
End: 2021-05-11 | Stop reason: SDUPTHER

## 2021-02-09 RX ORDER — MORPHINE SULFATE 2 MG/ML
2 INJECTION, SOLUTION INTRAMUSCULAR; INTRAVENOUS
Status: COMPLETED | OUTPATIENT
Start: 2021-02-09 | End: 2021-02-09

## 2021-02-09 RX ADMIN — SODIUM CHLORIDE 1000 ML: 9 INJECTION, SOLUTION INTRAVENOUS at 11:54

## 2021-02-09 RX ADMIN — MORPHINE SULFATE 2 MG: 2 INJECTION, SOLUTION INTRAMUSCULAR; INTRAVENOUS at 12:57

## 2021-02-09 NOTE — ED NOTES
Patient has been instructed that they have been given Morphine* which contains opioids, benzodiazepines, or other sedating drugs. Patient is aware that they  will need to refrain from driving or operating heavy machinery after taking this medication. Patient also instructed that they need to avoid drinking alcohol and using other products containing opioids, benzodiazepines, or other sedating drugs. Patient verbalized understanding.

## 2021-02-09 NOTE — ED NOTES
Pt arrives in the ED with complaints of right upper quadrant pain with movement and palpation x 1 year worsening over past few months.

## 2021-02-09 NOTE — ED NOTES
Emergency Department Nursing Plan of Care       The Nursing Plan of Care is developed from the Nursing assessment and Emergency Department Attending provider initial evaluation. The plan of care may be reviewed in the ED Provider note.     The Plan of Care was developed with the following considerations:   Patient / Family readiness to learn indicated by:verbalized understanding  Persons(s) to be included in education: patient  Barriers to Learning/Limitations:No    [facility]    2/9/2021   11:38 AM

## 2021-02-09 NOTE — ED PROVIDER NOTES
EMERGENCY DEPARTMENT HISTORY AND PHYSICAL EXAM      Date: 2/9/2021  Patient Name: Chepe Beaulieu    History of Presenting Illness     Chief Complaint   Patient presents with    Abdominal Pain       History Provided By: Patient    Additional History (Context): Chepe Beaulieu is a 41 y.o. female with Hypertension, anxiety, CVA who presents with abdominal pain. Patient states symptoms began greater than 1 year ago. Patient reports pain located to right upper quadrant region radiating to the mid abdomen. Patient states she feels a knot in the middle of her abdomen. Patient has been evaluated multiple times in addition to having an ultrasound done of her right breast today which was negative. Denies nausea, vomiting, fever or chills. PCP: Mj Combs MD    Current Outpatient Medications   Medication Sig Dispense Refill    hydroCHLOROthiazide (MICROZIDE) 12.5 mg capsule 1 tab(s)      amLODIPine (NORVASC) 10 mg tablet Take 1 Tab by mouth daily. 30 Tab 0    metoprolol tartrate (LOPRESSOR) 25 mg tablet Take 1 Tab by mouth two (2) times a day. 60 Tab 0    ibuprofen (MOTRIN) 600 mg tablet Take 1 Tab by mouth every six (6) hours as needed for Pain. 20 Tab 0    ondansetron (Zofran ODT) 4 mg disintegrating tablet Take 1 Tab by mouth every eight (8) hours as needed for Nausea.  10 Tab 0       Past History     Past Medical History:  Past Medical History:   Diagnosis Date    angina     [facility]ina at rest [facility])     Breast pain     since 2016, right breast, on and off    Chiari malformation     Heart abnormalities     leaky heart valve, murmur    Hypertension     Other ill-defined conditions(799.89)     cardiac arrhythmias    Other ill-defined conditions(799.89)     neuropathy    Psychiatric disorder     anxiety and depression    Stroke ([facility] [address]     TIA       Past Surgical History:  Past Surgical History:   Procedure Laterality Date    HX GYN      HX ORTHOPAEDIC      screws in left ankle    HX OTHER SURGICAL      surgery to back of head due to fractured skull    NEUROLOGICAL PROCEDURE UNLISTED      MA ABDOMEN SURGERY PROC UNLISTED      laporscopic surgery post ruptured fallopian tube       Family History:  History reviewed. No pertinent family history. Social History:  Social History     Tobacco Use    Smoking status: Current Every Day Smoker     Packs/day: 0.25     Years: 23.00     Pack years: 5.75    Smokeless tobacco: Current User    Tobacco comment: 1-2 cigs/day   Substance Use Topics    Alcohol use: Yes     Comment: social    Drug use: No       Allergies: Allergies   Allergen Reactions    Toradol [Ketorolac] Hives         Review of Systems   Review of Systems   Constitutional: Negative for appetite change, chills, fatigue and fever. HENT: Negative for congestion, ear pain and rhinorrhea. Eyes: Negative for pain and itching. Respiratory: Negative for cough, chest tightness, shortness of breath and wheezing. Cardiovascular: Negative for chest pain, palpitations and leg swelling. Gastrointestinal: Negative for abdominal pain, nausea and vomiting. Genitourinary: Negative for dysuria, frequency and urgency. Musculoskeletal: Negative for arthralgias, back pain and joint swelling. Skin: Negative for color change and rash. Neurological: Negative for dizziness, numbness and headaches. All other systems reviewed and are negative. Physical Exam     Vitals:    02/09/21 1110   BP: (!) 142/95   Pulse: 71   Resp: 18   Temp: 98.1 °F (36.7 °C)   SpO2: 98%   Weight: 61.7 kg (136 lb)   Height: 5' 2\" (1.575 m)     Physical Exam  Vitals signs and nursing note reviewed. Constitutional:       General: She is not in acute distress. Appearance: She is well-developed. She is not ill-appearing. HENT:      Head: Normocephalic and atraumatic.       Right Ear: Tympanic membrane and ear canal normal.      Left Ear: Tympanic membrane and ear canal normal.      Nose: Nose normal. Mouth/Throat:      Mouth: Mucous membranes are moist.      Pharynx: Oropharynx is clear. No oropharyngeal exudate or posterior oropharyngeal erythema. Eyes:      Extraocular Movements: Extraocular movements intact. Conjunctiva/sclera: Conjunctivae normal.      Pupils: Pupils are equal, round, and reactive to light. Neck:      Musculoskeletal: Normal range of motion and neck supple. Cardiovascular:      Rate and Rhythm: Normal rate and regular rhythm. Pulses: Normal pulses. Heart sounds: Normal heart sounds. Pulmonary:      Effort: Pulmonary effort is normal.      Breath sounds: Normal breath sounds. Abdominal:      General: Bowel sounds are normal.      Palpations: Abdomen is soft. Tenderness: There is abdominal tenderness in the right upper quadrant and epigastric area. There is no right CVA tenderness, left CVA tenderness, guarding or rebound. Musculoskeletal: Normal range of motion. Skin:     General: Skin is warm and dry. Neurological:      Mental Status: She is alert and oriented to person, place, and time. Psychiatric:         Attention and Perception: Attention and perception normal.         Mood and Affect: Mood is anxious. Speech: Speech normal.         Behavior: Behavior normal. Behavior is cooperative.            Diagnostic Study Results     Labs -     Recent Results (from the past 12 hour(s))   METABOLIC PANEL, COMPREHENSIVE    Collection Time: 02/09/21 11:52 AM   Result Value Ref Range    Sodium 142 136 - 145 mmol/L    Potassium 3.7 3.5 - 5.1 mmol/L    Chloride 107 97 - 108 mmol/L    CO2 30 21 - 32 mmol/L    Anion gap 5 5 - 15 mmol/L    Glucose 102 (H) 65 - 100 mg/dL    BUN 16 6 - 20 MG/DL    Creatinine 1.08 (H) 0.55 - 1.02 MG/DL    BUN/Creatinine ratio 15 12 - 20      GFR est AA >60 >60 ml/min/1.73m2    GFR est non-AA 55 (L) >60 ml/min/1.73m2    Calcium 8.1 (L) 8.5 - 10.1 MG/DL    Bilirubin, total 0.3 0.2 - 1.0 MG/DL    ALT (SGPT) 30 12 - 78 U/L    AST (SGOT) 19 15 - 37 U/L    Alk. phosphatase 49 45 - 117 U/L    Protein, total 6.6 6.4 - 8.2 g/dL    Albumin 3.4 (L) 3.5 - 5.0 g/dL    Globulin 3.2 2.0 - 4.0 g/dL    A-G Ratio 1.1 1.1 - 2.2     CBC WITH AUTOMATED DIFF    Collection Time: 02/09/21 11:52 AM   Result Value Ref Range    WBC 7.7 3.6 - 11.0 K/uL    RBC 3.97 3.80 - 5.20 M/uL    HGB 12.7 11.5 - 16.0 g/dL    HCT 36.2 35.0 - 47.0 %    MCV 91.2 80.0 - 99.0 FL    MCH 32.0 26.0 - 34.0 PG    MCHC 35.1 30.0 - 36.5 g/dL    RDW 12.0 11.5 - 14.5 %    PLATELET 015 (H) 732 - 400 K/uL    MPV 9.3 8.9 - 12.9 FL    NRBC 0.0 0  WBC    ABSOLUTE NRBC 0.00 0.00 - 0.01 K/uL    NEUTROPHILS 66 32 - 75 %    LYMPHOCYTES 26 12 - 49 %    MONOCYTES 7 5 - 13 %    EOSINOPHILS 0 0 - 7 %    BASOPHILS 1 0 - 1 %    IMMATURE GRANULOCYTES 0 0.0 - 0.5 %    ABS. NEUTROPHILS 5.1 1.8 - 8.0 K/UL    ABS. LYMPHOCYTES 2.0 0.8 - 3.5 K/UL    ABS. MONOCYTES 0.6 0.0 - 1.0 K/UL    ABS. EOSINOPHILS 0.0 0.0 - 0.4 K/UL    ABS. BASOPHILS 0.0 0.0 - 0.1 K/UL    ABS. IMM.  GRANS. 0.0 0.00 - 0.04 K/UL    DF AUTOMATED     LIPASE    Collection Time: 02/09/21 11:52 AM   Result Value Ref Range    Lipase 102 73 - 393 U/L   HCG QL SERUM    Collection Time: 02/09/21 11:52 AM   Result Value Ref Range    HCG, Ql. Negative NEG     URINALYSIS W/ REFLEX CULTURE    Collection Time: 02/09/21  1:23 PM    Specimen: Urine   Result Value Ref Range    Color YELLOW/STRAW      Appearance CLOUDY (A) CLEAR      Specific gravity 1.020 1.003 - 1.030      pH (UA) 7.0 5.0 - 8.0      Protein Negative NEG mg/dL    Glucose Negative NEG mg/dL    Ketone Negative NEG mg/dL    Bilirubin Negative NEG      Blood Negative NEG      Urobilinogen 0.2 0.2 - 1.0 EU/dL    Nitrites Negative NEG      Leukocyte Esterase Negative NEG      WBC 0-4 0 - 4 /hpf    RBC 0-5 0 - 5 /hpf    Epithelial cells FEW FEW /lpf    Bacteria Negative NEG /hpf    UA:UC IF INDICATED CULTURE NOT INDICATED BY UA RESULT CNI         Radiologic Studies -   US ABD LTD   Final Result 1. Nondistended gallbladder since the patient was not fasting for an adequate   time prior to the ultrasound. This limits sensitivity for detection of small   stones. No stones or biliary dilatation demonstrated. 2. Small simple hepatic cyst. Liver otherwise normal in appearance. CT Results  (Last 48 hours)    None        CXR Results  (Last 48 hours)    None            Medical Decision Making   I am the first provider for this patient. I reviewed the vital signs, available nursing notes, past medical history, past surgical history, family history and social history. Vital Signs-Reviewed the patient's vital signs. Records Reviewed: Nursing Notes, Old Medical Records, Previous Radiology Studies and Previous Laboratory Studies     50-year-old female with complaints of abdominal pain exhibiting tenderness to right upper quadrant and epigastric region. No guarding no rigidity on exam.  Patient is mildly hypertensive but no fever or tachycardia noted. Pain appears to be chronic and patient has had multiple imaging studies done of ribs, abdomen in addition to her right breast that were unremarkable. Will obtain ultrasound of abdomen to ensure no cholecystitis or cholelithiasis due to worsening pain but explained that if lab and imaging results are unremarkable she will need to follow-up with GI specialist for further evaluation. ED Course:   ED Course as of Feb 09 1349   Tue Feb 09, 2021   1345 Results unremarkable in addition to imaging unremarkable. Discussed results with patient and she now states symptoms began after having a piercing to her right breast greater than 2 to 3 years ago. Advised that I highly recommend that she follows up with GI to determine if an EGD is warranted due to this chronic complaint abdominal pain. Patient only had mild relief with morphine stating that it gave her a headache.   Patient does not want any continued narcotics stating that it just makes her have reactions. [NA]      ED Course User Index  [NA] Vasiliy Macedo NP         Disposition:  Discharge     DISCHARGE NOTE:   1:54 PM    Pt has been reexamined. Patient has no new complaints, changes, or physical findings. Care plan outlined and precautions discussed. All of pt's questions and concerns were addressed. Patient was instructed and agrees to follow up with GI, as well as to return to the ED upon further deterioration. Patient is ready to go home. Follow-up Information     Follow up With Specialties Details Why Contact Info    Gracie Olson [address] [phone]      Zhou Thompson MD Gastroenterology Schedule an appointment as soon as possible for a visit  evaluation of chronic abd pain [address] [facility]  [address] [facility] [address]  [phone]            Current Discharge Medication List          Provider Notes (Medical Decision Making):   DX: Cholecystitis, cholelithiasis, GERD, cirrhosis, hepatic engorgement, pancreatitis  Procedures:  Procedures      Diagnosis     Clinical Impression:   1.  Abdominal pain, right upper quadrant

## 2021-02-25 ENCOUNTER — TRANSCRIBE ORDER (OUTPATIENT)
Dept: SCHEDULING | Age: 46
End: 2021-02-25

## 2021-02-25 DIAGNOSIS — K21.9 GERD (GASTROESOPHAGEAL REFLUX DISEASE): Primary | ICD-10-CM

## 2021-02-25 DIAGNOSIS — I35.1 AORTIC VALVE REGURGITATION: ICD-10-CM

## 2021-02-25 DIAGNOSIS — R10.11 RUQ PAIN: ICD-10-CM

## 2021-03-09 ENCOUNTER — HOSPITAL ENCOUNTER (OUTPATIENT)
Dept: NUCLEAR MEDICINE | Age: 46
Discharge: HOME | End: 2021-03-09
Attending: INTERNAL MEDICINE
Payer: MEDICAID

## 2021-03-09 VITALS — BODY MASS INDEX: 24.14 KG/M2 | WEIGHT: 132 LBS

## 2021-03-09 DIAGNOSIS — R10.11 RUQ PAIN: ICD-10-CM

## 2021-03-09 DIAGNOSIS — I35.1 AORTIC VALVE REGURGITATION: ICD-10-CM

## 2021-03-09 DIAGNOSIS — K21.9 GERD (GASTROESOPHAGEAL REFLUX DISEASE): ICD-10-CM

## 2021-03-09 PROCEDURE — 78227 HEPATOBIL SYST IMAGE W/DRUG: CPT

## 2021-03-09 PROCEDURE — 74011000258 HC RX REV CODE- 258: Performed by: INTERNAL MEDICINE

## 2021-03-09 PROCEDURE — 74011250636 HC RX REV CODE- 250/636: Performed by: INTERNAL MEDICINE

## 2021-03-09 RX ORDER — SODIUM CHLORIDE 9 MG/ML
25 INJECTION, SOLUTION INTRAVENOUS
Status: COMPLETED | OUTPATIENT
Start: 2021-03-09 | End: 2021-03-09

## 2021-03-09 RX ADMIN — SODIUM CHLORIDE 25 ML: 900 INJECTION, SOLUTION INTRAVENOUS at 09:20

## 2021-03-09 RX ADMIN — SINCALIDE 1.2 MCG: 5 INJECTION, POWDER, LYOPHILIZED, FOR SOLUTION INTRAVENOUS at 09:20

## 2021-05-05 ENCOUNTER — HOSPITAL ENCOUNTER (EMERGENCY)
Age: 46
Discharge: OTHER INSTITUTION | End: 2021-05-06
Attending: EMERGENCY MEDICINE
Payer: MEDICAID

## 2021-05-05 DIAGNOSIS — R45.851 SUICIDAL IDEATION: Primary | ICD-10-CM

## 2021-05-05 LAB
ALBUMIN SERPL-MCNC: 3.8 G/DL (ref 3.5–5)
ALBUMIN/GLOB SERPL: 1 {RATIO} (ref 1.1–2.2)
ALP SERPL-CCNC: 47 U/L (ref 45–117)
ALT SERPL-CCNC: 26 U/L (ref 12–78)
AMPHET UR QL SCN: NEGATIVE
ANION GAP SERPL CALC-SCNC: 13 MMOL/L (ref 5–15)
APPEARANCE UR: CLEAR
AST SERPL-CCNC: 17 U/L (ref 15–37)
BARBITURATES UR QL SCN: NEGATIVE
BASOPHILS # BLD: 0.1 K/UL (ref 0–0.1)
BASOPHILS NFR BLD: 1 % (ref 0–1)
BENZODIAZ UR QL: NEGATIVE
BILIRUB SERPL-MCNC: 0.2 MG/DL (ref 0.2–1)
BILIRUB UR QL: NEGATIVE
BUN SERPL-MCNC: 16 MG/DL (ref 6–20)
BUN/CREAT SERPL: 15 (ref 12–20)
CALCIUM SERPL-MCNC: 8.6 MG/DL (ref 8.5–10.1)
CANNABINOIDS UR QL SCN: POSITIVE
CHLORIDE SERPL-SCNC: 106 MMOL/L (ref 97–108)
CO2 SERPL-SCNC: 24 MMOL/L (ref 21–32)
COCAINE UR QL SCN: NEGATIVE
COLOR UR: ABNORMAL
CREAT SERPL-MCNC: 1.1 MG/DL (ref 0.55–1.02)
DIFFERENTIAL METHOD BLD: NORMAL
DRUG SCRN COMMENT,DRGCM: ABNORMAL
EOSINOPHIL # BLD: 0 K/UL (ref 0–0.4)
EOSINOPHIL NFR BLD: 0 % (ref 0–7)
ERYTHROCYTE [DISTWIDTH] IN BLOOD BY AUTOMATED COUNT: 11.9 % (ref 11.5–14.5)
ETHANOL SERPL-MCNC: 29 MG/DL
FLUAV RNA SPEC QL NAA+PROBE: NOT DETECTED
FLUBV RNA SPEC QL NAA+PROBE: NOT DETECTED
GLOBULIN SER CALC-MCNC: 3.8 G/DL (ref 2–4)
GLUCOSE SERPL-MCNC: 99 MG/DL (ref 65–100)
GLUCOSE UR STRIP.AUTO-MCNC: NEGATIVE MG/DL
HCG UR QL: NEGATIVE
HCT VFR BLD AUTO: 38.7 % (ref 35–47)
HGB BLD-MCNC: 13.6 G/DL (ref 11.5–16)
HGB UR QL STRIP: NEGATIVE
IMM GRANULOCYTES # BLD AUTO: 0 K/UL (ref 0–0.04)
IMM GRANULOCYTES NFR BLD AUTO: 0 % (ref 0–0.5)
KETONES UR QL STRIP.AUTO: ABNORMAL MG/DL
LEUKOCYTE ESTERASE UR QL STRIP.AUTO: NEGATIVE
LYMPHOCYTES # BLD: 2.3 K/UL (ref 0.8–3.5)
LYMPHOCYTES NFR BLD: 29 % (ref 12–49)
MCH RBC QN AUTO: 31.4 PG (ref 26–34)
MCHC RBC AUTO-ENTMCNC: 35.1 G/DL (ref 30–36.5)
MCV RBC AUTO: 89.4 FL (ref 80–99)
METHADONE UR QL: NEGATIVE
MONOCYTES # BLD: 0.5 K/UL (ref 0–1)
MONOCYTES NFR BLD: 6 % (ref 5–13)
NEUTS SEG # BLD: 4.9 K/UL (ref 1.8–8)
NEUTS SEG NFR BLD: 64 % (ref 32–75)
NITRITE UR QL STRIP.AUTO: NEGATIVE
NRBC # BLD: 0 K/UL (ref 0–0.01)
NRBC BLD-RTO: 0 PER 100 WBC
OPIATES UR QL: NEGATIVE
PCP UR QL: NEGATIVE
PH UR STRIP: 5.5 [PH] (ref 5–8)
PLATELET # BLD AUTO: 366 K/UL (ref 150–400)
PMV BLD AUTO: 9.8 FL (ref 8.9–12.9)
POTASSIUM SERPL-SCNC: 3.8 MMOL/L (ref 3.5–5.1)
PROT SERPL-MCNC: 7.6 G/DL (ref 6.4–8.2)
PROT UR STRIP-MCNC: NEGATIVE MG/DL
RBC # BLD AUTO: 4.33 M/UL (ref 3.8–5.2)
SARS-COV-2, COV2: NOT DETECTED
SODIUM SERPL-SCNC: 143 MMOL/L (ref 136–145)
SP GR UR REFRACTOMETRY: >1.03 (ref 1–1.03)
UROBILINOGEN UR QL STRIP.AUTO: 0.2 EU/DL (ref 0.2–1)
WBC # BLD AUTO: 7.7 K/UL (ref 3.6–11)

## 2021-05-05 PROCEDURE — 90791 PSYCH DIAGNOSTIC EVALUATION: CPT

## 2021-05-05 PROCEDURE — 82077 ASSAY SPEC XCP UR&BREATH IA: CPT

## 2021-05-05 PROCEDURE — 81025 URINE PREGNANCY TEST: CPT

## 2021-05-05 PROCEDURE — 99285 EMERGENCY DEPT VISIT HI MDM: CPT

## 2021-05-05 PROCEDURE — 85025 COMPLETE CBC W/AUTO DIFF WBC: CPT

## 2021-05-05 PROCEDURE — 80307 DRUG TEST PRSMV CHEM ANLYZR: CPT

## 2021-05-05 PROCEDURE — 81003 URINALYSIS AUTO W/O SCOPE: CPT

## 2021-05-05 PROCEDURE — 74011250637 HC RX REV CODE- 250/637: Performed by: PHYSICIAN ASSISTANT

## 2021-05-05 PROCEDURE — 36415 COLL VENOUS BLD VENIPUNCTURE: CPT

## 2021-05-05 PROCEDURE — 87636 SARSCOV2 & INF A&B AMP PRB: CPT

## 2021-05-05 PROCEDURE — 80053 COMPREHEN METABOLIC PANEL: CPT

## 2021-05-05 RX ORDER — ACETAMINOPHEN 160 MG/5ML
650 SOLUTION ORAL
Status: DISCONTINUED | OUTPATIENT
Start: 2021-05-05 | End: 2021-05-05

## 2021-05-05 RX ORDER — ACETAMINOPHEN 325 MG/1
650 TABLET ORAL
Status: COMPLETED | OUTPATIENT
Start: 2021-05-05 | End: 2021-05-05

## 2021-05-05 RX ADMIN — ACETAMINOPHEN 650 MG: 325 TABLET ORAL at 23:16

## 2021-05-05 NOTE — ED TRIAGE NOTES
Pt arrives with c/o worsening depression for the past few months. States she many SI plans, would not tell RN a specific plan. Pt reports hx of schizophrenia and bipolar; states she is not taking her meds. Endorses AVH, states the voices are constant and she sees shadows.

## 2021-05-06 ENCOUNTER — HOSPITAL ENCOUNTER (INPATIENT)
Age: 46
LOS: 5 days | Discharge: HOME | DRG: 750 | End: 2021-05-11
Attending: PSYCHIATRY & NEUROLOGY | Admitting: PSYCHIATRY & NEUROLOGY
Payer: MEDICAID

## 2021-05-06 VITALS
HEIGHT: 62 IN | DIASTOLIC BLOOD PRESSURE: 105 MMHG | BODY MASS INDEX: 24.93 KG/M2 | TEMPERATURE: 98.7 F | WEIGHT: 135.5 LBS | HEART RATE: 85 BPM | SYSTOLIC BLOOD PRESSURE: 173 MMHG | OXYGEN SATURATION: 97 % | RESPIRATION RATE: 16 BRPM

## 2021-05-06 PROBLEM — F31.9 BIPOLAR DISORDER (HCC): Status: ACTIVE | Noted: 2021-05-06

## 2021-05-06 PROBLEM — F25.9 SCHIZOAFFECTIVE DISORDER (HCC): Status: ACTIVE | Noted: 2021-05-06

## 2021-05-06 PROBLEM — I10 HYPERTENSION: Status: ACTIVE | Noted: 2021-05-06

## 2021-05-06 PROCEDURE — 74011250637 HC RX REV CODE- 250/637: Performed by: EMERGENCY MEDICINE

## 2021-05-06 PROCEDURE — 65220000003 HC RM SEMIPRIVATE PSYCH

## 2021-05-06 PROCEDURE — 74011250637 HC RX REV CODE- 250/637: Performed by: NURSE PRACTITIONER

## 2021-05-06 RX ORDER — ACETAMINOPHEN 325 MG/1
650 TABLET ORAL
Status: DISCONTINUED | OUTPATIENT
Start: 2021-05-06 | End: 2021-05-11 | Stop reason: HOSPADM

## 2021-05-06 RX ORDER — TRAZODONE HYDROCHLORIDE 50 MG/1
50 TABLET ORAL
Status: DISCONTINUED | OUTPATIENT
Start: 2021-05-06 | End: 2021-05-11 | Stop reason: HOSPADM

## 2021-05-06 RX ORDER — OLANZAPINE 5 MG/1
5 TABLET ORAL
Status: DISCONTINUED | OUTPATIENT
Start: 2021-05-06 | End: 2021-05-11 | Stop reason: HOSPADM

## 2021-05-06 RX ORDER — DM/P-EPHED/ACETAMINOPH/DOXYLAM 30-7.5/3
2 LIQUID (ML) ORAL
Status: DISCONTINUED | OUTPATIENT
Start: 2021-05-06 | End: 2021-05-11 | Stop reason: HOSPADM

## 2021-05-06 RX ORDER — PHENOBARBITAL 32.4 MG/1
32.4 TABLET ORAL
Status: DISCONTINUED | OUTPATIENT
Start: 2021-05-07 | End: 2021-05-09

## 2021-05-06 RX ORDER — METOPROLOL TARTRATE 25 MG/1
25 TABLET, FILM COATED ORAL
Status: COMPLETED | OUTPATIENT
Start: 2021-05-06 | End: 2021-05-06

## 2021-05-06 RX ORDER — THERA TABS 400 MCG
1 TAB ORAL DAILY
Status: DISCONTINUED | OUTPATIENT
Start: 2021-05-06 | End: 2021-05-11 | Stop reason: HOSPADM

## 2021-05-06 RX ORDER — ADHESIVE BANDAGE
30 BANDAGE TOPICAL DAILY PRN
Status: DISCONTINUED | OUTPATIENT
Start: 2021-05-06 | End: 2021-05-11 | Stop reason: HOSPADM

## 2021-05-06 RX ORDER — PHENOBARBITAL 32.4 MG/1
32.4 TABLET ORAL 2 TIMES DAILY
Status: DISCONTINUED | OUTPATIENT
Start: 2021-05-08 | End: 2021-05-09

## 2021-05-06 RX ORDER — HALOPERIDOL 5 MG/ML
5 INJECTION INTRAMUSCULAR
Status: DISCONTINUED | OUTPATIENT
Start: 2021-05-06 | End: 2021-05-11 | Stop reason: HOSPADM

## 2021-05-06 RX ORDER — HYDROXYZINE 50 MG/1
50 TABLET, FILM COATED ORAL
Status: DISCONTINUED | OUTPATIENT
Start: 2021-05-06 | End: 2021-05-11 | Stop reason: HOSPADM

## 2021-05-06 RX ORDER — PHENOBARBITAL 32.4 MG/1
32.4 TABLET ORAL 4 TIMES DAILY
Status: COMPLETED | OUTPATIENT
Start: 2021-05-07 | End: 2021-05-08

## 2021-05-06 RX ORDER — FOLIC ACID 1 MG/1
1 TABLET ORAL DAILY
Status: DISCONTINUED | OUTPATIENT
Start: 2021-05-06 | End: 2021-05-11 | Stop reason: HOSPADM

## 2021-05-06 RX ORDER — CLONIDINE HYDROCHLORIDE 0.1 MG/1
0.1 TABLET ORAL
Status: DISCONTINUED | OUTPATIENT
Start: 2021-05-06 | End: 2021-05-11 | Stop reason: HOSPADM

## 2021-05-06 RX ORDER — METOPROLOL TARTRATE 25 MG/1
25 TABLET, FILM COATED ORAL 2 TIMES DAILY
Status: DISCONTINUED | OUTPATIENT
Start: 2021-05-06 | End: 2021-05-11 | Stop reason: HOSPADM

## 2021-05-06 RX ORDER — IBUPROFEN 600 MG/1
600 TABLET ORAL
Status: DISCONTINUED | OUTPATIENT
Start: 2021-05-06 | End: 2021-05-11 | Stop reason: HOSPADM

## 2021-05-06 RX ORDER — LANOLIN ALCOHOL/MO/W.PET/CERES
100 CREAM (GRAM) TOPICAL DAILY
Status: DISCONTINUED | OUTPATIENT
Start: 2021-05-06 | End: 2021-05-11 | Stop reason: HOSPADM

## 2021-05-06 RX ORDER — PHENOBARBITAL 64.8 MG/1
64.8 TABLET ORAL
Status: ACTIVE | OUTPATIENT
Start: 2021-05-06 | End: 2021-05-07

## 2021-05-06 RX ORDER — LORAZEPAM 2 MG/ML
1 INJECTION INTRAMUSCULAR
Status: DISCONTINUED | OUTPATIENT
Start: 2021-05-06 | End: 2021-05-11 | Stop reason: HOSPADM

## 2021-05-06 RX ORDER — LITHIUM CARBONATE 300 MG/1
600 CAPSULE ORAL
COMMUNITY
End: 2021-05-11

## 2021-05-06 RX ORDER — DIPHENHYDRAMINE HYDROCHLORIDE 50 MG/ML
50 INJECTION, SOLUTION INTRAMUSCULAR; INTRAVENOUS
Status: DISCONTINUED | OUTPATIENT
Start: 2021-05-06 | End: 2021-05-11 | Stop reason: HOSPADM

## 2021-05-06 RX ORDER — HYDROCHLOROTHIAZIDE 25 MG/1
12.5 TABLET ORAL DAILY
Status: DISCONTINUED | OUTPATIENT
Start: 2021-05-06 | End: 2021-05-11 | Stop reason: HOSPADM

## 2021-05-06 RX ORDER — BENZTROPINE MESYLATE 1 MG/1
1 TABLET ORAL
Status: DISCONTINUED | OUTPATIENT
Start: 2021-05-06 | End: 2021-05-11 | Stop reason: HOSPADM

## 2021-05-06 RX ORDER — PHENOBARBITAL 32.4 MG/1
16.2 TABLET ORAL
Status: DISCONTINUED | OUTPATIENT
Start: 2021-05-09 | End: 2021-05-09

## 2021-05-06 RX ORDER — PHENOBARBITAL 32.4 MG/1
16.2 TABLET ORAL 2 TIMES DAILY
Status: DISCONTINUED | OUTPATIENT
Start: 2021-05-09 | End: 2021-05-09

## 2021-05-06 RX ORDER — AMLODIPINE BESYLATE 5 MG/1
10 TABLET ORAL
Status: COMPLETED | OUTPATIENT
Start: 2021-05-06 | End: 2021-05-06

## 2021-05-06 RX ORDER — PHENOBARBITAL 64.8 MG/1
64.8 TABLET ORAL 4 TIMES DAILY
Status: DISCONTINUED | OUTPATIENT
Start: 2021-05-06 | End: 2021-05-07

## 2021-05-06 RX ORDER — AMLODIPINE BESYLATE 5 MG/1
10 TABLET ORAL DAILY
Status: DISCONTINUED | OUTPATIENT
Start: 2021-05-06 | End: 2021-05-11 | Stop reason: HOSPADM

## 2021-05-06 RX ORDER — LITHIUM CARBONATE 300 MG/1
300 CAPSULE ORAL 3 TIMES DAILY
Status: DISCONTINUED | OUTPATIENT
Start: 2021-05-06 | End: 2021-05-11 | Stop reason: HOSPADM

## 2021-05-06 RX ORDER — ONDANSETRON 4 MG/1
4 TABLET, ORALLY DISINTEGRATING ORAL
Status: DISCONTINUED | OUTPATIENT
Start: 2021-05-06 | End: 2021-05-11 | Stop reason: HOSPADM

## 2021-05-06 RX ORDER — IBUPROFEN 200 MG
1 TABLET ORAL DAILY
Status: DISCONTINUED | OUTPATIENT
Start: 2021-05-06 | End: 2021-05-07

## 2021-05-06 RX ADMIN — AMLODIPINE BESYLATE 10 MG: 5 TABLET ORAL at 00:42

## 2021-05-06 RX ADMIN — METOPROLOL TARTRATE 25 MG: 25 TABLET, FILM COATED ORAL at 18:12

## 2021-05-06 RX ADMIN — PHENOBARBITAL 64.8 MG: 64.8 TABLET ORAL at 12:19

## 2021-05-06 RX ADMIN — LITHIUM CARBONATE 300 MG: 300 CAPSULE ORAL at 21:47

## 2021-05-06 RX ADMIN — PHENOBARBITAL 64.8 MG: 64.8 TABLET ORAL at 08:41

## 2021-05-06 RX ADMIN — LITHIUM CARBONATE 300 MG: 300 CAPSULE ORAL at 12:19

## 2021-05-06 RX ADMIN — AMLODIPINE BESYLATE 10 MG: 5 TABLET ORAL at 08:41

## 2021-05-06 RX ADMIN — METOPROLOL TARTRATE 25 MG: 25 TABLET, FILM COATED ORAL at 08:41

## 2021-05-06 RX ADMIN — Medication 100 MG: at 08:41

## 2021-05-06 RX ADMIN — HYDROCHLOROTHIAZIDE 12.5 MG: 25 TABLET ORAL at 08:41

## 2021-05-06 RX ADMIN — LITHIUM CARBONATE 300 MG: 300 CAPSULE ORAL at 16:47

## 2021-05-06 RX ADMIN — THERA TABS 1 TABLET: TAB at 08:41

## 2021-05-06 RX ADMIN — METOPROLOL TARTRATE 25 MG: 25 TABLET, FILM COATED ORAL at 00:42

## 2021-05-06 RX ADMIN — CLONIDINE HYDROCHLORIDE 0.1 MG: 0.1 TABLET ORAL at 05:32

## 2021-05-06 RX ADMIN — LURASIDONE HYDROCHLORIDE 40 MG: 40 TABLET, FILM COATED ORAL at 16:47

## 2021-05-06 RX ADMIN — FOLIC ACID 1 MG: 1 TABLET ORAL at 08:41

## 2021-05-06 NOTE — BH NOTES
GROUP THERAPY PROGRESS NOTE    Patient is participating in psychosocial assessment group. Group time: 50 minutes    Personal goal for participation: To complete a psychosocial assessment     Goal orientation: Personal    Group therapy participation: active     Therapeutic interventions reviewed and discussed: Social workers completed psychosocial assessment on patients upon admission to the unit. The goal is to gather vital information about a patient in order to best meet their needs during their stay here. Impression of participation: Patient participated in assessment and provided psychosocial history.      FRAN Lovelace, Supervisee in Social Work

## 2021-05-06 NOTE — PROGRESS NOTES
[facility] 60  Master Treatment Plan for Bonny Silva    Date Treatment Plan Initiated: 05/06/2021    Treatment Plan Modalities:  Type of Modality Amount  (x minutes) Frequency (x/week) Duration (x days) Name of Responsible Staff   Community & wrap-up meetings to encourage peer interactions 15 7 1 Timmie Duane, RN     Group psychotherapy to assist in building coping skills and internal controls 60 7 1 Courtney Grover   Therapeutic activity groups to build coping skills 60 7 1 Courtney Grover   Psychoeducation in group setting to address:   Medication education   15 7 [address] PharmD   Coping skills   30 3 1 Courtney Grover   Relaxation techniques         Symptom management         Discharge planning   61 2 [facility]    60 2 1 Chaplain JAVIER   61 1 1 volunteer   Recovery/AA/NA      volunteer   Physician medication management   13 7 1 MD Jose/ISAIAH Grey   Family meeting/discharge planning   15 2 1 Jadyn Olguin and Sunita Loving                                     Problem: Depressed Mood (Adult/Pediatric)  Goal: *STG: Participates in treatment plan  Outcome: Progressing Towards Goal  Goal: *STG: Verbalizes anger, guilt, and other feelings in a constructive manor  Outcome: Progressing Towards Goal  Goal: *STG: Complies with medication therapy  Outcome: Progressing Towards Goal     Problem: Chemical Dependency (Adult/Pediatric)  Goal: *STG: Participates in treatment plan  Outcome: Progressing Towards Goal  Goal: *STG: Seeks staff when symptoms of withdrawal increase  Outcome: Progressing Towards Goal  Goal: *STG: Complies with medication therapy  Outcome: Progressing Towards Goal       Pt is visible on the unit. Cooperative, flat, depressed, labile, irritable. Medication and meal compliant. Endorses increased depression and thoughts to harm herself. Verbalizes vague plans, but denies any plan or intention to hurt herself while on the unit.   Pt endorses racing thoughts and \"a lot of chatter\" in her mind that keeps her from being able to focus and be productive in her life. She also states that she \"doesn't trust anyone. \" She endorses using substances to cope/calm down. Agreeable to medications and receptive to information about following up for psychological testing through her outpatient provider. Pt's focus is on starting the proper medications. Staff focus is on offering reassurance and support, and encouraging group participation and medication compliance. Will continue to monitor with q15 minute checks for safety.

## 2021-05-06 NOTE — PROGRESS NOTES
Admission Medication Reconciliation:    Information obtained from:  patient interview, Insurance claims data, and [facility]  RxQuery data available¹:  YES    Comments/Recommendations: Updated PTA meds/reviewed patient's allergies. 1)  Patient denies taking medications prior to admission. She was prescribed lithium and lurasidone in 2/2021 but never started taking it. She reports that she has previously been on the follwing medications: olanzapine, quetiapine, risperidone, aripiprazole, alprazolam, sertraline, paroxetine, citalopram, lamotrigine, and valproic acid. 2)  The [place] Prescription Monitoring Program () was assessed to determine fill history of any controlled medications. The patient has NOT filled any controlled medications in the last 3 months. 3)  Medication changes (since last review): Added  - lithium 600mg QHS  - lurasidone 40mg daily with dinner    Adjusted  - metoprolol ER 50mg daily    Removed  - ibuprofen, ondansetron   ¹RxQuery pharmacy benefit data reflects medications filled and processed through the patient's insurance, however this data does NOT capture whether the medication was picked up or is currently being taken by the patient. Allergies: Toradol [ketorolac]    Significant PMH/Disease States:   Past Medical History:   Diagnosis Date    angina     Angina at rest [facility])     Breast pain     since 2016, right breast, on and off    Chiari malformation     Heart abnormalities     leaky heart valve, murmur    Hypertension     Other ill-defined conditions(799.89)     cardiac arrhythmias    Other ill-defined conditions(799.89)     neuropathy    Psychiatric disorder     anxiety and depression    Stroke [facility])     TIA     Chief Complaint for this Admission:  No chief complaint on file. Prior to Admission Medications:   Prior to Admission Medications   Prescriptions Last Dose Informant Taking?    amLODIPine (NORVASC) 10 mg tablet Not Taking at Unknown time  No   Sig: Take 1 Tab by mouth daily. hydroCHLOROthiazide (MICROZIDE) 12.5 mg capsule Not Taking at Unknown time  No   Si tab(s)   lithium carbonate 300 mg capsule Not Taking at Unknown time  No   Sig: Take 600 mg by mouth nightly. lurasidone (Latuda) 40 mg tab tablet Not Taking at Unknown time  No   Sig: Take 40 mg by mouth daily (with dinner). metoprolol tartrate (LOPRESSOR) 25 mg tablet Not Taking at Unknown time  No   Sig: Take 1 Tab by mouth two (2) times a day.       Facility-Administered Medications: None     Jason Osborn, PHARMD
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
## 2021-05-06 NOTE — PROGRESS NOTES
Problem: Discharge Planning  Goal: *Discharge to safe environment  Outcome: Progressing Towards Goal  Note: Pt is able to return home when stable  Goal: *Knowledge of medication management  Outcome: Progressing Towards Goal  Note: Pt verbalized an understanding of her prescribed medications  Goal: *Knowledge of discharge instructions  Outcome: Progressing Towards Goal  Note: Pt will verbalize an understanding of her discharge plan before her discharge date.

## 2021-05-06 NOTE — ED NOTES
Bedside and Verbal shift change report given to Kory Montgomery RN (oncoming nurse) by Mona Juarez RN (offgoing nurse). Report included the following information SBAR.

## 2021-05-06 NOTE — ED NOTES
TRANSFER - OUT REPORT:    Verbal report given to Silvio Cat (name) on Agustin Walker  being transferred to [facility] bed 729 for routine progression of care       Report consisted of patients Situation, Background, Assessment and   Recommendations(SBAR). Information from the following report(s) SBAR was reviewed with the receiving nurse. Lines: N/A    Opportunity for questions and clarification was provided. Patient transported with:   JUAN F MCDONALD, patient belongings.

## 2021-05-06 NOTE — BH NOTES
GROUP THERAPY PROGRESS NOTE    Patient  is participating in Discharge Planning Group    Group time: 45 minutes    Personal goal for participation: Preparation for Discharge     Goal orientation: Making Decisions with Your Support Team    Group therapy participation: minimal    Therapeutic interventions reviewed and discussed:     Impression of participation: Eri Lares was present for the allotted time for group. Pt was very engaged and eagerly shared her personal story. Pt acknowledged having a very limited and unproductive supports. She rarely consults them and usually make her own decisions. She told peers that her decisions usually leads more bad things occurring in her life. US suggested finding supports that responds to her needs. Pt agreed but stated she feels afraid to move forward because it is very uncomfortable. Others acknowledged that making and finding new supports can be difficult but rewards are worth the risks.  Pt continued to be an active participant in group and her contributions helped with the overall wellness of the group

## 2021-05-06 NOTE — PROGRESS NOTES
Laboratory Monitoring for Antipsychotics: This patient is currently prescribed the following medication(s):   Current Facility-Administered Medications   Medication Dose Route Frequency    PHENobarbitaL (LUMINAL) tablet 64.8 mg  64.8 mg Oral QID    Followed by    Daryl Lidia ON 5/7/2021] PHENobarbitaL (LUMINAL) tablet 32.4 mg  32.4 mg Oral QID    Followed by    Daryl Lidia ON 5/8/2021] PHENobarbitaL (LUMINAL) tablet 32.4 mg  32.4 mg Oral BID    Followed by    Daryl Lidia ON 5/9/2021] PHENobarbitaL (LUMINAL) tablet 16.2 mg  16.2 mg Oral BID    thiamine HCL (B-1) tablet 100 mg  100 mg Oral DAILY    folic acid (FOLVITE) tablet 1 mg  1 mg Oral DAILY    therapeutic multivitamin (THERAGRAN) tablet 1 Tab  1 Tab Oral DAILY    nicotine (NICODERM CQ) 14 mg/24 hr patch 1 Patch  1 Patch TransDERmal DAILY    hydroCHLOROthiazide (HYDRODIURIL) tablet 12.5 mg  12.5 mg Oral DAILY    metoprolol tartrate (LOPRESSOR) tablet 25 mg  25 mg Oral BID    amLODIPine (NORVASC) tablet 10 mg  10 mg Oral DAILY    lithium carbonate capsule 300 mg  300 mg Oral TID    lurasidone (LATUDA) tablet 40 mg  40 mg Oral DAILY WITH DINNER     The following labs have been completed for monitoring of antipsychotics and/or mood stabilizers:    Height, Weight, BMI Estimation  Estimated body mass index is 24.78 kg/m² as calculated from the following:    Height as of 5/5/21: 157.5 cm (62\"). Weight as of 5/5/21: 61.5 kg (135 lb 8 oz). Vital Signs/Blood Pressure  Visit Vitals  BP (!) 147/94   Pulse 81   Temp 98.3 °F (36.8 °C)   Resp 18   LMP 04/06/2021   SpO2 96%     Renal Function, Hepatic Function and Chemistry  CrCl cannot be calculated (Unknown ideal weight.).     Lab Results   Component Value Date/Time    Sodium 143 05/05/2021 08:31 PM    Potassium 3.8 05/05/2021 08:31 PM    Chloride 106 05/05/2021 08:31 PM    CO2 24 05/05/2021 08:31 PM    Anion gap 13 05/05/2021 08:31 PM    BUN 16 05/05/2021 08:31 PM    Creatinine 1.10 (H) 05/05/2021 08:31 PM    BUN/Creatinine ratio 15 05/05/2021 08:31 PM    Bilirubin, total 0.2 05/05/2021 08:31 PM    Protein, total 7.6 05/05/2021 08:31 PM    Albumin 3.8 05/05/2021 08:31 PM    Globulin 3.8 05/05/2021 08:31 PM    A-G Ratio 1.0 (L) 05/05/2021 08:31 PM    ALT (SGPT) 26 05/05/2021 08:31 PM    AST (SGOT) 17 05/05/2021 08:31 PM    Alk. phosphatase 47 05/05/2021 08:31 PM     Lab Results   Component Value Date/Time    Glucose 99 05/05/2021 08:31 PM    Glucose (POC) 99 04/23/2017 06:48 PM     No results found for: HBA1C, HGBE8, [id number]    Hematology  Lab Results   Component Value Date/Time    WBC 7.7 05/05/2021 08:31 PM    RBC 4.33 05/05/2021 08:31 PM    HGB 13.6 05/05/2021 08:31 PM    HCT 38.7 05/05/2021 08:31 PM    MCV 89.4 05/05/2021 08:31 PM    MCH 31.4 05/05/2021 08:31 PM    MCHC 35.1 05/05/2021 08:31 PM    RDW 11.9 05/05/2021 08:31 PM    PLATELET 380 26/70/6876 08:31 PM     Lipids  No results found for: CHOL, CHOLX, CHLST, CHOLV, [id number], HDL, HDLP, LDL, LDLC, DLDLP, TGLX, TRIGL, TRIGP, CHHD, CHHDX    Thyroid Function  No results found for: TSH, TSH2, TSH3, TSHP, TSHELE, TT3, T3U, T3UP, FRT3, FT3, FT4, FT4P, T4, T4P, FT4T, TT7, TSHEXT    Pregnancy Status  Lab Results   Component Value Date/Time    HCG urine, QL NEGATIVE  11/15/2017 01:34 AM    Pregnancy test,urine (POC) Negative 05/05/2021 08:24 PM     Assessment/Plan:  Will order lipid panel and hemoglobin A1c or fasting glucose to complete the recommended baseline laboratory monitoring based on the patient's current medication regimen.         Rebekah Wang, PHARMD
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
## 2021-05-06 NOTE — BH NOTES
GROUP THERAPY PROGRESS NOTE    Patient did not participate in substance abuse/coping skills group.      FRAN Kim, Supervisee in Social Work

## 2021-05-06 NOTE — BH NOTES
GROUP THERAPY PROGRESS NOTE    Patient is participating in treatment team group. Group time: 60 minutes    Personal goal for participation: To participate in treatment plan and discharge     Goal orientation: Personal    Group therapy participation: active     Therapeutic interventions reviewed and discussed: Group members met with their treatment team individually and discussed their treatment plan with their provider, , nurse, and pharmacist. Each patient was given the opportunity to ask questions related to their discharge and/or medications. Impression of participation: Patient participated in treatment team. Engaged in conversation and discussion. Asked questions about treatment plan and discharge.     FRAN Darnell, Supervisee in Social Work Pt reported withish discharge  and rash on the penis 3 days after having sex with wife. denies any pain.

## 2021-05-06 NOTE — ED NOTES
Called [facility] to give report and was told that they were unable to take report at this time due to not having staff available. Unable to tell this RN when staff would be available. Called nursing supervisor, Mars Nash, at [facility] who told me she would go evaluate and asked me to call back in a few minutes to give report.
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
## 2021-05-06 NOTE — BH NOTES
Pt admitted voluntarily to general unit   Pt endorses SI, no HI noted. UDS + THC, hx of crack cocaine usage  Daily ETOH use with no hx of detox  +AH     Skin assessment completed by ZULLY Griffin and NADIRA Philippe. Multiple tattoos, no wounds or impairments noted. Belongings searched and secured by Sharyle Sheehan.     Pt does have access to weapons at home

## 2021-05-06 NOTE — BH NOTES
PSYCHOSOCIAL ASSESSMENT 
:Patient identifying info:  
Bonny Silva is a 41 y.o., female admitted 2021  5:13 AM  
 
Presenting problem and precipitating factors:  
 
 
Mental status assessment:  
 
Strengths: 
 
Collateral information:  
 
Current psychiatric /substance abuse providers and contact info:  
 
Previous psychiatric/substance abuse providers and response to treatment:  
 
Family history of mental illness or substance abuse:  
 
Substance abuse history:   
Social History Tobacco Use  Smoking status: Current Every Day Smoker Packs/day: 0.25 Years: 23.00 Pack years: 5.75  Smokeless tobacco: Current User  Tobacco comment: 1-2 cigs/day Substance Use Topics  Alcohol use: Yes Frequency: 2-3 times a week Drinks per session: 1 or 2 Binge frequency: Less than monthly Comment: social  
 
 
History of biomedical complications associated with substance abuse : 
 
Patient's current acceptance of treatment or motivation for change: 
 
Family constellation:  
 
Is significant other involved? Describe support system:  
 
Describe living arrangements and home environment: 
 
Health issues:  
Hospital Problems  Date Reviewed: 2021 Codes Class Noted POA * (Principal) Bipolar disorder ([facility] ICD-10-CM: F31.9 ICD-9-CM: 296.80  2021 Unknown Schizoaffective disorder ([facility] ICD-10-CM: F25.9 ICD-9-CM: 295.70  2021 Unknown Trauma history:  
 
Legal issues:  
 
History of  service:  
 
Financial status:  
 
Jewish/cultural factors:  
 
Education/work history:  
 
Have you been licensed as a health care professional (current or ):  
 
Leisure and recreation preferences:  
 
Describe coping skills: 
 
Nelson Mckeon 2021

## 2021-05-06 NOTE — ED NOTES
Pt changed into paper scrubs and belongings secured in 1 pt belonging bag and placed in pt belonging cabinet.
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
## 2021-05-06 NOTE — PROGRESS NOTES
GROUP THERAPY PROGRESS NOTE        Misty Rodriguez was NOT present for medication education group.     Penelope Hutchinson, PharmD, BCPP, [facility] Specialist, [facility]

## 2021-05-06 NOTE — PROGRESS NOTES
Problem: Depressed Mood (Adult/Pediatric)  Goal: *STG: Remains safe in hospital  Outcome: Progressing Towards Goal     Semi-isolative, coming out for meals and medications. Flat affect, depressed mood. Pt denies SI. Pt has not reported any concerns at this time. 1800: Refused her scheduled phenobarbital, education and encouragement.

## 2021-05-07 LAB
CHOLEST SERPL-MCNC: 210 MG/DL
EST. AVERAGE GLUCOSE BLD GHB EST-MCNC: 108 MG/DL
GLUCOSE P FAST SERPL-MCNC: 95 MG/DL (ref 65–100)
HBA1C MFR BLD: 5.4 % (ref 4–5.6)
HDLC SERPL-MCNC: 54 MG/DL
HDLC SERPL: 3.9 {RATIO} (ref 0–5)
LDLC SERPL CALC-MCNC: 112.6 MG/DL (ref 0–100)
LIPID PROFILE,FLP: ABNORMAL
TRIGL SERPL-MCNC: 217 MG/DL (ref ?–150)
VLDLC SERPL CALC-MCNC: 43.4 MG/DL

## 2021-05-07 PROCEDURE — 74011250637 HC RX REV CODE- 250/637: Performed by: NURSE PRACTITIONER

## 2021-05-07 PROCEDURE — 36415 COLL VENOUS BLD VENIPUNCTURE: CPT

## 2021-05-07 PROCEDURE — 65220000003 HC RM SEMIPRIVATE PSYCH

## 2021-05-07 PROCEDURE — 82947 ASSAY GLUCOSE BLOOD QUANT: CPT

## 2021-05-07 PROCEDURE — 83036 HEMOGLOBIN GLYCOSYLATED A1C: CPT

## 2021-05-07 PROCEDURE — 80061 LIPID PANEL: CPT

## 2021-05-07 RX ADMIN — LITHIUM CARBONATE 300 MG: 300 CAPSULE ORAL at 21:00

## 2021-05-07 RX ADMIN — LITHIUM CARBONATE 300 MG: 300 CAPSULE ORAL at 08:10

## 2021-05-07 RX ADMIN — PHENOBARBITAL 32.4 MG: 32.4 TABLET ORAL at 12:20

## 2021-05-07 RX ADMIN — THERA TABS 1 TABLET: TAB at 08:10

## 2021-05-07 RX ADMIN — LURASIDONE HYDROCHLORIDE 40 MG: 40 TABLET, FILM COATED ORAL at 17:40

## 2021-05-07 RX ADMIN — METOPROLOL TARTRATE 25 MG: 25 TABLET, FILM COATED ORAL at 17:41

## 2021-05-07 RX ADMIN — METOPROLOL TARTRATE 25 MG: 25 TABLET, FILM COATED ORAL at 08:10

## 2021-05-07 RX ADMIN — AMLODIPINE BESYLATE 10 MG: 5 TABLET ORAL at 08:10

## 2021-05-07 RX ADMIN — PHENOBARBITAL 32.4 MG: 32.4 TABLET ORAL at 17:41

## 2021-05-07 RX ADMIN — PHENOBARBITAL 32.4 MG: 32.4 TABLET ORAL at 21:00

## 2021-05-07 RX ADMIN — LITHIUM CARBONATE 300 MG: 300 CAPSULE ORAL at 17:40

## 2021-05-07 RX ADMIN — HYDROCHLOROTHIAZIDE: 25 TABLET ORAL at 08:11

## 2021-05-07 RX ADMIN — FOLIC ACID 1 MG: 1 TABLET ORAL at 08:10

## 2021-05-07 RX ADMIN — Medication 100 MG: at 08:10

## 2021-05-07 NOTE — INTERDISCIPLINARY ROUNDS
Behavioral Health Interdisciplinary Rounds     Patient Name: Harvey Fletcher  Age: 41 y.o. Room/Bed:  729/  Primary Diagnosis: Bipolar disorder ([facility] [address]   Admission Status: Voluntary     Readmission within 30 days: no  Power of  in place: no  Patient requires a blocked bed: no          Reason for blocked bed:     VTE Prophylaxis: No    Mobility needs/Fall risk: no  Flu Vaccine : no   Nutritional Plan: no  Consults:          Labs/Testing due today?: yes: completed    Sleep hours: 4       Participation in Care/Groups:  yes  Medication Compliant?: refusing phenobarb  PRNS (last 24 hours): None    Restraints (last 24 hours):  no     CIWA (range last 24 hours): CIWA-Ar Total: 0   COWS (range last 24 hours):      Alcohol screening (AUDIT) completed -   AUDIT Score: 15     If applicable, date SBIRT discussed in treatment team AND documented:   AUDIT Screen Score: AUDIT Score: 15      Document Brief Intervention (corresponds directly with the 5 A's, Ask, Advise, Assess, Assist, and Arrange): At- Risk Patients (Score 7-15 for women; 8-15 for men)  Discuss concern patient is drinking at unhealthy levels known to increase risk of alcohol-related health problems. Is Patient ready to commit to change? If No:   Encourage reflection   Discuss short term and long term health risks of consuming alcohol   Barriers to change   Reaffirm willingness to help / Educational materials provided  If Yes:   Set goal  [place] provided    Harmful use or Dependence (Score 16 or greater)   Discuss short term and long term health risks of consuming alcohol   Recommendations   Negotiate drinking goal   Recommend addiction specialist/center   Arrange follow-up appointments.     Tobacco - patient is a smoker: Have You Used Tobacco in the Past 30 Days: Yes  Illegal Drugs use: Have You Used Any Illegal Substances Over the Past 12 Months: Yes    24 hour chart check complete:     Patient goal(s) for today: Continue to attend groups, ask for PRN to help with AH and racing thoughts  Treatment team focus/goals: medication mgmt and safe discharge  Progress note: Reports racing thoughts and AH as \"constant chatter\". Attending and participating in groups, social with peers and staff. Denies SI/HI/VH. LOS:  1  Expected LOS:     Financial concerns/prescription coverage:    Family contact: Coby Tarun: [phone]      Family requesting physician contact today:    Discharge plan: return home  Access to weapons: no       Outpatient provider(s): [facility]: Che Henry NP  Patient's preferred phone number for follow up call : [phone]   Patient's preferred e-mail address : [email or web address]   Participating treatment team members: FRAN Mccarthy; Karne Sagastume NP; Audrey Aguilar RN; NELSON Lucas

## 2021-05-07 NOTE — PROGRESS NOTES
Problem: Depressed Mood (Adult/Pediatric)  Goal: *STG: Complies with medication therapy  Outcome: Progressing Towards Goal     Visible on the unit. Anxious mood, denies SI. Compliant with meals and medications. Pt has not reported any concerns at this time.

## 2021-05-07 NOTE — PROGRESS NOTES
Pt received resting quietly in bed, NAD noted. Calm and cooperative. Respirations equal and unlabored. Will continue to monitor throughout shift. Problem: Depressed Mood (Adult/Pediatric)  Goal: *STG: Remains safe in hospital  Outcome: Progressing Towards Goal     Problem: Falls - Risk of  Goal: *Absence of Falls  Description: Document Migdalia Fall Risk and appropriate interventions in the flowsheet.   Outcome: Progressing Towards Goal  Note: Fall Risk Interventions:    Medication Interventions: Teach patient to arise slowly       Problem: Chemical Dependency (Adult/Pediatric)  Goal: *STG: Remains safe in hospital  Outcome: Progressing Towards Goal

## 2021-05-07 NOTE — BH NOTES
GROUP THERAPY PROGRESS NOTE     Patient participated in self-care group. Group time: 50 min     Personal goal for participation: Discuss self-care as it related to treatment, recovery and coping. Practicing a guided meditation to promote hopeful outlook and positive mindset     Goal orientation: personal      Group therapy participation: active     Therapeutic interventions reviewed and discussed: Group members were engaged in discussion about what self-care looks like in their personal lives, as well as opportunity to make self-care part of their daily routine. Group members participated in a guided meditation for taking care of yourself and cultivating self-lovy. Group members then processed their experience and any changes to physical and emotional states. Impression of participation:  She was alert, oriented, calm and engaged. She participated in group and reported she as tired to meditate for self care in the past with less success. She attributed the felt benefits and different experience to the evironment.      FRAN Kim, Supervisee in Social Work

## 2021-05-07 NOTE — BH NOTES
PSYCHIATRIC PROGRESS NOTE       Patient: Mychal Mendiola MRN: [id number]  SSN: [id number]    YOB: 1975  Age: 41 y.o. Sex: female      Admit Date: 5/6/2021    LOS: 1 day       Chief Complaint:  The constant chatters are bothering me. Interval History:  Mychal Mendiola states the constant negative chatters are bothering her and feels frustrated about it. She's informed that she only had one dose of latuda and is very premature to assess response. Thoughts of suicide is decreasing. She's been attending groups. No aggression or violent outbursts. She's been refusing phenobarb, states it makes her very drowsy. I informed her that the dose was decreased to 32.4 mg, she is willing to take it. Calm and pleasant.       Past Medical History:  Past Medical History:   Diagnosis Date    angina     Angina at rest [facility])     Breast pain     since 2016, right breast, on and off    Chiari malformation     Heart abnormalities     leaky heart valve, murmur    Hypertension     Other ill-defined conditions(799.89)     cardiac arrhythmias    Other ill-defined conditions(799.89)     neuropathy    Psychiatric disorder     anxiety and depression    Stroke ([facility] [address]     TIA         ALLERGIES:(reviewed/updated 5/7/2021)  Allergies   Allergen Reactions    Toradol [Ketorolac] Hives       Laboratory report:  Lab Results   Component Value Date/Time    WBC 7.7 05/05/2021 08:31 PM    Hemoglobin (POC) 12.2 05/11/2015 06:48 PM    HGB 13.6 05/05/2021 08:31 PM    Hematocrit (POC) 36 05/11/2015 06:48 PM    HCT 38.7 05/05/2021 08:31 PM    PLATELET 024 44/43/3556 08:31 PM    MCV 89.4 05/05/2021 08:31 PM      Lab Results   Component Value Date/Time    Sodium 143 05/05/2021 08:31 PM    Potassium 3.8 05/05/2021 08:31 PM    Chloride 106 05/05/2021 08:31 PM    CO2 24 05/05/2021 08:31 PM    Anion gap 13 05/05/2021 08:31 PM    Glucose 95 05/07/2021 06:12 AM    BUN 16 05/05/2021 08:31 PM    Creatinine 1.10 (H) 05/05/2021 08:31 PM BUN/Creatinine ratio 15 05/05/2021 08:31 PM    GFR est AA >60 05/05/2021 08:31 PM    GFR est non-AA 54 (L) 05/05/2021 08:31 PM    Calcium 8.6 05/05/2021 08:31 PM    Bilirubin, total 0.2 05/05/2021 08:31 PM    Alk. phosphatase 47 05/05/2021 08:31 PM    Protein, total 7.6 05/05/2021 08:31 PM    Albumin 3.8 05/05/2021 08:31 PM    Globulin 3.8 05/05/2021 08:31 PM    A-G Ratio 1.0 (L) 05/05/2021 08:31 PM    ALT (SGPT) 26 05/05/2021 08:31 PM      Vitals:    05/06/21 1211 05/06/21 1558 05/07/21 0813 05/07/21 0827   BP: (!) 129/91 (!) 131/90 (!) 137/96    Pulse: 82 75 73    Resp: 18 16 16    Temp: 98.6 °F (37 °C) 98.6 °F (37 °C) 98.7 °F (37.1 °C)    SpO2: 98% 100% 97%    Weight:    61.2 kg (135 lb)   Height:    5' 2\" (1.575 m)   LMP: 04/06/2021       No results found for: VALF2, VALAC, VALP, VALPR, DS6, CRBAM, CRBAMP, CARB2, XCRBAM  No results found for: LITHM    Vital Signs  Patient Vitals for the past 24 hrs:   Temp Pulse Resp BP SpO2   05/07/21 0813 98.7 °F (37.1 °C) 73 16 (!) 137/96 97 %   05/06/21 1558 98.6 °F (37 °C) 75 16 (!) 131/90 100 %   05/06/21 1211 98.6 °F (37 °C) 82 18 (!) 129/91 98 %     Wt Readings from Last 3 Encounters:   05/07/21 61.2 kg (135 lb)   05/05/21 61.5 kg (135 lb 8 oz)   03/09/21 59.9 kg (132 lb)     Temp Readings from Last 3 Encounters:   05/07/21 98.7 °F (37.1 °C)   05/06/21 98.7 °F (37.1 °C)   02/09/21 98.1 °F (36.7 °C)     BP Readings from Last 3 Encounters:   05/07/21 (!) 137/96   05/06/21 (!) 173/105   02/09/21 (!) 160/93     Pulse Readings from Last 3 Encounters:   05/07/21 73   05/06/21 85   02/09/21 61       [facility] (reviewed/updated 5/7/2021)  [id number]    Result Date: 2/9/2021  EXAM:  US ABD LTD INDICATION: RUQ pain > 2 months ; evaluate gallbladder and liver COMPARISON: CT of 12/29/2020. TECHNIQUE: Real-time sonography of the abdomen was performed with multiple static images of the liver, gallbladder, pancreas, spleen, kidneys and abdominal aorta obtained.  FINDINGS: The liver is normal in echotexture. There is a small simple cyst in the central portion of the liver with a maximum size of 1.2 cm. Portal venous flow is normal. The gallbladder is not well distended since the patient was fasting for only 4 hours. No gallstones are demonstrated. There is no biliary duct dilatation and the common duct measures less than 4 mm in diameter. The pancreas appears normal and is visualized portion. The right kidney demonstrates normal echogenicity. There is no focal right renal lesion, calculus, or right hydronephrosis. The aorta tapers normally. The visualized portion of the IVC appears unremarkable. .     1. Nondistended gallbladder since the patient was not fasting for an adequate time prior to the ultrasound. This limits sensitivity for detection of small stones. No stones or biliary dilatation demonstrated. 2. Small simple hepatic cyst. Liver otherwise normal in appearance. Us Breast Rt Limited=<3 Quad    Result Date: 2/9/2021  EXAM: US BREAST RT LIMITED=<3 QUAD INDICATION: Inferior right breast pain has recently resolved. COMPARISON: Mammograms on 2/1/2021 and 9/28/2017 TECHNIQUE: Diagnostic unilateral right Limited breast ultrasound. FINDINGS: 5:00 at the inframammary fold and patient directed of recent pain. Normal breast tissues. No cyst, fluid collection, mass, or pathologic shadowing. No sonographic evidence of malignancy. Recommendation: Screening mammogram in one year, preferably with three-dimensional technique. BI-RADS Assessment Category 1: Negative. I gave results to the patient at the completion of the study. Nm Hepatobiliary W Intervention    Result Date: 3/9/2021  EXAM: NM HEPATOBILIARY W INTERVENTION INDICATION: Right upper quadrant pain. COMPARISON: None. CORRELATIVE IMAGING STUDIES: None. TRACER: 5.3 mCi of Tc-99m Choletec.  TECHNIQUE: Following the uneventful intravenous administration of Tc 99m Choletec, imaging of the abdomen was performed in the anterior projection for 60 minutes. Delayed Citizen of [place] and right lateral views were also obtained. Following the intravenous administration of 1.2 micrograms CCK, imaging of the abdomen was performed in the anterior projection for an additional 30 minutes. Gallbladder ejection fraction was calculated. FINDINGS: The initial images demonstrate prompt hepatic tracer uptake. The common bile duct is visualized at 12 minutes. The gallbladder is visualized at 12 minutes. It demonstrates progressive filling. The duodenum is visualized at 36 minutes. Post CCK images demonstrate appropriate gallbladder contraction. Calculated ejection fraction between 0 and 30 minutes is 100 %. Normal gallbladder hepatobiliary imaging study.       Current Facility-Administered Medications   Medication Dose Route Frequency Provider Last Rate Last Admin    OLANZapine (ZyPREXA) tablet 5 mg  5 mg Oral Q6H PRN Liz Garcia NP        haloperidol lactate (HALDOL) injection 5 mg  5 mg IntraMUSCular Q6H PRN Liz Garcia NP        benztropine (COGENTIN) tablet 1 mg  1 mg Oral BID PRN Liz Garcia NP        diphenhydrAMINE (BENADRYL) injection 50 mg  50 mg IntraMUSCular BID PRN Liz Garcia NP        hydrOXYzine HCL (ATARAX) tablet 50 mg  50 mg Oral TID PRN Liz Garcia NP        LORazepam (ATIVAN) injection 1 mg  1 mg IntraMUSCular Q4H PRN Liz Garcia NP        traZODone (DESYREL) tablet 50 mg  50 mg Oral QHS PRN Liz Garcia NP        acetaminophen (TYLENOL) tablet 650 mg  650 mg Oral Q4H PRN Liz Garcia NP        magnesium hydroxide (MILK OF MAGNESIA) 400 mg/5 mL oral suspension 30 mL  30 mL Oral DAILY PRN Liz Garcia NP        cloNIDine HCL (CATAPRES) tablet 0.1 mg  0.1 mg Oral Q6H PRN Yeny Garcia NP   0.1 mg at 05/06/21 0532    PHENobarbitaL (LUMINAL) tablet 32.4 mg 32.4 mg Oral QID Yeny Garcia NP        Followed by   Darlene Clarke ON 5/8/2021] PHENobarbitaL (LUMINAL) tablet 32.4 mg  32.4 mg Oral BID Yeny Garcia, NP        Followed by   Darlene Clarke ON 5/9/2021] PHENobarbitaL (LUMINAL) tablet 16.2 mg  16.2 mg Oral BID Carolyn Garcia NP        PHENobarbitaL (LUMINAL) tablet 32.4 mg  32.4 mg Oral Q6H PRN Yeny Garcia NP        Followed by   Darlene Clarke ON 5/9/2021] PHENobarbitaL (LUMINAL) tablet 16.2 mg  16.2 mg Oral Q6H PRN Yeny Garcia NP        thiamine HCL (B-1) tablet 100 mg  100 mg Oral DAILY Yeny Garcia NP   100 mg at 93/85/63 9184    folic acid (FOLVITE) tablet 1 mg  1 mg Oral DAILY Yeny Garcia NP   1 mg at 05/07/21 0810    therapeutic multivitamin (THERAGRAN) tablet 1 Tab  1 Tab Oral DAILY Yeny Garcia NP   1 Tab at 05/07/21 0810    nicotine (NICODERM CQ) 14 mg/24 hr patch 1 Patch  1 Patch TransDERmal DAILY Jono Larkin MD        hydroCHLOROthiazide (HYDRODIURIL) tablet 12.5 mg  12.5 mg Oral DAILY Claudia Lei NP   Given at 05/07/21 0811    ibuprofen (MOTRIN) tablet 600 mg  600 mg Oral Q6H PRN Claudia Lei NP        metoprolol tartrate (LOPRESSOR) tablet 25 mg  25 mg Oral BID Claudia Lei NP   25 mg at 05/07/21 0810    ondansetron (ZOFRAN ODT) tablet 4 mg  4 mg Oral Q8H PRN Claudia Lei, ISAIAH        amLODIPine (NORVASC) tablet 10 mg  10 mg Oral DAILY Claudiakristy Lei, NP   10 mg at 05/07/21 0810    nicotine buccal (POLACRILEX) lozenge 2 mg  2 mg Oral Q2H PRN Claudia ISAIAH Lei        lithium carbonate capsule 300 mg  300 mg Oral TID Joan Grey NP   300 mg at 05/07/21 0810    lurasidone (LATUDA) tablet 40 mg  40 mg Oral DAILY WITH DINNER Joan Grey NP   40 mg at 05/06/21 1647       Side Effects: (reviewed/updated 5/7/2021)  None reported or admitted to.     Review of Systems: (reviewed/updated 5/7/2021)  Appetite: good  Sleep: good   All other Review of Systems: ah    Mental Status Exam:  Eye contact: Good eye contact  Psychomotor activity: Relaxed  Speech is spontaneous  Thought process:Logical and goal directed   Mood is \"ok\"  Affect: Blunted  Perception: +ah  Suicidal ideation: No si  Homicidal ideation: No hi  Insight/judgment: Partial  Cognition is grossly intact      Physical Exam:  Musculoskeletal system: steady gait  Tremor not present  Cog wheeling not present      Assessment and Plan:  Lisa Prado meets criteria for a diagnosis of Schizoaffective disorder, bipolar subtype; cocaine use disorder, in partial remission; alcohol use disorder, moderate; cannabis use disorder, unspecified. Lithium level Monday. Continue current medications as prescribed. We will closely monitor for safety. We will encourage reality orientation. Disposition planning to continue. I certify that this patients inpatient psychiatric hospital services furnished since the previous certification were, and continue to be, required for treatment that could reasonably be expected to improve the patient's condition, or for diagnostic study, and that the patient continues to need, on a daily basis, active treatment furnished directly by or requiring the supervision of inpatient psychiatric facility personnel. In addition, the hospital records show that services furnished were intensive treatment services, admission or related services, or equivalent services.       Signed:  Flor Cartagena NP  5/7/2021

## 2021-05-07 NOTE — BH NOTES
GROUP THERAPY PROGRESS NOTE    Patient did not participate in psychosocial assessment group.      FRAN Greco, Supervisee in Social Work

## 2021-05-07 NOTE — PROGRESS NOTES
Laboratory Monitoring for Antipsychotics: This patient is currently prescribed the following medication(s):   Current Facility-Administered Medications   Medication Dose Route Frequency    PHENobarbitaL (LUMINAL) tablet 32.4 mg  32.4 mg Oral QID    Followed by   Rei Toussaint ON 5/8/2021] PHENobarbitaL (LUMINAL) tablet 32.4 mg  32.4 mg Oral BID    Followed by   Rei Toussaint ON 5/9/2021] PHENobarbitaL (LUMINAL) tablet 16.2 mg  16.2 mg Oral BID    thiamine HCL (B-1) tablet 100 mg  100 mg Oral DAILY    folic acid (FOLVITE) tablet 1 mg  1 mg Oral DAILY    therapeutic multivitamin (THERAGRAN) tablet 1 Tab  1 Tab Oral DAILY    nicotine (NICODERM CQ) 14 mg/24 hr patch 1 Patch  1 Patch TransDERmal DAILY    hydroCHLOROthiazide (HYDRODIURIL) tablet 12.5 mg  12.5 mg Oral DAILY    metoprolol tartrate (LOPRESSOR) tablet 25 mg  25 mg Oral BID    amLODIPine (NORVASC) tablet 10 mg  10 mg Oral DAILY    lithium carbonate capsule 300 mg  300 mg Oral TID    lurasidone (LATUDA) tablet 40 mg  40 mg Oral DAILY WITH DINNER     The following labs have been completed for monitoring of antipsychotics and/or mood stabilizers:    Height, Weight, BMI Estimation  Estimated body mass index is 24.69 kg/m² as calculated from the following:    Height as of this encounter: 157.5 cm (62\"). Weight as of this encounter: 61.2 kg (135 lb). Vital Signs/Blood Pressure  Visit Vitals  BP (!) 137/96   Pulse 73   Temp 98.7 °F (37.1 °C)   Resp 16   Ht 157.5 cm (62\")   Wt 61.2 kg (135 lb)   LMP 04/06/2021   SpO2 97%   BMI 24.69 kg/m²     Renal Function, Hepatic Function and Chemistry  Estimated Creatinine Clearance: 56.2 mL/min (A) (based on SCr of 1.1 mg/dL (H)).     Lab Results   Component Value Date/Time    Sodium 143 05/05/2021 08:31 PM    Potassium 3.8 05/05/2021 08:31 PM    Chloride 106 05/05/2021 08:31 PM    CO2 24 05/05/2021 08:31 PM    Anion gap 13 05/05/2021 08:31 PM    BUN 16 05/05/2021 08:31 PM    Creatinine 1.10 (H) 05/05/2021 08:31 PM BUN/Creatinine ratio 15 05/05/2021 08:31 PM    Bilirubin, total 0.2 05/05/2021 08:31 PM    Protein, total 7.6 05/05/2021 08:31 PM    Albumin 3.8 05/05/2021 08:31 PM    Globulin 3.8 05/05/2021 08:31 PM    A-G Ratio 1.0 (L) 05/05/2021 08:31 PM    ALT (SGPT) 26 05/05/2021 08:31 PM    AST (SGOT) 17 05/05/2021 08:31 PM    Alk. phosphatase 47 05/05/2021 08:31 PM     Lab Results   Component Value Date/Time    Glucose 95 05/07/2021 06:12 AM    Glucose (POC) 99 04/23/2017 06:48 PM     Lab Results   Component Value Date/Time    Hemoglobin A1c 5.4 05/07/2021 06:12 AM     Hematology  Lab Results   Component Value Date/Time    WBC 7.7 05/05/2021 08:31 PM    RBC 4.33 05/05/2021 08:31 PM    HGB 13.6 05/05/2021 08:31 PM    HCT 38.7 05/05/2021 08:31 PM    MCV 89.4 05/05/2021 08:31 PM    MCH 31.4 05/05/2021 08:31 PM    MCHC 35.1 05/05/2021 08:31 PM    RDW 11.9 05/05/2021 08:31 PM    PLATELET 038 13/73/3694 08:31 PM     Lipids  Lab Results   Component Value Date/Time    Cholesterol, total 210 (H) 05/07/2021 06:12 AM    HDL Cholesterol 54 05/07/2021 06:12 AM    LDL, calculated 112.6 (H) 05/07/2021 06:12 AM    Triglyceride 217 (H) 05/07/2021 06:12 AM    CHOL/HDL Ratio 3.9 05/07/2021 06:12 AM     Thyroid Function  No results found for: TSH, TSH2, TSH3, TSHP, TSHELE, TT3, T3U, T3UP, FRT3, FT3, FT4, FT4P, T4, T4P, FT4T, TT7, TSHEXT    Pregnancy Status  Lab Results   Component Value Date/Time    HCG urine, QL NEGATIVE  11/15/2017 01:34 AM    Pregnancy test,urine (POC) Negative 05/05/2021 08:24 PM     Assessment/Plan:  Recommended baseline laboratory monitoring has been completed based on this patient's current medication regimen. The patient's estimated 10-year ASCVD risk is 6.3%; therefore, the patient is not a candidate for a high-intensity statin. No further intervention needed at this time. Follow-up metabolic monitoring labs should be completed in 3 months (quarterly for the first year of antipsychotic therapy).      Monique Agudelo PHARMD

## 2021-05-07 NOTE — BH NOTES
GROUP THERAPY PROGRESS NOTE    Patient is participating in psychotherapy group. Group time: 45 minutes    Personal goal for participation: identify signs and symptoms of stress and appropriate coping skills to use to minimize stress effect     Goal orientation: Personal    Group therapy participation: active     Therapeutic interventions reviewed and discussed:  Patient was given opportunity to engaged in conversation about crisis and safety planning and to create a personalized safety plan. Patient was guided through assessing triggers and warning signs (specific thoughts, feelings, behaviors, body sensations, etc.) that a crisis may be developing, using their current hospitalization and recent crisis event as the case study. Pt was then supported to generate a list of internal coping strategies they can do independently to redirect their thinking and deescalate impending crisis. Patient was encouraged to assess their support system and list specific people (family, friends, mental health professionals, etc.)  that can be mobilized to provide a distraction as well as needed help. Impression of participation: Evan Bowen was alert, oriented, and engaged. Her mood was good and she readily participated in group conversation. She identifies many stress indicators, coping skills and support systems. She demonstrated insight and was able to redirect her focus from others/blaming to herself. She remained engaged and future focused throughout group.        FRAN Castro, Supervisee in Social Work

## 2021-05-07 NOTE — SUICIDE SAFETY PLAN
SAFETY PLAN    A suicide Safety Plan is a document that supports someone when they are having thoughts of suicide. Warning Signs that indicate a suicidal crisis may be developing: What (situations, thoughts, feelings, body sensations, behaviors, etc.) do you experience that lets you know you are beginning to think about suicide? 1. Negative self image  2. In bed all day  3. Very angry    Internal Coping Strategies:  What things can I do (relaxation techniques, hobbies, physical activities, etc.) to take my mind off my problems without contacting another person? 1. Working/driving  2. Retail shoping  3. Playing with my granddaughter    People and social settings that provide distraction: Who can I call or where can I go to distract me? 1. Name: Jean Lutz  Phone: saved in phone  2. Name: Gilford Carmine  Phone: saved in phone   3. Place: [facility]            4. Place: [place]    People whom I can ask for help: Who can I call when I need help - for example, friends, family, clergy, someone else? 1. Name: Jean Lutz  Phone: saved in phone  2. Name: Gilford Carmine  Phone: saved in phone     Professionals or [address] [place] [address] I can contact during a crisis: Who can I call for help - for example, my doctor, my psychiatrist, my psychologist, a mental health provider, a suicide hotline? 1. Clinician Name: Trung Hassan   Phone: [phone]    2. Suicide Prevention Lifeline: 6-007-022-TALK (6371)    3. [address] [place] [address] Emergency Services -  for example, [facility] suicide hotline, [facility] Hotline: 211      Emergency Services Address: [facility]      Emergency Services Phone: [phone]    Making the environment safe: How can I make my environment (house/apartment/living space) safer? For example, can I remove guns, medications, and other items? 1. Program your local crisis number into your phone.    2. Have your support system program your local crisis number into their phone.   3. Keep medications in a lock box. Only keep a 3 day supply available at a time.

## 2021-05-07 NOTE — PROGRESS NOTES
Problem: Depressed Mood (Adult/Pediatric)  Goal: *STG: Participates in treatment plan  Outcome: Progressing Towards Goal  Note: Out on unit bright affect mood improved slight anxiety with relief when distracted in group and socializing. Participating in groups noted positive attitude and insight into her negative self talk and anxiety level. Denies SI, daily goal is to attend groups and socialize w peers. Staff focus is on offering support and reassurance.    Goal: *STG: Attends activities and groups  Outcome: Progressing Towards Goal  Goal: *STG: Demonstrates reduction in symptoms and increase in insight into coping skills/future focused  Outcome: Progressing Towards Goal  Goal: Interventions  Outcome: Progressing Towards Goal     Problem: Chemical Dependency (Adult/Pediatric)  Goal: *STG: Seeks staff when symptoms of withdrawal increase  Outcome: Progressing Towards Goal  Goal: *STG: Will identify negative impact of chemical dependency including the use of tobacco, alcohol, and other substances  Outcome: Progressing Towards Goal auscultated w/doppler

## 2021-05-07 NOTE — BH NOTES
GROUP THERAPY PROGRESS NOTE    Patient is participating in Discharge Planning Group    Group time: 1.30 Minutes     Personal goal for participation: Preparation for Discharge     Goal orientation: Making Decisions with Your Support Team    Group therapy participation: Active     Therapeutic interventions reviewed and discussed:     Impression of participation: Pierre Rooney was present for the  allotted time for group. She was very engaged, supportive of peers and eagerly shared her analysis of her supporters. Pt acknowledged her support system is inadequate to meet her needs thus she cannot  on them to help her to make good decisions. She has discussed her needs with [address] [place] [address] team and   is exploring new resources to help meet her needs.

## 2021-05-08 PROCEDURE — 74011250637 HC RX REV CODE- 250/637: Performed by: NURSE PRACTITIONER

## 2021-05-08 PROCEDURE — 65220000003 HC RM SEMIPRIVATE PSYCH

## 2021-05-08 RX ADMIN — LURASIDONE HYDROCHLORIDE 40 MG: 40 TABLET, FILM COATED ORAL at 16:57

## 2021-05-08 RX ADMIN — FOLIC ACID 1 MG: 1 TABLET ORAL at 08:36

## 2021-05-08 RX ADMIN — METOPROLOL TARTRATE 25 MG: 25 TABLET, FILM COATED ORAL at 08:36

## 2021-05-08 RX ADMIN — LITHIUM CARBONATE 300 MG: 300 CAPSULE ORAL at 16:57

## 2021-05-08 RX ADMIN — METOPROLOL TARTRATE 25 MG: 25 TABLET, FILM COATED ORAL at 16:56

## 2021-05-08 RX ADMIN — LITHIUM CARBONATE 300 MG: 300 CAPSULE ORAL at 08:35

## 2021-05-08 RX ADMIN — PHENOBARBITAL 32.4 MG: 32.4 TABLET ORAL at 08:36

## 2021-05-08 RX ADMIN — THERA TABS 1 TABLET: TAB at 08:36

## 2021-05-08 RX ADMIN — LITHIUM CARBONATE 300 MG: 300 CAPSULE ORAL at 21:46

## 2021-05-08 RX ADMIN — Medication 100 MG: at 08:35

## 2021-05-08 RX ADMIN — TRAZODONE HYDROCHLORIDE 50 MG: 50 TABLET ORAL at 21:46

## 2021-05-08 RX ADMIN — AMLODIPINE BESYLATE 10 MG: 5 TABLET ORAL at 08:36

## 2021-05-08 RX ADMIN — HYDROCHLOROTHIAZIDE 12.5 MG: 25 TABLET ORAL at 08:35

## 2021-05-08 NOTE — PROGRESS NOTES
Problem: Depressed Mood (Adult/Pediatric)  Goal: *STG: Complies with medication therapy  Outcome: Progressing Towards Goal    Visible on the unit, interacting appropriately with peers. Calm and cooperative at this time. Pt denies SI. Pt took a shower. Compliant with meals and medications. Pt has not reported any concerns at this time. 1800: Refused phenobarbital stating she does not have any withdrawal symptoms. Medication education and encouragement provided.

## 2021-05-08 NOTE — PROGRESS NOTES
[facility] 60  Master Treatment Plan for Elisa Repress    Date Treatment Plan Initiated: 5/8/21    Treatment Plan Modalities:  Type of Modality Amount  (x minutes) Frequency (x/week) Duration (x days) Name of Responsible Staff   Community & wrap-up meetings to encourage peer interactions 13 7 [place], RN   Group psychotherapy to assist in building coping skills and internal controls 60 7 1 Courtney Grover   Therapeutic activity groups to build coping skills 60 7 1 Courtney Grover   Psychoeducation in group setting to address:   Medication education   15 [address] PharmD   Coping skills   30 3 1 Courtney Grover   Relaxation techniques         Symptom management         Discharge planning   60 2 [facility]    60 2 1 Chaplain JAVIER   61 1 1 [facility]/AA/NA         Physician medication management   13 7 1 Dr. Jin Saravia meeting/discharge planning   15 2 1 Mary Kay Holloway and Sunita Loving                                  Goal will be met by 5/11/21:    Problem: Depressed Mood (Adult/Pediatric)  Goal: *STG: Participates in treatment plan  Outcome: Progressing Towards Goal  Note: Pt participated in treatment team. Out on the unit and interacting with peers and staff. Goal: *STG: Verbalizes anger, guilt, and other feelings in a constructive manor  Outcome: Progressing Towards Goal  Note: Pt able to verbalize feelings in a constructive manor. State she is feeling \"anxious and hyper\". No complaints of poor sleep. Continues to have auditory hallucinations. Stated hallucinations are \"less\"  Goal: *STG: Attends activities and groups  Outcome: Progressing Towards Goal  Note: Attending groups and participating. Goal: *STG: Demonstrates reduction in symptoms and increase in insight into coping skills/future focused  Outcome: Progressing Towards Goal  Note: Able to use positive coping skills on the unit.    Goal: *STG: Complies with medication therapy  Outcome: Progressing Towards Goal  Note: Taking medications as scheduled. Goal: Interventions  Outcome: Progressing Towards Goal     Problem: Patient Education: Go to Patient Education Activity  Goal: Patient/Family Education  Outcome: Progressing Towards Goal     Problem: Falls - Risk of  Goal: *Absence of Falls  Description: Document Lala Danielle Fall Risk and appropriate interventions in the flowsheet. Outcome: Progressing Towards Goal  Note: Fall Risk Interventions:            Medication Interventions: Teach patient to arise slowly                   Problem: Patient Education: Go to Patient Education Activity  Goal: Patient/Family Education  Outcome: Progressing Towards Goal     Problem: Chemical Dependency (Adult/Pediatric)  Goal: *STG: Seeks staff when symptoms of withdrawal increase  Outcome: Progressing Towards Goal  Note: No complaints of withdrawal symptoms.    Goal: *STG: Will identify negative impact of chemical dependency including the use of tobacco, alcohol, and other substances  Outcome: Progressing Towards Goal  Note: Identified that substance abuse was causing her \"problems\"  Goal: Interventions  Outcome: Progressing Towards Goal     Problem: Patient Education: Go to Patient Education Activity  Goal: Patient/Family Education  Outcome: Progressing Towards Goal

## 2021-05-08 NOTE — BH NOTES
GROUP THERAPY PROGRESS NOTE    Patient is participating in Treatment Team Group. Group time: 60 minutes    Personal goal for participation: To participate in treatment plan and discharge     Goal orientation: Personal    Group therapy participation: active     Therapeutic interventions reviewed and discussed: Group members met with their treatment team individually and discussed their treatment plan with their provider, , nurse, and pharmacist. Each patient was given the opportunity to ask questions related to their discharge and/or medications. Impression of participation: Patient participated in treatment team. Engaged in conversation and discussion. Asked questions about treatment plan and discharge.     Courtney Grover, Supervisee in Social Work

## 2021-05-08 NOTE — BH NOTES
Weekend Coverage    Chief Complaint: \"I am finding out more reasons why I am here\"      Assessment and Plan: Pierre Rooney states that she knows that she needs therapy and plans on getting a therapist. She endorses feeling hyperactive and having increased energy. She denies any AVH. She endorses increased sleep. She denies SI/HI. Voices are decreasing. Denies any w/d sx. She presents as elevated with pressured speech. Continue the current medication regimen   Disposition planning to continue. I certify that this patients inpatient psychiatric hospital services furnished since the previous certification were, and continue to be, required for treatment that could reasonably be expected to improve the patient's condition, or for diagnostic study, and that the patient continues to need, on a daily basis, active treatment furnished directly by or requiring the supervision of inpatient psychiatric facility personnel. In addition, the hospital records show that services furnished were intensive treatment services, admission or related services, or equivalent services.      Current Facility-Administered Medications   Medication Dose Route Frequency    PHENobarbitaL (LUMINAL) tablet 32.4 mg  32.4 mg Oral BID    Followed by   Jeff Deluca ON 5/9/2021] PHENobarbitaL (LUMINAL) tablet 16.2 mg  16.2 mg Oral BID    thiamine HCL (B-1) tablet 100 mg  100 mg Oral DAILY    folic acid (FOLVITE) tablet 1 mg  1 mg Oral DAILY    therapeutic multivitamin (THERAGRAN) tablet 1 Tab  1 Tab Oral DAILY    hydroCHLOROthiazide (HYDRODIURIL) tablet 12.5 mg  12.5 mg Oral DAILY    metoprolol tartrate (LOPRESSOR) tablet 25 mg  25 mg Oral BID    amLODIPine (NORVASC) tablet 10 mg  10 mg Oral DAILY    lithium carbonate capsule 300 mg  300 mg Oral TID    lurasidone (LATUDA) tablet 40 mg  40 mg Oral DAILY WITH DINNER       Physical Exam:         Past Medical History:  Past Medical History:   Diagnosis Date    angina     Angina at rest [facility])     Breast pain     since 2016, right breast, on and off    Chiari malformation     Heart abnormalities     leaky heart valve, murmur    Hypertension     Other ill-defined conditions(799.89)     cardiac arrhythmias    Other ill-defined conditions(799.89)     neuropathy    Psychiatric disorder     anxiety and depression    Stroke [facility])     TIA           Labs:  Lab Results   Component Value Date/Time    WBC 7.7 05/05/2021 08:31 PM    Hemoglobin (POC) 12.2 05/11/2015 06:48 PM    HGB 13.6 05/05/2021 08:31 PM    Hematocrit (POC) 36 05/11/2015 06:48 PM    HCT 38.7 05/05/2021 08:31 PM    PLATELET 685 21/25/3525 08:31 PM    MCV 89.4 05/05/2021 08:31 PM      Lab Results   Component Value Date/Time    Sodium 143 05/05/2021 08:31 PM    Potassium 3.8 05/05/2021 08:31 PM    Chloride 106 05/05/2021 08:31 PM    CO2 24 05/05/2021 08:31 PM    Anion gap 13 05/05/2021 08:31 PM    Glucose 95 05/07/2021 06:12 AM    BUN 16 05/05/2021 08:31 PM    Creatinine 1.10 (H) 05/05/2021 08:31 PM    BUN/Creatinine ratio 15 05/05/2021 08:31 PM    GFR est AA >60 05/05/2021 08:31 PM    GFR est non-AA 54 (L) 05/05/2021 08:31 PM    Calcium 8.6 05/05/2021 08:31 PM    Bilirubin, total 0.2 05/05/2021 08:31 PM    Alk.  phosphatase 47 05/05/2021 08:31 PM    Protein, total 7.6 05/05/2021 08:31 PM    Albumin 3.8 05/05/2021 08:31 PM    Globulin 3.8 05/05/2021 08:31 PM    A-G Ratio 1.0 (L) 05/05/2021 08:31 PM    ALT (SGPT) 26 05/05/2021 08:31 PM      Vitals:    05/07/21 0827 05/07/21 1608 05/07/21 2032 05/08/21 0808   BP:  137/84 (!) 149/80 123/83   Pulse:  91 73 77   Resp:  16 16 16   Temp:  98.5 °F (36.9 °C) 98.9 °F (37.2 °C) 98.9 °F (37.2 °C)   SpO2:  100%  97%   Weight: 61.2 kg (135 lb)      Height: 5' 2\" (1.575 m)      LMP: 04/06/2021

## 2021-05-08 NOTE — BH NOTES
GROUP THERAPY PROGRESS NOTE    Patient is participating in Discharge/Goals/Community Meeting Group. Group time: 60 minutes    Personal goal for participation: Process feelings related to discharge and/or feelings/goals for today. Goal orientation: Personal    Group therapy participation: active    Therapeutic interventions reviewed and discussed: Group discussion was focused on discharge plans and anxiety related to this. Group members discussed what they planned to do once discharge and discharge plans. Discussion also related to support and communication issues that arise. Group members verbalized how they are feeling today, their personal goal for today, and goals for the week. Patients were given an opportunity to share any concerns and issues they were having. Impression of participation: Jimmy Whelan actively participated in group. Patient is irritable at times. Walked out of group and shut the door because staff interrupted a patient crying. MSW validated group member's feelings, and encouraged advocacy. Engaged in discussion. Supportive of other group members.      Courtney Grover, Supervisee in Social Work

## 2021-05-08 NOTE — PROGRESS NOTES
Problem: Falls - Risk of  Goal: *Absence of Falls  Description: Document Laron Adela Fall Risk and appropriate interventions in the flowsheet. Outcome: Progressing Towards Goal  Note:     Patient received resting in bed with eyes closed. NAD and no complaints noted. Safety measures in place. Will continue to monitor with q15min rounds.         Fall Risk Interventions:            Medication Interventions: Teach patient to arise slowly

## 2021-05-09 PROCEDURE — 65220000003 HC RM SEMIPRIVATE PSYCH

## 2021-05-09 PROCEDURE — 74011250637 HC RX REV CODE- 250/637: Performed by: NURSE PRACTITIONER

## 2021-05-09 RX ADMIN — HYDROXYZINE HYDROCHLORIDE 50 MG: 50 TABLET, FILM COATED ORAL at 20:06

## 2021-05-09 RX ADMIN — THERA TABS 1 TABLET: TAB at 08:50

## 2021-05-09 RX ADMIN — LITHIUM CARBONATE 300 MG: 300 CAPSULE ORAL at 17:01

## 2021-05-09 RX ADMIN — LITHIUM CARBONATE 300 MG: 300 CAPSULE ORAL at 08:50

## 2021-05-09 RX ADMIN — LITHIUM CARBONATE 300 MG: 300 CAPSULE ORAL at 21:38

## 2021-05-09 RX ADMIN — AMLODIPINE BESYLATE 10 MG: 5 TABLET ORAL at 08:50

## 2021-05-09 RX ADMIN — LURASIDONE HYDROCHLORIDE 40 MG: 40 TABLET, FILM COATED ORAL at 17:01

## 2021-05-09 RX ADMIN — METOPROLOL TARTRATE 25 MG: 25 TABLET, FILM COATED ORAL at 17:01

## 2021-05-09 RX ADMIN — METOPROLOL TARTRATE 25 MG: 25 TABLET, FILM COATED ORAL at 08:50

## 2021-05-09 RX ADMIN — Medication 100 MG: at 08:50

## 2021-05-09 RX ADMIN — FOLIC ACID 1 MG: 1 TABLET ORAL at 08:50

## 2021-05-09 RX ADMIN — HYDROCHLOROTHIAZIDE 12.5 MG: 25 TABLET ORAL at 08:50

## 2021-05-09 NOTE — PROGRESS NOTES
Problem: Depressed Mood (Adult/Pediatric)  Goal: Interventions  Outcome: Progressing Towards Goal     Problem: Patient Education: Go to Patient Education Activity  Goal: Patient/Family Education  Outcome: Progressing Towards Goal     Problem: Patient Education: Go to Patient Education Activity  Goal: Patient/Family Education  Outcome: Progressing Towards Goal     Problem: Falls - Risk of  Goal: *Absence of Falls  Description: Document Roman Cortes Fall Risk and appropriate interventions in the flowsheet.   Outcome: Progressing Towards Goal  Note: Fall Risk Interventions:            Medication Interventions: Teach patient to arise slowly

## 2021-05-09 NOTE — BH NOTES
GROUP THERAPY PROGRESS NOTE    Patient did not participate in Leisure Education Group     Courtney Grover, Supervisee in Social Work

## 2021-05-09 NOTE — BH NOTES
Weekend Coverage    Chief Complaint: \"Its been a good morning\"    Assessment and Plan: She states that she is doing ok this morning. She denies having any withdrawl symptoms and states that she does not have a problem with alcohol. Pt has been refused doses of her Phenobarbital. She states that she still feels hyper. She denies SI/HI/AVH. She states that she knows what she needs to do when she leaves. She denies any questions or concerns at the moment. Discontinue Phenobarbital scheduled and PRN. Disposition planning to continue. I certify that this patients inpatient psychiatric hospital services furnished since the previous certification were, and continue to be, required for treatment that could reasonably be expected to improve the patient's condition, or for diagnostic study, and that the patient continues to need, on a daily basis, active treatment furnished directly by or requiring the supervision of inpatient psychiatric facility personnel. In addition, the hospital records show that services furnished were intensive treatment services, admission or related services, or equivalent services.      Current Facility-Administered Medications   Medication Dose Route Frequency    PHENobarbitaL (LUMINAL) tablet 16.2 mg  16.2 mg Oral BID    thiamine HCL (B-1) tablet 100 mg  100 mg Oral DAILY    folic acid (FOLVITE) tablet 1 mg  1 mg Oral DAILY    therapeutic multivitamin (THERAGRAN) tablet 1 Tab  1 Tab Oral DAILY    hydroCHLOROthiazide (HYDRODIURIL) tablet 12.5 mg  12.5 mg Oral DAILY    metoprolol tartrate (LOPRESSOR) tablet 25 mg  25 mg Oral BID    amLODIPine (NORVASC) tablet 10 mg  10 mg Oral DAILY    lithium carbonate capsule 300 mg  300 mg Oral TID    lurasidone (LATUDA) tablet 40 mg  40 mg Oral DAILY WITH DINNER       Physical Exam:         Past Medical History:  Past Medical History:   Diagnosis Date    angina     [facility] rest [facility])     Breast pain     since 2016, right breast, on and off  Chiari malformation     Heart abnormalities     leaky heart valve, murmur    Hypertension     Other ill-defined conditions(799.89)     cardiac arrhythmias    Other ill-defined conditions(799.89)     neuropathy    Psychiatric disorder     anxiety and depression    Stroke ([facility] [address]     TIA           Labs:  Lab Results   Component Value Date/Time    WBC 7.7 05/05/2021 08:31 PM    Hemoglobin (POC) 12.2 05/11/2015 06:48 PM    HGB 13.6 05/05/2021 08:31 PM    Hematocrit (POC) 36 05/11/2015 06:48 PM    HCT 38.7 05/05/2021 08:31 PM    PLATELET 484 58/44/0662 08:31 PM    MCV 89.4 05/05/2021 08:31 PM      Lab Results   Component Value Date/Time    Sodium 143 05/05/2021 08:31 PM    Potassium 3.8 05/05/2021 08:31 PM    Chloride 106 05/05/2021 08:31 PM    CO2 24 05/05/2021 08:31 PM    Anion gap 13 05/05/2021 08:31 PM    Glucose 95 05/07/2021 06:12 AM    BUN 16 05/05/2021 08:31 PM    Creatinine 1.10 (H) 05/05/2021 08:31 PM    BUN/Creatinine ratio 15 05/05/2021 08:31 PM    GFR est AA >60 05/05/2021 08:31 PM    GFR est non-AA 54 (L) 05/05/2021 08:31 PM    Calcium 8.6 05/05/2021 08:31 PM    Bilirubin, total 0.2 05/05/2021 08:31 PM    Alk.  phosphatase 47 05/05/2021 08:31 PM    Protein, total 7.6 05/05/2021 08:31 PM    Albumin 3.8 05/05/2021 08:31 PM    Globulin 3.8 05/05/2021 08:31 PM    A-G Ratio 1.0 (L) 05/05/2021 08:31 PM    ALT (SGPT) 26 05/05/2021 08:31 PM      Vitals:    05/08/21 1652 05/08/21 1759 05/08/21 2056 05/09/21 0803   BP: (!) 156/106 (!) 137/92 (!) 142/89 130/89   Pulse: 84 80 70 75   Resp: 18  16 16   Temp: 98.8 °F (37.1 °C)  98.4 °F (36.9 °C) 97.9 °F (36.6 °C)   SpO2:   98% 97%   Weight:       Height:       LMP: 04/06/2021

## 2021-05-09 NOTE — BH NOTES
GROUP THERAPY PROGRESS NOTE    Patient did not participate in Recreational Group Therapy.      Courtney Grover, Supervisee in Social Work

## 2021-05-09 NOTE — PROGRESS NOTES
Problem: Depressed Mood (Adult/Pediatric)  Goal: *STG: Participates in treatment plan  Outcome: Progressing Towards Goal  Note: Pt participated in treatment team. Stated she still feels \"hyper\". CIWA was negative. Notified Alondra Varela NP pt refusing phenobarbital. Pt feels she does not need to take medication. Educated pt on benefits of medication. Encouraged pt to attend groups and participate. Goal: Interventions  Outcome: Progressing Towards Goal     Problem: Falls - Risk of  Goal: *Absence of Falls  Description: Document Migdalia Fall Risk and appropriate interventions in the flowsheet.   Outcome: Progressing Towards Goal  Note: Fall Risk Interventions:       Medication Interventions: Teach patient to arise slowly    Problem: Patient Education: Go to Patient Education Activity  Goal: Patient/Family Education  Outcome: Progressing Towards Goal

## 2021-05-09 NOTE — PROGRESS NOTES
Problem: Falls - Risk of  Goal: *Absence of Falls  Description: Document Anthony Broad Fall Risk and appropriate interventions in the flowsheet.   Outcome: Progressing Towards Goal  Note: Fall Risk Interventions:            Medication Interventions: Teach patient to arise slowly       Pt appears asleep in bed, NAD, even respirations, will continue to monitor q15min        Sleep Hours: 8hrs

## 2021-05-10 LAB
DATE LAST DOSE: NORMAL
LITHIUM SERPL-SCNC: 0.84 MMOL/L (ref 0.6–1.2)
REPORTED DOSE,DOSE: NORMAL UNITS
REPORTED DOSE/TIME,TMG: NORMAL

## 2021-05-10 PROCEDURE — 74011250637 HC RX REV CODE- 250/637: Performed by: NURSE PRACTITIONER

## 2021-05-10 PROCEDURE — 36415 COLL VENOUS BLD VENIPUNCTURE: CPT

## 2021-05-10 PROCEDURE — 80178 ASSAY OF LITHIUM: CPT

## 2021-05-10 PROCEDURE — 65220000003 HC RM SEMIPRIVATE PSYCH

## 2021-05-10 RX ADMIN — AMLODIPINE BESYLATE 10 MG: 5 TABLET ORAL at 08:39

## 2021-05-10 RX ADMIN — METOPROLOL TARTRATE 25 MG: 25 TABLET, FILM COATED ORAL at 16:48

## 2021-05-10 RX ADMIN — LURASIDONE HYDROCHLORIDE 60 MG: 60 TABLET, FILM COATED ORAL at 16:48

## 2021-05-10 RX ADMIN — METOPROLOL TARTRATE 25 MG: 25 TABLET, FILM COATED ORAL at 08:40

## 2021-05-10 RX ADMIN — FOLIC ACID 1 MG: 1 TABLET ORAL at 08:39

## 2021-05-10 RX ADMIN — LITHIUM CARBONATE 300 MG: 300 CAPSULE ORAL at 20:53

## 2021-05-10 RX ADMIN — THERA TABS 1 TABLET: TAB at 08:39

## 2021-05-10 RX ADMIN — LITHIUM CARBONATE 300 MG: 300 CAPSULE ORAL at 16:48

## 2021-05-10 RX ADMIN — LITHIUM CARBONATE 300 MG: 300 CAPSULE ORAL at 08:40

## 2021-05-10 RX ADMIN — Medication 100 MG: at 08:39

## 2021-05-10 RX ADMIN — HYDROCHLOROTHIAZIDE 12.5 MG: 25 TABLET ORAL at 08:38

## 2021-05-10 NOTE — BH NOTES
GROUP THERAPY PROGRESS NOTE    Patient is participating in Group Psychotherapy. Group time: 50 minutes    Personal goal for participation: Cognitive restructuring     Goal orientation: Personal    Group therapy participation: active    Therapeutic interventions reviewed and discussed: Group members were provided psychoeducation on cognitive distortions and the importance of increasing awareness of thoughts. Patients were handed a thought log/thought record to record their experiences, thoughts, feelings, and behaviors that are associated with them. The goal is for members to become aware of cognitive distortions that are unnoticed and challenge them. Group members then discussed different techniques to work on cognitive restructuring: Socratic questioning, decatastrophizing, putting thoughts on trial.     Impression of participation: [place] Plan actively participated in group. Patient processed her anger and outbursts and how they can be results of distortions. She became tearful in group, but acknowledged that this was okay and healthy. MSW encouraged patient to view this as a coping skill. Demonstrated understanding of cognitive restructuring. Calm, pleasant and cooperative. Smiling in group.      Courtney Grover, Supervisee in Social Work

## 2021-05-10 NOTE — BH NOTES
GROUP THERAPY PROGRESS NOTE     Patient participated in healthy living and wellness education group. Group time: 50 min     Personal goal for participation: Practicing deep breathing and progressive muscle relaxation (PMR) as a stress reduction strategy. Goal orientation: Personal      Group therapy participation: active     Therapeutic interventions reviewed and discussed: Group members were engaged in conversation about coping skills. Members were then led through breathing and PMR exercises. Afterwards group members process their experience, discussing how they felt during and after practicing coping skills, where they noticed tension in their bodies and how they can use these coping skills when they leave the hospital.      Impression of participation: Keri Zarco was alert, oriented and calm. She readily engaged in group and appeared calm and relaxed throughout activity.      FRAN Wall, Supervisee in Social Work

## 2021-05-10 NOTE — BH NOTES
GROUP THERAPY PROGRESS NOTE    Patient is participating in Leisure Education Group. Group time: 50 minutes    Personal goal for participation: To understand your values      Goal orientation: Personal    Group therapy participation: active    Therapeutic interventions reviewed and discussed: Group members were asked to pick a card from the stack and read the question. Each card has a question that will hint at persons values. Then, a follow up question that identifies that values shown in the first. Group discussion around participant values and scenarios. Impression of participation: Julio Solorzano actively participated in group. Patient's mood is depressed and can be tearful but does laugh and smile in group. Processed her relationships and the negative impact it has on her life. Pt set goal to practice more self-care.      Courtney Grover, Supervisee in Social Work

## 2021-05-10 NOTE — PROGRESS NOTES
Problem: Depressed Mood (Adult/Pediatric)  Goal: Interventions  Outcome: Progressing Towards Goal     Problem: Patient Education: Go to Patient Education Activity  Goal: Patient/Family Education  Outcome: Progressing Towards Goal     Problem: Patient Education: Go to Patient Education Activity  Goal: Patient/Family Education  Outcome: Progressing Towards Goal     Problem: Falls - Risk of  Goal: *Absence of Falls  Description: Document Erin Bonilla Fall Risk and appropriate interventions in the flowsheet.   Outcome: Progressing Towards Goal  Note: Fall Risk Interventions:            Medication Interventions: Teach patient to arise slowly

## 2021-05-10 NOTE — INTERDISCIPLINARY ROUNDS
Behavioral Health Interdisciplinary Rounds     Patient Name: Lottie Krabbe  Age: 41 y.o. Room/Bed:  729/  Primary Diagnosis: Bipolar disorder ([facility] [address]   Admission Status: Voluntary     Readmission within 30 days: no  Power of  in place: no  Patient requires a blocked bed: no          Reason for blocked bed:       VTE Prophylaxis: No    Mobility needs/Fall risk: no  Flu Vaccine :    Nutritional Plan: no  Consults:            Labs/Testing due today?: yes    Sleep hours: 7         Participation in Care/Groups:  TT  Medication Compliant?: Yes  PRNS (last 24 hours): Antianxiety    Restraints (last 24 hours):  no     CIWA (range last 24 hours): CIWA-Ar Total: 0   COWS (range last 24 hours):      Alcohol screening (AUDIT) completed -   AUDIT Score: 15     If applicable, date SBIRT discussed in treatment team AND documented:   AUDIT Screen Score: AUDIT Score: 15      Document Brief Intervention (corresponds directly with the 5 A's, Ask, Advise, Assess, Assist, and Arrange): At- Risk Patients (Score 7-15 for women; 8-15 for men)  Discuss concern patient is drinking at unhealthy levels known to increase risk of alcohol-related health problems. Is Patient ready to commit to change? If No:   Encourage reflection   Discuss short term and long term health risks of consuming alcohol   Barriers to change   Reaffirm willingness to help / Educational materials provided  If Yes:   Set goal  IAT-Auto provided    Harmful use or Dependence (Score 16 or greater)   Discuss short term and long term health risks of consuming alcohol   Recommendations   Negotiate drinking goal   Recommend addiction specialist/center   Arrange follow-up appointments.     Tobacco - patient is a smoker: Have You Used Tobacco in the Past 30 Days: Yes  Illegal Drugs use: Have You Used Any Illegal Substances Over the Past 12 Months: Yes    24 hour chart check complete: yes     Patient goal(s) for today: Continue to attend groups, ask for PRN to help with AH and racing thoughts  Treatment team focus/goals: medication mgmt and safe discharge  Progress note: Pt reports her voices have \"quieted down. \"  Denies SI/HI/VH. She is attending groups and states she feels she will be ready to discharge Tuesday.        Financial concerns/prescription coverage:    Family contact: Tay Linares: [phone]                                       Family requesting physician contact today:    Discharge plan: return home  Access to weapons: no                                              Outpatient provider(s): [facility]: Angela Ann NP  Patient's preferred phone number for follow up call : [phone]   Patient's preferred e-mail address : [email or web address]     LOS:  4  Expected LOS:     Participating treatment team members: FRAN Preciado; Addy Maldonado NP; Barron Gee RN; NELSON Barboza

## 2021-05-10 NOTE — PROGRESS NOTES
Laboratory Monitoring for Lithium: This patient is currently prescribed the following medication(s):   Current Facility-Administered Medications   Medication Dose Route Frequency    thiamine HCL (B-1) tablet 100 mg  100 mg Oral DAILY    folic acid (FOLVITE) tablet 1 mg  1 mg Oral DAILY    therapeutic multivitamin (THERAGRAN) tablet 1 Tab  1 Tab Oral DAILY    hydroCHLOROthiazide (HYDRODIURIL) tablet 12.5 mg  12.5 mg Oral DAILY    metoprolol tartrate (LOPRESSOR) tablet 25 mg  25 mg Oral BID    amLODIPine (NORVASC) tablet 10 mg  10 mg Oral DAILY    lithium carbonate capsule 300 mg  300 mg Oral TID    lurasidone (LATUDA) tablet 40 mg  40 mg Oral DAILY WITH DINNER     The following labs have been completed for monitoring of lithium:    Lithium Serum Concentration  Lab Results   Component Value Date/Time    Lithium level 0.84 05/10/2021 06:59 AM     Renal Function and Chemistry  Lab Results   Component Value Date/Time    Sodium 143 05/05/2021 08:31 PM    Potassium 3.8 05/05/2021 08:31 PM    Chloride 106 05/05/2021 08:31 PM    CO2 24 05/05/2021 08:31 PM    Anion gap 13 05/05/2021 08:31 PM    BUN 16 05/05/2021 08:31 PM    Creatinine 1.10 (H) 05/05/2021 08:31 PM    BUN/Creatinine ratio 15 05/05/2021 08:31 PM     Assessment/Plan:  A lithium level was drawn on 5/10 @ 0659 and found to be 0.84 mmol/L. This level was drawn ~9hr following the previous dose and is reflective of steady state trough (after @ least 4 days of therapy). Therapeutic lithium level is generally 0.8-1.2mmol/L (however, some patients may have a target level lower than 0.8mmol/L). This level is considered therapeutic. Recommend continuing the current dose.      Yakelin Alcaraz, PharmD, BCPP, [facility], [address] [place] [address]

## 2021-05-10 NOTE — PROGRESS NOTES
Problem: Falls - Risk of  Goal: *Absence of Falls  Description: Document Roman Cortes Fall Risk and appropriate interventions in the flowsheet. Outcome: Progressing Towards Goal  Note: Fall Risk Interventions:            Medication Interventions: Teach patient to arise slowly       Patient received, appears to be asleep, eyes closed, breathing even and unlabored. No signs of distress noted. Will continue to monitor for safety.

## 2021-05-10 NOTE — BH NOTES
GROUP THERAPY PROGRESS NOTE    Patient is participating in community meeting/discharge and goals group. Group time: 50 minutes    Personal goal for participation: Set goals for treatment and mental health recovery as a strategy to increase motivation around action steps to achieving goals and start to identify some objectives for future individual or group therapy in the outpatient setting. Goal orientation: Personal    Group therapy participation: active     Therapeutic interventions reviewed and discussed: Group members were engaged in conversation about treatment team, effective communication and goal setting. Group members were given the opportunity to reflect on the most important areas of their lives (family, friends, work/school, spirituality, physical health, and mental health), what they are happy with and feel is going well, as well as what they would like to improve on and how that would change their life. They were encouraged to set goals (for the day, the week and their discharge/treatment plan) and discuss the actions steps necessary to achieve their goals. Impression of participation: Della Stock was alert, oriented, agitated but readily engaged. She was on edge and making negative/self defeating statements. She was able to focus on and complete task. She demonstrative active listening but continued to make self defeating statements and was resistant to reframes.        FRAN Wu, Supervisee in Social Work

## 2021-05-10 NOTE — BH NOTES
PSYCHIATRIC PROGRESS NOTE       Patient: Agustin Walker MRN: [id number]  SSN: [id number]    YOB: 1975  Age: 41 y.o. Sex: female      Admit Date: 5/6/2021    LOS: 4 days       Chief Complaint: The weekend went well. Interval History:  Agustin Walker states the voices have quieted down and does not bother her as much. She is open in increasing her latuda. She is pleasant and cheerful. She denies si or hi or vh. Lithium level 0.84. she's been appropriate in the unit, attending groups. She is tolerating her meds and denies side effects. Plan for dc in the next 24 hours.        Past Medical History:  Past Medical History:   Diagnosis Date    angina     Angina at rest [facility])     Breast pain     since 2016, right breast, on and off    Chiari malformation     Heart abnormalities     leaky heart valve, murmur    Hypertension     Other ill-defined conditions(799.89)     cardiac arrhythmias    Other ill-defined conditions(799.89)     neuropathy    Psychiatric disorder     anxiety and depression    Stroke ([facility] [address]     TIA         ALLERGIES:(reviewed/updated 5/10/2021)  Allergies   Allergen Reactions    Toradol [Ketorolac] Hives       Laboratory report:  Lab Results   Component Value Date/Time    WBC 7.7 05/05/2021 08:31 PM    Hemoglobin (POC) 12.2 05/11/2015 06:48 PM    HGB 13.6 05/05/2021 08:31 PM    Hematocrit (POC) 36 05/11/2015 06:48 PM    HCT 38.7 05/05/2021 08:31 PM    PLATELET 749 83/68/9178 08:31 PM    MCV 89.4 05/05/2021 08:31 PM      Lab Results   Component Value Date/Time    Sodium 143 05/05/2021 08:31 PM    Potassium 3.8 05/05/2021 08:31 PM    Chloride 106 05/05/2021 08:31 PM    CO2 24 05/05/2021 08:31 PM    Anion gap 13 05/05/2021 08:31 PM    Glucose 95 05/07/2021 06:12 AM    BUN 16 05/05/2021 08:31 PM    Creatinine 1.10 (H) 05/05/2021 08:31 PM    BUN/Creatinine ratio 15 05/05/2021 08:31 PM    GFR est AA >60 05/05/2021 08:31 PM    GFR est non-AA 54 (L) 05/05/2021 08:31 PM    Calcium 8.6 05/05/2021 08:31 PM    Bilirubin, total 0.2 05/05/2021 08:31 PM    Alk. phosphatase 47 05/05/2021 08:31 PM    Protein, total 7.6 05/05/2021 08:31 PM    Albumin 3.8 05/05/2021 08:31 PM    Globulin 3.8 05/05/2021 08:31 PM    A-G Ratio 1.0 (L) 05/05/2021 08:31 PM    ALT (SGPT) 26 05/05/2021 08:31 PM      Vitals:    05/09/21 1014 05/09/21 1711 05/09/21 1929 05/10/21 0806   BP:  (!) 141/104 135/81 128/86   Pulse:  82 82 87   Resp:  16 16 16   Temp:  98.3 °F (36.8 °C) 97.5 °F (36.4 °C) 97.8 °F (36.6 °C)   SpO2:  98% 100% 98%   Weight: 63.7 kg (140 lb 6.4 oz)      Height:       LMP: 04/06/2021       No results found for: VALF2, VALAC, VALP, VALPR, DS6, CRBAM, CRBAMP, CARB2, XCRBAM  Lab Results   Component Value Date/Time    Lithium level 0.84 05/10/2021 06:59 AM       Vital Signs  Patient Vitals for the past 24 hrs:   Temp Pulse Resp BP SpO2   05/10/21 0806 97.8 °F (36.6 °C) 87 16 128/86 98 %   05/09/21 1929 97.5 °F (36.4 °C) 82 16 135/81 100 %   05/09/21 1711 98.3 °F (36.8 °C) 82 16 (!) 141/104 98 %     Wt Readings from Last 3 Encounters:   05/09/21 63.7 kg (140 lb 6.4 oz)   05/05/21 61.5 kg (135 lb 8 oz)   03/09/21 59.9 kg (132 lb)     Temp Readings from Last 3 Encounters:   05/10/21 97.8 °F (36.6 °C)   05/06/21 98.7 °F (37.1 °C)   02/09/21 98.1 °F (36.7 °C)     BP Readings from Last 3 Encounters:   05/10/21 128/86   05/06/21 (!) 173/105   02/09/21 (!) 160/93     Pulse Readings from Last 3 Encounters:   05/10/21 87   05/06/21 85   02/09/21 61       Radiology (reviewed/updated 5/10/2021)  [id number]    Result Date: 2/9/2021  EXAM:  US ABD LTD INDICATION: RUQ pain > 2 months ; evaluate gallbladder and liver COMPARISON: CT of 12/29/2020. TECHNIQUE: Real-time sonography of the abdomen was performed with multiple static images of the liver, gallbladder, pancreas, spleen, kidneys and abdominal aorta obtained. FINDINGS: The liver is normal in echotexture.  There is a small simple cyst in the central portion of the liver with a maximum size of 1.2 cm. Portal venous flow is normal. The gallbladder is not well distended since the patient was fasting for only 4 hours. No gallstones are demonstrated. There is no biliary duct dilatation and the common duct measures less than 4 mm in diameter. The pancreas appears normal and is visualized portion. The right kidney demonstrates normal echogenicity. There is no focal right renal lesion, calculus, or right hydronephrosis. The aorta tapers normally. The visualized portion of the IVC appears unremarkable. .     1. Nondistended gallbladder since the patient was not fasting for an adequate time prior to the ultrasound. This limits sensitivity for detection of small stones. No stones or biliary dilatation demonstrated. 2. Small simple hepatic cyst. Liver otherwise normal in appearance. Us Breast Rt Limited=<3 Quad    Result Date: 2/9/2021  EXAM: US BREAST RT LIMITED=<3 QUAD INDICATION: Inferior right breast pain has recently resolved. COMPARISON: Mammograms on 2/1/2021 and 9/28/2017 TECHNIQUE: Diagnostic unilateral right Limited breast ultrasound. FINDINGS: 5:00 at the inframammary fold and patient directed of recent pain. Normal breast tissues. No cyst, fluid collection, mass, or pathologic shadowing. No sonographic evidence of malignancy. Recommendation: Screening mammogram in one year, preferably with three-dimensional technique. BI-RADS Assessment Category 1: Negative. I gave results to the patient at the completion of the study. Nm Hepatobiliary W Intervention    Result Date: 3/9/2021  EXAM: NM HEPATOBILIARY W INTERVENTION INDICATION: Right upper quadrant pain. COMPARISON: None. CORRELATIVE IMAGING STUDIES: None. TRACER: 5.3 mCi of Tc-99m Choletec. TECHNIQUE: Following the uneventful intravenous administration of Tc 99m Choletec, imaging of the abdomen was performed in the anterior projection for 60 minutes. Delayed Vietnamese and right lateral views were also obtained.  Following the intravenous administration of 1.2 micrograms CCK, imaging of the abdomen was performed in the anterior projection for an additional 30 minutes. Gallbladder ejection fraction was calculated. FINDINGS: The initial images demonstrate prompt hepatic tracer uptake. The common bile duct is visualized at 12 minutes. The gallbladder is visualized at 12 minutes. It demonstrates progressive filling. The duodenum is visualized at 36 minutes. Post CCK images demonstrate appropriate gallbladder contraction. Calculated ejection fraction between 0 and 30 minutes is 100 %. Normal gallbladder hepatobiliary imaging study.       Current Facility-Administered Medications   Medication Dose Route Frequency Provider Last Rate Last Admin    OLANZapine (ZyPREXA) tablet 5 mg  5 mg Oral Q6H PRN Posey-Scout, Lutricia [place], NP        haloperidol lactate (HALDOL) injection 5 mg  5 mg IntraMUSCular Q6H PRN Posey-Scout, Lutricia [place], NP        benztropine (COGENTIN) tablet 1 mg  1 mg Oral BID PRN Posey-Scout, Lutricia [place], NP        diphenhydrAMINE (BENADRYL) injection 50 mg  50 mg IntraMUSCular BID PRN Posey-Scout, Lutricia [place], NP        hydrOXYzine HCL (ATARAX) tablet 50 mg  50 mg Oral TID PRN Posey-Yeny [place], NP   50 mg at 05/09/21 2006    LORazepam (ATIVAN) injection 1 mg  1 mg IntraMUSCular Q4H PRN Posey-Scout, Lutricia [place], NP        traZODone (DESYREL) tablet 50 mg  50 mg Oral QHS PRN Posey-Yeny Butterfield, NP   50 mg at 05/08/21 2146    acetaminophen (TYLENOL) tablet 650 mg  650 mg Oral Q4H PRN Posey-Scout, Lutricia [place], NP        magnesium hydroxide (MILK OF MAGNESIA) 400 mg/5 mL oral suspension 30 mL  30 mL Oral DAILY PRN Posey-Scout, Lutricia [place], NP        cloNIDine HCL (CATAPRES) tablet 0.1 mg  0.1 mg Oral Q6H PRN Posey-Yeny Butterfield, NP   0.1 mg at 05/06/21 0532    thiamine HCL (B-1) tablet 100 mg  100 mg Oral DAILY Posey-Scout, Lutricia [place], NP 100 mg at 53/62/81 8115    folic acid (FOLVITE) tablet 1 mg  1 mg Oral DAILY PoseyAltheaScoutYeny pace, NP   1 mg at 05/10/21 0839    therapeutic multivitamin (THERAGRAN) tablet 1 Tab  1 Tab Oral DAILY TatyAltheaScoutYeny pace D, NP   1 Tab at 05/10/21 1176    hydroCHLOROthiazide (HYDRODIURIL) tablet 12.5 mg  12.5 mg Oral DAILY Zackery Lulu, NP   12.5 mg at 05/10/21 8198    ibuprofen (MOTRIN) tablet 600 mg  600 mg Oral Q6H PRN Zackery Lulu, NP        metoprolol tartrate (LOPRESSOR) tablet 25 mg  25 mg Oral BID Zackery Lulu, NP   25 mg at 05/10/21 0840    ondansetron (ZOFRAN ODT) tablet 4 mg  4 mg Oral Q8H PRN Zackery Lulu, NP        amLODIPine (NORVASC) tablet 10 mg  10 mg Oral DAILY Zackery Lulu, NP   10 mg at 05/10/21 2178    nicotine buccal (POLACRILEX) lozenge 2 mg  2 mg Oral Q2H PRN Zackery Lulu, NP        lithium carbonate capsule 300 mg  300 mg Oral TID Joan Grey, NP   300 mg at 05/10/21 0840    lurasidone (LATUDA) tablet 40 mg  40 mg Oral DAILY WITH DINNER Joan Grey, NP   40 mg at 05/09/21 1701       Side Effects: (reviewed/updated 5/10/2021)  None reported or admitted to. Review of Systems: (reviewed/updated 5/10/2021)  Appetite: good  Sleep: good   All other Review of Systems: ah    Mental Status Exam:  Eye contact: Good eye contact  Psychomotor activity: Relaxed  Speech is spontaneous  Thought process:Logical and goal directed   Mood is \"ok\"  Affect: full  Perception: +ah much decreased.   Suicidal ideation: No si  Homicidal ideation: No hi  Insight/judgment: Partial  Cognition is grossly intact      Physical Exam:  Musculoskeletal system: steady gait  Tremor not present  Cog wheeling not present      Assessment and Plan:  Ashlyn Johnson meets criteria for a diagnosis of Schizoaffective disorder, bipolar subtype; cocaine use disorder, in partial remission; alcohol use disorder, moderate; cannabis use disorder, unspecified. Increase latuda to 60mg. Continue current medications as prescribed. We will closely monitor for safety. We will encourage reality orientation. Plan for dc tomorrow. I certify that this patients inpatient psychiatric hospital services furnished since the previous certification were, and continue to be, required for treatment that could reasonably be expected to improve the patient's condition, or for diagnostic study, and that the patient continues to need, on a daily basis, active treatment furnished directly by or requiring the supervision of inpatient psychiatric facility personnel. In addition, the hospital records show that services furnished were intensive treatment services, admission or related services, or equivalent services.       Signed:  Tacho Kaye NP  5/10/2021

## 2021-05-11 VITALS
OXYGEN SATURATION: 98 % | RESPIRATION RATE: 16 BRPM | WEIGHT: 140.4 LBS | BODY MASS INDEX: 25.83 KG/M2 | HEIGHT: 62 IN | TEMPERATURE: 98.3 F | SYSTOLIC BLOOD PRESSURE: 143 MMHG | HEART RATE: 78 BPM | DIASTOLIC BLOOD PRESSURE: 89 MMHG

## 2021-05-11 PROCEDURE — 74011250637 HC RX REV CODE- 250/637: Performed by: NURSE PRACTITIONER

## 2021-05-11 RX ORDER — LANOLIN ALCOHOL/MO/W.PET/CERES
100 CREAM (GRAM) TOPICAL DAILY
Qty: 30 TAB | Refills: 0 | OUTPATIENT
Start: 2021-05-12 | End: 2021-09-13

## 2021-05-11 RX ORDER — AMLODIPINE BESYLATE 10 MG/1
10 TABLET ORAL DAILY
Qty: 30 TAB | Refills: 0 | Status: ON HOLD | OUTPATIENT
Start: 2021-05-11 | End: 2022-10-08 | Stop reason: SDUPTHER

## 2021-05-11 RX ORDER — HYDROCHLOROTHIAZIDE 12.5 MG/1
12.5 CAPSULE ORAL DAILY
Qty: 30 CAP | Refills: 0 | Status: SHIPPED | OUTPATIENT
Start: 2021-05-11 | End: 2021-11-18 | Stop reason: ALTCHOICE

## 2021-05-11 RX ORDER — THERA TABS 400 MCG
1 TAB ORAL DAILY
Qty: 30 TAB | Refills: 0 | Status: SHIPPED | OUTPATIENT
Start: 2021-05-12 | End: 2021-11-18 | Stop reason: ALTCHOICE

## 2021-05-11 RX ORDER — LITHIUM CARBONATE 300 MG
900 TABLET ORAL
Qty: 90 TAB | Refills: 0 | Status: SHIPPED | OUTPATIENT
Start: 2021-05-11 | End: 2021-05-20 | Stop reason: ALTCHOICE

## 2021-05-11 RX ORDER — FOLIC ACID 1 MG/1
1 TABLET ORAL DAILY
Qty: 30 TAB | Refills: 0 | OUTPATIENT
Start: 2021-05-12 | End: 2021-09-13

## 2021-05-11 RX ORDER — METOPROLOL TARTRATE 25 MG/1
25 TABLET, FILM COATED ORAL 2 TIMES DAILY
Qty: 60 TAB | Refills: 0 | OUTPATIENT
Start: 2021-05-11 | End: 2021-09-13

## 2021-05-11 RX ADMIN — THERA TABS 1 TABLET: TAB at 08:19

## 2021-05-11 RX ADMIN — METOPROLOL TARTRATE 25 MG: 25 TABLET, FILM COATED ORAL at 08:19

## 2021-05-11 RX ADMIN — Medication 100 MG: at 08:19

## 2021-05-11 RX ADMIN — FOLIC ACID 1 MG: 1 TABLET ORAL at 08:19

## 2021-05-11 RX ADMIN — HYDROCHLOROTHIAZIDE 12.5 MG: 25 TABLET ORAL at 08:18

## 2021-05-11 RX ADMIN — LITHIUM CARBONATE 300 MG: 300 CAPSULE ORAL at 08:19

## 2021-05-11 RX ADMIN — AMLODIPINE BESYLATE 10 MG: 5 TABLET ORAL at 08:19

## 2021-05-11 NOTE — PROGRESS NOTES
Pharmacist Discharge Medication Reconciliation    Discharging Provider: August Yanez NP    Significant PMH:   Past Medical History:   Diagnosis Date    angina     Angina at rest [facility])     Breast pain     since 2016, right breast, on and off    Chiari malformation     Heart abnormalities     leaky heart valve, murmur    Hypertension     Other ill-defined conditions(799.89)     cardiac arrhythmias    Other ill-defined conditions(799.89)     neuropathy    Psychiatric disorder     anxiety and depression    Stroke [facility])     TIA     Chief Complaint for this Admission: No chief complaint on file. Allergies: Toradol [ketorolac]    Discharge Medications:   Current Discharge Medication List        START taking these medications    Details   lithium carbonate 300 mg tablet Take 3 Tabs by mouth nightly. Indications: mood  Qty: 90 Tab, Refills: 0  Start date: 4/86/5699      folic acid (FOLVITE) 1 mg tablet Take 1 Tab by mouth daily. Indications: inadequate folic acid  Qty: 30 Tab, Refills: 0  Start date: 5/12/2021      therapeutic multivitamin (THERAGRAN) tablet Take 1 Tab by mouth daily. Indications: treatment to prevent vitamin deficiency  Qty: 30 Tab, Refills: 0  Start date: 5/12/2021      thiamine HCL (B-1) 100 mg tablet Take 1 Tab by mouth daily. Indications: deficiency in thiamine or vitamin B1  Qty: 30 Tab, Refills: 0  Start date: 5/12/2021           CONTINUE these medications which have CHANGED    Details   amLODIPine (NORVASC) 10 mg tablet Take 1 Tab by mouth daily. Indications: high blood pressure  Qty: 30 Tab, Refills: 0  Start date: 5/11/2021      hydroCHLOROthiazide (MICROZIDE) 12.5 mg capsule Take 1 Cap by mouth daily. Indications: high blood pressure  Qty: 30 Cap, Refills: 0  Start date: 5/11/2021      lurasidone (Latuda) 40 mg tab tablet Take 1 Tab by mouth daily (with dinner).  Indications: bipolar depression  Qty: 30 Tab, Refills: 0  Start date: 5/11/2021      metoprolol tartrate (LOPRESSOR) 25 mg tablet Take 1 Tab by mouth two (2) times a day.  Indications: high blood pressure  Qty: 60 Tab, Refills: 0  Start date: 5/11/2021           STOP taking these medications       lithium carbonate 300 mg capsule Comments:   Reason for Stopping:         ibuprofen (MOTRIN) 600 mg tablet Comments:   Reason for Stopping:         ondansetron (Zofran ODT) 4 mg disintegrating tablet Comments:   Reason for Stopping:             The patient's chart, MAR and AVS were reviewed by RAINER MoD.

## 2021-05-11 NOTE — DISCHARGE INSTRUCTIONS
DISCHARGE SUMMARY    Tina Johnson  : 1975  MRN: [id number]    The patient Nola Jimenez exhibits the ability to control behavior in a less restrictive environment. Patient's level of functioning is improving. No assaultive/destructive behavior has been observed for the past 24 hours. No suicidal/homicidal threat or behavior has been observed for the past 24 hours. There is no evidence of serious medication side effects. Patient has not been in physical or protective restraints for at least the past 24 hours. If weapons involved, how are they secured? Patient reports the gun belongs to her boyfriend, Cheril Gosselin. She states he removed the gun from her house. MSW LM for Cheril Gosselin to confirm gun security - did not receive return call. Is patient aware of and in agreement with discharge plan? yes    Arrangements for medication:  Prescriptions sent to pt pharmacy     Copy of discharge instructions to provider?:  [address] [facility] [address] [facility] for transportation home:  Daughter to  @ 1400    Keep all follow up appointments as scheduled, continue to take prescribed medications per physician instructions. Mental health crisis number:  887 or [facility] Crisis: [address] [place] [address] Emergency WARM LINE      9-902-213-MHAV ()      M-F: 9am to 9pm      Sat & Sun: 5pm - 9pm  National suicide prevention lines:                             0-739-CLBXTOI (7-031-465-7710)       1-167-336-TALK (8-846.144.8197)    Crisis Text Line:  Text HOME to LILLIANA Fuentes 9 from Nurse    PATIENT INSTRUCTIONS:    These are general instructions for a healthy lifestyle:    No smoking/ No tobacco products/ Avoid exposure to second hand smoke  Surgeon General's Warning:  Quitting smoking now greatly reduces serious risk to your health.     Obesity, smoking, and sedentary lifestyle greatly increases your risk for illness    A healthy diet, regular physical exercise & weight monitoring are important for maintaining a healthy lifestyle    You may be retaining fluid if you have a history of heart failure or if you experience any of the following symptoms:  Weight gain of 3 pounds or more overnight or 5 pounds in a week, increased swelling in our hands or feet or shortness of breath while lying flat in bed. Please call your doctor as soon as you notice any of these symptoms; do not wait until your next office visit. The discharge information has been reviewed with the patient. The patient verbalized understanding. Discharge medications reviewed with the patient and appropriate educational materials and side effects teaching were provided.   ___________________________________________________________________________________________________________________________________
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
## 2021-05-11 NOTE — PROGRESS NOTES
Problem: Depressed Mood (Adult/Pediatric)  Goal: *STG: Participates in treatment plan  Outcome: Progressing Towards Goal  Note: Out on unit engaged with a bright affect mood positive and hopeful. Denies SI, daily goal is to discuss d/c plans. Staff focus is on offering support and reassurance.    Goal: *STG: Verbalizes anger, guilt, and other feelings in a constructive manor  Outcome: Progressing Towards Goal  Goal: *STG: Attends activities and groups  Outcome: Progressing Towards Goal  Goal: *STG: Demonstrates reduction in symptoms and increase in insight into coping skills/future focused  Outcome: Progressing Towards Goal  Goal: *STG: Complies with medication therapy  Outcome: Progressing Towards Goal  Goal: Interventions  Outcome: Progressing Towards Goal

## 2021-05-11 NOTE — INTERDISCIPLINARY ROUNDS
Behavioral Health Interdisciplinary Rounds     Patient Name: Charisse Sheriff  Age: 41 y.o. Room/Bed:  729/01  Primary Diagnosis: Bipolar disorder ([facility] [address]   Admission Status: Voluntary     Readmission within 30 days: no  Power of  in place: no  Patient requires a blocked bed: no          Reason for blocked bed:     VTE Prophylaxis: No    Mobility needs/Fall risk: no  Flu Vaccine :   Nutritional Plan: no  Consults:  no        Labs/Testing due today?: no    Sleep hours:        Participation in Care/Groups:  yes  Medication Compliant?: Yes  PRNS (last 24 hours): None    Restraints (last 24 hours):  no     CIWA (range last 24 hours): CIWA-Ar Total: 0   COWS (range last 24 hours):      Alcohol screening (AUDIT) completed -   AUDIT Score: 15     If applicable, date SBIRT discussed in treatment team AND documented:   AUDIT Screen Score: AUDIT Score: 15      Document Brief Intervention (corresponds directly with the 5 A's, Ask, Advise, Assess, Assist, and Arrange): At- Risk Patients (Score 7-15 for women; 8-15 for men)  Discuss concern patient is drinking at unhealthy levels known to increase risk of alcohol-related health problems. Is Patient ready to commit to change? If No:   Encourage reflection   Discuss short term and long term health risks of consuming alcohol   Barriers to change   Reaffirm willingness to help / Educational materials provided  If Yes:   Set goal  Peerlyst provided    Harmful use or Dependence (Score 16 or greater)   Discuss short term and long term health risks of consuming alcohol   Recommendations   Negotiate drinking goal   Recommend addiction specialist/center   Arrange follow-up appointments.     Tobacco - patient is a smoker: Have You Used Tobacco in the Past 30 Days: Yes  Illegal Drugs use: Have You Used Any Illegal Substances Over the Past 12 Months: Yes    24 hour chart check complete: yes     Patient goal(s) for today:   Treatment team focus/goals:   Progress note     LOS:  5  Expected LOS:     Financial concerns/prescription coverage:   Family contact:     Family requesting physician contact today:    Discharge plan:Access to weapons :       Outpatient provider(s):  Patient's preferred phone number for follow up call :   Patient's preferred e-mail address :  Participating treatment team members: Opal Bush, * (assigned SW),

## 2021-05-11 NOTE — BH NOTES
GROUP THERAPY PROGRESS NOTE     Patient participated in recreational therapy group. Group time: 50 min     Personal goal for participation: Discuss self-care as it related to treatment, recovery and coping. Practicing a guided meditation to promote hopeful outlook and positive mindset     Goal orientation: personal      Group therapy participation: active      Therapeutic interventions reviewed and discussed: Group members were engaged in discussion about what self-care looks like in their personal lives, as well as opportunity to make self-care part of their daily routine. Group members participated in a guided meditation for taking care of yourself and cultivating self-love. Group members then processed their experience and any changes to physical and emotional states. Impression of participation: Jonna Sales was alert, oriented, calm and engaged. She reported having a difficult time focusing on group activity due to stress and worry about discharge. She was able to use self talk to manage her emotions and thoughts.      FRAN Broussard, Supervisee in Social Work

## 2021-05-11 NOTE — BH NOTES
Patient seen in treatment team, states she is feeling well today. Patient did states the Latuda 60mg was too strong for her, will decrease to 40mg. Will continue with Lithium. Patient will plan to discharge today to home, will call daughter for ride.

## 2021-05-11 NOTE — BH NOTES
GROUP THERAPY PROGRESS NOTE     Patient participated in psychotherapy group. Group time: 50 min     Personal goal for participation: Discuss the difference between what could happen vs what will happen with a focus on facts in order to minimize worry and stress. and.      Goal orientation: Personal     Group therapy participation: active     Therapeutic interventions reviewed and discussed: Group members were given opportunity to complete a worksheet about what could happen vs what would happen. Group members were then encouraged to share with group what they are worried about, possible outcomes if worry doesnt come true, and possible outcomes if it does come true. They were engaged in role play on how to handle various outcomes and ended by processing their emotional response to the exercise. Impression of participation: Shelia Muniz was alert, oriented, and engaged. She was supportive of peers and engaged in group conversation offering reframes to faulty thinking of peers.      FRAN Mccabe, Supervisee in Social Work
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
## 2021-05-11 NOTE — BH NOTES
GROUP THERAPY PROGRESS NOTE    Patient is participating in Substance Abuse group. Group time: 50 minutes    Personal goal for participation: To understand addiction and admitting powerlessness     Goal orientation: Personal    Group therapy participation: active    Therapeutic interventions reviewed and discussed: Group discussion of substance use, abuse, and dependence and the first step of the 12-step program. Patients were able to share their experiences with alcohol and drugs and identify whether they are more afraid of doing drugs and drinking alcohol or not doing it. Members were given the opportunity to admit whether they felt powerless over their substance abuse, and to express their life structure with addiction. Each patient wrote down consequences related to their addiction and made a list of ways they have been powerless to drugs/alcohol and reflected on this in group. Group therapist then shared the rest of the parts to the first step and these were: speak up at a 12-step program, get an [place] sponsor, tell someone if you feel like drinking o using drugs, and tell someone if you use substances. Members were given these two questions: What do I need to keep this?  and What happens if I dont?  to reflect on and answer in group. Impression of participation: Jonna Sales actively participated in group. Patient shared her experience with substance abuse. She discussed her addiction to cocaine and childhood growing up. Patient processed how her addicted formed. Demonstrated understanding of situation and condition. Calm, pleasant and cooperative. Mood is anxious.      Courtney Grover, Supervisee in Social Work

## 2021-05-14 NOTE — PROGRESS NOTES
Reached out to patient at [phone] for follow up. Party answering phone hung up when this writer asked for patient. Unsure if it was patient or not.

## 2021-05-16 ENCOUNTER — HOSPITAL ENCOUNTER (EMERGENCY)
Age: 46
Discharge: HOME | End: 2021-05-16
Attending: EMERGENCY MEDICINE
Payer: MEDICAID

## 2021-05-16 ENCOUNTER — APPOINTMENT (OUTPATIENT)
Dept: GENERAL RADIOLOGY | Age: 46
End: 2021-05-16
Attending: EMERGENCY MEDICINE
Payer: MEDICAID

## 2021-05-16 VITALS
HEART RATE: 70 BPM | BODY MASS INDEX: 25.03 KG/M2 | DIASTOLIC BLOOD PRESSURE: 92 MMHG | HEIGHT: 62 IN | OXYGEN SATURATION: 96 % | RESPIRATION RATE: 13 BRPM | TEMPERATURE: 98.2 F | WEIGHT: 136 LBS | SYSTOLIC BLOOD PRESSURE: 135 MMHG

## 2021-05-16 DIAGNOSIS — R07.89 ATYPICAL CHEST PAIN: ICD-10-CM

## 2021-05-16 DIAGNOSIS — E87.6 ACUTE HYPOKALEMIA: ICD-10-CM

## 2021-05-16 DIAGNOSIS — R07.9 ACUTE CHEST PAIN: Primary | ICD-10-CM

## 2021-05-16 DIAGNOSIS — I10 ACCELERATED HYPERTENSION: ICD-10-CM

## 2021-05-16 DIAGNOSIS — Z72.0 TOBACCO ABUSE: ICD-10-CM

## 2021-05-16 LAB
ALBUMIN SERPL-MCNC: 4.2 G/DL (ref 3.5–5)
ALBUMIN/GLOB SERPL: 1.1 {RATIO} (ref 1.1–2.2)
ALP SERPL-CCNC: 57 U/L (ref 45–117)
ALT SERPL-CCNC: 34 U/L (ref 12–78)
ANION GAP SERPL CALC-SCNC: 8 MMOL/L (ref 5–15)
AST SERPL-CCNC: 22 U/L (ref 15–37)
BASOPHILS # BLD: 0.1 K/UL (ref 0–0.1)
BASOPHILS NFR BLD: 1 % (ref 0–1)
BILIRUB SERPL-MCNC: 0.5 MG/DL (ref 0.2–1)
BNP SERPL-MCNC: 48 PG/ML (ref 0–125)
BUN SERPL-MCNC: 18 MG/DL (ref 6–20)
BUN/CREAT SERPL: 18 (ref 12–20)
CALCIUM SERPL-MCNC: 9 MG/DL (ref 8.5–10.1)
CHLORIDE SERPL-SCNC: 99 MMOL/L (ref 97–108)
CO2 SERPL-SCNC: 29 MMOL/L (ref 21–32)
CREAT SERPL-MCNC: 1.01 MG/DL (ref 0.55–1.02)
D DIMER PPP FEU-MCNC: 0.19 MG/L FEU (ref 0–0.65)
DATE LAST DOSE: ABNORMAL
DIFFERENTIAL METHOD BLD: ABNORMAL
EOSINOPHIL # BLD: 0 K/UL (ref 0–0.4)
EOSINOPHIL NFR BLD: 0 % (ref 0–7)
ERYTHROCYTE [DISTWIDTH] IN BLOOD BY AUTOMATED COUNT: 11.7 % (ref 11.5–14.5)
GLOBULIN SER CALC-MCNC: 4 G/DL (ref 2–4)
GLUCOSE BLD STRIP.AUTO-MCNC: 104 MG/DL (ref 65–117)
GLUCOSE SERPL-MCNC: 102 MG/DL (ref 65–100)
HCG SERPL QL: NEGATIVE
HCT VFR BLD AUTO: 38.5 % (ref 35–47)
HGB BLD-MCNC: 13.6 G/DL (ref 11.5–16)
IMM GRANULOCYTES # BLD AUTO: 0 K/UL (ref 0–0.04)
IMM GRANULOCYTES NFR BLD AUTO: 0 % (ref 0–0.5)
LITHIUM SERPL-SCNC: <0.2 MMOL/L (ref 0.6–1.2)
LYMPHOCYTES # BLD: 2.7 K/UL (ref 0.8–3.5)
LYMPHOCYTES NFR BLD: 31 % (ref 12–49)
MCH RBC QN AUTO: 31.8 PG (ref 26–34)
MCHC RBC AUTO-ENTMCNC: 35.3 G/DL (ref 30–36.5)
MCV RBC AUTO: 90 FL (ref 80–99)
MONOCYTES # BLD: 0.6 K/UL (ref 0–1)
MONOCYTES NFR BLD: 7 % (ref 5–13)
NEUTS SEG # BLD: 5.3 K/UL (ref 1.8–8)
NEUTS SEG NFR BLD: 61 % (ref 32–75)
NRBC # BLD: 0 K/UL (ref 0–0.01)
NRBC BLD-RTO: 0 PER 100 WBC
PLATELET # BLD AUTO: 421 K/UL (ref 150–400)
PMV BLD AUTO: 9.2 FL (ref 8.9–12.9)
POTASSIUM SERPL-SCNC: 3.2 MMOL/L (ref 3.5–5.1)
PROT SERPL-MCNC: 8.2 G/DL (ref 6.4–8.2)
RBC # BLD AUTO: 4.28 M/UL (ref 3.8–5.2)
REPORTED DOSE,DOSE: ABNORMAL UNITS
REPORTED DOSE/TIME,TMG: ABNORMAL
SERVICE CMNT-IMP: NORMAL
SODIUM SERPL-SCNC: 136 MMOL/L (ref 136–145)
TROPONIN I SERPL-MCNC: <0.05 NG/ML
WBC # BLD AUTO: 8.7 K/UL (ref 3.6–11)

## 2021-05-16 PROCEDURE — 74011250637 HC RX REV CODE- 250/637: Performed by: EMERGENCY MEDICINE

## 2021-05-16 PROCEDURE — 80178 ASSAY OF LITHIUM: CPT

## 2021-05-16 PROCEDURE — 84484 ASSAY OF TROPONIN QUANT: CPT

## 2021-05-16 PROCEDURE — 85379 FIBRIN DEGRADATION QUANT: CPT

## 2021-05-16 PROCEDURE — 80053 COMPREHEN METABOLIC PANEL: CPT

## 2021-05-16 PROCEDURE — 71045 X-RAY EXAM CHEST 1 VIEW: CPT

## 2021-05-16 PROCEDURE — 82962 GLUCOSE BLOOD TEST: CPT

## 2021-05-16 PROCEDURE — 93005 ELECTROCARDIOGRAM TRACING: CPT

## 2021-05-16 PROCEDURE — 83880 ASSAY OF NATRIURETIC PEPTIDE: CPT

## 2021-05-16 PROCEDURE — 84703 CHORIONIC GONADOTROPIN ASSAY: CPT

## 2021-05-16 PROCEDURE — 99284 EMERGENCY DEPT VISIT MOD MDM: CPT

## 2021-05-16 PROCEDURE — 85025 COMPLETE CBC W/AUTO DIFF WBC: CPT

## 2021-05-16 PROCEDURE — 36415 COLL VENOUS BLD VENIPUNCTURE: CPT

## 2021-05-16 RX ORDER — POTASSIUM CHLORIDE 750 MG/1
40 TABLET, FILM COATED, EXTENDED RELEASE ORAL
Status: COMPLETED | OUTPATIENT
Start: 2021-05-16 | End: 2021-05-16

## 2021-05-16 RX ORDER — TRAMADOL HYDROCHLORIDE 50 MG/1
50 TABLET ORAL
Status: COMPLETED | OUTPATIENT
Start: 2021-05-16 | End: 2021-05-16

## 2021-05-16 RX ORDER — GUAIFENESIN 100 MG/5ML
81 LIQUID (ML) ORAL DAILY
COMMUNITY
End: 2021-09-13

## 2021-05-16 RX ORDER — TRAMADOL HYDROCHLORIDE 50 MG/1
50 TABLET ORAL
Qty: 18 TAB | Refills: 0 | Status: SHIPPED | OUTPATIENT
Start: 2021-05-16 | End: 2021-05-19

## 2021-05-16 RX ADMIN — POTASSIUM CHLORIDE 40 MEQ: 750 TABLET, EXTENDED RELEASE ORAL at 02:23

## 2021-05-16 RX ADMIN — TRAMADOL HYDROCHLORIDE 50 MG: 50 TABLET, FILM COATED ORAL at 01:07

## 2021-05-16 NOTE — ED PROVIDER NOTES
EMERGENCY DEPARTMENT HISTORY AND PHYSICAL EXAM      Please note that this dictation was completed with the assistance of \"Dragon\", the computer voice recognition software. Quite often unanticipated grammatical, syntax, homophones, and other interpretive errors are inadvertently transcribed by the computer software. Please disregard these errors and any errors that have escaped final proofreading. Thank you. Patient Name: Ashlyn Johnson  : 1975  MRN: [id number]  History of Presenting Illness     Chief Complaint   Patient presents with    Chest Pain     History Provided By: Patient    HPI: Ashlyn Johnson, 41 y.o. female with past medical history as documented below presents to the ED with c/o of acute onset of 3 days of mild to moderate left-sided chest pain. Patient reports symptoms have been ongoing for the past 3 days. Patient reports history of valvular issues in the past.  She states she was diagnosed with \"valve issues\" back in 2020 but has not followed up with a cardiologist. She previously lived in [place] and was smoking crack at that time. Patient reports taking no medications prior to arrival.  Patient states symptoms worse with movement. Denies any recent prolonged travel, history of DVT or PE. Pt denies any other alleviating or exacerbating factors. Additionally, pt specifically denies any recent fever, chills, headache, nausea, vomiting, abdominal pain, SOB, lightheadedness, dizziness, numbness, weakness, lower extremity swelling, heart palpitations, urinary sxs, diarrhea, constipation, melena, hematochezia, cough, or congestion. There are no other complaints, changes or physical findings pertinent to the HPI at this time.     PCP: Sumanth Allen MD    Past History   Past Medical History:  Past Medical History:   Diagnosis Date    angina     Angina at rest [facility])     Breast pain     since 2016, right breast, on and off    Chiari malformation     Heart abnormalities     leaky heart valve, murmur    Hypertension     Other ill-defined conditions(799.89)     cardiac arrhythmias    Other ill-defined conditions(799.89)     neuropathy    Psychiatric disorder     anxiety and depression    Stroke ([facility] [address]     TIA       Past Surgical History:  Past Surgical History:   Procedure Laterality Date    HX GYN      HX ORTHOPAEDIC      screws in left ankle    HX OTHER SURGICAL      surgery to back of head due to fractured skull    NEUROLOGICAL PROCEDURE UNLISTED      PA ABDOMEN SURGERY PROC UNLISTED      laporscopic surgery post ruptured fallopian tube       Family History:  Family History   Problem Relation Age of Onset    Hypertension Mother     Stroke Mother     Diabetes Mother     Drug Abuse Father        Social History:  Social History     Tobacco Use    Smoking status: Current Every Day Smoker     Packs/day: 0.25     Years: 23.00     Pack years: 5.75    Smokeless tobacco: Current User    Tobacco comment: 1-2 cigs/day   Substance Use Topics    Alcohol use: Yes     Frequency: 2-3 times a week     Drinks per session: 1 or 2     Binge frequency: Less than monthly     Comment: social    Drug use: Yes     Types: Marijuana       Allergies: Allergies   Allergen Reactions    Toradol [Ketorolac] Hives       Current Medications:  No current facility-administered medications on file prior to encounter. Current Outpatient Medications on File Prior to Encounter   Medication Sig Dispense Refill    aspirin 81 mg chewable tablet Take 81 mg by mouth daily.  amLODIPine (NORVASC) 10 mg tablet Take 1 Tab by mouth daily. Indications: high blood pressure 30 Tab 0    hydroCHLOROthiazide (MICROZIDE) 12.5 mg capsule Take 1 Cap by mouth daily. Indications: high blood pressure 30 Cap 0    metoprolol tartrate (LOPRESSOR) 25 mg tablet Take 1 Tab by mouth two (2) times a day.  Indications: high blood pressure 60 Tab 0    folic acid (FOLVITE) 1 mg tablet Take 1 Tab by mouth daily. Indications: inadequate folic acid 30 Tab 0    therapeutic multivitamin (THERAGRAN) tablet Take 1 Tab by mouth daily. Indications: treatment to prevent vitamin deficiency 30 Tab 0    thiamine HCL (B-1) 100 mg tablet Take 1 Tab by mouth daily. Indications: deficiency in thiamine or vitamin B1 30 Tab 0    lithium carbonate 300 mg tablet Take 3 Tabs by mouth nightly. Indications: mood 90 Tab 0    lurasidone (Latuda) 40 mg tab tablet Take 1 Tab by mouth daily (with dinner). Indications: bipolar depression 30 Tab 0     Review of Systems   Review of Systems   Constitutional: Negative. Negative for chills and fever. HENT: Negative. Negative for congestion and sore throat. Eyes: Negative. Respiratory: Negative. Negative for cough, chest tightness, shortness of breath and wheezing. Cardiovascular: Positive for chest pain. Negative for palpitations and leg swelling. Gastrointestinal: Negative. Negative for abdominal distention, abdominal pain, blood in stool, constipation, diarrhea, nausea and vomiting. Endocrine: Negative. Genitourinary: Negative. Negative for dysuria, flank pain, frequency, hematuria and urgency. Musculoskeletal: Negative. Negative for arthralgias, back pain and myalgias. Skin: Negative. Negative for color change and rash. Neurological: Negative. Negative for dizziness, syncope, speech difficulty, weakness, light-headedness, numbness and headaches. Hematological: Negative. Psychiatric/Behavioral: Negative. Negative for confusion and self-injury. The patient is not nervous/anxious. All other systems reviewed and are negative. Physical Exam   Physical Exam  Vitals and nursing note reviewed. Constitutional:       General: She is not in acute distress. Appearance: She is well-developed. She is not diaphoretic. HENT:      Head: Normocephalic and atraumatic. Mouth/Throat:      Pharynx: No oropharyngeal exudate.    Eyes:      Conjunctiva/sclera: Conjunctivae normal.   Cardiovascular:      Rate and Rhythm: Normal rate and regular rhythm. Heart sounds: Normal heart sounds. Pulmonary:      Effort: Pulmonary effort is normal. No respiratory distress. Breath sounds: Normal breath sounds. No wheezing or rales. Chest:      Chest wall: Tenderness (Reproducible chest wall tenderness, no rash noted, equal pulses and blood pressures in both arms.) present. Abdominal:      General: Bowel sounds are normal. There is no distension. Palpations: Abdomen is soft. There is no mass. Tenderness: There is no abdominal tenderness. There is no guarding or rebound. Musculoskeletal:         General: Normal range of motion. Cervical back: Normal range of motion. Skin:     General: Skin is warm. Neurological:      Mental Status: She is alert and oriented to person, place, and time. Cranial Nerves: No cranial nerve deficit. Motor: No abnormal muscle tone. Diagnostic Study Results     Labs -   I have personally reviewed and interpreted all available laboratory results.    Recent Results (from the past 24 hour(s))   EKG, 12 LEAD, INITIAL    Collection Time: 05/16/21 12:14 AM   Result Value Ref Range    Ventricular Rate 77 BPM    Atrial Rate 77 BPM    P-R Interval 168 ms    QRS Duration 84 ms    Q-T Interval 432 ms    QTC Calculation (Bezet) 488 ms    Calculated P Axis 39 degrees    Calculated R Axis 7 degrees    Calculated T Axis 18 degrees    Diagnosis       Normal sinus rhythm  Possible Left atrial enlargement  Left ventricular hypertrophy  Prolonged QT  Abnormal ECG  When compared with ECG of 18-NOV-2019 18:59,  No significant change was found     GLUCOSE, POC    Collection Time: 05/16/21 12:20 AM   Result Value Ref Range    Glucose (POC) 104 65 - 117 mg/dL    Performed by [facility]    CBC WITH AUTOMATED DIFF    Collection Time: 05/16/21 12:26 AM   Result Value Ref Range    WBC 8.7 3.6 - 11.0 K/uL    RBC 4.28 3.80 - 5.20 M/uL HGB 13.6 11.5 - 16.0 g/dL    HCT 38.5 35.0 - 47.0 %    MCV 90.0 80.0 - 99.0 FL    MCH 31.8 26.0 - 34.0 PG    MCHC 35.3 30.0 - 36.5 g/dL    RDW 11.7 11.5 - 14.5 %    PLATELET 598 (H) 498 - 400 K/uL    MPV 9.2 8.9 - 12.9 FL    NRBC 0.0 0  WBC    ABSOLUTE NRBC 0.00 0.00 - 0.01 K/uL    NEUTROPHILS 61 32 - 75 %    LYMPHOCYTES 31 12 - 49 %    MONOCYTES 7 5 - 13 %    EOSINOPHILS 0 0 - 7 %    BASOPHILS 1 0 - 1 %    IMMATURE GRANULOCYTES 0 0.0 - 0.5 %    ABS. NEUTROPHILS 5.3 1.8 - 8.0 K/UL    ABS. LYMPHOCYTES 2.7 0.8 - 3.5 K/UL    ABS. MONOCYTES 0.6 0.0 - 1.0 K/UL    ABS. EOSINOPHILS 0.0 0.0 - 0.4 K/UL    ABS. BASOPHILS 0.1 0.0 - 0.1 K/UL    ABS. IMM. GRANS. 0.0 0.00 - 0.04 K/UL    DF AUTOMATED     METABOLIC PANEL, COMPREHENSIVE    Collection Time: 05/16/21 12:26 AM   Result Value Ref Range    Sodium 136 136 - 145 mmol/L    Potassium 3.2 (L) 3.5 - 5.1 mmol/L    Chloride 99 97 - 108 mmol/L    CO2 29 21 - 32 mmol/L    Anion gap 8 5 - 15 mmol/L    Glucose 102 (H) 65 - 100 mg/dL    BUN 18 6 - 20 MG/DL    Creatinine 1.01 0.55 - 1.02 MG/DL    BUN/Creatinine ratio 18 12 - 20      GFR est AA >60 >60 ml/min/1.73m2    GFR est non-AA 60 (L) >60 ml/min/1.73m2    Calcium 9.0 8.5 - 10.1 MG/DL    Bilirubin, total 0.5 0.2 - 1.0 MG/DL    ALT (SGPT) 34 12 - 78 U/L    AST (SGOT) 22 15 - 37 U/L    Alk.  phosphatase 57 45 - 117 U/L    Protein, total 8.2 6.4 - 8.2 g/dL    Albumin 4.2 3.5 - 5.0 g/dL    Globulin 4.0 2.0 - 4.0 g/dL    A-G Ratio 1.1 1.1 - 2.2     TROPONIN I    Collection Time: 05/16/21 12:26 AM   Result Value Ref Range    Troponin-I, Qt. <0.05 <0.05 ng/mL   NT-PRO BNP    Collection Time: 05/16/21 12:26 AM   Result Value Ref Range    NT pro-BNP 48 0 - 125 PG/ML   LITHIUM    Collection Time: 05/16/21 12:26 AM   Result Value Ref Range    Lithium level <0.20 (L) 0.60 - 1.20 MMOL/L    Reported dose date NOT PROVIDED      Reported dose time: NOT PROVIDED      Reported dose: NOT PROVIDED UNITS   HCG QL SERUM    Collection Time: 05/16/21 12:26 AM   Result Value Ref Range    HCG, Ql. Negative NEG     D DIMER    Collection Time: 05/16/21 12:26 AM   Result Value Ref Range    D-dimer 0.19 0.00 - 0.65 mg/L FEU       Radiologic Studies -   I have personally reviewed and interpreted all available imaging studies and agree with radiology interpretation and report. XR CHEST PORT   Final Result   No acute infiltrate. CT Results  (Last 48 hours)    None        CXR Results  (Last 48 hours)               05/16/21 0117  XR CHEST PORT Final result    Impression:  No acute infiltrate. Narrative:  Clinical indication: Chest pain. Portable AP upright view of the chest obtained, comparison November 18, 2019. The  heart size is normal. No acute infiltrate. Medical Decision Making   I reviewed the vital signs, available nursing notes, past medical history, past surgical history, family history and social history. Vital Signs-Reviewed the patient's vital signs. Patient Vitals for the past 24 hrs:   Temp Pulse Resp BP SpO2   05/16/21 0200 -- 70 13 (!) 135/92 96 %   05/16/21 0003 98.2 °F (36.8 °C) 73 22 (!) 167/101 98 %       Pulse Oximetry Analysis - 98% on RA    Cardiac Monitor:   Rate: 73 bpm  The cardiac monitor revealed the following rhythm as interpreted by me: Normal Sinus Rhythm     The cardiac monitor was ordered secondary to the patient's history of CP and to monitor the patient for dysrhythmia    ED EKG interpretation:  Rhythm: normal sinus rhythm; and regular . Rate (approx.): 77; Axis: normal; P wave: normal; QRS interval: normal ; ST/T wave: normal; Other findings: left ventricular hypertrophy.  This EKG was interpreted by Antonella Nathan MD    Records Reviewed: Nursing Notes, Old Medical Records, Previous electrocardiograms, Previous Radiology Studies and Previous Laboratory Studies    Provider Notes (Medical Decision Making):   DDx includes STEMI, NSTEMI, Angina, PE, Aortic Pathology, Chest Wall Pain, Pleurisy, Pneumonia, GERD/esophagitis, Anxiety. No cough/fever or focal lung findings to suggest pneumonia. No tachycardia, hypoxia or pleuritic component to suggest PE. Pulses symmetric and no extremely elevated BP/asymmetry or classic tearing sensation to suggest Aortic Dissection. Also, no neuro findings. No wretching/forceful vomiting to suggest esophageal disaster. Denies IV drug abuse, has native valves, no fevers/murmurs or skin lesions to suggest endocarditis. Will evaluate with EKG, labs, cardiac enzymes, chest x-ray. Will provide pain control and reassess. ED Course:   I am the first provider for this patient's ED visit today. Initial assessment performed. I discussed presenting problems, concerns and my formulated plan for today's visit with the patient and any available family members at bedside. I encouraged them to ask questions as they arise throughout the visit. TOBACCO COUNSELING:  Upon evaluation, pt expressed that they are a current tobacco user. For approximately 5 mins, pt has been counseled on the dangers of smoking and was encouraged to quit as soon as possible in order to decrease further risks to their health. Pt has conveyed their understanding of the risks involved should they continue to use tobacco products. I reviewed our electronic medical record system for any past medical records that were available that may contribute to the patient's current condition, the nursing notes and vital signs from today's visit.       ED Orders Placed :  Orders Placed This Encounter    XR CHEST PORT    CBC WITH AUTOMATED DIFF    METABOLIC PANEL, COMPREHENSIVE    TROPONIN I    NT-PRO BNP    Hold Sample    LITHIUM    HCG QL SERUM    D DIMER    GLUCOSE, POC    EKG 12 LEAD INITIAL    aspirin 81 mg chewable tablet    traMADoL (ULTRAM) tablet 50 mg    potassium chloride SR (KLOR-CON 10) tablet 40 mEq    traMADoL (Ultram) 50 mg tablet       ED Medications Administered:  Medications traMADoL (ULTRAM) tablet 50 mg (50 mg Oral Given 5/16/21 0107)   potassium chloride SR (KLOR-CON 10) tablet 40 mEq (40 mEq Oral Given 5/16/21 0223)        Progress Note:  Patient has been reassessed and reports feeling better and symptoms have improved significantly after ED treatment. Shy Prado's final labs and imaging have been reviewed with her and available family and/or caregiver. They have been counseled regarding her diagnosis. She verbally conveys understanding and agreement of the signs, symptoms, diagnosis, treatment and prognosis and additionally agrees to follow up as recommended with Dr. Cecy Bullard MD and/or specialist in 24 - 48 hours. She also agrees with the care-plan we created together and conveys that all of her questions have been answered. I have also put together a packet of discharge instructions for her that include: 1) educational information regarding their diagnosis, 2) how to care for their diagnosis at home, as well a 3) list of reasons why they would want to return to the ED prior to their follow-up appointment should the patient's condition change or symptoms worsen. I have answered all questions to the patient's satisfaction. Strict return precautions given. She both understood and agreed with plan as discussed. Vital signs stable for discharge. Disposition:   DISCHARGE  The pt is ready for discharge. The pt's signs, symptoms, diagnosis, and discharge instructions have been discussed and pt has conveyed their understanding. The pt is to follow up as recommended or return to ER should their symptoms worsen. Plan has been discussed and pt is in agreement. Plan:  1. Return precautions as discussed with patient and available family and/or caregiver.      2.   Discharge Medication List as of 5/16/2021  2:36 AM      START taking these medications    Details   traMADoL (Ultram) 50 mg tablet Take 1 Tab by mouth every four (4) hours as needed for Pain for up to 3 days. Max Daily Amount: 300 mg., Normal, Disp-18 Tab, R-0         CONTINUE these medications which have NOT CHANGED    Details   aspirin 81 mg chewable tablet Take 81 mg by mouth daily. , Historical Med      amLODIPine (NORVASC) 10 mg tablet Take 1 Tab by mouth daily. Indications: high blood pressure, Normal, Disp-30 Tab, R-0      hydroCHLOROthiazide (MICROZIDE) 12.5 mg capsule Take 1 Cap by mouth daily. Indications: high blood pressure, Normal, Disp-30 Cap, R-0      metoprolol tartrate (LOPRESSOR) 25 mg tablet Take 1 Tab by mouth two (2) times a day. Indications: high blood pressure, Normal, Disp-60 Tab, R-0      folic acid (FOLVITE) 1 mg tablet Take 1 Tab by mouth daily. Indications: inadequate folic acid, Normal, RZAU-99 Tab, R-0      therapeutic multivitamin (THERAGRAN) tablet Take 1 Tab by mouth daily. Indications: treatment to prevent vitamin deficiency, Normal, Disp-30 Tab, R-0      thiamine HCL (B-1) 100 mg tablet Take 1 Tab by mouth daily. Indications: deficiency in thiamine or vitamin B1, Normal, Disp-30 Tab, R-0      lithium carbonate 300 mg tablet Take 3 Tabs by mouth nightly. Indications: mood, Normal, Disp-90 Tab, R-0      lurasidone (Latuda) 40 mg tab tablet Take 1 Tab by mouth daily (with dinner). Indications: bipolar depression, Normal, Disp-30 Tab, R-0           3. Follow-up Information     Follow up With Specialties Details Why [address] [facility] EMERGENCY DEPT Emergency Medicine  As needed, If symptoms worsen [address] [facility]    Andrew Pacheco MD Cardiology  As needed, If symptoms worsen [address] [facility]  [phone]            Instructed to return to ED if worse  Diagnosis   Clinical Impression:  1. Acute chest pain    2. Atypical chest pain    3. Accelerated hypertension    4. Tobacco abuse    5. Acute hypokalemia        Attestation:  Stefany Cardoza MD, am the attending of record for this patient.  I personally performed the services described in this documentation on this date, 5/16/2021 for patient, Cosme Pérez. I have reviewed the chart and verified that the record is accurate and complete.

## 2021-05-16 NOTE — ED NOTES
Patient presents to the ED with c/o left sided chest pain that started x 3 days ago. Pt stated she has 2 leaking valves that she was diagnosed with in august 2020 but has not followed up with a cardiologist. Pt reports an itching feeling and her pulse in her ears. Pt stated she has been slurring her words and talking slow. Denies N/V/D. Denies SOB. Pt is alert, oriented and appropriate. Ambulatory on arrival.       Emergency Department Nursing Plan of Care       The Nursing Plan of Care is developed from the Nursing assessment and Emergency Department Attending provider initial evaluation. The plan of care may be reviewed in the ED Provider note.     The Plan of Care was developed with the following considerations:   Patient / Family readiness to learn indicated by:verbalized understanding  Persons(s) to be included in education: patient  Barriers to Learning/Limitations:No    Signed     Mell Roles    5/16/2021   1:48 AM

## 2021-05-16 NOTE — ED NOTES
Discharge instructions were given to the patient by Radha Esquivel.     The patient left the Emergency Department ambulatory, alert and oriented and in no acute distress with 1 prescription. The patient was encouraged to call or return to the ED for worsening issues or problems and was encouraged to schedule a follow up appointment for continuing care. The patient verbalized understanding of discharge instructions and prescriptions, all questions were answered. The patient has no further concerns at this time.

## 2021-05-16 NOTE — ED TRIAGE NOTES
Pt comes in with mid/left sided chest pain x 3 days. Pt endorses cardiac history. Pt take BP medications. Pt says, \"I feel off. I feel weak. \"

## 2021-05-17 LAB
ATRIAL RATE: 77 BPM
CALCULATED P AXIS, ECG09: 39 DEGREES
CALCULATED R AXIS, ECG10: 7 DEGREES
CALCULATED T AXIS, ECG11: 18 DEGREES
DIAGNOSIS, 93000: NORMAL
P-R INTERVAL, ECG05: 168 MS
Q-T INTERVAL, ECG07: 432 MS
QRS DURATION, ECG06: 84 MS
QTC CALCULATION (BEZET), ECG08: 488 MS
VENTRICULAR RATE, ECG03: 77 BPM

## 2021-05-20 ENCOUNTER — OFFICE VISIT (OUTPATIENT)
Dept: CARDIOLOGY CLINIC | Age: 46
End: 2021-05-20
Payer: MEDICAID

## 2021-05-20 VITALS
RESPIRATION RATE: 16 BRPM | HEIGHT: 62 IN | OXYGEN SATURATION: 96 % | SYSTOLIC BLOOD PRESSURE: 117 MMHG | HEART RATE: 81 BPM | WEIGHT: 137 LBS | BODY MASS INDEX: 25.21 KG/M2 | DIASTOLIC BLOOD PRESSURE: 81 MMHG

## 2021-05-20 DIAGNOSIS — I10 ESSENTIAL HYPERTENSION: ICD-10-CM

## 2021-05-20 DIAGNOSIS — R00.2 PALPITATIONS: ICD-10-CM

## 2021-05-20 DIAGNOSIS — F25.0 SCHIZOAFFECTIVE DISORDER, BIPOLAR TYPE (HCC): ICD-10-CM

## 2021-05-20 DIAGNOSIS — R07.89 OTHER CHEST PAIN: ICD-10-CM

## 2021-05-20 DIAGNOSIS — I35.1 NONRHEUMATIC AORTIC VALVE INSUFFICIENCY: Primary | ICD-10-CM

## 2021-05-20 PROCEDURE — 99204 OFFICE O/P NEW MOD 45 MIN: CPT | Performed by: SPECIALIST

## 2021-05-20 RX ORDER — PANTOPRAZOLE SODIUM 20 MG/1
TABLET, DELAYED RELEASE ORAL
COMMUNITY
Start: 2021-02-13 | End: 2021-09-13

## 2021-05-20 RX ORDER — PROPRANOLOL HYDROCHLORIDE 10 MG/1
TABLET ORAL
COMMUNITY
Start: 2021-05-12 | End: 2021-09-13

## 2021-05-20 NOTE — PROGRESS NOTES
HISTORY OF PRESENT ILLNESS  Katlin Chisholm is a 41 y.o. female     SUMMARY:   Problem List  Date Reviewed: 2021        Codes Class Noted    Nonrheumatic aortic valve insufficiency ICD-10-CM: I35.1  ICD-9-CM: 424.1  2021    Overview Signed 2021  8:19 AM by Bharat Bose MD     07/15/20  echo normal lvef, lvh, mild to mod aortic regurg, mild tr             Bipolar disorder ([facility] ICD-10-CM: F31.9  ICD-9-CM: 296.80  2021        Schizoaffective disorder ([facility] ICD-10-CM: F25.9  ICD-9-CM: 295.70  2021        Hypertension ICD-10-CM: I10  ICD-9-CM: 401.9  2021              Current Outpatient Medications on File Prior to Visit   Medication Sig    pantoprazole (PROTONIX) 20 mg tablet TAKE 1 TABLET BY MOUTH ONCE A DAY AS NEEDED FOR HEARTBURN    propranoloL (INDERAL) 10 mg tablet     aspirin 81 mg chewable tablet Take 81 mg by mouth daily.  amLODIPine (NORVASC) 10 mg tablet Take 1 Tab by mouth daily. Indications: high blood pressure    hydroCHLOROthiazide (MICROZIDE) 12.5 mg capsule Take 1 Cap by mouth daily. Indications: high blood pressure    metoprolol tartrate (LOPRESSOR) 25 mg tablet Take 1 Tab by mouth two (2) times a day. Indications: high blood pressure    folic acid (FOLVITE) 1 mg tablet Take 1 Tab by mouth daily. Indications: inadequate folic acid    therapeutic multivitamin (THERAGRAN) tablet Take 1 Tab by mouth daily. Indications: treatment to prevent vitamin deficiency    thiamine HCL (B-1) 100 mg tablet Take 1 Tab by mouth daily. Indications: deficiency in thiamine or vitamin B1    [] traMADoL (Ultram) 50 mg tablet Take 1 Tab by mouth every four (4) hours as needed for Pain for up to 3 days. Max Daily Amount: 300 mg. No current facility-administered medications on file prior to visit.        CARDIOLOGY STUDIES TO DATE:  07/15/20  echo normal lvef, lvh, mild to mod aortic regurg, mild tr    Chief Complaint   Patient presents with   [facility] Follow Up     NP, Pt c/o palpitations. HPI :  She is referred from the ER for cardiac evaluation. She is not been in [facility] too long previously lived in [place] and she says at that time she was smoking crack. She had some issues with chest pain and palpitations at that time and it sounds like they did an echo and a stress test which as far she knows was unremarkable. She continues to have random episodes of sharp noncardiac sounding chest pain without associated symptoms. It is not exertional.  She also notes an occasional skipped heartbeat mostly at night again without any associated symptoms. She recently stopped smoking and has started walking without any problems and wants to do more of that. She has longstanding hypertension. There is no history of diabetes and she says her cholesterols been okay. Family history is negative for premature coronary disease. Last year her primary care ordered an echocardiogram on her and I reviewed that and summarized the results above. She is currently off all her psychiatric medications because of concerns about side effects. It sounds like she has been tried on many different medications over the years and is always been a difficult problem.   CARDIAC ROS:   negative for dyspnea, syncope, orthopnea, paroxysmal nocturnal dyspnea, exertional chest pressure/discomfort, claudication, lower extremity edema    Family History   Problem Relation Age of Onset    Hypertension Mother     Stroke Mother     Diabetes Mother     Drug Abuse Father        Past Medical History:   Diagnosis Date    angina     [facility]ina at rest [facility])     Breast pain     since 2016, right breast, on and off    Chiari malformation     Heart abnormalities     leaky heart valve, murmur    Hypertension     Other ill-defined conditions(799.89)     cardiac arrhythmias    Other ill-defined conditions(799.89)     neuropathy    Psychiatric disorder     anxiety and depression    Stroke ([facility] TIA       GENERAL ROS:  A comprehensive review of systems was negative except for that written in the HPI. Visit Vitals  /81 (BP 1 Location: Left arm, BP Patient Position: Sitting, BP Cuff Size: Small adult)   Pulse 81   Resp 16   Ht 5' 2\" (1.575 m)   Wt 137 lb (62.1 kg)   LMP 05/05/2021 (Exact Date)   SpO2 96%   BMI 25.06 kg/m²       Wt Readings from Last 3 Encounters:   05/20/21 137 lb (62.1 kg)   05/16/21 136 lb (61.7 kg)   05/09/21 140 lb 6.4 oz (63.7 kg)            BP Readings from Last 3 Encounters:   05/20/21 117/81   05/16/21 (!) 135/92   05/11/21 (!) 143/89       PHYSICAL EXAM  General appearance: alert, cooperative, no distress, appears stated age  Neurologic: Alert and oriented X 3  Neck: supple, symmetrical, trachea midline, no adenopathy, no carotid bruit and no JVD  Lungs: clear to auscultation bilaterally  Heart: regular rate and rhythm, S1, S2 normal, no murmur, click, rub or gallop  Abdomen: pos bs, soft nt  Extremities: extremities normal, atraumatic, no cyanosis or edema  Pulses: 2+ and symmetric    Lab Results   Component Value Date/Time    Cholesterol, total 210 (H) 05/07/2021 06:12 AM    HDL Cholesterol 54 05/07/2021 06:12 AM    LDL, calculated 112.6 (H) 05/07/2021 06:12 AM    Triglyceride 217 (H) 05/07/2021 06:12 AM    CHOL/HDL Ratio 3.9 05/07/2021 06:12 AM     ASSESSMENT :      I congratulated her on quitting smoking and encouraged her to maintain abstinence. We talked about her aortic valve issues and I reassured her that there was no danger in any of that at this point but that we would need to do periodic reevaluation with echocardiography. Her chest pain does not sound cardiac at that this point, however I cautioned her about symptoms that would prompt an urgent return visit here or call to 911, and at some point we may want to do a stress test on her.   Likewise the palpitations sound very benign she is already on a beta-blocker so I do not think we need to pursue that with further testing unless it becomes more of a worry or frequent occurrence for her. current treatment plan is effective, no change in therapy  lab results and schedule of future lab studies reviewed with patient  reviewed diet, exercise and weight control    Encounter Diagnoses   Name Primary?  Nonrheumatic aortic valve insufficiency Yes    Essential hypertension     Schizoaffective disorder, bipolar type (HCC)     Other chest pain     Palpitations      Orders Placed This Encounter    pantoprazole (PROTONIX) 20 mg tablet    propranoloL (INDERAL) 10 mg tablet       Follow-up and Dispositions    · Return in about 6 months (around 11/20/2021). Jim Marion MD  5/20/2021  Please note that this dictation was completed with [employer], the computer voice recognition software. Quite often unanticipated grammatical, syntax, homophones, and other interpretive errors are inadvertently transcribed by the computer software. Please disregard these errors. Please excuse any errors that have escaped final proofreading. Thank you.

## 2021-05-20 NOTE — PROGRESS NOTES
Chief Complaint   Patient presents with   [facility] Follow Up     NP, Pt c/o palpitations.

## 2021-08-07 ENCOUNTER — HOSPITAL ENCOUNTER (EMERGENCY)
Age: 46
Discharge: HOME | End: 2021-08-07
Attending: EMERGENCY MEDICINE
Payer: MEDICAID

## 2021-08-07 VITALS
DIASTOLIC BLOOD PRESSURE: 85 MMHG | TEMPERATURE: 98 F | HEIGHT: 62 IN | WEIGHT: 138 LBS | HEART RATE: 74 BPM | OXYGEN SATURATION: 99 % | SYSTOLIC BLOOD PRESSURE: 188 MMHG | BODY MASS INDEX: 25.4 KG/M2 | RESPIRATION RATE: 20 BRPM

## 2021-08-07 DIAGNOSIS — S39.012A ACUTE MYOFASCIAL STRAIN OF LUMBAR REGION, INITIAL ENCOUNTER: Primary | ICD-10-CM

## 2021-08-07 PROCEDURE — 99283 EMERGENCY DEPT VISIT LOW MDM: CPT

## 2021-08-07 PROCEDURE — 74011000250 HC RX REV CODE- 250: Performed by: EMERGENCY MEDICINE

## 2021-08-07 PROCEDURE — 74011250637 HC RX REV CODE- 250/637: Performed by: EMERGENCY MEDICINE

## 2021-08-07 RX ORDER — HYDROCODONE BITARTRATE AND ACETAMINOPHEN 5; 325 MG/1; MG/1
1 TABLET ORAL
Qty: 8 TABLET | Refills: 0 | Status: SHIPPED | OUTPATIENT
Start: 2021-08-07 | End: 2021-08-10

## 2021-08-07 RX ORDER — LIDOCAINE 4 G/100G
PATCH TOPICAL
Qty: 5 PATCH | Refills: 0 | Status: SHIPPED | OUTPATIENT
Start: 2021-08-07 | End: 2021-09-08 | Stop reason: SDUPTHER

## 2021-08-07 RX ORDER — HYDROCODONE BITARTRATE AND ACETAMINOPHEN 5; 325 MG/1; MG/1
1 TABLET ORAL ONCE
Status: COMPLETED | OUTPATIENT
Start: 2021-08-07 | End: 2021-08-07

## 2021-08-07 RX ORDER — LIDOCAINE 4 G/100G
1 PATCH TOPICAL
Status: DISCONTINUED | OUTPATIENT
Start: 2021-08-07 | End: 2021-08-07 | Stop reason: HOSPADM

## 2021-08-07 RX ORDER — CYCLOBENZAPRINE HCL 10 MG
10 TABLET ORAL
Status: COMPLETED | OUTPATIENT
Start: 2021-08-07 | End: 2021-08-07

## 2021-08-07 RX ORDER — CYCLOBENZAPRINE HCL 10 MG
10 TABLET ORAL
Qty: 20 TABLET | Refills: 0 | OUTPATIENT
Start: 2021-08-07 | End: 2021-09-13

## 2021-08-07 RX ADMIN — CYCLOBENZAPRINE HYDROCHLORIDE 10 MG: 10 TABLET, FILM COATED ORAL at 06:14

## 2021-08-07 RX ADMIN — HYDROCODONE BITARTRATE AND ACETAMINOPHEN 1 TABLET: 5; 325 TABLET ORAL at 06:14

## 2021-08-07 NOTE — ED NOTES
Pt reports to ER w/ Back pain x 2 days. Pt reports worsening today. Injury after falling off stool 2 days ago. Alert and oriented x4. Skin warm dry and intact. Ambulates indepedently. Emergency Department Nursing Plan of Care       The Nursing Plan of Care is developed from the Nursing assessment and Emergency Department Attending provider initial evaluation. The plan of care may be reviewed in the ED Provider note.     The Plan of Care was developed with the following considerations:   Patient / Family readiness to learn indicated by:verbalized understanding  Persons(s) to be included in education: patient  Barriers to Learning/Limitations:No    Signed     Maico Monsalve    8/7/2021   6:22 AM

## 2021-08-07 NOTE — ED PROVIDER NOTES
EMERGENCY DEPARTMENT HISTORY AND PHYSICAL EXAM      Date: 8/7/2021  Patient Name: Naveen Garcia    History of Presenting Illness     Chief Complaint   Patient presents with    Back Pain       History Provided By: Patient    HPI: Naveen Garcia, 40 y.o. female with PMHx significant for neuropathy, anxiety, depression, who presents with acute, severe, low back pain that began after she moved furniture. Tells me that she is moving to a new apartment and did not have a lot of help so was moving heavy furniture by herself 3 days ago. Next morning woke up with acute, low back pain primarily located on the right side. Denies any midline back pain, weakness, numbness, or tingling her extremities. No bowel or bladder incontinence. Has tried Motrin, heat, Biofreeze, Tiger balm at home without significant relief. Symptoms are worse with movement and better with rest.      PCP: Reinaldo Mclain MD    There are no other complaints, changes, or physical findings at this time. Current Facility-Administered Medications   Medication Dose Route Frequency Provider Last Rate Last Admin    lidocaine 4 % patch 1 Patch  1 Patch TransDERmal NOW Naveen Marx MD   1 Patch at 08/07/21 3918     Current Outpatient Medications   Medication Sig Dispense Refill    cyclobenzaprine (FLEXERIL) 10 mg tablet Take 1 Tablet by mouth three (3) times daily as needed for Muscle Spasm(s). 20 Tablet 0    lidocaine 4 % patch Apply one patch to affected area for 12 hours and the remove for 12 hours 5 Patch 0    HYDROcodone-acetaminophen (Norco) 5-325 mg per tablet Take 1 Tablet by mouth every four (4) hours as needed for Pain for up to 3 days. Max Daily Amount: 6 Tablets. 8 Tablet 0    pantoprazole (PROTONIX) 20 mg tablet TAKE 1 TABLET BY MOUTH ONCE A DAY AS NEEDED FOR HEARTBURN      propranoloL (INDERAL) 10 mg tablet       aspirin 81 mg chewable tablet Take 81 mg by mouth daily.       amLODIPine (NORVASC) 10 mg tablet Take 1 Tab by mouth daily. Indications: high blood pressure 30 Tab 0    hydroCHLOROthiazide (MICROZIDE) 12.5 mg capsule Take 1 Cap by mouth daily. Indications: high blood pressure 30 Cap 0    metoprolol tartrate (LOPRESSOR) 25 mg tablet Take 1 Tab by mouth two (2) times a day. Indications: high blood pressure 60 Tab 0    folic acid (FOLVITE) 1 mg tablet Take 1 Tab by mouth daily. Indications: inadequate folic acid 30 Tab 0    therapeutic multivitamin (THERAGRAN) tablet Take 1 Tab by mouth daily. Indications: treatment to prevent vitamin deficiency 30 Tab 0    thiamine HCL (B-1) 100 mg tablet Take 1 Tab by mouth daily.  Indications: deficiency in thiamine or vitamin B1 30 Tab 0     Past History     Past Medical History:  Past Medical History:   Diagnosis Date    angina     Angina at rest [facility])     Breast pain     since 2016, right breast, on and off    Chiari malformation     Heart abnormalities     leaky heart valve, murmur    Hypertension     Other ill-defined conditions(799.89)     cardiac arrhythmias    Other ill-defined conditions(799.89)     neuropathy    Psychiatric disorder     anxiety and depression    Stroke ([facility]     TIA     Past Surgical History:  Past Surgical History:   Procedure Laterality Date    HX GYN      HX ORTHOPAEDIC      screws in left ankle    HX OTHER SURGICAL      surgery to back of head due to fractured skull    NEUROLOGICAL PROCEDURE UNLISTED      MD ABDOMEN SURGERY PROC UNLISTED      laporscopic surgery post ruptured fallopian tube     Family History:  Family History   Problem Relation Age of Onset    Hypertension Mother     Stroke Mother     Diabetes Mother     Drug Abuse Father      Social History:  Social History     Tobacco Use    Smoking status: Former Smoker     Packs/day: 0.25     Years: 23.00     Pack years: 5.75     Quit date: 3/4/2021     Years since quittin.4    Smokeless tobacco: Current User    Tobacco comment: 1-2 cigs/day   Vaping Use    Vaping Use: Some days   Substance Use Topics    Alcohol use: Yes     Comment: social    Drug use: Yes     Types: Marijuana     Comment: former crack user approx clean 2017     Allergies: Allergies   Allergen Reactions    Toradol [Ketorolac] Hives     Review of Systems   Review of Systems   Musculoskeletal: Positive for back pain. All other systems reviewed and are negative. Physical Exam   Physical Exam  Vitals and nursing note reviewed. Constitutional:       General: She is not in acute distress. Appearance: She is well-developed. HENT:      Head: Normocephalic and atraumatic. Eyes:      Conjunctiva/sclera: Conjunctivae normal.      Pupils: Pupils are equal, round, and reactive to light. Cardiovascular:      Rate and Rhythm: Normal rate and regular rhythm. Pulmonary:      Effort: Pulmonary effort is normal. No respiratory distress. Breath sounds: Normal breath sounds. No stridor. Abdominal:      General: There is no distension. Palpations: Abdomen is soft. Tenderness: There is no abdominal tenderness. Musculoskeletal:      Cervical back: Normal range of motion. Lumbar back: Spasms and tenderness present. No bony tenderness. Decreased range of motion (due to pain). Back:    Skin:     General: Skin is warm and dry. Neurological:      Mental Status: She is alert and oriented to person, place, and time. Diagnostic Study Results   Labs -   No results found for this or any previous visit (from the past 12 hour(s)). Radiologic Studies -   No orders to display     No results found. Medical Decision Making   I am the first provider for this patient. I reviewed the vital signs, available nursing notes, past medical history, past surgical history, family history and social history. Vital Signs-Reviewed the patient's vital signs.   Patient Vitals for the past 12 hrs:   Temp Pulse Resp BP SpO2   08/07/21 0710 -- -- -- (!) 188/85 --   08/07/21 0546 98 °F (36.7 °C) 74 20 (!) 191/107 99 %       Pulse Oximetry Analysis - 99% on ra      Records Reviewed: Nursing Notes and Old Medical Records    Provider Notes (Medical Decision Making):   Patient presents with low back pain after moving heavy furniture. Has significant tenderness over her bilateral lumbar paraspinal musculature, worse on the right. No midline tenderness or red flag back pain symptoms, I do not feel imaging at this time. We'll treat her pain and reevaluate. ED Course:   Initial assessment performed. The patients presenting problems have been discussed, and they are in agreement with the care plan formulated and outlined with them. I have encouraged them to ask questions as they arise throughout their visit. Patient feeling improved after pain medication. Plan to discharge home with analgesics and outpatient follow-up       Procedures:  Procedures    Critical Care:  none    Disposition:  Discharge Note:  The patient has been re-evaluated and is ready for discharge. Reviewed available results with patient. Counseled patient on diagnosis and care plan. Patient has expressed understanding, and all questions have been answered. Patient agrees with plan and agrees to follow up as recommended, or to return to the ED if their symptoms worsen. Discharge instructions have been provided and explained to the patient, along with reasons to return to the ED. PLAN:  1. Discharge Medication List as of 8/7/2021  7:12 AM      START taking these medications    Details   cyclobenzaprine (FLEXERIL) 10 mg tablet Take 1 Tablet by mouth three (3) times daily as needed for Muscle Spasm(s). , Normal, Disp-20 Tablet, R-0      lidocaine 4 % patch Apply one patch to affected area for 12 hours and the remove for 12 hours, Normal, Disp-5 Patch, R-0      HYDROcodone-acetaminophen (Norco) 5-325 mg per tablet Take 1 Tablet by mouth every four (4) hours as needed for Pain for up to 3 days.  Max Daily Amount: 6 Tablets., Normal, Disp-8 Tablet, R-0         CONTINUE these medications which have NOT CHANGED    Details   pantoprazole (PROTONIX) 20 mg tablet TAKE 1 TABLET BY MOUTH ONCE A DAY AS NEEDED FOR HEARTBURN, Historical Med      propranoloL (INDERAL) 10 mg tablet Historical Med      aspirin 81 mg chewable tablet Take 81 mg by mouth daily. , Historical Med      amLODIPine (NORVASC) 10 mg tablet Take 1 Tab by mouth daily. Indications: high blood pressure, Normal, Disp-30 Tab, R-0      hydroCHLOROthiazide (MICROZIDE) 12.5 mg capsule Take 1 Cap by mouth daily. Indications: high blood pressure, Normal, Disp-30 Cap, R-0      metoprolol tartrate (LOPRESSOR) 25 mg tablet Take 1 Tab by mouth two (2) times a day. Indications: high blood pressure, Normal, Disp-60 Tab, R-0      folic acid (FOLVITE) 1 mg tablet Take 1 Tab by mouth daily. Indications: inadequate folic acid, Normal, BHCG-47 Tab, R-0      therapeutic multivitamin (THERAGRAN) tablet Take 1 Tab by mouth daily. Indications: treatment to prevent vitamin deficiency, Normal, Disp-30 Tab, R-0      thiamine HCL (B-1) 100 mg tablet Take 1 Tab by mouth daily. Indications: deficiency in thiamine or vitamin B1, Normal, Disp-30 Tab, R-0           2. Follow-up Information     Follow up With Specialties Details Why Contact Info    vinod Stringer MD Family Medicine Schedule an appointment as soon as possible for a visit   [address] [place] [address]  [phone]          Return to ED if worse     Diagnosis     Clinical Impression:   1. Acute myofascial strain of lumbar region, initial encounter            Please note that this dictation was completed with [employer], the [employer] voice recognition software. Quite often unanticipated grammatical, syntax, homophones, and other interpretive errors are inadvertently transcribed by the computer software. Please disregard these errors.   Please excuse any errors that have escaped final proofreading

## 2021-08-07 NOTE — ED TRIAGE NOTES
Patient c/o low back pain. She states she was doing heavy lifting and fell off of a stool 2 days ago.

## 2021-08-07 NOTE — ED NOTES
Patient has been instructed that they have been given Hydrocodone which contains opioids, benzodiazepines, or other sedating drugs. Patient is aware that they  will need to refrain from driving or operating heavy machinery after taking this medication. Patient also instructed that they need to avoid drinking alcohol and using other products containing opioids, benzodiazepines, or other sedating drugs. Patient verbalized understanding.

## 2021-09-08 ENCOUNTER — HOSPITAL ENCOUNTER (EMERGENCY)
Age: 46
Discharge: HOME | End: 2021-09-08
Attending: EMERGENCY MEDICINE
Payer: MEDICAID

## 2021-09-08 VITALS
SYSTOLIC BLOOD PRESSURE: 139 MMHG | HEIGHT: 62 IN | RESPIRATION RATE: 18 BRPM | DIASTOLIC BLOOD PRESSURE: 86 MMHG | TEMPERATURE: 98.6 F | OXYGEN SATURATION: 97 % | HEART RATE: 72 BPM | BODY MASS INDEX: 25.4 KG/M2 | WEIGHT: 138 LBS

## 2021-09-08 DIAGNOSIS — B96.89 BV (BACTERIAL VAGINOSIS): Primary | ICD-10-CM

## 2021-09-08 DIAGNOSIS — N76.0 BV (BACTERIAL VAGINOSIS): Primary | ICD-10-CM

## 2021-09-08 DIAGNOSIS — E87.6 HYPOKALEMIA: ICD-10-CM

## 2021-09-08 DIAGNOSIS — R10.84 ABDOMINAL PAIN, GENERALIZED: ICD-10-CM

## 2021-09-08 DIAGNOSIS — M54.50 LOW BACK PAIN WITHOUT SCIATICA, UNSPECIFIED BACK PAIN LATERALITY, UNSPECIFIED CHRONICITY: ICD-10-CM

## 2021-09-08 LAB
ALBUMIN SERPL-MCNC: 3.8 G/DL (ref 3.5–5)
ALBUMIN/GLOB SERPL: 0.9 {RATIO} (ref 1.1–2.2)
ALP SERPL-CCNC: 60 U/L (ref 45–117)
ALT SERPL-CCNC: 30 U/L (ref 12–78)
AMPHET UR QL SCN: NEGATIVE
ANION GAP SERPL CALC-SCNC: 11 MMOL/L (ref 5–15)
APPEARANCE UR: CLEAR
APPEARANCE UR: CLEAR
AST SERPL-CCNC: 24 U/L (ref 15–37)
BARBITURATES UR QL SCN: NEGATIVE
BASOPHILS # BLD: 0.1 K/UL (ref 0–0.1)
BASOPHILS NFR BLD: 1 % (ref 0–1)
BENZODIAZ UR QL: NEGATIVE
BILIRUB SERPL-MCNC: 0.2 MG/DL (ref 0.2–1)
BILIRUB UR QL: NEGATIVE
BILIRUB UR QL: NEGATIVE
BUN SERPL-MCNC: 18 MG/DL (ref 6–20)
BUN/CREAT SERPL: 19 (ref 12–20)
CALCIUM SERPL-MCNC: 8.8 MG/DL (ref 8.5–10.1)
CANNABINOIDS UR QL SCN: NEGATIVE
CHLORIDE SERPL-SCNC: 102 MMOL/L (ref 97–108)
CLUE CELLS VAG QL WET PREP: NORMAL
CO2 SERPL-SCNC: 26 MMOL/L (ref 21–32)
COCAINE UR QL SCN: NEGATIVE
COLOR UR: NORMAL
COLOR UR: NORMAL
CREAT SERPL-MCNC: 0.97 MG/DL (ref 0.55–1.02)
DIFFERENTIAL METHOD BLD: ABNORMAL
DRUG SCRN COMMENT,DRGCM: NORMAL
EOSINOPHIL # BLD: 0 K/UL (ref 0–0.4)
EOSINOPHIL NFR BLD: 0 % (ref 0–7)
ERYTHROCYTE [DISTWIDTH] IN BLOOD BY AUTOMATED COUNT: 11.6 % (ref 11.5–14.5)
ETHANOL SERPL-MCNC: <10 MG/DL
GLOBULIN SER CALC-MCNC: 4.1 G/DL (ref 2–4)
GLUCOSE SERPL-MCNC: 99 MG/DL (ref 65–100)
GLUCOSE UR STRIP.AUTO-MCNC: NEGATIVE MG/DL
GLUCOSE UR STRIP.AUTO-MCNC: NEGATIVE MG/DL
HCG UR QL: NEGATIVE
HCT VFR BLD AUTO: 37.8 % (ref 35–47)
HGB BLD-MCNC: 13.2 G/DL (ref 11.5–16)
HGB UR QL STRIP: NEGATIVE
HGB UR QL STRIP: NEGATIVE
IMM GRANULOCYTES # BLD AUTO: 0 K/UL (ref 0–0.04)
IMM GRANULOCYTES NFR BLD AUTO: 1 % (ref 0–0.5)
KETONES UR QL STRIP.AUTO: NEGATIVE MG/DL
KETONES UR QL STRIP.AUTO: NEGATIVE MG/DL
KOH PREP SPEC: NORMAL
LEUKOCYTE ESTERASE UR QL STRIP.AUTO: NEGATIVE
LEUKOCYTE ESTERASE UR QL STRIP.AUTO: NEGATIVE
LIPASE SERPL-CCNC: 128 U/L (ref 73–393)
LYMPHOCYTES # BLD: 2.8 K/UL (ref 0.8–3.5)
LYMPHOCYTES NFR BLD: 35 % (ref 12–49)
MAGNESIUM SERPL-MCNC: 2.1 MG/DL (ref 1.6–2.4)
MCH RBC QN AUTO: 31.4 PG (ref 26–34)
MCHC RBC AUTO-ENTMCNC: 34.9 G/DL (ref 30–36.5)
MCV RBC AUTO: 89.8 FL (ref 80–99)
METHADONE UR QL: NEGATIVE
MONOCYTES # BLD: 0.6 K/UL (ref 0–1)
MONOCYTES NFR BLD: 7 % (ref 5–13)
NEUTS SEG # BLD: 4.6 K/UL (ref 1.8–8)
NEUTS SEG NFR BLD: 56 % (ref 32–75)
NITRITE UR QL STRIP.AUTO: NEGATIVE
NITRITE UR QL STRIP.AUTO: NEGATIVE
NRBC # BLD: 0 K/UL (ref 0–0.01)
NRBC BLD-RTO: 0 PER 100 WBC
OPIATES UR QL: NEGATIVE
PCP UR QL: NEGATIVE
PH UR STRIP: 6 [PH] (ref 5–8)
PH UR STRIP: 6 [PH] (ref 5–8)
PLATELET # BLD AUTO: 386 K/UL (ref 150–400)
PMV BLD AUTO: 9.3 FL (ref 8.9–12.9)
POTASSIUM SERPL-SCNC: 3.4 MMOL/L (ref 3.5–5.1)
PROT SERPL-MCNC: 7.9 G/DL (ref 6.4–8.2)
PROT UR STRIP-MCNC: NEGATIVE MG/DL
PROT UR STRIP-MCNC: NEGATIVE MG/DL
RBC # BLD AUTO: 4.21 M/UL (ref 3.8–5.2)
SERVICE CMNT-IMP: NORMAL
SODIUM SERPL-SCNC: 139 MMOL/L (ref 136–145)
SP GR UR REFRACTOMETRY: 1.02 (ref 1–1.03)
SP GR UR REFRACTOMETRY: 1.02 (ref 1–1.03)
T VAGINALIS VAG QL WET PREP: NORMAL
UROBILINOGEN UR QL STRIP.AUTO: 1 EU/DL (ref 0.2–1)
UROBILINOGEN UR QL STRIP.AUTO: 1 EU/DL (ref 0.2–1)
WBC # BLD AUTO: 8.1 K/UL (ref 3.6–11)

## 2021-09-08 PROCEDURE — 87210 SMEAR WET MOUNT SALINE/INK: CPT

## 2021-09-08 PROCEDURE — 80053 COMPREHEN METABOLIC PANEL: CPT

## 2021-09-08 PROCEDURE — 74011000250 HC RX REV CODE- 250: Performed by: EMERGENCY MEDICINE

## 2021-09-08 PROCEDURE — 83690 ASSAY OF LIPASE: CPT

## 2021-09-08 PROCEDURE — 82077 ASSAY SPEC XCP UR&BREATH IA: CPT

## 2021-09-08 PROCEDURE — 83735 ASSAY OF MAGNESIUM: CPT

## 2021-09-08 PROCEDURE — 80307 DRUG TEST PRSMV CHEM ANLYZR: CPT

## 2021-09-08 PROCEDURE — 96372 THER/PROPH/DIAG INJ SC/IM: CPT

## 2021-09-08 PROCEDURE — 85025 COMPLETE CBC W/AUTO DIFF WBC: CPT

## 2021-09-08 PROCEDURE — 81025 URINE PREGNANCY TEST: CPT

## 2021-09-08 PROCEDURE — 81003 URINALYSIS AUTO W/O SCOPE: CPT

## 2021-09-08 PROCEDURE — 87491 CHLMYD TRACH DNA AMP PROBE: CPT

## 2021-09-08 PROCEDURE — 74011250636 HC RX REV CODE- 250/636: Performed by: EMERGENCY MEDICINE

## 2021-09-08 PROCEDURE — 99284 EMERGENCY DEPT VISIT MOD MDM: CPT

## 2021-09-08 PROCEDURE — 96374 THER/PROPH/DIAG INJ IV PUSH: CPT

## 2021-09-08 RX ORDER — ONDANSETRON 2 MG/ML
4 INJECTION INTRAMUSCULAR; INTRAVENOUS
Status: COMPLETED | OUTPATIENT
Start: 2021-09-08 | End: 2021-09-08

## 2021-09-08 RX ORDER — ONDANSETRON 4 MG/1
4 TABLET, ORALLY DISINTEGRATING ORAL
Qty: 20 TABLET | Refills: 0 | OUTPATIENT
Start: 2021-09-08 | End: 2022-02-01

## 2021-09-08 RX ORDER — DICYCLOMINE HYDROCHLORIDE 20 MG/1
20 TABLET ORAL
Qty: 15 TABLET | Refills: 0 | Status: SHIPPED | OUTPATIENT
Start: 2021-09-08 | End: 2021-11-18 | Stop reason: ALTCHOICE

## 2021-09-08 RX ORDER — LIDOCAINE 4 G/100G
PATCH TOPICAL
Qty: 30 PATCH | Refills: 0 | OUTPATIENT
Start: 2021-09-08 | End: 2021-11-10

## 2021-09-08 RX ORDER — LIDOCAINE 4 G/100G
2 PATCH TOPICAL EVERY 24 HOURS
Status: DISCONTINUED | OUTPATIENT
Start: 2021-09-08 | End: 2021-09-09 | Stop reason: HOSPADM

## 2021-09-08 RX ORDER — DICYCLOMINE HYDROCHLORIDE 10 MG/ML
20 INJECTION INTRAMUSCULAR
Status: COMPLETED | OUTPATIENT
Start: 2021-09-08 | End: 2021-09-08

## 2021-09-08 RX ORDER — METRONIDAZOLE 500 MG/1
500 TABLET ORAL 2 TIMES DAILY
Qty: 14 TABLET | Refills: 0 | OUTPATIENT
Start: 2021-09-08 | End: 2022-02-01

## 2021-09-08 RX ADMIN — DICYCLOMINE HYDROCHLORIDE 20 MG: 20 INJECTION, SOLUTION INTRAMUSCULAR at 21:20

## 2021-09-08 RX ADMIN — SODIUM CHLORIDE 1000 ML: 9 INJECTION, SOLUTION INTRAVENOUS at 21:18

## 2021-09-08 RX ADMIN — ONDANSETRON 4 MG: 2 INJECTION INTRAMUSCULAR; INTRAVENOUS at 21:20

## 2021-09-08 NOTE — ED TRIAGE NOTES
C/o abd pain with nausea x 1.5 weeks. Pt also reporting vaginal itching, headache, and lower back pain.

## 2021-09-09 NOTE — ED PROVIDER NOTES
45-year-old female with a history of hypertension, heart murmur, cardiac arrhythmia, neuropathy, TIA, Chiari malformation, angina, anxiety, depression presents with 1 to 2 weeks of diffuse abdominal pain and back pain accompanied by nausea. The pain got worse in the last 24 hours, and she was up all night unable to sleep. Patient states that the onset of symptoms she thought it was a UTI because she was having dysuria. She drank water and cranberry juice and the dysuria decreased. Then she noticed vaginal itching as well as worsening lower abdominal pain. Has no appetite due to persistent nausea, but denies vomiting or diarrhea. Denies difficulty voiding, blood in urine, vaginal bleeding, vaginal discharge. Has never had similar pain before. She does have a history of an ectopic pregnancy. Unsure if she is pregnant currently. Has tried Tylenol over-the-counter without relief.            Past Medical History:   Diagnosis Date    angina     Angina at rest [facility])     Breast pain     since 2016, right breast, on and off    Chiari malformation     Heart abnormalities     leaky heart valve, murmur    Hypertension     Other ill-defined conditions(799.89)     cardiac arrhythmias    Other ill-defined conditions(799.89)     neuropathy    Psychiatric disorder     anxiety and depression    Stroke ([facility] [address]     TIA       Past Surgical History:   Procedure Laterality Date    HX GYN      HX ORTHOPAEDIC      screws in left ankle    HX OTHER SURGICAL      surgery to back of head due to fractured skull    NEUROLOGICAL PROCEDURE UNLISTED      MD ABDOMEN SURGERY PROC UNLISTED      laporscopic surgery post ruptured fallopian tube         Family History:   Problem Relation Age of Onset    Hypertension Mother     Stroke Mother     Diabetes Mother     Drug Abuse Father        Social History     Socioeconomic History    Marital status: LEGALLY      Spouse name: Not on file    Number of children: Not on file  Years of education: Not on file    Highest education level: Not on file   Occupational History    Not on file   Tobacco Use    Smoking status: Former Smoker     Packs/day: 0.25     Years: 23.00     Pack years: 5.75     Quit date: 3/4/2021     Years since quittin.5    Smokeless tobacco: Current User    Tobacco comment: 1-2 cigs/day   Vaping Use    Vaping Use: Some days   Substance and Sexual Activity    Alcohol use: Yes     Alcohol/week: 49.0 standard drinks     Types: 52 Shots of liquor per week     Comment: social    Drug use: Yes     Types: Marijuana     Comment: former crack user approx clean 2017    Sexual activity: Yes     Partners: Male     Birth control/protection: Surgical   Other Topics Concern     Service Not Asked    Blood Transfusions Not Asked    Caffeine Concern Not Asked    Occupational Exposure Not Asked    Hobby Hazards Not Asked    Sleep Concern Not Asked    Stress Concern Not Asked    Weight Concern Not Asked    Special Diet Not Asked    Back Care Not Asked    Exercise Not Asked    Bike Helmet Not Asked    [address] Not Asked    Self-Exams Not Asked   Social History Narrative    Not on file     Social Determinants of Health     Financial Resource Strain:     Difficulty of Paying Living Expenses:    Food Insecurity:     Worried About Running Out of Food in the Last Year:     [address] in the Last Year:    Transportation Needs:     Lack of Transportation (Medical):      Lack of Transportation (Non-Medical):    Physical Activity:     Days of Exercise per Week:     Minutes of Exercise per Session:    Stress:     Feeling of Stress :    Social Connections:     Frequency of Communication with Friends and Family:     Frequency of Social Gatherings with Friends and Family:     Attends [employer] Services:     Active Member of Clubs or Organizations:     Attends Club or Organization Meetings:     Marital Status:    Intimate Partner Violence:     Fear of Current or Ex-Partner:     Emotionally Abused:     Physically Abused:     Sexually Abused: ALLERGIES: Toradol [ketorolac]    Review of Systems   Constitutional: Negative. Negative for chills, fever and unexpected weight change. HENT: Negative. Negative for congestion and trouble swallowing. Eyes: Negative for discharge. Respiratory: Negative. Negative for cough, chest tightness and shortness of breath. Cardiovascular: Negative. Negative for chest pain. Gastrointestinal: Positive for abdominal pain and nausea. Negative for abdominal distention, constipation and diarrhea. Endocrine: Negative. Genitourinary: Positive for dysuria and vaginal pain. Negative for difficulty urinating, frequency and urgency. Musculoskeletal: Positive for back pain. Negative for arthralgias and myalgias. Skin: Negative. Negative for color change. Allergic/Immunologic: Negative. Neurological: Negative. Negative for dizziness, speech difficulty and headaches. Hematological: Negative. Psychiatric/Behavioral: Negative. Negative for agitation and confusion. All other systems reviewed and are negative. Vitals:    09/08/21 1928   BP: (!) 145/90   Pulse: 88   Resp: 18   Temp: 98.6 °F (37 °C)   SpO2: 100%   Weight: 62.6 kg (138 lb)   Height: 5' 2\" (1.575 m)            Physical Exam  Vitals and nursing note reviewed. Constitutional:       Appearance: She is well-developed. HENT:      Head: Normocephalic and atraumatic. Eyes:      Conjunctiva/sclera: Conjunctivae normal.   Cardiovascular:      Rate and Rhythm: Normal rate and regular rhythm. Pulmonary:      Effort: Pulmonary effort is normal. No respiratory distress. Abdominal:      Palpations: Abdomen is soft. Tenderness: There is no abdominal tenderness. Comments: Mild left-sided abdominal tenderness to palpation without guarding or rebound   Musculoskeletal:         General: No deformity. Normal range of motion. Cervical back: Neck supple. Skin:     General: Skin is warm and dry. Neurological:      Mental Status: She is alert and oriented to person, place, and time. Psychiatric:         Behavior: Behavior normal.         Thought Content: Thought content normal.          MDM  Number of Diagnoses or Management Options  Diagnosis management comments: Diffuse abdominal pain, greater on the left side, with no reproducible tenderness and mildly hyperactive bowel sounds. Differential diagnosis: Renal colic, constipation, gastroenteritis, ovarian cyst, UTI, pyelonephritis,    ED Course as of Sep 15 0538   Wed Sep 08, 2021   2234 Patient reassessed. Abdominal pain is resolved. Only complaint of back pain. Will treat as BV and have patient follow-up with her PCP/OB. [SS]      ED Course User Index  [SS] Nacho Rouse MD       Procedures      LABORATORY TESTS:  No results found for this or any previous visit (from the past 12 hour(s)). IMAGING RESULTS:  No orders to display       MEDICATIONS GIVEN:  Medications   ondansetron (ZOFRAN) injection 4 mg (4 mg IntraVENous Given 9/8/21 2120)   sodium chloride 0.9 % bolus infusion 1,000 mL (0 mL IntraVENous IV Completed 9/8/21 2251)   dicyclomine (BENTYL) 10 mg/mL injection 20 mg (20 mg IntraMUSCular Given 9/8/21 2120)       IMPRESSION:  1. BV (bacterial vaginosis)    2. Abdominal pain, generalized    3. Hypokalemia    4. Low back pain without sciatica, unspecified back pain laterality, unspecified chronicity        PLAN:  1.    Discharge Medication List as of 9/8/2021 10:42 PM      START taking these medications    Details   metroNIDAZOLE (FlagyL) 500 mg tablet Take 1 Tablet by mouth two (2) times a day., Normal, Disp-14 Tablet, R-0      dicyclomine (BENTYL) 20 mg tablet Take 1 Tablet by mouth every six (6) hours as needed for Abdominal Cramps., Normal, Disp-15 Tablet, R-0      ondansetron (Zofran ODT) 4 mg disintegrating tablet Take 1 Tablet by mouth every eight (8) hours as needed for Nausea., Normal, Disp-20 Tablet, R-0         CONTINUE these medications which have CHANGED    Details   lidocaine 4 % patch Apply one patch to affected area for 12 hours and the remove for 12 hours, Normal, Disp-30 Patch, R-0         CONTINUE these medications which have NOT CHANGED    Details   amLODIPine (NORVASC) 10 mg tablet Take 1 Tab by mouth daily. Indications: high blood pressure, Normal, Disp-30 Tab, R-0      hydroCHLOROthiazide (MICROZIDE) 12.5 mg capsule Take 1 Cap by mouth daily. Indications: high blood pressure, Normal, Disp-30 Cap, R-0      metoprolol tartrate (LOPRESSOR) 25 mg tablet Take 1 Tab by mouth two (2) times a day. Indications: high blood pressure, Normal, Disp-60 Tab, R-0      cyclobenzaprine (FLEXERIL) 10 mg tablet Take 1 Tablet by mouth three (3) times daily as needed for Muscle Spasm(s). , Normal, Disp-20 Tablet, R-0      pantoprazole (PROTONIX) 20 mg tablet TAKE 1 TABLET BY MOUTH ONCE A DAY AS NEEDED FOR HEARTBURN, Historical Med      propranoloL (INDERAL) 10 mg tablet Historical Med      aspirin 81 mg chewable tablet Take 81 mg by mouth daily. , Historical Med      therapeutic multivitamin (THERAGRAN) tablet Take 1 Tab by mouth daily. Indications: treatment to prevent vitamin deficiency, Normal, Disp-30 Tab, R-0      folic acid (FOLVITE) 1 mg tablet Take 1 Tab by mouth daily. Indications: inadequate folic acid, Normal, ETTJ-50 Tab, R-0      thiamine HCL (B-1) 100 mg tablet Take 1 Tab by mouth daily. Indications: deficiency in thiamine or vitamin B1, Normal, Disp-30 Tab, R-0           2. Follow-up Information     Follow up With Specialties Details Why Contact Info    vinod Klein, [address] [address] [phone]      [address] [facility]    [address] [address]  [phone]        Return to ED if worse

## 2021-09-09 NOTE — ED NOTES
Discharge instructions were given to the patient by this RN. The patient left the Emergency Department ambulatory, alert and oriented and in no acute distress with e- prescriptions. The patient was encouraged to call or return to the ED for worsening issues or problems and was encouraged to schedule a follow up appointment for continuing care. The patient verbalized understanding of discharge instructions and prescriptions, all questions were answered. The patient has no further concerns at this time.

## 2021-09-09 NOTE — ED NOTES
Pt presents to ED ambulatory complaining of lower abdominal cramping x 1.5 weeks. Pt reports she has been having some lower back pain and vaginal itching. Pt reports taking OTC yeast medication w/o relief. Pt also reports having nausea but denies any episode of vomiting. Pt is alert and oriented x 4, RR even and unlabored, skin is warm and dry. Assessment completed and pt updated on plan of care. Call bell in reach. Emergency Department Nursing Plan of Care       The Nursing Plan of Care is developed from the Nursing assessment and Emergency Department Attending provider initial evaluation. The plan of care may be reviewed in the ED Provider note.     The Plan of Care was developed with the following considerations:   Patient / Family readiness to learn indicated by:verbalized understanding  Persons(s) to be included in education: patient  Barriers to Learning/Limitations:No    Signed     Uma Marrufo RN    9/8/2021   8:26 PM

## 2021-09-11 LAB
C TRACH RRNA SPEC QL NAA+PROBE: NEGATIVE
N GONORRHOEA RRNA SPEC QL NAA+PROBE: NEGATIVE
SPECIMEN SOURCE: NORMAL

## 2021-09-13 ENCOUNTER — HOSPITAL ENCOUNTER (EMERGENCY)
Age: 46
Discharge: HOME | End: 2021-09-13
Attending: EMERGENCY MEDICINE
Payer: MEDICAID

## 2021-09-13 VITALS
TEMPERATURE: 98.3 F | RESPIRATION RATE: 20 BRPM | HEIGHT: 62 IN | DIASTOLIC BLOOD PRESSURE: 113 MMHG | HEART RATE: 92 BPM | SYSTOLIC BLOOD PRESSURE: 175 MMHG | OXYGEN SATURATION: 96 % | WEIGHT: 148 LBS | BODY MASS INDEX: 27.23 KG/M2

## 2021-09-13 DIAGNOSIS — J02.9 ACUTE PHARYNGITIS, UNSPECIFIED ETIOLOGY: ICD-10-CM

## 2021-09-13 DIAGNOSIS — Z20.822 SUSPECTED COVID-19 VIRUS INFECTION: Primary | ICD-10-CM

## 2021-09-13 LAB — DEPRECATED S PYO AG THROAT QL EIA: NEGATIVE

## 2021-09-13 PROCEDURE — 87880 STREP A ASSAY W/OPTIC: CPT

## 2021-09-13 PROCEDURE — U0005 INFEC AGEN DETEC AMPLI PROBE: HCPCS

## 2021-09-13 PROCEDURE — 99283 EMERGENCY DEPT VISIT LOW MDM: CPT

## 2021-09-13 PROCEDURE — 93005 ELECTROCARDIOGRAM TRACING: CPT

## 2021-09-13 PROCEDURE — 87070 CULTURE OTHR SPECIMN AEROBIC: CPT

## 2021-09-13 RX ORDER — LIDOCAINE HYDROCHLORIDE 20 MG/ML
15 SOLUTION OROPHARYNGEAL
Qty: 100 ML | Refills: 0 | Status: SHIPPED | OUTPATIENT
Start: 2021-09-13 | End: 2021-11-18 | Stop reason: ALTCHOICE

## 2021-09-13 RX ORDER — IBUPROFEN 800 MG/1
800 TABLET ORAL
Qty: 20 TABLET | Refills: 0 | Status: SHIPPED | OUTPATIENT
Start: 2021-09-13 | End: 2021-09-20

## 2021-09-13 NOTE — ED PROVIDER NOTES
EMERGENCY DEPARTMENT HISTORY AND PHYSICAL EXAM    Date: 9/13/2021  Patient Name: Nicholas Damico    History of Presenting Illness     Chief Complaint   Patient presents with    Generalized Body Aches    Sore Throat         History Provided By: Patient    HPI: Nicholas Damico is a 40 y.o. female with a PMH of hypertension, TIA, anxiety, depression who presents with body aches, sore throat, nasal congestion, loss of taste and chills x2 days. Patient rates pain 9 out of 10 and aching in nature. Patient denies any known exposure to COVID-19 but is also not vaccinated. Patient denies any known fevers, no nausea or vomiting, no diarrhea. Patient has not taken anything for symptoms prior to arrival.    PCP: Margot Soliz MD    Current Outpatient Medications   Medication Sig Dispense Refill    ibuprofen (MOTRIN) 800 mg tablet Take 1 Tablet by mouth every eight (8) hours as needed for Pain for up to 7 days. 20 Tablet 0    lidocaine (XYLOCAINE) 2 % solution Take 15 mL by mouth every four (4) hours as needed for Pain. 100 mL 0    metroNIDAZOLE (FlagyL) 500 mg tablet Take 1 Tablet by mouth two (2) times a day. 14 Tablet 0    ondansetron (Zofran ODT) 4 mg disintegrating tablet Take 1 Tablet by mouth every eight (8) hours as needed for Nausea. 20 Tablet 0    amLODIPine (NORVASC) 10 mg tablet Take 1 Tab by mouth daily. Indications: high blood pressure 30 Tab 0    hydroCHLOROthiazide (MICROZIDE) 12.5 mg capsule Take 1 Cap by mouth daily. Indications: high blood pressure 30 Cap 0    therapeutic multivitamin (THERAGRAN) tablet Take 1 Tab by mouth daily. Indications: treatment to prevent vitamin deficiency 30 Tab 0    lidocaine 4 % patch Apply one patch to affected area for 12 hours and the remove for 12 hours 30 Patch 0    dicyclomine (BENTYL) 20 mg tablet Take 1 Tablet by mouth every six (6) hours as needed for Abdominal Cramps.  15 Tablet 0       Past History     Past Medical History:  Past Medical History:   Diagnosis Date    angina     Angina [facility] rest [facility])     Breast pain     since 2016, right breast, on and off    Chiari malformation     Heart abnormalities     leaky heart valve, murmur    Hypertension     Other ill-defined conditions(799.89)     cardiac arrhythmias    Other ill-defined conditions(799.89)     neuropathy    Psychiatric disorder     anxiety and depression    Stroke ([facility] [address]     TIA       Past Surgical History:  Past Surgical History:   Procedure Laterality Date    HX GYN      HX ORTHOPAEDIC      screws in left ankle    HX OTHER SURGICAL      surgery to back of head due to fractured skull    NEUROLOGICAL PROCEDURE UNLISTED      ME ABDOMEN SURGERY PROC UNLISTED      laporscopic surgery post ruptured fallopian tube       Family History:  Family History   Problem Relation Age of Onset    Hypertension Mother     Stroke Mother     Diabetes Mother     Drug Abuse Father        Social History:  Social History     Tobacco Use    Smoking status: Former Smoker     Packs/day: 0.25     Years: 23.00     Pack years: 5.75     Quit date: 3/4/2021     Years since quittin.5    Smokeless tobacco: Current User    Tobacco comment: 1-2 cigs/day   Vaping Use    Vaping Use: Some days   Substance Use Topics    Alcohol use: Yes     Alcohol/week: 49.0 standard drinks     Types: 52 Shots of liquor per week     Comment: social    Drug use: Yes     Types: Marijuana     Comment: former crack user approx clean 2017       Allergies: Allergies   Allergen Reactions    Toradol [Ketorolac] Hives         Review of Systems   Review of Systems   Constitutional: Positive for chills. Negative for fever. HENT: Positive for congestion, rhinorrhea, sneezing and sore throat. Respiratory: Positive for cough. Negative for shortness of breath, wheezing and stridor. Musculoskeletal: Positive for myalgias. Neurological: Negative for speech difficulty and weakness.    All other systems reviewed and are negative. Physical Exam     Vitals:    09/13/21 1405 09/13/21 1435   BP: (!) 156/118 (!) 175/113   Pulse: 88 92   Resp: 20    Temp: 98.3 °F (36.8 °C)    SpO2: 96%    Weight: 67.1 kg (148 lb)    Height: 5' 2\" (1.575 m)      Physical Exam  Vitals and nursing note reviewed. Constitutional:       General: She is not in acute distress. Appearance: She is well-developed. HENT:      Head: Normocephalic and atraumatic. Right Ear: Tympanic membrane normal.      Left Ear: Tympanic membrane normal.      Mouth/Throat:      Mouth: Mucous membranes are moist. No oral lesions. Pharynx: Uvula midline. No pharyngeal swelling, oropharyngeal exudate or posterior oropharyngeal erythema. Eyes:      Conjunctiva/sclera: Conjunctivae normal.   Cardiovascular:      Rate and Rhythm: Normal rate and regular rhythm. Heart sounds: Normal heart sounds. Pulmonary:      Effort: Pulmonary effort is normal. No respiratory distress. Breath sounds: Normal breath sounds. No wheezing or rales. Skin:     General: Skin is warm and dry. Neurological:      Mental Status: She is alert and oriented to person, place, and time. Psychiatric:         Behavior: Behavior normal.         Thought Content:  Thought content normal.         Judgment: Judgment normal.           Diagnostic Study Results     Labs -     Recent Results (from the past 12 hour(s))   STREP AG SCREEN, GROUP A    Collection Time: 09/13/21  2:28 PM    Specimen: Swab   Result Value Ref Range    Group A Strep Ag ID Negative NEG     EKG, 12 LEAD, INITIAL    Collection Time: 09/13/21  2:48 PM   Result Value Ref Range    Ventricular Rate 77 BPM    Atrial Rate 77 BPM    P-R Interval 148 ms    QRS Duration 78 ms    Q-T Interval 398 ms    QTC Calculation (Bezet) 450 ms    Calculated P Axis 45 degrees    Calculated R Axis 23 degrees    Calculated T Axis 34 degrees    Diagnosis       Normal sinus rhythm  Normal ECG  When compared with ECG of 16-MAY-2021 00:14,  No significant change was found         Radiologic Studies -   No orders to display     CT Results  (Last 48 hours)    None        CXR Results  (Last 48 hours)    None            Medical Decision Making   I am the first provider for this patient. I reviewed the vital signs, available nursing notes, past medical history, past surgical history, family history and social history. Vital Signs-Reviewed the patient's vital signs. Records Reviewed: Nursing Notes and Old Medical Records    Provider Notes (Medical Decision Making):   Patient presents with body aches, loss of taste, nasal congestion, sore throat, chills and concern for Covid. We will do a Covid and strep swab and follow-up with patient after discharge should it be positive. Patient does not warrant any other labs at this time. Patient has been given isolation precautions per CDC guidelines. Disposition:  Discharged    DISCHARGE NOTE:   2:54 PM      Care plan outlined and precautions discussed. Patient has no new complaints, changes, or physical findings. Results of strep swab were reviewed with the patient. All medications were reviewed with the patient; will d/c home. All of pt's questions and concerns were addressed. Patient was instructed and agrees to follow up with PCP as needed, as well as to return to the ED upon further deterioration. Patient is ready to go home. Follow-up Information     Follow up With Specialties Details Why Contact Info    vinod Yousif MD Family Medicine In 1 week As needed [address] [place] [address]  [phone]            Discharge Medication List as of 9/13/2021  2:54 PM      START taking these medications    Details   ibuprofen (MOTRIN) 800 mg tablet Take 1 Tablet by mouth every eight (8) hours as needed for Pain for up to 7 days. , Normal, Disp-20 Tablet, R-0      lidocaine (XYLOCAINE) 2 % solution Take 15 mL by mouth every four (4) hours as needed for Pain., Normal, Disp-100 mL, R-0 CONTINUE these medications which have NOT CHANGED    Details   metroNIDAZOLE (FlagyL) 500 mg tablet Take 1 Tablet by mouth two (2) times a day., Normal, Disp-14 Tablet, R-0      ondansetron (Zofran ODT) 4 mg disintegrating tablet Take 1 Tablet by mouth every eight (8) hours as needed for Nausea., Normal, Disp-20 Tablet, R-0      amLODIPine (NORVASC) 10 mg tablet Take 1 Tab by mouth daily. Indications: high blood pressure, Normal, Disp-30 Tab, R-0      hydroCHLOROthiazide (MICROZIDE) 12.5 mg capsule Take 1 Cap by mouth daily. Indications: high blood pressure, Normal, Disp-30 Cap, R-0      therapeutic multivitamin (THERAGRAN) tablet Take 1 Tab by mouth daily. Indications: treatment to prevent vitamin deficiency, Normal, Disp-30 Tab, R-0      lidocaine 4 % patch Apply one patch to affected area for 12 hours and the remove for 12 hours, Normal, Disp-30 Patch, R-0      dicyclomine (BENTYL) 20 mg tablet Take 1 Tablet by mouth every six (6) hours as needed for Abdominal Cramps., Normal, Disp-15 Tablet, R-0             Procedures:  Procedures    Please note that this dictation was completed with Dragon, computer voice recognition software. Quite often unanticipated grammatical, syntax, homophones, and other interpretive errors are inadvertently transcribed by the computer software. Please disregard these errors. Additionally, please excuse any errors that have escaped final proofreading. Diagnosis     Clinical Impression:   1. Suspected COVID-19 virus infection    2.  Acute pharyngitis, unspecified etiology

## 2021-09-13 NOTE — ED NOTES
Discharge instructions discussed with patient. Patient expressed understanding of the instructions provided. Patient was in stable condition when she ambulated out of the ED in the care of herself.

## 2021-09-13 NOTE — ED TRIAGE NOTES
Reports generalized body aches, has chills, loss of taste, shortness of breath. Symptoms starting for two days.

## 2021-09-13 NOTE — ED NOTES
Emergency Department Nursing Plan of Care       The Nursing Plan of Care is developed from the Nursing assessment and Emergency Department Attending provider initial evaluation. The plan of care may be reviewed in the ED Provider note.     The Plan of Care was developed with the following considerations:   Patient / Family readiness to learn indicated by:verbalized understanding  Persons(s) to be included in education: patient  Barriers to Learning/Limitations:No    Signed     Salena Oliveros    9/13/2021   3:05 PM

## 2021-09-13 NOTE — Clinical Note
[facility] EMERGENCY DEPT  [address] [address] [phone]    Work/School Note    Date: 9/13/2021     To Whom It May concern:    Dilan Stewart was evaulated by the following provider(s):  Attending Provider: Casey Glover MD  Physician Assistant: Miguel Mora virus is suspected. Per the CDC guidelines we recommend home isolation until the following conditions are all met:    1. At least 10 days have passed since symptoms first appeared and  2. At least 24 hours have passed since last fever without the use of fever-reducing medications and  3.  Symptoms (e.g., cough, shortness of breath) have improved    Sincerely,          Nolan Duarte PA-C

## 2021-09-14 ENCOUNTER — PATIENT OUTREACH (OUTPATIENT)
Dept: CASE MANAGEMENT | Age: 46
End: 2021-09-14

## 2021-09-14 LAB
ATRIAL RATE: 77 BPM
CALCULATED P AXIS, ECG09: 45 DEGREES
CALCULATED R AXIS, ECG10: 23 DEGREES
CALCULATED T AXIS, ECG11: 34 DEGREES
DIAGNOSIS, 93000: NORMAL
P-R INTERVAL, ECG05: 148 MS
Q-T INTERVAL, ECG07: 398 MS
QRS DURATION, ECG06: 78 MS
QTC CALCULATION (BEZET), ECG08: 450 MS
SARS-COV-2, XPLCVT: NOT DETECTED
SOURCE, COVRS: NORMAL
VENTRICULAR RATE, ECG03: 77 BPM

## 2021-09-15 LAB
BACTERIA SPEC CULT: NORMAL
SERVICE CMNT-IMP: NORMAL

## 2021-09-21 ENCOUNTER — PATIENT OUTREACH (OUTPATIENT)
Dept: CASE MANAGEMENT | Age: 46
End: 2021-09-21

## 2021-09-21 NOTE — PROGRESS NOTES
Patient resolved from Transition of Care episode on 9/21/21. ACM/CTN was unsuccessful at contacting this patient today. Patient/family was provided the following resources and education related to COVID-19 during the initial call:                         Signs, symptoms and red flags related to COVID-19            CDC exposure and quarantine guidelines            [employer] exposure contact - [phone]            Contact for their local Department of Health                 Patient has not had any additional ED or hospital visits. No further outreach scheduled with this CTN/ACM. Episode of Care resolved. Patient has this CTN/ACM contact information if future needs arise.

## 2021-11-10 ENCOUNTER — APPOINTMENT (OUTPATIENT)
Dept: CT IMAGING | Age: 46
End: 2021-11-10
Attending: PHYSICIAN ASSISTANT
Payer: MEDICAID

## 2021-11-10 ENCOUNTER — APPOINTMENT (OUTPATIENT)
Dept: GENERAL RADIOLOGY | Age: 46
End: 2021-11-10
Attending: PHYSICIAN ASSISTANT
Payer: MEDICAID

## 2021-11-10 ENCOUNTER — HOSPITAL ENCOUNTER (EMERGENCY)
Age: 46
Discharge: HOME | End: 2021-11-10
Attending: EMERGENCY MEDICINE
Payer: MEDICAID

## 2021-11-10 VITALS
BODY MASS INDEX: 24.84 KG/M2 | SYSTOLIC BLOOD PRESSURE: 151 MMHG | HEART RATE: 82 BPM | HEIGHT: 62 IN | WEIGHT: 135 LBS | OXYGEN SATURATION: 100 % | DIASTOLIC BLOOD PRESSURE: 94 MMHG | TEMPERATURE: 98.4 F | RESPIRATION RATE: 16 BRPM

## 2021-11-10 DIAGNOSIS — S60.021A CONTUSION OF RIGHT INDEX FINGER WITHOUT DAMAGE TO NAIL, INITIAL ENCOUNTER: ICD-10-CM

## 2021-11-10 DIAGNOSIS — T14.8XXA CONTUSION OF LEFT CLAVICLE, INITIAL ENCOUNTER: Primary | ICD-10-CM

## 2021-11-10 DIAGNOSIS — T71.193A ASSAULT BY MANUAL STRANGULATION: ICD-10-CM
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
## 2021-11-10 LAB
ANION GAP BLD CALC-SCNC: 10 MMOL/L (ref 10–20)
CA-I BLD-MCNC: 1.12 MMOL/L (ref 1.12–1.32)
CHLORIDE BLD-SCNC: 102 MMOL/L (ref 100–108)
CO2 BLD-SCNC: 29 MMOL/L (ref 19–24)
CREAT UR-MCNC: 0.6 MG/DL (ref 0.6–1.3)
GLUCOSE BLD STRIP.AUTO-MCNC: 107 MG/DL (ref 74–106)
HCG UR QL: NEGATIVE
LACTATE BLD-SCNC: 0.57 MMOL/L (ref 0.4–2)
POTASSIUM BLD-SCNC: 4.5 MMOL/L (ref 3.5–5.5)
SODIUM BLD-SCNC: 140 MMOL/L (ref 136–145)

## 2021-11-10 PROCEDURE — 93005 ELECTROCARDIOGRAM TRACING: CPT

## 2021-11-10 PROCEDURE — 99284 EMERGENCY DEPT VISIT MOD MDM: CPT

## 2021-11-10 PROCEDURE — 83605 ASSAY OF LACTIC ACID: CPT

## 2021-11-10 PROCEDURE — 80047 BASIC METABLC PNL IONIZED CA: CPT

## 2021-11-10 PROCEDURE — 73010 X-RAY EXAM OF SHOULDER BLADE: CPT

## 2021-11-10 PROCEDURE — 74011250637 HC RX REV CODE- 250/637: Performed by: PHYSICIAN ASSISTANT

## 2021-11-10 PROCEDURE — 73140 X-RAY EXAM OF FINGER(S): CPT

## 2021-11-10 PROCEDURE — 73000 X-RAY EXAM OF COLLAR BONE: CPT

## 2021-11-10 PROCEDURE — 75810000275 HC EMERGENCY DEPT VISIT NO LEVEL OF CARE

## 2021-11-10 PROCEDURE — 81025 URINE PREGNANCY TEST: CPT

## 2021-11-10 RX ORDER — LIDOCAINE 50 MG/G
PATCH TOPICAL
Qty: 15 EACH | Refills: 0 | OUTPATIENT
Start: 2021-11-10 | End: 2022-02-01

## 2021-11-10 RX ORDER — HYDROCODONE BITARTRATE AND ACETAMINOPHEN 5; 325 MG/1; MG/1
1 TABLET ORAL
Status: COMPLETED | OUTPATIENT
Start: 2021-11-10 | End: 2021-11-10

## 2021-11-10 RX ORDER — ACETAMINOPHEN 500 MG
1000 TABLET ORAL
Qty: 20 TABLET | Refills: 0 | Status: SHIPPED | OUTPATIENT
Start: 2021-11-10 | End: 2021-11-18 | Stop reason: ALTCHOICE

## 2021-11-10 RX ADMIN — HYDROCODONE BITARTRATE AND ACETAMINOPHEN 1 TABLET: 5; 325 TABLET ORAL at 12:20
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
## 2021-11-10 NOTE — ED NOTES
Spoke with Jacquelyne Bloch in [facility] she will come santo brenner.

## 2021-11-10 NOTE — ED NOTES
SL dcd bleeding controlled, dressing applied. PT verbalized understanding of dc instructions, meds and FU.
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
## 2021-11-10 NOTE — DISCHARGE INSTRUCTIONS
It was a pleasure taking care of you at [facility] Emergency Department today. We know that when you come to [facility], you are entrusting us with your health, comfort, and safety. Our physicians and nurses honor that trust, and we truly appreciate the opportunity to care for you and your loved ones. We also value our feedback. If you receive a survey about your Emergency Department experience today, please fill it out. We care about our patients' feedback, and we listen to what you have to say. Thank you!
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
## 2021-11-11 ENCOUNTER — APPOINTMENT (OUTPATIENT)
Dept: CT IMAGING | Age: 46
End: 2021-11-11
Attending: PHYSICIAN ASSISTANT
Payer: MEDICAID

## 2021-11-11 ENCOUNTER — HOSPITAL ENCOUNTER (EMERGENCY)
Age: 46
Discharge: HOME | End: 2021-11-11
Attending: EMERGENCY MEDICINE
Payer: MEDICAID

## 2021-11-11 VITALS
HEIGHT: 62 IN | BODY MASS INDEX: 25.19 KG/M2 | WEIGHT: 136.91 LBS | SYSTOLIC BLOOD PRESSURE: 157 MMHG | OXYGEN SATURATION: 96 % | TEMPERATURE: 98.1 F | DIASTOLIC BLOOD PRESSURE: 97 MMHG | HEART RATE: 88 BPM | RESPIRATION RATE: 16 BRPM
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
## 2021-11-11 DIAGNOSIS — T71.193A ASSAULT BY MANUAL STRANGULATION: ICD-10-CM

## 2021-11-11 LAB
ANION GAP BLD CALC-SCNC: 11 MMOL/L (ref 10–20)
ATRIAL RATE: 78 BPM
CA-I BLD-MCNC: 1.22 MMOL/L (ref 1.12–1.32)
CALCULATED P AXIS, ECG09: 37 DEGREES
CALCULATED R AXIS, ECG10: 21 DEGREES
CALCULATED T AXIS, ECG11: 12 DEGREES
CHLORIDE BLD-SCNC: 106 MMOL/L (ref 98–107)
CO2 BLD-SCNC: 25 MMOL/L (ref 21–32)
CREAT BLD-MCNC: 0.86 MG/DL (ref 0.6–1.3)
DIAGNOSIS, 93000: NORMAL
GLUCOSE BLD-MCNC: 110 MG/DL (ref 65–100)
P-R INTERVAL, ECG05: 146 MS
POTASSIUM BLD-SCNC: 3.9 MMOL/L (ref 3.5–5.1)
Q-T INTERVAL, ECG07: 402 MS
QRS DURATION, ECG06: 80 MS
QTC CALCULATION (BEZET), ECG08: 458 MS
SERVICE CMNT-IMP: ABNORMAL
SODIUM BLD-SCNC: 141 MMOL/L (ref 136–145)
VENTRICULAR RATE, ECG03: 78 BPM

## 2021-11-11 PROCEDURE — 80047 BASIC METABLC PNL IONIZED CA: CPT

## 2021-11-11 PROCEDURE — 75810000275 HC EMERGENCY DEPT VISIT NO LEVEL OF CARE

## 2021-11-11 PROCEDURE — 74011250636 HC RX REV CODE- 250/636: Performed by: PHYSICIAN ASSISTANT

## 2021-11-11 PROCEDURE — 74011000636 HC RX REV CODE- 636: Performed by: EMERGENCY MEDICINE

## 2021-11-11 PROCEDURE — 70498 CT ANGIOGRAPHY NECK: CPT

## 2021-11-11 PROCEDURE — 99284 EMERGENCY DEPT VISIT MOD MDM: CPT

## 2021-11-11 PROCEDURE — 96374 THER/PROPH/DIAG INJ IV PUSH: CPT

## 2021-11-11 RX ORDER — FENTANYL CITRATE 50 UG/ML
50 INJECTION, SOLUTION INTRAMUSCULAR; INTRAVENOUS
Status: COMPLETED | OUTPATIENT
Start: 2021-11-11 | End: 2021-11-11

## 2021-11-11 RX ORDER — DIPHENHYDRAMINE HYDROCHLORIDE 50 MG/ML
25 INJECTION, SOLUTION INTRAMUSCULAR; INTRAVENOUS
Status: COMPLETED | OUTPATIENT
Start: 2021-11-11 | End: 2021-11-11

## 2021-11-11 RX ORDER — HYDROCODONE BITARTRATE AND ACETAMINOPHEN 5; 325 MG/1; MG/1
1 TABLET ORAL
Qty: 10 TABLET | Refills: 0 | Status: SHIPPED | OUTPATIENT
Start: 2021-11-11 | End: 2021-11-14

## 2021-11-11 RX ADMIN — IOPAMIDOL 100 ML: 755 INJECTION, SOLUTION INTRAVENOUS at 10:39

## 2021-11-11 RX ADMIN — FENTANYL CITRATE 50 MCG: 50 INJECTION, SOLUTION INTRAMUSCULAR; INTRAVENOUS at 09:46

## 2021-11-11 RX ADMIN — DIPHENHYDRAMINE HYDROCHLORIDE 25 MG: 50 INJECTION, SOLUTION INTRAMUSCULAR; INTRAVENOUS at 11:24
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
## 2021-11-11 NOTE — ED NOTES
Bedside shift change report given to Donna RN (oncoming nurse) by Carolyn Meek RN (offgoing nurse). Report included the following information SBAR, ED Summary and MAR.
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
## 2021-11-18 ENCOUNTER — OFFICE VISIT (OUTPATIENT)
Dept: CARDIOLOGY CLINIC | Age: 46
End: 2021-11-18
Payer: MEDICAID

## 2021-11-18 VITALS
BODY MASS INDEX: 25.76 KG/M2 | DIASTOLIC BLOOD PRESSURE: 98 MMHG | SYSTOLIC BLOOD PRESSURE: 150 MMHG | RESPIRATION RATE: 19 BRPM | HEART RATE: 79 BPM | HEIGHT: 62 IN | WEIGHT: 140 LBS | OXYGEN SATURATION: 98 %

## 2021-11-18 DIAGNOSIS — I10 PRIMARY HYPERTENSION: Primary | ICD-10-CM

## 2021-11-18 DIAGNOSIS — R06.02 SOB (SHORTNESS OF BREATH): ICD-10-CM

## 2021-11-18 DIAGNOSIS — I35.1 NONRHEUMATIC AORTIC VALVE INSUFFICIENCY: ICD-10-CM

## 2021-11-18 DIAGNOSIS — R07.89 OTHER CHEST PAIN: ICD-10-CM

## 2021-11-18 PROCEDURE — 99214 OFFICE O/P EST MOD 30 MIN: CPT | Performed by: SPECIALIST

## 2021-11-18 RX ORDER — QUETIAPINE FUMARATE 50 MG/1
TABLET, FILM COATED ORAL
COMMUNITY
Start: 2021-10-27 | End: 2022-05-19 | Stop reason: ALTCHOICE

## 2021-11-18 RX ORDER — GUAIFENESIN 100 MG/5ML
81 LIQUID (ML) ORAL DAILY
Status: ON HOLD | COMMUNITY
End: 2022-10-08 | Stop reason: SDUPTHER

## 2021-11-18 RX ORDER — HYDROXYZINE 25 MG/1
TABLET, FILM COATED ORAL AS NEEDED
Status: ON HOLD | COMMUNITY
End: 2022-10-07

## 2021-11-18 RX ORDER — CYCLOBENZAPRINE HCL 10 MG
TABLET ORAL
COMMUNITY
Start: 2021-11-15 | End: 2022-05-19 | Stop reason: ALTCHOICE

## 2021-11-18 RX ORDER — HYDROCHLOROTHIAZIDE 12.5 MG/1
12.5 CAPSULE ORAL DAILY
Qty: 90 CAPSULE | Refills: 3 | Status: SHIPPED | OUTPATIENT
Start: 2021-11-18 | End: 2022-07-30
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
## 2021-11-18 NOTE — PROGRESS NOTES
HISTORY OF PRESENT ILLNESS  Tommy Sweeney is a 40 y.o. female     SUMMARY:   Problem List  Date Reviewed: 11/18/2021          Codes Class Noted    Nonrheumatic aortic valve insufficiency ICD-10-CM: I35.1  ICD-9-CM: 424.1  5/20/2021    Overview Signed 5/20/2021  8:19 AM by Fuentes Nance MD     07/15/20  echo normal lvef, lvh, mild to mod aortic regurg, mild tr             Bipolar disorder ([facility] ICD-10-CM: F31.9  ICD-9-CM: 296.80  5/6/2021        Schizoaffective disorder ([facility] ICD-10-CM: F25.9  ICD-9-CM: 295.70  5/6/2021        Hypertension ICD-10-CM: I10  ICD-9-CM: 401.9  5/6/2021              Current Outpatient Medications on File Prior to Visit   Medication Sig    cyclobenzaprine (FLEXERIL) 10 mg tablet     hydrOXYzine HCL (ATARAX) 25 mg tablet 1-2 tab(s)    QUEtiapine (SEROquel) 50 mg tablet     aspirin 81 mg chewable tablet Take 81 mg by mouth daily.  lidocaine (LIDODERM) 5 % Apply patch to the affected area for 12 hours a day and remove for 12 hours a day.  metroNIDAZOLE (FlagyL) 500 mg tablet Take 1 Tablet by mouth two (2) times a day.  ondansetron (Zofran ODT) 4 mg disintegrating tablet Take 1 Tablet by mouth every eight (8) hours as needed for Nausea.  amLODIPine (NORVASC) 10 mg tablet Take 1 Tab by mouth daily. Indications: high blood pressure     No current facility-administered medications on file prior to visit. CARDIOLOGY STUDIES TO DATE:  07/15/20  echo normal lvef, lvh, mild to mod aortic regurg, mild tr    Chief Complaint   Patient presents with    Follow-up     6 mo appt.  Chest Pain    Palpitations    Shortness of Breath     with the palpitations     HPI :  Since we last met she did get back on her psychiatric meds and she is working as a . She gets quite a bit of exercise with that but she is complaining of both exertional and nonexertional sharp chest pain as well as shortness of breath.   For some reason her hydrochlorothiazide was stopped, and she stopped the metoprolol that we had prescribed for palpitations because she said it was causing headaches. She is remained on the amlodipine but did not take it today. She still has some palpitations from time to time. She says she is sleeping well. CARDIAC ROS:   negative for syncope, orthopnea, paroxysmal nocturnal dyspnea, claudication, lower extremity edema    Family History   Problem Relation Age of Onset    Hypertension Mother     Stroke Mother     Diabetes Mother     Drug Abuse Father        Past Medical History:   Diagnosis Date    angina     Angina at rest [facility])     Breast pain     since 2016, right breast, on and off    Chiari malformation     Heart abnormalities     leaky heart valve, murmur    Hypertension     Other ill-defined conditions(799.89)     cardiac arrhythmias    Other ill-defined conditions(799.89)     neuropathy    Psychiatric disorder     anxiety and depression    Stroke ([facility] [address]     TIA       GENERAL ROS:  A comprehensive review of systems was negative except for that written in the HPI.     Visit Vitals  BP (!) 150/98 (BP 1 Location: Left upper arm, BP Patient Position: Sitting, BP Cuff Size: Large adult)   Pulse 79   Resp 19   Ht 5' 2\" (1.575 m)   Wt 140 lb (63.5 kg)   LMP 10/25/2021   SpO2 98%   BMI 25.61 kg/m²       Wt Readings from Last 3 Encounters:   11/18/21 140 lb (63.5 kg)   11/11/21 136 lb 14.5 oz (62.1 kg)   11/10/21 135 lb (61.2 kg)            BP Readings from Last 3 Encounters:   11/18/21 (!) 150/98   11/11/21 (!) 157/97   11/10/21 (!) 151/94       PHYSICAL EXAM  General appearance: alert, cooperative, no distress, appears stated age  Neurologic: Alert and oriented X 3  Neck: supple, symmetrical, trachea midline, no adenopathy, no carotid bruit and no JVD  Lungs: clear to auscultation bilaterally  Heart: regular rate and rhythm, S1, S2 normal, no murmur, click, rub or gallop  Extremities: extremities normal, atraumatic, no cyanosis or edema    Lab Results   Component Value Date/Time    Cholesterol, total 210 (H) 05/07/2021 06:12 AM    HDL Cholesterol 54 05/07/2021 06:12 AM    LDL, calculated 112.6 (H) 05/07/2021 06:12 AM    Triglyceride 217 (H) 05/07/2021 06:12 AM    CHOL/HDL Ratio 3.9 05/07/2021 06:12 AM     ASSESSMENT :      Her symptoms are somewhat atypical but she does have risk factors and she is quite worried about all this so I think it is time to reevaluate her with a Lexiscan stress test, and an echocardiogram to see where we  terms of her aortic regurgitation. I am going to represcribe the hydrochlorothiazide and I strongly encouraged her to take her amlodipine every day. current treatment plan is effective, no change in therapy  lab results and schedule of future lab studies reviewed with patient  reviewed diet, exercise and weight control    Encounter Diagnoses   Name Primary?  Primary hypertension Yes    Nonrheumatic aortic valve insufficiency     Other chest pain     SOB (shortness of breath)      Orders Placed This Encounter    cyclobenzaprine (FLEXERIL) 10 mg tablet    hydrOXYzine HCL (ATARAX) 25 mg tablet    QUEtiapine (SEROquel) 50 mg tablet    aspirin 81 mg chewable tablet       Follow-up and Dispositions    · Return in about 6 months (around 5/18/2022). Lea Clifton MD  11/18/2021  Please note that this dictation was completed with [employer], the computer voice recognition software. Quite often unanticipated grammatical, syntax, homophones, and other interpretive errors are inadvertently transcribed by the computer software. Please disregard these errors. Please excuse any errors that have escaped final proofreading. Thank you.

## 2021-11-23 ENCOUNTER — HOSPITAL ENCOUNTER (OUTPATIENT)
Dept: NON INVASIVE DIAGNOSTICS | Age: 46
Discharge: HOME | End: 2021-11-23
Attending: SPECIALIST
Payer: MEDICAID

## 2021-11-23 ENCOUNTER — HOSPITAL ENCOUNTER (OUTPATIENT)
Dept: NUCLEAR MEDICINE | Age: 46
Discharge: HOME | End: 2021-11-23
Attending: SPECIALIST
Payer: MEDICAID

## 2021-11-23 DIAGNOSIS — I10 PRIMARY HYPERTENSION: ICD-10-CM

## 2021-11-23 DIAGNOSIS — R07.89 OTHER CHEST PAIN: ICD-10-CM

## 2021-11-23 DIAGNOSIS — I35.1 NONRHEUMATIC AORTIC VALVE INSUFFICIENCY: ICD-10-CM

## 2021-11-23 DIAGNOSIS — R06.02 SOB (SHORTNESS OF BREATH): ICD-10-CM

## 2021-11-23 LAB
AV R PG: 124.94 MMHG
ECHO AR MAX VEL PISA: 558.84 CM/S
ECHO AV AREA PEAK VELOCITY: 3.41 CM2
ECHO AV AREA VTI: 3.7 CM2
ECHO AV MEAN GRADIENT: 4.45 MMHG
ECHO AV PEAK GRADIENT: 8.54 MMHG
ECHO AV PEAK VELOCITY: 146.12 CM/S
ECHO AV REGURGITANT PHT: 440.96 MS
ECHO AV VTI: 26.1 CM
ECHO LV INTERNAL DIMENSION DIASTOLIC: 4.16 CM (ref 3.9–5.3)
ECHO LV INTERNAL DIMENSION SYSTOLIC: 2.82 CM
ECHO LV IVSD: 1.24 CM (ref 0.6–0.9)
ECHO LV MASS 2D: 192.1 G (ref 67–162)
ECHO LV POSTERIOR WALL DIASTOLIC: 1.31 CM (ref 0.6–0.9)
ECHO LVOT DIAM: 2.26 CM
ECHO LVOT PEAK GRADIENT: 6.15 MMHG
ECHO LVOT PEAK VELOCITY: 124.03 CM/S
ECHO LVOT SV: 96.5 ML
ECHO LVOT VTI: 24.04 CM
LVOT MG: 3.16 MMHG
STRESS TARGET HR: 175 BPM

## 2021-11-23 PROCEDURE — 93308 TTE F-UP OR LMTD: CPT

## 2021-11-23 PROCEDURE — 93308 TTE F-UP OR LMTD: CPT | Performed by: INTERNAL MEDICINE

## 2021-11-23 PROCEDURE — 93321 DOPPLER ECHO F-UP/LMTD STD: CPT | Performed by: INTERNAL MEDICINE

## 2021-11-23 PROCEDURE — 93325 DOPPLER ECHO COLOR FLOW MAPG: CPT | Performed by: INTERNAL MEDICINE

## 2021-11-23 RX ORDER — SODIUM CHLORIDE 0.9 % (FLUSH) 0.9 %
10 SYRINGE (ML) INJECTION AS NEEDED
Status: DISCONTINUED | OUTPATIENT
Start: 2021-11-23 | End: 2021-11-23

## 2021-11-24 ENCOUNTER — TELEPHONE (OUTPATIENT)
Dept: CARDIOLOGY CLINIC | Age: 46
End: 2021-11-24

## 2021-11-24 NOTE — TELEPHONE ENCOUNTER
I called and spoke with the patient. Patient's two identifiers verified. I notified patient regarding this:  ----- Message from Екатерина Rico MD sent at 11/24/2021  7:43 AM EST -----  \"Heart muscle is not enlarged and is strong. Aortic valve is leaking but is unchanged from before. Looks good.  Repeat echo 1 yr\"

## 2021-11-24 NOTE — PROGRESS NOTES
Heart muscle is not enlarged and is strong. Aortic valve is leaking but is unchanged from before. Looks good.  Repeat echo 1 yr

## 2021-12-07 ENCOUNTER — HOSPITAL ENCOUNTER (EMERGENCY)
Age: 46
Discharge: HOME | End: 2021-12-07
Attending: EMERGENCY MEDICINE
Payer: MEDICAID

## 2021-12-07 VITALS
OXYGEN SATURATION: 98 % | BODY MASS INDEX: 25.4 KG/M2 | RESPIRATION RATE: 19 BRPM | DIASTOLIC BLOOD PRESSURE: 105 MMHG | HEIGHT: 62 IN | HEART RATE: 86 BPM | WEIGHT: 138 LBS | SYSTOLIC BLOOD PRESSURE: 151 MMHG | TEMPERATURE: 98.3 F

## 2021-12-07 DIAGNOSIS — A60.04 HERPES SIMPLEX VIRUS (HSV) INFECTION OF VAGINA: Primary | ICD-10-CM

## 2021-12-07 DIAGNOSIS — R09.89 TENDER LYMPH NODE: ICD-10-CM

## 2021-12-07 LAB
APPEARANCE UR: CLEAR
BACTERIA URNS QL MICRO: NEGATIVE /HPF
BILIRUB UR QL: NEGATIVE
CLUE CELLS VAG QL WET PREP: NORMAL
COLOR UR: NORMAL
EPITH CASTS URNS QL MICRO: NORMAL /LPF
GLUCOSE UR STRIP.AUTO-MCNC: NEGATIVE MG/DL
HCG UR QL: NEGATIVE
HGB UR QL STRIP: NEGATIVE
KETONES UR QL STRIP.AUTO: NEGATIVE MG/DL
KOH PREP SPEC: NORMAL
LEUKOCYTE ESTERASE UR QL STRIP.AUTO: NEGATIVE
NITRITE UR QL STRIP.AUTO: NEGATIVE
PH UR STRIP: 6.5 [PH] (ref 5–8)
PROT UR STRIP-MCNC: NEGATIVE MG/DL
RBC #/AREA URNS HPF: NORMAL /HPF (ref 0–5)
SERVICE CMNT-IMP: NORMAL
SP GR UR REFRACTOMETRY: 1.02 (ref 1–1.03)
T VAGINALIS VAG QL WET PREP: NORMAL
UA: UC IF INDICATED,UAUC: NORMAL
UROBILINOGEN UR QL STRIP.AUTO: 1 EU/DL (ref 0.2–1)
WBC URNS QL MICRO: NORMAL /HPF (ref 0–4)

## 2021-12-07 PROCEDURE — 87255 GENET VIRUS ISOLATE HSV: CPT

## 2021-12-07 PROCEDURE — 99283 EMERGENCY DEPT VISIT LOW MDM: CPT

## 2021-12-07 PROCEDURE — 87591 N.GONORRHOEAE DNA AMP PROB: CPT

## 2021-12-07 PROCEDURE — 87210 SMEAR WET MOUNT SALINE/INK: CPT

## 2021-12-07 PROCEDURE — 81025 URINE PREGNANCY TEST: CPT

## 2021-12-07 PROCEDURE — 81001 URINALYSIS AUTO W/SCOPE: CPT

## 2021-12-07 RX ORDER — VALACYCLOVIR HYDROCHLORIDE 1 G/1
1000 TABLET, FILM COATED ORAL 2 TIMES DAILY
Qty: 20 TABLET | Refills: 0 | Status: SHIPPED | OUTPATIENT
Start: 2021-12-07 | End: 2021-12-17

## 2021-12-07 RX ORDER — IBUPROFEN 600 MG/1
600 TABLET ORAL
Qty: 20 TABLET | Refills: 0 | OUTPATIENT
Start: 2021-12-07 | End: 2022-02-01
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
## 2021-12-07 NOTE — LETTER
[facility] EMERGENCY DEPT  [address] [address] [phone]    Work/School Note    Date: 12/7/2021    To Whom It May concern:    Vinh Mercedes was seen and treated today in the emergency room by the following provider(s):  Attending Provider: Royal Farrar MD  Physician Assistant: ARCHIE De La Cruz. Vinh Mercedes may return to work on [id number].     Sincerely,          ARCHIE Khalil

## 2021-12-07 NOTE — LETTER
12/10/2021      [address] [place] [address]      Dear Ms. Prado        You were seen in the Emergency Department of [address] [place] [address] on 12/7/2021 and had lab and/or radiology tests performed. The herpes from your Emergency Department visit on 12/7/2021 was positive. You were treated appropriately during your visit. No further treatment is required. If you have not improved or are worsening, please follow up with your primary care doctor or Emergency department as soon as possible. Please follow up with you [facility] care doctor or health department. If you have any questions please contact the Emergency Department at [phone].       Sincerely,    SHAWN Carr [facility] EMERGENCY DEPT  [address] [address] [phone]

## 2021-12-07 NOTE — ED NOTES
Pt in with pelvic pain x 2 days. Pt reports no dysuria, no vaginal discharge or abnormal bleeding. She has concern for a lesion that she just noticed with no history of same. Pt complains of LLQ \"lump\" which is tender to palpation. Emergency Department Nursing Plan of Care       The Nursing Plan of Care is developed from the Nursing assessment and Emergency Department Attending provider initial evaluation. The plan of care may be reviewed in the ED Provider note.     The Plan of Care was developed with the following considerations:   Patient / Family readiness to learn indicated by:verbalized understanding  Persons(s) to be included in education: patient  Barriers to Learning/Limitations:No    Signed     Leida Soliz RN    12/7/2021   3:58 PM

## 2021-12-10 LAB
HSV SPEC CULT: ABNORMAL
SPECIMEN SOURCE: ABNORMAL

## 2022-02-01 ENCOUNTER — HOSPITAL ENCOUNTER (EMERGENCY)
Age: 47
Discharge: HOME | End: 2022-02-01
Attending: EMERGENCY MEDICINE
Payer: MEDICAID

## 2022-02-01 ENCOUNTER — APPOINTMENT (OUTPATIENT)
Dept: GENERAL RADIOLOGY | Age: 47
End: 2022-02-01
Attending: NURSE PRACTITIONER
Payer: MEDICAID

## 2022-02-01 VITALS
RESPIRATION RATE: 15 BRPM | SYSTOLIC BLOOD PRESSURE: 160 MMHG | TEMPERATURE: 99.1 F | OXYGEN SATURATION: 99 % | DIASTOLIC BLOOD PRESSURE: 100 MMHG | HEIGHT: 62 IN | WEIGHT: 140 LBS | HEART RATE: 85 BPM | BODY MASS INDEX: 25.76 KG/M2
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
## 2022-02-01 DIAGNOSIS — S63.617A SPRAIN OF LEFT LITTLE FINGER, UNSPECIFIED SITE OF DIGIT, INITIAL ENCOUNTER: ICD-10-CM

## 2022-02-01 DIAGNOSIS — S63.502A SPRAIN OF LEFT WRIST, INITIAL ENCOUNTER: ICD-10-CM

## 2022-02-01 DIAGNOSIS — S43.402A SPRAIN OF LEFT SHOULDER, UNSPECIFIED SHOULDER SPRAIN TYPE, INITIAL ENCOUNTER: ICD-10-CM

## 2022-02-01 DIAGNOSIS — S39.012A STRAIN OF LUMBAR REGION, INITIAL ENCOUNTER: ICD-10-CM

## 2022-02-01 PROCEDURE — 73140 X-RAY EXAM OF FINGER(S): CPT

## 2022-02-01 PROCEDURE — 73030 X-RAY EXAM OF SHOULDER: CPT

## 2022-02-01 PROCEDURE — 75810000275 HC EMERGENCY DEPT VISIT NO LEVEL OF CARE

## 2022-02-01 PROCEDURE — 73110 X-RAY EXAM OF WRIST: CPT

## 2022-02-01 PROCEDURE — 99284 EMERGENCY DEPT VISIT MOD MDM: CPT

## 2022-02-01 PROCEDURE — 74011250637 HC RX REV CODE- 250/637: Performed by: NURSE PRACTITIONER

## 2022-02-01 PROCEDURE — 72100 X-RAY EXAM L-S SPINE 2/3 VWS: CPT

## 2022-02-01 RX ORDER — CYCLOBENZAPRINE HCL 10 MG
10 TABLET ORAL
Status: COMPLETED | OUTPATIENT
Start: 2022-02-01 | End: 2022-02-01

## 2022-02-01 RX ORDER — FLUOXETINE 10 MG/1
CAPSULE ORAL DAILY
COMMUNITY
End: 2022-05-19 | Stop reason: ALTCHOICE

## 2022-02-01 RX ORDER — TRAMADOL HYDROCHLORIDE 50 MG/1
50 TABLET ORAL
Qty: 6 TABLET | Refills: 0 | Status: SHIPPED | OUTPATIENT
Start: 2022-02-01 | End: 2022-02-06

## 2022-02-01 RX ORDER — HYDROCODONE BITARTRATE AND ACETAMINOPHEN 5; 325 MG/1; MG/1
1 TABLET ORAL
Status: COMPLETED | OUTPATIENT
Start: 2022-02-01 | End: 2022-02-01

## 2022-02-01 RX ADMIN — CYCLOBENZAPRINE HYDROCHLORIDE 10 MG: 10 TABLET, FILM COATED ORAL at 12:24

## 2022-02-01 RX ADMIN — HYDROCODONE BITARTRATE AND ACETAMINOPHEN 1 TABLET: 5; 325 TABLET ORAL at 12:24

## 2022-02-01 NOTE — Clinical Note
[facility] EMERGENCY DEPT  [address] [address] [phone]    Work/School Note    Date: 2/1/2022    To Whom It May concern:    Alison Thacker was seen and treated today in the emergency room by the following provider(s):  Attending Provider: Sabrina Franklin MD  Nurse Practitioner: Elizabeth Leon NP. Alison Thacker is excused from work/school on 02/01/22 and 02/02/22. She is medically clear to return to work/school on 2/3/2022.        Sincerely,          Vasiliy Macedo NP
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
## 2022-02-01 NOTE — Clinical Note
[facility] EMERGENCY DEPT  [address] [id number] [phone]    Work/School Note    Date: 2/1/2022    To Whom It May concern:    Beatriz Brunner was seen and treated today in the emergency room by the following provider(s):  Attending Provider: Brittani Leija MD  Nurse Practitioner: Sherwin Landau, NP. Beatriz Brunner is excused from work/school on 02/01/22 and 02/02/22. She is medically clear to return to work/school on 2/3/2022.        Sincerely,          Vasiliy Macedo NP

## 2022-02-01 NOTE — DISCHARGE INSTRUCTIONS
It was a pleasure taking care of you at [facility] Emergency Department today. We know that when you come to [facility], you are entrusting us with your health, comfort, and safety. Our physicians and nurses honor that trust, and we truly appreciate the opportunity to care for you and your loved ones. We also value our feedback. If you receive a survey about your Emergency Department experience today, please fill it out. We care about our patients' feedback, and we listen to what you have to say. Thank you!
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
## 2022-02-02 NOTE — PROGRESS NOTES
Spiritual Care Partner Volunteer visited patient at [facility] in [facility] EMERGENCY DEPT on 02/01/2022   Documented by:    [id number] [facility] [place],Grant, Alize Carrillo5 Provider   Paging Service [phone])

## 2022-03-19 PROBLEM — F25.9 SCHIZOAFFECTIVE DISORDER (HCC): Status: ACTIVE | Noted: 2021-05-06

## 2022-03-19 PROBLEM — F31.9 BIPOLAR DISORDER (HCC): Status: ACTIVE | Noted: 2021-05-06

## 2022-03-19 PROBLEM — I35.1 NONRHEUMATIC AORTIC VALVE INSUFFICIENCY: Status: ACTIVE | Noted: 2021-05-20

## 2022-03-20 PROBLEM — I10 HYPERTENSION: Status: ACTIVE | Noted: 2021-05-06

## 2022-05-02 ENCOUNTER — APPOINTMENT (OUTPATIENT)
Dept: GENERAL RADIOLOGY | Age: 47
End: 2022-05-02
Attending: EMERGENCY MEDICINE
Payer: MEDICAID

## 2022-05-02 ENCOUNTER — HOSPITAL ENCOUNTER (EMERGENCY)
Age: 47
Discharge: HOME | End: 2022-05-02
Attending: EMERGENCY MEDICINE
Payer: MEDICAID

## 2022-05-02 VITALS
TEMPERATURE: 98.6 F | SYSTOLIC BLOOD PRESSURE: 148 MMHG | OXYGEN SATURATION: 96 % | RESPIRATION RATE: 16 BRPM | DIASTOLIC BLOOD PRESSURE: 92 MMHG | BODY MASS INDEX: 26.68 KG/M2 | HEIGHT: 62 IN | HEART RATE: 94 BPM | WEIGHT: 145 LBS

## 2022-05-02 DIAGNOSIS — R07.89 ATYPICAL CHEST PAIN: ICD-10-CM

## 2022-05-02 DIAGNOSIS — E87.6 HYPOKALEMIA: ICD-10-CM

## 2022-05-02 DIAGNOSIS — R00.2 PALPITATIONS: Primary | ICD-10-CM

## 2022-05-02 LAB
ALBUMIN SERPL-MCNC: 3.8 G/DL (ref 3.5–5)
ALBUMIN/GLOB SERPL: 1 {RATIO} (ref 1.1–2.2)
ALP SERPL-CCNC: 54 U/L (ref 45–117)
ALT SERPL-CCNC: 22 U/L (ref 12–78)
ANION GAP SERPL CALC-SCNC: 8 MMOL/L (ref 5–15)
AST SERPL-CCNC: 11 U/L (ref 15–37)
ATRIAL RATE: 90 BPM
BASOPHILS # BLD: 0.1 K/UL (ref 0–0.1)
BASOPHILS NFR BLD: 1 % (ref 0–1)
BILIRUB SERPL-MCNC: 0.2 MG/DL (ref 0.2–1)
BUN SERPL-MCNC: 22 MG/DL (ref 6–20)
BUN/CREAT SERPL: 22 (ref 12–20)
CALCIUM SERPL-MCNC: 8.2 MG/DL (ref 8.5–10.1)
CALCULATED P AXIS, ECG09: 49 DEGREES
CALCULATED R AXIS, ECG10: 19 DEGREES
CALCULATED T AXIS, ECG11: 33 DEGREES
CHLORIDE SERPL-SCNC: 98 MMOL/L (ref 97–108)
CO2 SERPL-SCNC: 30 MMOL/L (ref 21–32)
CREAT SERPL-MCNC: 1 MG/DL (ref 0.55–1.02)
DIAGNOSIS, 93000: NORMAL
DIFFERENTIAL METHOD BLD: ABNORMAL
EOSINOPHIL # BLD: 1.3 K/UL (ref 0–0.4)
EOSINOPHIL NFR BLD: 16 % (ref 0–7)
ERYTHROCYTE [DISTWIDTH] IN BLOOD BY AUTOMATED COUNT: 11.7 % (ref 11.5–14.5)
GLOBULIN SER CALC-MCNC: 3.8 G/DL (ref 2–4)
GLUCOSE SERPL-MCNC: 101 MG/DL (ref 65–100)
HCT VFR BLD AUTO: 37.4 % (ref 35–47)
HGB BLD-MCNC: 12.9 G/DL (ref 11.5–16)
IMM GRANULOCYTES # BLD AUTO: 0 K/UL
IMM GRANULOCYTES NFR BLD AUTO: 0 %
LYMPHOCYTES # BLD: 2.4 K/UL (ref 0.8–3.5)
LYMPHOCYTES NFR BLD: 29 % (ref 12–49)
MCH RBC QN AUTO: 31.4 PG (ref 26–34)
MCHC RBC AUTO-ENTMCNC: 34.5 G/DL (ref 30–36.5)
MCV RBC AUTO: 91 FL (ref 80–99)
MONOCYTES # BLD: 0.8 K/UL (ref 0–1)
MONOCYTES NFR BLD: 10 % (ref 5–13)
NEUTS SEG # BLD: 3.6 K/UL (ref 1.8–8)
NEUTS SEG NFR BLD: 44 % (ref 32–75)
NRBC # BLD: 0 K/UL (ref 0–0.01)
NRBC BLD-RTO: 0 PER 100 WBC
P-R INTERVAL, ECG05: 154 MS
PLATELET # BLD AUTO: 429 K/UL (ref 150–400)
PMV BLD AUTO: 9 FL (ref 8.9–12.9)
POTASSIUM SERPL-SCNC: 3.2 MMOL/L (ref 3.5–5.1)
PROT SERPL-MCNC: 7.6 G/DL (ref 6.4–8.2)
Q-T INTERVAL, ECG07: 380 MS
QRS DURATION, ECG06: 88 MS
QTC CALCULATION (BEZET), ECG08: 464 MS
RBC # BLD AUTO: 4.11 M/UL (ref 3.8–5.2)
RBC MORPH BLD: ABNORMAL
SODIUM SERPL-SCNC: 136 MMOL/L (ref 136–145)
TROPONIN-HIGH SENSITIVITY: 9 NG/L (ref 0–51)
TSH SERPL DL<=0.05 MIU/L-ACNC: 1.01 UIU/ML (ref 0.36–3.74)
VENTRICULAR RATE, ECG03: 90 BPM
WBC # BLD AUTO: 8.2 K/UL (ref 3.6–11)

## 2022-05-02 PROCEDURE — 80053 COMPREHEN METABOLIC PANEL: CPT

## 2022-05-02 PROCEDURE — 84484 ASSAY OF TROPONIN QUANT: CPT

## 2022-05-02 PROCEDURE — 93005 ELECTROCARDIOGRAM TRACING: CPT

## 2022-05-02 PROCEDURE — 74011250637 HC RX REV CODE- 250/637: Performed by: EMERGENCY MEDICINE

## 2022-05-02 PROCEDURE — 85025 COMPLETE CBC W/AUTO DIFF WBC: CPT

## 2022-05-02 PROCEDURE — 71045 X-RAY EXAM CHEST 1 VIEW: CPT

## 2022-05-02 PROCEDURE — 84443 ASSAY THYROID STIM HORMONE: CPT

## 2022-05-02 PROCEDURE — 99285 EMERGENCY DEPT VISIT HI MDM: CPT

## 2022-05-02 RX ORDER — POTASSIUM CHLORIDE 750 MG/1
40 TABLET, FILM COATED, EXTENDED RELEASE ORAL
Status: COMPLETED | OUTPATIENT
Start: 2022-05-02 | End: 2022-05-02

## 2022-05-02 RX ADMIN — POTASSIUM CHLORIDE 40 MEQ: 750 TABLET, EXTENDED RELEASE ORAL at 06:29

## 2022-05-02 NOTE — ED NOTES
..Discharge summary and discharge medications reviewed with patient and appropriate educational materials and side effects teaching were provided. patient and spouse  Given 0 paper prescriptions and 0 electronic prescriptions sent to pt's listed pharmacy. Patient  verbalized understanding of the importance of discussing medications with his or her physician or clinic they will be following up with. No si/s of acute distress prior to discharge. Patient offered wheelchair from treatment area to hospital entrance, patient did not wheelchair.

## 2022-05-02 NOTE — ED TRIAGE NOTES
Pt reports chest pain 1 week. PMH of angina w/ in depth cardiac hx. Pt takes amolodopine, asprin and hydrochlorothiazide. Pt has recent missed stress test and hx of echocardiograms. Pt reports hx of 2 regurtating valves. Pt reports series of TIA's 2002/2003. Emergency Department Nursing Plan of Care       The Nursing Plan of Care is developed from the Nursing assessment and Emergency Department Attending provider initial evaluation. The plan of care may be reviewed in the ED Provider note.     The Plan of Care was developed with the following considerations:   Patient / Family readiness to learn indicated by:verbalized understanding  Persons(s) to be included in education: patient  Barriers to Learning/Limitations:No    Signed     Law Lim RN    5/2/2022   3:59 AM

## 2022-05-02 NOTE — DISCHARGE INSTRUCTIONS
You were evaluated in the emergency department for chest pain and palpitations. Your examination was reassuring as was your work-up including blood work, EKG, and chest x-ray. It will be important for you to follow-up with cardiology in 3-7 days. If you develop worsening symptoms such as worsening chest pain, shortness of breath, or palpitations, please return to the emergency department immediately.

## 2022-05-02 NOTE — ED PROVIDER NOTES
EMERGENCY DEPARTMENT HISTORY AND PHYSICAL EXAM      Date: 5/2/2022  Patient Name: Anabell Linton    History of Presenting Illness     Chief Complaint   Patient presents with    Chest Pain       History Provided By: Patient    HPI: Anabell Linton, 40 y.o. female with history of hypertension, CVA, GERD malformation presents to the ED with cc of chest pain. Symptoms have been intermittent over the past 3 to 4 days. She endorses palpitations. She states that she woke up from sleep with palpitations sharp chest pain located mostly her chest without radiation. Not associate with shortness of breath, or near syncope. Symptoms resolved spontaneously. Symptoms are nonexertional.  She has been evaluated by cardiology in the past and she is scheduled to undergo Holter monitoring and echocardiogram.  Currently she feels well in the ED and has no complaints of chest pain, shortness of breath, or palpitations. She has an upcoming appointment this week with cardiology but came to the ED over the weekend when she developed the symptoms to make sure everything was okay first.  Drinks soda occasionally. Denies drug use. There are no other complaints, changes, or physical findings at this time. PCP: Karin Blackwell MD    No current facility-administered medications on file prior to encounter. Current Outpatient Medications on File Prior to Encounter   Medication Sig Dispense Refill    aspirin 81 mg chewable tablet Take 81 mg by mouth daily.  hydroCHLOROthiazide (MICROZIDE) 12.5 mg capsule Take 1 Capsule by mouth daily. Indications: high blood pressure 90 Capsule 3    amLODIPine (NORVASC) 10 mg tablet Take 1 Tab by mouth daily. Indications: high blood pressure 30 Tab 0    FLUoxetine (PROzac) 10 mg capsule Take  by mouth daily.  Unknown dosage (Patient not taking: Reported on 5/2/2022)      cyclobenzaprine (FLEXERIL) 10 mg tablet  (Patient not taking: Reported on 5/2/2022)      hydrOXYzine HCL (ATARAX) 25 mg tablet 1-2 tab(s) (Patient not taking: Reported on 2022)      QUEtiapine (SEROquel) 50 mg tablet  (Patient not taking: Reported on 2022)         Past History     Past Medical History:  Past Medical History:   Diagnosis Date    angina     [facility]ina at rest [facility])     Breast pain     since 2016, right breast, on and off    Chiari malformation     Heart abnormalities     leaky heart valve, murmur    Hypertension     Other ill-defined conditions(799.89)     cardiac arrhythmias    Other ill-defined conditions(799.89)     neuropathy    Psychiatric disorder     anxiety and depression    Stroke ([facility] [address]     TIA       Past Surgical History:  Past Surgical History:   Procedure Laterality Date    HX GYN      HX ORTHOPAEDIC      screws in left ankle    HX OTHER SURGICAL      surgery to back of head due to fractured skull    NEUROLOGICAL PROCEDURE UNLISTED      MN ABDOMEN SURGERY PROC UNLISTED      laporscopic surgery post ruptured fallopian tube       Family History:  Family History   Problem Relation Age of Onset    Hypertension Mother     Stroke Mother     Diabetes Mother     Drug Abuse Father        Social History:  Social History     Tobacco Use    Smoking status: Former Smoker     Packs/day: 0.25     Years: 23.00     Pack years: 5.75     Quit date: 3/4/2021     Years since quittin.1    Smokeless tobacco: Current User    Tobacco comment: 1-2 cigs/day   Vaping Use    Vaping Use: Some days   Substance Use Topics    Alcohol use: Yes     Alcohol/week: 49.0 standard drinks     Types: 52 Shots of liquor per week     Comment: social    Drug use: Yes     Types: Marijuana     Comment: former crack user approx clean        Allergies: Allergies   Allergen Reactions    Metoprolol Other (comments)     headache    Toradol [Ketorolac] Hives         Review of Systems   Review of Systems   Constitutional: Negative for chills and fever.    Respiratory: Negative for cough and shortness of breath. Cardiovascular: Positive for palpitations. Negative for chest pain and leg swelling. Gastrointestinal: Negative for abdominal pain, nausea and vomiting. Musculoskeletal: Negative for back pain and gait problem. Skin: Negative for color change and rash. Neurological: Negative for dizziness, weakness, light-headedness and headaches. Hematological: Does not bruise/bleed easily. All other systems reviewed and are negative. Physical Exam   Physical Exam  Vitals and nursing note reviewed. Constitutional:       General: She is not in acute distress. Appearance: Normal appearance. She is not ill-appearing or toxic-appearing. HENT:      Head: Normocephalic and atraumatic. Nose: Nose normal.      Mouth/Throat:      Mouth: Mucous membranes are moist.   Eyes:      Extraocular Movements: Extraocular movements intact. Pupils: Pupils are equal, round, and reactive to light. Cardiovascular:      Rate and Rhythm: Normal rate and regular rhythm. Heart sounds: No murmur heard. Pulmonary:      Effort: Pulmonary effort is normal. No respiratory distress. Breath sounds: Normal breath sounds. No wheezing. Abdominal:      General: There is no distension. Palpations: Abdomen is soft. Tenderness: There is no abdominal tenderness. There is no guarding or rebound. Musculoskeletal:         General: No swelling or tenderness. Normal range of motion. Cervical back: Normal range of motion and neck supple. Right lower leg: No edema. Left lower leg: No edema. Skin:     General: Skin is warm and dry. Coloration: Skin is not pale. Findings: No erythema. Neurological:      General: No focal deficit present. Mental Status: She is alert and oriented to person, place, and time.          Diagnostic Study Results     Labs -  Labs Reviewed   CBC WITH AUTOMATED DIFF - Abnormal; Notable for the following components:       Result Value    PLATELET 779 (*)     EOSINOPHILS 16 (*)     ABS. EOSINOPHILS 1.3 (*)     All other components within normal limits   METABOLIC PANEL, COMPREHENSIVE - Abnormal; Notable for the following components:    Potassium 3.2 (*)     Glucose 101 (*)     BUN 22 (*)     BUN/Creatinine ratio 22 (*)     GFR est non-AA 60 (*)     Calcium 8.2 (*)     AST (SGOT) 11 (*)     A-G Ratio 1.0 (*)     All other components within normal limits   TROPONIN-HIGH SENSITIVITY   TSH 3RD GENERATION       Radiologic Studies -   XR CHEST PORT   Final Result   Normal chest.        CT Results  (Last 48 hours)    None        CXR Results  (Last 48 hours)               05/02/22 0449  XR CHEST PORT Final result    Impression:  Normal chest.       Narrative:  EXAM: XR CHEST PORT       INDICATION: CP       COMPARISON: Chest x-ray 5/16/2021       FINDINGS: A portable AP radiograph of the chest was obtained at hours. The   patient is on a cardiac monitor. The lungs are clear. The cardiac and   mediastinal contours and pulmonary vascularity are normal.  The bones and soft   tissues are grossly within normal limits. Medical Decision Making   I am the first provider for this patient. I reviewed the vital signs, available nursing notes, past medical history, past surgical history, family history and social history. Vital Signs-Reviewed the patient's vital signs. Visit Vitals  BP (!) 148/92 (BP 1 Location: Right upper arm, BP Patient Position: At rest)   Pulse 94   Temp 98.6 °F (37 °C)   Resp 16   Ht 5' 2\" (1.575 m)   Wt 65.8 kg (145 lb)   SpO2 96%   BMI 26.52 kg/m²       Records Reviewed: Nursing Notes    Provider Notes (Medical Decision Making):   71-year-old female presenting to the emergency department for intermittent palpitations with atypical chest discomfort. Afebrile and vital signs stable. She is currently asymptomatic in the emergency department. Symptoms appear atypical for ACS and high-sensitivity troponin minimally elevated at 9. Offered repeat the patient declined. TSH within normal limits and electrolytes within normal limits. Chest x-ray negative for acute process. Patient observed on cardiac monitor for 2 to 3 hours without any alarms or arrhythmia. She has an upcoming appointment with cardiology this week for Holter monitoring and echocardiogram and I feel she is stable for discharge home at this time. Given strict return ED precautions and all questions answered. ED Course:   Initial assessment performed. The patients presenting problems have been discussed, and they are in agreement with the care plan formulated and outlined with them. I have encouraged them to ask questions as they arise throughout their visit. ED Course as of 05/03/22 1309   Mon May 02, 2022   0410 EKG per my interpretation normal sinus rhythm, rate 90 bpm, normal axis, no acute ischemic changes or interval changes. [AK]      ED Course User Index  [AK] Mike Horowitz MD     The cardiac monitor revealed the following rhythm as interpreted by me: Normal Sinus Rhythm, rate 94 bpm  The cardiac monitor was ordered secondary to the patient's history of palpitations and chest pain and to monitor the patient for dysrhythmia    Discharge Note:  The patient has been re-evaluated and is ready for discharge. Reviewed available results with patient. Counseled patient on diagnosis and care plan. Patient has expressed understanding, and all questions have been answered. Patient agrees with plan and agrees to follow up as recommended, or to return to the ED if their symptoms worsen. Discharge instructions have been provided and explained to the patient, along with reasons to return to the ED. Disposition:  Discharge    DISCHARGE PLAN:  1. Discharge Medication List as of 5/2/2022  6:12 AM        2.    Follow-up Information     Follow up With Specialties Details Why Contact Info    vinod Haywood MD Family Medicine Schedule an appointment as soon as possible for a visit   [address]      Michael [address]iz [address] Vascular Surgery, Nurse Practitioner, Cardiology Schedule an appointment as soon as possible for a visit   [address]  [address] EMERGENCY DEPT Emergency Medicine Go to  As needed, If symptoms worsen [address]        3. Return to ED if worse     Diagnosis     Clinical Impression:   1. Palpitations    2. Atypical chest pain    3. Hypokalemia        Attestations:  I am the first and primary provider of record for this patient's ED encounter. I personally performed the services described above in this documentation. Andres Garcia MD    Please note that this dictation was completed with [email or web address], the computer voice recognition software. Quite often unanticipated grammatical, syntax, homophones, and other interpretive errors are inadvertently transcribed by the computer software. Please disregard these errors. Please excuse any errors that have escaped final proofreading. Thank you.

## 2022-05-19 ENCOUNTER — OFFICE VISIT (OUTPATIENT)
Dept: CARDIOLOGY CLINIC | Age: 47
End: 2022-05-19
Payer: MEDICAID

## 2022-05-19 VITALS
DIASTOLIC BLOOD PRESSURE: 82 MMHG | WEIGHT: 140 LBS | SYSTOLIC BLOOD PRESSURE: 113 MMHG | OXYGEN SATURATION: 98 % | HEIGHT: 62 IN | HEART RATE: 83 BPM | RESPIRATION RATE: 17 BRPM | BODY MASS INDEX: 25.76 KG/M2

## 2022-05-19 DIAGNOSIS — R00.2 PALPITATIONS: ICD-10-CM

## 2022-05-19 DIAGNOSIS — R07.89 OTHER CHEST PAIN: Primary | ICD-10-CM

## 2022-05-19 DIAGNOSIS — I35.1 NONRHEUMATIC AORTIC VALVE INSUFFICIENCY: ICD-10-CM

## 2022-05-19 DIAGNOSIS — I10 PRIMARY HYPERTENSION: ICD-10-CM

## 2022-05-19 PROCEDURE — 99214 OFFICE O/P EST MOD 30 MIN: CPT | Performed by: SPECIALIST

## 2022-05-19 NOTE — PROGRESS NOTES
1. Have you been to the ER, urgent care clinic since your last visit? Hospitalized since your last visit? Yes. [facility] ED on 05/02/2022 d/t chest pain    2. Have you seen or consulted any other health care providers outside of the [address] [place] [address] since your last visit? Include any pap smears or colon screening. No      Chief Complaint   Patient presents with    Follow-up     6 month appt.

## 2022-05-19 NOTE — PROGRESS NOTES
HISTORY OF PRESENT ILLNESS  Chris Horton is a 40 y.o. female     SUMMARY:   Problem List  Date Reviewed: 5/19/2022          Codes Class Noted    Nonrheumatic aortic valve insufficiency ICD-10-CM: I35.1  ICD-9-CM: 424.1  5/20/2021    Overview Signed 5/20/2021  8:19 AM by Karen Plascencia MD     07/15/20  echo normal lvef, lvh, mild to mod aortic regurg, mild tr             Bipolar disorder ([facility] ICD-10-CM: F31.9  ICD-9-CM: 296.80  5/6/2021        Schizoaffective disorder ([facility] ICD-10-CM: F25.9  ICD-9-CM: 295.70  5/6/2021        Hypertension ICD-10-CM: I10  ICD-9-CM: 401.9  5/6/2021              Current Outpatient Medications on File Prior to Visit   Medication Sig    hydrOXYzine HCL (ATARAX) 25 mg tablet Take  by mouth as needed.  aspirin 81 mg chewable tablet Take 81 mg by mouth daily.  hydroCHLOROthiazide (MICROZIDE) 12.5 mg capsule Take 1 Capsule by mouth daily. Indications: high blood pressure    amLODIPine (NORVASC) 10 mg tablet Take 1 Tab by mouth daily. Indications: high blood pressure    FLUoxetine (PROzac) 10 mg capsule Take  by mouth daily. Unknown dosage (Patient not taking: Reported on 5/2/2022)    cyclobenzaprine (FLEXERIL) 10 mg tablet  (Patient not taking: Reported on 5/2/2022)    QUEtiapine (SEROquel) 50 mg tablet  (Patient not taking: Reported on 2/1/2022)     No current facility-administered medications on file prior to visit. CARDIOLOGY STUDIES TO DATE:  07/15/20  echo normal lvef, lvh, mild to mod aortic regurg, mild tr  11/21  echo normal lv size and lvef, mild lvh, mod aortic regurg    Chief Complaint   Patient presents with    Follow-up     6 month appt. HPI :  Recently she has been having a lot more trouble with palpitations and chest pain both with and without activity. She was actually in the ER earlier this month with those complaints and had a negative work-up. She is somewhat active working as a .   She is off all her psych medicines now because she says that does not do any good. Fortunately she is sleeping well. CARDIAC ROS:   negative for syncope, orthopnea, paroxysmal nocturnal dyspnea, claudication, lower extremity edema    Family History   Problem Relation Age of Onset    Hypertension Mother     Stroke Mother     Diabetes Mother     Drug Abuse Father        Past Medical History:   Diagnosis Date    angina     Angina at rest [facility])     Breast pain     since 2016, right breast, on and off    Chiari malformation     Heart abnormalities     leaky heart valve, murmur    Hypertension     Other ill-defined conditions(799.89)     cardiac arrhythmias    Other ill-defined conditions(799.89)     neuropathy    Psychiatric disorder     anxiety and depression    Stroke ([facility] [address]     TIA       GENERAL ROS:  A comprehensive review of systems was negative except for that written in the HPI.     Visit Vitals  /82 (BP 1 Location: Right upper arm, BP Patient Position: Sitting, BP Cuff Size: Large adult)   Pulse 83   Resp 17   Ht 5' 2\" (1.575 m)   Wt 140 lb (63.5 kg)   SpO2 98%   BMI 25.61 kg/m²       Wt Readings from Last 3 Encounters:   05/19/22 140 lb (63.5 kg)   05/02/22 145 lb (65.8 kg)   02/01/22 140 lb (63.5 kg)            BP Readings from Last 3 Encounters:   05/19/22 113/82   05/02/22 (!) 148/92   02/01/22 (!) 160/100       PHYSICAL EXAM  General appearance: alert, cooperative, no distress, appears stated age  Neurologic: Alert and oriented X 3  Neck: supple, symmetrical, trachea midline, no adenopathy, no carotid bruit and no JVD  Lungs: clear to auscultation bilaterally  Heart: regular rate and rhythm, S1, S2 normal, no murmur, click, rub or gallop  Extremities: extremities normal, atraumatic, no cyanosis or edema    Lab Results   Component Value Date/Time    Cholesterol, total 210 (H) 05/07/2021 06:12 AM    HDL Cholesterol 54 05/07/2021 06:12 AM    LDL, calculated 112.6 (H) 05/07/2021 06:12 AM    Triglyceride 217 (H) 05/07/2021 06:12 AM    CHOL/HDL Ratio 3.9 05/07/2021 06:12 AM     ASSESSMENT :      We last met I had ordered a stress test on monitor on him for some reason she did not do that but she did do the echocardiogram and she will need a follow-up for that in another 6 months or so. In the meantime I am going to put an event monitor on her and order an exercise Cardiolite stress test.  current treatment plan is effective, no change in therapy  lab results and schedule of future lab studies reviewed with patient  reviewed diet, exercise and weight control    Encounter Diagnoses   Name Primary?  Primary hypertension Yes    Nonrheumatic aortic valve insufficiency     Palpitations     Other chest pain      No orders of the defined types were placed in this encounter. Follow-up and Dispositions    · Return in about 6 months (around 11/19/2022). Cony Bourgeois MD  5/19/2022  Please note that this dictation was completed with [employer], the computer voice recognition software. Quite often unanticipated grammatical, syntax, homophones, and other interpretive errors are inadvertently transcribed by the computer software. Please disregard these errors. Please excuse any errors that have escaped final proofreading. Thank you.

## 2022-06-03 ENCOUNTER — ANESTHESIA EVENT (OUTPATIENT)
Dept: ENDOSCOPY | Age: 47
End: 2022-06-03
Payer: MEDICAID

## 2022-06-03 ENCOUNTER — HOSPITAL ENCOUNTER (OUTPATIENT)
Age: 47
Setting detail: OUTPATIENT SURGERY
Discharge: HOME | End: 2022-06-03
Attending: INTERNAL MEDICINE | Admitting: INTERNAL MEDICINE
Payer: MEDICAID

## 2022-06-03 ENCOUNTER — ANESTHESIA (OUTPATIENT)
Dept: ENDOSCOPY | Age: 47
End: 2022-06-03
Payer: MEDICAID

## 2022-06-03 VITALS
WEIGHT: 138.6 LBS | HEIGHT: 62 IN | SYSTOLIC BLOOD PRESSURE: 140 MMHG | DIASTOLIC BLOOD PRESSURE: 98 MMHG | OXYGEN SATURATION: 98 % | HEART RATE: 83 BPM | BODY MASS INDEX: 25.51 KG/M2 | TEMPERATURE: 99.8 F | RESPIRATION RATE: 17 BRPM

## 2022-06-03 LAB — HCG UR QL: NEGATIVE

## 2022-06-03 PROCEDURE — 74011250636 HC RX REV CODE- 250/636: Performed by: INTERNAL MEDICINE

## 2022-06-03 PROCEDURE — 81025 URINE PREGNANCY TEST: CPT

## 2022-06-03 RX ORDER — SODIUM CHLORIDE 0.9 % (FLUSH) 0.9 %
5-40 SYRINGE (ML) INJECTION AS NEEDED
Status: DISCONTINUED | OUTPATIENT
Start: 2022-06-03 | End: 2022-06-03 | Stop reason: HOSPADM

## 2022-06-03 RX ORDER — FENTANYL CITRATE 50 UG/ML
200 INJECTION, SOLUTION INTRAMUSCULAR; INTRAVENOUS
Status: DISCONTINUED | OUTPATIENT
Start: 2022-06-03 | End: 2022-06-03 | Stop reason: HOSPADM

## 2022-06-03 RX ORDER — SODIUM CHLORIDE 9 MG/ML
150 INJECTION, SOLUTION INTRAVENOUS CONTINUOUS
Status: DISCONTINUED | OUTPATIENT
Start: 2022-06-03 | End: 2022-06-03 | Stop reason: HOSPADM

## 2022-06-03 RX ORDER — EPINEPHRINE 0.1 MG/ML
1 INJECTION INTRACARDIAC; INTRAVENOUS
Status: DISCONTINUED | OUTPATIENT
Start: 2022-06-03 | End: 2022-06-03 | Stop reason: HOSPADM

## 2022-06-03 RX ORDER — DEXTROMETHORPHAN/PSEUDOEPHED 2.5-7.5/.8
1.2 DROPS ORAL
Status: DISCONTINUED | OUTPATIENT
Start: 2022-06-03 | End: 2022-06-03 | Stop reason: HOSPADM

## 2022-06-03 RX ORDER — SODIUM CHLORIDE 0.9 % (FLUSH) 0.9 %
5-40 SYRINGE (ML) INJECTION EVERY 8 HOURS
Status: DISCONTINUED | OUTPATIENT
Start: 2022-06-03 | End: 2022-06-03 | Stop reason: HOSPADM

## 2022-06-03 RX ORDER — ATROPINE SULFATE 0.1 MG/ML
0.5 INJECTION INTRAVENOUS
Status: DISCONTINUED | OUTPATIENT
Start: 2022-06-03 | End: 2022-06-03 | Stop reason: HOSPADM

## 2022-06-03 RX ORDER — MIDAZOLAM HYDROCHLORIDE 1 MG/ML
.25-5 INJECTION, SOLUTION INTRAMUSCULAR; INTRAVENOUS
Status: DISCONTINUED | OUTPATIENT
Start: 2022-06-03 | End: 2022-06-03 | Stop reason: HOSPADM

## 2022-06-03 NOTE — ANESTHESIA PREPROCEDURE EVALUATION
Relevant Problems   NEUROLOGY   (+) Bipolar disorder (HCC)   (+) Schizoaffective disorder (HCC)      CARDIOVASCULAR   (+) Hypertension   (+) Nonrheumatic aortic valve insufficiency       Anesthetic History   No history of anesthetic complications            Review of Systems / Medical History  Patient summary reviewed, nursing notes reviewed and pertinent labs reviewed    Pulmonary  Within defined limits                 Neuro/Psych   Within defined limits      TIA and psychiatric history     Cardiovascular  Within defined limits  Hypertension                   GI/Hepatic/Renal  Within defined limits              Endo/Other  Within defined limits           Other Findings              Physical Exam    Airway  Mallampati: II  TM Distance: > 6 cm  Neck ROM: normal range of motion   Mouth opening: Normal     Cardiovascular  Regular rate and rhythm,  S1 and S2 normal,  no murmur, click, rub, or gallop             Dental  No notable dental hx       Pulmonary  Breath sounds clear to auscultation               Abdominal  GI exam deferred       Other Findings            Anesthetic Plan    ASA: 2  Anesthesia type: MAC            Anesthetic plan and risks discussed with: Patient

## 2022-06-13 NOTE — ED ADULT TRIAGE NOTE - NS ED NURSE AMBULANCES
[facility]
Statement Selected
Regular rate and rhythm, Heart sounds S1 S2 present, no murmurs, rubs or gallops

## 2022-07-30 ENCOUNTER — HOSPITAL ENCOUNTER (EMERGENCY)
Age: 47
Discharge: HOME | End: 2022-07-30
Attending: STUDENT IN AN ORGANIZED HEALTH CARE EDUCATION/TRAINING PROGRAM
Payer: MEDICAID

## 2022-07-30 ENCOUNTER — APPOINTMENT (OUTPATIENT)
Dept: GENERAL RADIOLOGY | Age: 47
End: 2022-07-30
Attending: STUDENT IN AN ORGANIZED HEALTH CARE EDUCATION/TRAINING PROGRAM
Payer: MEDICAID

## 2022-07-30 VITALS
HEART RATE: 75 BPM | OXYGEN SATURATION: 96 % | SYSTOLIC BLOOD PRESSURE: 132 MMHG | TEMPERATURE: 97.5 F | WEIGHT: 137 LBS | RESPIRATION RATE: 16 BRPM | BODY MASS INDEX: 25.21 KG/M2 | DIASTOLIC BLOOD PRESSURE: 93 MMHG | HEIGHT: 62 IN

## 2022-07-30 DIAGNOSIS — M54.12 LEFT CERVICAL RADICULOPATHY: ICD-10-CM

## 2022-07-30 DIAGNOSIS — R07.9 ACUTE CHEST PAIN: Primary | ICD-10-CM

## 2022-07-30 DIAGNOSIS — I10 PRIMARY HYPERTENSION: ICD-10-CM

## 2022-07-30 LAB
ALBUMIN SERPL-MCNC: 3.8 G/DL (ref 3.5–5)
ALBUMIN/GLOB SERPL: 1.1 {RATIO} (ref 1.1–2.2)
ALP SERPL-CCNC: 68 U/L (ref 45–117)
ALT SERPL-CCNC: 24 U/L (ref 12–78)
ANION GAP SERPL CALC-SCNC: 6 MMOL/L (ref 5–15)
AST SERPL-CCNC: 18 U/L (ref 15–37)
BASOPHILS # BLD: 0.1 K/UL (ref 0–0.1)
BASOPHILS NFR BLD: 1 % (ref 0–1)
BILIRUB SERPL-MCNC: 0.3 MG/DL (ref 0.2–1)
BNP SERPL-MCNC: 61 PG/ML
BUN SERPL-MCNC: 11 MG/DL (ref 6–20)
BUN/CREAT SERPL: 13 (ref 12–20)
CALCIUM SERPL-MCNC: 9 MG/DL (ref 8.5–10.1)
CHLORIDE SERPL-SCNC: 104 MMOL/L (ref 97–108)
CO2 SERPL-SCNC: 29 MMOL/L (ref 21–32)
COMMENT, HOLDF: NORMAL
CREAT SERPL-MCNC: 0.83 MG/DL (ref 0.55–1.02)
DIFFERENTIAL METHOD BLD: ABNORMAL
EOSINOPHIL # BLD: 0.5 K/UL (ref 0–0.4)
EOSINOPHIL NFR BLD: 5 % (ref 0–7)
ERYTHROCYTE [DISTWIDTH] IN BLOOD BY AUTOMATED COUNT: 11.9 % (ref 11.5–14.5)
GLOBULIN SER CALC-MCNC: 3.6 G/DL (ref 2–4)
GLUCOSE SERPL-MCNC: 97 MG/DL (ref 65–100)
HCT VFR BLD AUTO: 38.3 % (ref 35–47)
HGB BLD-MCNC: 13.4 G/DL (ref 11.5–16)
IMM GRANULOCYTES # BLD AUTO: 0 K/UL (ref 0–0.04)
IMM GRANULOCYTES NFR BLD AUTO: 0 % (ref 0–0.5)
LYMPHOCYTES # BLD: 2.3 K/UL (ref 0.8–3.5)
LYMPHOCYTES NFR BLD: 26 % (ref 12–49)
MCH RBC QN AUTO: 31.5 PG (ref 26–34)
MCHC RBC AUTO-ENTMCNC: 35 G/DL (ref 30–36.5)
MCV RBC AUTO: 89.9 FL (ref 80–99)
MONOCYTES # BLD: 0.7 K/UL (ref 0–1)
MONOCYTES NFR BLD: 8 % (ref 5–13)
NEUTS SEG # BLD: 5.5 K/UL (ref 1.8–8)
NEUTS SEG NFR BLD: 60 % (ref 32–75)
NRBC # BLD: 0 K/UL (ref 0–0.01)
NRBC BLD-RTO: 0 PER 100 WBC
PLATELET # BLD AUTO: 425 K/UL (ref 150–400)
PMV BLD AUTO: 8.8 FL (ref 8.9–12.9)
POTASSIUM SERPL-SCNC: 4.1 MMOL/L (ref 3.5–5.1)
PROT SERPL-MCNC: 7.4 G/DL (ref 6.4–8.2)
RBC # BLD AUTO: 4.26 M/UL (ref 3.8–5.2)
SAMPLES BEING HELD,HOLD: NORMAL
SODIUM SERPL-SCNC: 139 MMOL/L (ref 136–145)
TROPONIN-HIGH SENSITIVITY: 5 NG/L (ref 0–51)
TROPONIN-HIGH SENSITIVITY: 6 NG/L (ref 0–51)
WBC # BLD AUTO: 9.1 K/UL (ref 3.6–11)

## 2022-07-30 PROCEDURE — 85025 COMPLETE CBC W/AUTO DIFF WBC: CPT

## 2022-07-30 PROCEDURE — 71045 X-RAY EXAM CHEST 1 VIEW: CPT

## 2022-07-30 PROCEDURE — 74011000250 HC RX REV CODE- 250: Performed by: STUDENT IN AN ORGANIZED HEALTH CARE EDUCATION/TRAINING PROGRAM

## 2022-07-30 PROCEDURE — 36415 COLL VENOUS BLD VENIPUNCTURE: CPT

## 2022-07-30 PROCEDURE — 93005 ELECTROCARDIOGRAM TRACING: CPT

## 2022-07-30 PROCEDURE — 80053 COMPREHEN METABOLIC PANEL: CPT

## 2022-07-30 PROCEDURE — 99285 EMERGENCY DEPT VISIT HI MDM: CPT

## 2022-07-30 PROCEDURE — 83880 ASSAY OF NATRIURETIC PEPTIDE: CPT

## 2022-07-30 PROCEDURE — 74011250637 HC RX REV CODE- 250/637: Performed by: STUDENT IN AN ORGANIZED HEALTH CARE EDUCATION/TRAINING PROGRAM

## 2022-07-30 PROCEDURE — 84484 ASSAY OF TROPONIN QUANT: CPT

## 2022-07-30 RX ORDER — NAPROXEN 500 MG/1
500 TABLET ORAL
Qty: 20 TABLET | Refills: 0 | Status: SHIPPED | OUTPATIENT
Start: 2022-07-30 | End: 2022-10-08

## 2022-07-30 RX ORDER — LIDOCAINE 4 G/100G
1 PATCH TOPICAL EVERY 12 HOURS
Qty: 5 PATCH | Refills: 2 | Status: ON HOLD | OUTPATIENT
Start: 2022-07-30 | End: 2022-10-07

## 2022-07-30 RX ORDER — LIDOCAINE 4 G/100G
2 PATCH TOPICAL EVERY 24 HOURS
Status: DISCONTINUED | OUTPATIENT
Start: 2022-07-30 | End: 2022-07-30 | Stop reason: HOSPADM

## 2022-07-30 RX ORDER — NAPROXEN 250 MG/1
500 TABLET ORAL
Status: COMPLETED | OUTPATIENT
Start: 2022-07-30 | End: 2022-07-30

## 2022-07-30 RX ORDER — DIAZEPAM 10 MG/2ML
5 INJECTION INTRAMUSCULAR
Status: DISCONTINUED | OUTPATIENT
Start: 2022-07-30 | End: 2022-07-30

## 2022-07-30 RX ORDER — HYDROCHLOROTHIAZIDE 12.5 MG/1
12.5 CAPSULE ORAL DAILY
Qty: 90 CAPSULE | Refills: 3 | Status: ON HOLD | OUTPATIENT
Start: 2022-07-30 | End: 2022-10-08 | Stop reason: SDUPTHER

## 2022-07-30 RX ORDER — DIAZEPAM 5 MG/1
5 TABLET ORAL
Status: COMPLETED | OUTPATIENT
Start: 2022-07-30 | End: 2022-07-30

## 2022-07-30 RX ORDER — CYCLOBENZAPRINE HCL 10 MG
10 TABLET ORAL
Qty: 20 TABLET | Refills: 0 | Status: SHIPPED | OUTPATIENT
Start: 2022-07-30

## 2022-07-30 RX ADMIN — NAPROXEN 500 MG: 250 TABLET ORAL at 12:16

## 2022-07-30 RX ADMIN — DIAZEPAM 5 MG: 5 TABLET ORAL at 12:16

## 2022-07-30 NOTE — ED PROVIDER NOTES
EMERGENCY DEPARTMENT HISTORY AND PHYSICAL EXAM      Date: 7/30/2022  Patient Name: Johnson Lombardi    History of Presenting Illness     Chief Complaint   Patient presents with    Chest Pain (Angina)     Pt arrives via EMS from home d/t sudden onset CP this am around 6 w/ associated diaphoresis, SOB and nausea. Pt took asa 81 mg w/ some improvement in pain but notes some discomfort to L lateral neck, shoulder, arm and chest wall that is worse w/ breathing. Shortness of Breath       History Provided By: Patient    HPI: Johnson Lombardi, 56 y.o. female with past medical history of bipolar disorder, schizoaffective disorder, hypertension, aortic valve insufficiency, degenerative disc disease in the cervical spine, presents to the ED with cc of L sided chest pain, neck pain, and arm pain. Patient reports symptoms began acutely this morning. Patient reports pain initially was localized to the the area beneath her left breast.  This pain then moved to the lateral left chest wall, where it still lingers at this time. She reports that this is associated with lateral left neck pain that then radiates and shoots down her left arm. She reports this is a sharp, shooting pain. She tells me that it feels like she may have \"pulled something\". She tells me that she has been running around playing with her grandchildren, which has been more physically taxing for her than usual over the past several days. She admits that movement of her left arm as well as left neck rotation of her torso exacerbates current pain. She denies any associated shortness of breath, but states that breathing deeply does make it hurt more. She denies risk factors for DVT/PE or history of thromboembolism. She states that she does have a history of having \"anginal\" chest pain, but tells me current pain does not feel like her previous cardiac chest pain.     PCP: Colletta Clap, MD    No current facility-administered medications on file prior to encounter. Current Outpatient Medications on File Prior to Encounter   Medication Sig Dispense Refill    hydrOXYzine HCL (ATARAX) 25 mg tablet Take  by mouth as needed. aspirin 81 mg chewable tablet Take 81 mg by mouth daily. (Patient not taking: Reported on 6/3/2022)      [DISCONTINUED] hydroCHLOROthiazide (MICROZIDE) 12.5 mg capsule Take 1 Capsule by mouth daily. Indications: high blood pressure 90 Capsule 3    amLODIPine (NORVASC) 10 mg tablet Take 1 Tab by mouth daily. Indications: high blood pressure 30 Tab 0       Past History     Past Medical History:  Past Medical History:   Diagnosis Date    angina     Angina at rest [facility])     Breast pain     since 2016, right breast, on and off    Chiari malformation     Heart abnormalities     leaky heart valve, murmur    Hypertension     Other ill-defined conditions(799.89)     cardiac arrhythmias    Other ill-defined conditions(799.89)     neuropathy    Psychiatric disorder     anxiety and depression    Stroke ([facility] [address]     TIA       Past Surgical History:  Past Surgical History:   Procedure Laterality Date    HX GYN      HX ORTHOPAEDIC      screws in left ankle    HX OTHER SURGICAL      surgery to back of head due to fractured skull    NEUROLOGICAL PROCEDURE UNLISTED      PA ABDOMEN SURGERY PROC UNLISTED      laporscopic surgery post ruptured fallopian tube       Family History:  Family History   Problem Relation Age of Onset    Hypertension Mother     Stroke Mother     Diabetes Mother     Drug Abuse Father        Social History:  Social History     Tobacco Use    Smoking status: Former     Years: 23.00     Types: Cigarettes     Quit date: 3/5/2021     Years since quittin.4    Smokeless tobacco: Current   Vaping Use    Vaping Use: Some days   Substance Use Topics    Alcohol use: Yes     Comment: social    Drug use: Not Currently     Types: Marijuana     Comment: former crack user approx clean 2017       Allergies:   Allergies   Allergen Reactions Metoprolol Other (comments)     headache    Toradol [Ketorolac] Hives         Review of Systems   Review of Systems   Constitutional:  Negative for chills and fever. HENT:  Negative for congestion and rhinorrhea. Eyes:  Negative for visual disturbance. Respiratory:  Negative for chest tightness and shortness of breath. Cardiovascular:  Positive for chest pain. Negative for palpitations and leg swelling. Gastrointestinal:  Negative for abdominal pain, diarrhea, nausea and vomiting. Genitourinary:  Negative for dysuria, flank pain and hematuria. Musculoskeletal:  Positive for neck pain. Negative for back pain. Sharp shooting pain in the left upper arm   Skin:  Negative for rash. Allergic/Immunologic: Negative for immunocompromised state. Neurological:  Negative for dizziness, speech difficulty, weakness and headaches. Hematological:  Negative for adenopathy. Psychiatric/Behavioral:  Negative for dysphoric mood and suicidal ideas. Physical Exam   Physical Exam  Vitals and nursing note reviewed. Constitutional:       General: She is not in acute distress. Appearance: She is not ill-appearing or toxic-appearing. HENT:      Head: Normocephalic and atraumatic. Nose: Nose normal.      Mouth/Throat:      Mouth: Mucous membranes are moist.   Eyes:      Extraocular Movements: Extraocular movements intact. Pupils: Pupils are equal, round, and reactive to light. Neck:     Cardiovascular:      Rate and Rhythm: Normal rate and regular rhythm. Pulses: Normal pulses. Pulmonary:      Effort: Pulmonary effort is normal. No tachypnea. Breath sounds: No stridor. No decreased breath sounds, wheezing or rhonchi. Chest:      Chest wall: Tenderness present. Abdominal:      General: Abdomen is flat. There is no distension. Tenderness: There is no abdominal tenderness. Musculoskeletal:         General: Normal range of motion.       Cervical back: Normal range of motion and neck supple. No rigidity or crepitus. Muscular tenderness present. Right lower leg: No tenderness. No edema. Left lower leg: No tenderness. No edema. Comments: Neurovascularly intact in the left upper extremity. Movement of the left arm does exacerbate and reproduce pain throughout her left side   Skin:     General: Skin is warm and dry. Neurological:      General: No focal deficit present. Mental Status: She is alert and oriented to person, place, and time. Psychiatric:         Judgment: Judgment normal.       Diagnostic Study Results     Labs -     Recent Results (from the past 24 hour(s))   EKG, 12 LEAD, INITIAL    Collection Time: 07/30/22 11:45 AM   Result Value Ref Range    Ventricular Rate 75 BPM    Atrial Rate 75 BPM    P-R Interval 154 ms    QRS Duration 88 ms    Q-T Interval 412 ms    QTC Calculation (Bezet) 460 ms    Calculated P Axis 42 degrees    Calculated R Axis 5 degrees    Calculated T Axis 25 degrees    Diagnosis       Normal sinus rhythm  Normal ECG  When compared with ECG of 02-MAY-2022 03:58,  No significant change was found     CBC WITH AUTOMATED DIFF    Collection Time: 07/30/22 11:46 AM   Result Value Ref Range    WBC 9.1 3.6 - 11.0 K/uL    RBC 4.26 3.80 - 5.20 M/uL    HGB 13.4 11.5 - 16.0 g/dL    HCT 38.3 35.0 - 47.0 %    MCV 89.9 80.0 - 99.0 FL    MCH 31.5 26.0 - 34.0 PG    MCHC 35.0 30.0 - 36.5 g/dL    RDW 11.9 11.5 - 14.5 %    PLATELET 302 (H) 637 - 400 K/uL    MPV 8.8 (L) 8.9 - 12.9 FL    NRBC 0.0 0  WBC    ABSOLUTE NRBC 0.00 0.00 - 0.01 K/uL    NEUTROPHILS 60 32 - 75 %    LYMPHOCYTES 26 12 - 49 %    MONOCYTES 8 5 - 13 %    EOSINOPHILS 5 0 - 7 %    BASOPHILS 1 0 - 1 %    IMMATURE GRANULOCYTES 0 0.0 - 0.5 %    ABS. NEUTROPHILS 5.5 1.8 - 8.0 K/UL    ABS. LYMPHOCYTES 2.3 0.8 - 3.5 K/UL    ABS. MONOCYTES 0.7 0.0 - 1.0 K/UL    ABS. EOSINOPHILS 0.5 (H) 0.0 - 0.4 K/UL    ABS. BASOPHILS 0.1 0.0 - 0.1 K/UL    ABS. IMM.  GRANS. 0.0 0.00 - 0.04 K/UL DF AUTOMATED     METABOLIC PANEL, COMPREHENSIVE    Collection Time: 07/30/22 11:46 AM   Result Value Ref Range    Sodium 139 136 - 145 mmol/L    Potassium 4.1 3.5 - 5.1 mmol/L    Chloride 104 97 - 108 mmol/L    CO2 29 21 - 32 mmol/L    Anion gap 6 5 - 15 mmol/L    Glucose 97 65 - 100 mg/dL    BUN 11 6 - 20 MG/DL    Creatinine 0.83 0.55 - 1.02 MG/DL    BUN/Creatinine ratio 13 12 - 20      GFR est AA >60 >60 ml/min/1.73m2    GFR est non-AA >60 >60 ml/min/1.73m2    Calcium 9.0 8.5 - 10.1 MG/DL    Bilirubin, total 0.3 0.2 - 1.0 MG/DL    ALT (SGPT) 24 12 - 78 U/L    AST (SGOT) 18 15 - 37 U/L    Alk. phosphatase 68 45 - 117 U/L    Protein, total 7.4 6.4 - 8.2 g/dL    Albumin 3.8 3.5 - 5.0 g/dL    Globulin 3.6 2.0 - 4.0 g/dL    A-G Ratio 1.1 1.1 - 2.2     NT-PRO BNP    Collection Time: 07/30/22 11:46 AM   Result Value Ref Range    NT pro-BNP 61 <125 PG/ML   TROPONIN-HIGH SENSITIVITY    Collection Time: 07/30/22 11:46 AM   Result Value Ref Range    Troponin-High Sensitivity 6 0 - 51 ng/L   SAMPLES BEING HELD    Collection Time: 07/30/22 11:46 AM   Result Value Ref Range    SAMPLES BEING HELD RAPHAEL     COMMENT        Add-on orders for these samples will be processed based on acceptable specimen integrity and analyte stability, which may vary by analyte. TROPONIN-HIGH SENSITIVITY    Collection Time: 07/30/22  1:41 PM   Result Value Ref Range    Troponin-High Sensitivity 5 0 - 51 ng/L       Radiologic Studies -   XR CHEST PORT   Final Result   No evidence of acute cardiopulmonary process. CT Results  (Last 48 hours)      None          CXR Results  (Last 48 hours)                 07/30/22 1210  XR CHEST PORT Final result    Impression:  No evidence of acute cardiopulmonary process. Narrative:  INDICATION: Chest pain       COMPARISON: 5/2/2022       FINDINGS: AP portable imaging of the chest performed at 11:58 AM demonstrates a   stable cardiomediastinal silhouette. The lungs are clear bilaterally.  No significant osseous abnormalities are seen. Medical Decision Making   I, Ravinder Webster MD am the first provider for this patient, and I am the attending of record for this patient encounter. I reviewed the vital signs, available nursing notes, past medical history, past surgical history, family history and social history. Vital Signs-Reviewed the patient's vital signs. Patient Vitals for the past 24 hrs:   Temp Pulse Resp BP SpO2   07/30/22 1541 -- 75 16 (!) 132/93 96 %   07/30/22 1531 -- 71 15 133/89 96 %   07/30/22 1141 97.5 °F (36.4 °C) 82 12 (!) 145/119 97 %       EKG interpretation: (Preliminary)  Normal sinus rhythm, no acute ST changes concerning for ischemia. QTc normal. EKG interpretation by Ravinder Webster MD    Records Reviewed: Nursing Notes and Old Medical Records    Provider Notes (Medical Decision Making):   DDx: Musculoskeletal chest pain, cervical radiculopathy, ACS, GERD, PE, pneumonia, anxiety, etc.    Plan: EKG, troponin, proBNP, basic blood work, chest x-ray. Medicate patient for pain with naproxen, oral Valium, and lidocaine patch application over areas of pain. Work-up in the ED is largely unremarkable. Troponin x2 is negative. Effectively ruling patient out for ACS. Results reviewed with the patient. On my repeat evaluation, she is reporting symptomatic improvement with ED treatment. Suspect symptoms today are secondary to musculoskeletal etiology. Will provide Rx for naproxen, Flexeril, and lidocaine patch for home to treat current symptoms. She is requesting refill for her hydrochlorothiazide, which is provided per her request.  Patient advised to follow-up with her PCP for ongoing management. Reasons to return were discussed. She felt comfortable with plan. ED Course:   Initial assessment performed. The patient's presenting problems have been discussed, and they are in agreement with the care plan formulated and outlined with them.   I have encouraged them to ask questions as they arise throughout their visit. Mily Cunningham MD      Disposition:  Discharge      DISCHARGE PLAN:  1. Discharge Medication List as of 7/30/2022  3:56 PM        START taking these medications    Details   cyclobenzaprine (FLEXERIL) 10 mg tablet Take 1 Tablet by mouth three (3) times daily as needed for Muscle Spasm(s). , Normal, Disp-20 Tablet, R-0      lidocaine 4 % patch 1 Patch by TransDERmal route every twelve (12) hours. , Normal, Disp-5 Patch, R-2      naproxen (NAPROSYN) 500 mg tablet Take 1 Tablet by mouth every twelve (12) hours as needed for Pain., Normal, Disp-20 Tablet, R-0           CONTINUE these medications which have NOT CHANGED    Details   hydrOXYzine HCL (ATARAX) 25 mg tablet Take  by mouth as needed., Historical Med      aspirin 81 mg chewable tablet Take 81 mg by mouth daily. , Historical Med      hydroCHLOROthiazide (MICROZIDE) 12.5 mg capsule Take 1 Capsule by mouth daily. Indications: high blood pressure, Normal, Disp-90 Capsule, R-3      amLODIPine (NORVASC) 10 mg tablet Take 1 Tab by mouth daily. Indications: high blood pressure, Normal, Disp-30 Tab, R-0           2. Follow-up Information       Follow up With Specialties Details Why Contact Info    vinod Manning MD Family Medicine In 2 days  [address]  [phone]      [facility] EMERGENCY DEPT Emergency Medicine  If symptoms worsen [address]  [address]  [phone]          3. Return to ED if worse     Diagnosis     Clinical Impression:   1. Acute chest pain    2. Left cervical radiculopathy    3. Primary hypertension        Attestations:    Mily Cunningham MD    Please note that this dictation was completed with [employer], the [employer] voice recognition software. Quite often unanticipated grammatical, syntax, homophones, and other interpretive errors are inadvertently transcribed by the computer software. Please disregard these errors. Please excuse any errors that have escaped final proofreading. Thank you.

## 2022-07-31 LAB
ATRIAL RATE: 75 BPM
CALCULATED P AXIS, ECG09: 42 DEGREES
CALCULATED R AXIS, ECG10: 5 DEGREES
CALCULATED T AXIS, ECG11: 25 DEGREES
DIAGNOSIS, 93000: NORMAL
P-R INTERVAL, ECG05: 154 MS
Q-T INTERVAL, ECG07: 412 MS
QRS DURATION, ECG06: 88 MS
QTC CALCULATION (BEZET), ECG08: 460 MS
VENTRICULAR RATE, ECG03: 75 BPM

## 2022-08-03 NOTE — ED ADULT TRIAGE NOTE - NSWEIGHTCALCTOOLDRUG_GEN_A_CORE
Requested Prescriptions     Pending Prescriptions Disp Refills    fluticasone (FLONASE) 50 mcg/act nasal spray [Pharmacy Med Name: FLUTICASONE PROP 50 MCG SPRAY] 24 mL 3     Sig: SPRAY 1 SPRAY INTO EACH NOSTRIL EVERY DAY     LOV 6/3/22, F/U 9/16/22, Labs completed  used

## 2022-08-27 ENCOUNTER — HOSPITAL ENCOUNTER (EMERGENCY)
Age: 47
Discharge: HOME | End: 2022-08-27
Attending: EMERGENCY MEDICINE
Payer: MEDICAID

## 2022-08-27 VITALS
RESPIRATION RATE: 16 BRPM | DIASTOLIC BLOOD PRESSURE: 103 MMHG | OXYGEN SATURATION: 99 % | HEART RATE: 77 BPM | SYSTOLIC BLOOD PRESSURE: 162 MMHG | TEMPERATURE: 98 F | BODY MASS INDEX: 25.58 KG/M2 | WEIGHT: 139 LBS | HEIGHT: 62 IN

## 2022-08-27 DIAGNOSIS — Z76.89 ENCOUNTER FOR ASSESSMENT OF STD EXPOSURE: Primary | ICD-10-CM

## 2022-08-27 DIAGNOSIS — N89.8 VAGINA ITCHING: ICD-10-CM

## 2022-08-27 DIAGNOSIS — N76.0 VAGINITIS AND VULVOVAGINITIS: ICD-10-CM

## 2022-08-27 LAB
APPEARANCE UR: CLEAR
BACTERIA URNS QL MICRO: NEGATIVE /HPF
BILIRUB UR QL: NEGATIVE
CLUE CELLS VAG QL WET PREP: NORMAL
COLOR UR: NORMAL
EPITH CASTS URNS QL MICRO: NORMAL /LPF
GLUCOSE UR STRIP.AUTO-MCNC: NEGATIVE MG/DL
HCG UR QL: NEGATIVE
HGB UR QL STRIP: NEGATIVE
KETONES UR QL STRIP.AUTO: NEGATIVE MG/DL
KOH PREP SPEC: NORMAL
LEUKOCYTE ESTERASE UR QL STRIP.AUTO: NEGATIVE
NITRITE UR QL STRIP.AUTO: NEGATIVE
PH UR STRIP: 7 [PH] (ref 5–8)
PROT UR STRIP-MCNC: NEGATIVE MG/DL
RBC #/AREA URNS HPF: NORMAL /HPF (ref 0–5)
SERVICE CMNT-IMP: NORMAL
SP GR UR REFRACTOMETRY: 1.01
T VAGINALIS VAG QL WET PREP: NORMAL
UA: UC IF INDICATED,UAUC: NORMAL
UROBILINOGEN UR QL STRIP.AUTO: 1 EU/DL (ref 0.2–1)
WBC URNS QL MICRO: NORMAL /HPF (ref 0–4)

## 2022-08-27 PROCEDURE — 87491 CHLMYD TRACH DNA AMP PROBE: CPT

## 2022-08-27 PROCEDURE — 99283 EMERGENCY DEPT VISIT LOW MDM: CPT

## 2022-08-27 PROCEDURE — 81001 URINALYSIS AUTO W/SCOPE: CPT

## 2022-08-27 PROCEDURE — 87210 SMEAR WET MOUNT SALINE/INK: CPT

## 2022-08-27 PROCEDURE — 81025 URINE PREGNANCY TEST: CPT

## 2022-08-27 RX ORDER — CAMPHOR 0.45 %
GEL (GRAM) TOPICAL
Qty: 30 G | Refills: 0 | Status: SHIPPED | OUTPATIENT
Start: 2022-08-27

## 2022-08-27 RX ORDER — AZITHROMYCIN 500 MG/1
1000 TABLET, FILM COATED ORAL
Status: DISCONTINUED | OUTPATIENT
Start: 2022-08-27 | End: 2022-08-27 | Stop reason: HOSPADM

## 2022-08-27 NOTE — ED TRIAGE NOTES
Patient presents to the ED with c/o vaginal itching that started this morning. Denies vaginal bleeding or discharge. Denies urinary symptoms.

## 2022-08-27 NOTE — ED NOTES
Patient (s) given copy of dc instructions and script(s). Patient (s) verbalized understanding of instructions and script (s). Patient given a current medication reconciliation form and verbalized understanding of their medications. Patient (s) verbalized understanding of the importance of discussing medications with  his or her physician or clinic they will be following up with. Patient alert and oriented and in no acute distress. Patient discharged home ambulatory with self.

## 2022-08-27 NOTE — DISCHARGE INSTRUCTIONS
Thank You! It was a pleasure taking care of you in our Emergency Department today. We know that when you come to [address] [place] [address], you are entrusting us with your health, comfort, and safety. Our physicians and nurses honor that trust, and truly appreciate the opportunity to care for you and your loved ones. We also value your feedback. If you receive a survey about your Emergency Department experience today, please fill it out. We care about our patients' feedback, and we listen to what you have to say. Thank you. Dr. Toniann Habermann, M.D.      ____________________________________________________________________  I have included a copy of your lab results and/or radiologic studies from today's visit so you can have them easily available at your follow-up visit. We hope you feel better and please do not hesitate to contact the ED if you have any questions at all! Recent Results (from the past 12 hour(s))   URINALYSIS W/ REFLEX CULTURE    Collection Time: 08/27/22  5:51 AM    Specimen: Urine   Result Value Ref Range    Color YELLOW/STRAW      Appearance CLEAR CLEAR      Specific gravity 1.010      pH (UA) 7.0 5.0 - 8.0      Protein Negative NEG mg/dL    Glucose Negative NEG mg/dL    Ketone Negative NEG mg/dL    Bilirubin Negative NEG      Blood Negative NEG      Urobilinogen 1.0 0.2 - 1.0 EU/dL    Nitrites Negative NEG      Leukocyte Esterase Negative NEG      WBC 0-4 0 - 4 /hpf    RBC 0-5 0 - 5 /hpf    Epithelial cells FEW FEW /lpf    Bacteria Negative NEG /hpf    UA:UC IF INDICATED CULTURE NOT INDICATED BY UA RESULT CNI     WET PREP    Collection Time: 08/27/22  5:51 AM    Specimen: Miscellaneous sample   Result Value Ref Range    Clue cells CLUE CELLS ABSENT      Wet prep NO TRICHOMONAS SEEN     KOH, OTHER SOURCES    Collection Time: 08/27/22  5:51 AM    Specimen: Vagina;  Other   Result Value Ref Range    Special Requests: NO SPECIAL REQUESTS      KOH NO YEAST SEEN     HCG URINE, QL. - POC    Collection Time: 08/27/22  6:01 AM   Result Value Ref Range    Pregnancy test,urine (POC) Negative NEG         No orders to display     CT Results  (Last 48 hours)      None          The exam and treatment you received in the Emergency Department were for an urgent problem and are not intended as complete care. It is important that you follow up with a doctor, nurse practitioner, or physician assistant for ongoing care. If your symptoms become worse or you do not improve as expected and you are unable to reach your usual health care provider, you should return to the Emergency Department. We are available 24 hours a day. Please take your discharge instructions with you when you go to your follow-up appointment. If a prescription has been provided, please have it filled as soon as possible to prevent a delay in treatment. Read the entire medication instruction sheet provided to you by the pharmacy. If you have any questions or reservations about taking the medication due to side effects or interactions with other medications, please call your primary care physician or contact the ER to speak with the charge nurse. Please make an appointment with your family doctor or the physician you were referred to for follow-up of this visit as instructed on your discharge paperwork. Return to the ER if you are unable to be seen or if you are unable to be seen in a timely manner. If you have any problem arranging the follow-up visit, contact the Emergency Department immediately.

## 2022-08-27 NOTE — ED PROVIDER NOTES
EMERGENCY DEPARTMENT HISTORY AND PHYSICAL EXAM        Please note that this dictation was completed with the assistance of \"Dragon\", the computer voice recognition software. Quite often unanticipated grammatical, syntax, homophones, and other interpretive errors are inadvertently transcribed by the computer software. Please disregard these errors and any errors that have escaped final proofreading. Thank you. Date: 08/27/22  Patient: Leopoldo Grater  Patient Age and Sex: 56 y.o. female   MRN: [id number]  CSN: [id number]    History of Presenting Illness     Chief Complaint   Patient presents with    Vaginal Itching     History Provided By: Patient/family/EMS (if applicable)    HPI: Leopoldo Grater, 56 y.o. female with past medical history as documented below presents to the ED with c/o of 1 day history of vaginal itching. Patient states that the symptoms started for the past 24 hours. No vaginal bleeding. Patient also reports scant vaginal discharge. Expressed concern for possible STI once empiric testing and treatment. Denies chance of pregnancy. Denies pelvic pain. Pt denies any other exacerbating or ameliorating factors. Additionally, pt specifically denies any recent fever, chills, headache, nausea, vomiting, abdominal pain, CP, SOB, lightheadedness, dizziness, numbness, weakness, lower extremity swelling, heart palpitations, urinary sxs, diarrhea, constipation, melena, hematochezia, cough, or congestion. There are no other complaints, changes or physical findings pertinent to the HPI at this time. PCP: None  Past History   Past Medical History:  History reviewed. No pertinent past medical history. Past Surgical History:  Denies    Family History:   Family history reviewed and was non-contributory, unless specified below:  History reviewed. No pertinent family history.     Social History:  Social History     Tobacco Use    Smoking status: Never    Smokeless tobacco: Never   Substance Use Topics    Alcohol use: Not Currently    Drug use: Never       Allergies: Allergies   Allergen Reactions    Toradol [Ketorolac] Itching       Current Medications:  No current facility-administered medications on file prior to encounter. Current Outpatient Medications on File Prior to Encounter   Medication Sig Dispense Refill    acetaminophen (TYLENOL) 500 mg tablet Take 2 Tabs by mouth every eight (8) hours as needed for Pain. (Patient not taking: Reported on 8/27/2022) 30 Tab 0    ibuprofen (MOTRIN) 400 mg tablet Take 1 Tab by mouth every six (6) hours as needed for Pain. (Patient not taking: Reported on 8/27/2022) 20 Tab 0    lidocaine (LIDODERM) 5 % Apply patch to the affected area for 12 hours a day and remove for 12 hours a day. (Patient not taking: Reported on 8/27/2022) 1 Each 0     Review of Systems   A complete ROS was reviewed by me today and all other systems negative, unless otherwise specified below:  Review of Systems   Constitutional: Negative. Negative for chills and fever. HENT: Negative. Negative for congestion and sore throat. Eyes: Negative. Respiratory: Negative. Negative for cough, chest tightness, shortness of breath and wheezing. Cardiovascular: Negative. Negative for chest pain, palpitations and leg swelling. Gastrointestinal: Negative. Negative for abdominal distention, abdominal pain, blood in stool, constipation, diarrhea, nausea and vomiting. Endocrine: Negative. Genitourinary:  Positive for vaginal discharge. Negative for difficulty urinating, dysuria, flank pain, frequency, hematuria and urgency. Musculoskeletal: Negative. Negative for back pain and neck stiffness. Skin: Negative. Negative for rash. Allergic/Immunologic: Negative. Neurological: Negative. Negative for dizziness, syncope, weakness, light-headedness, numbness and headaches. Hematological: Negative. Psychiatric/Behavioral: Negative. Negative for confusion and self-injury. Physical Exam   Physical Exam  Vitals and nursing note reviewed. Constitutional:       General: She is not in acute distress. Appearance: She is well-developed. She is not diaphoretic. HENT:      Head: Normocephalic and atraumatic. Mouth/Throat:      Pharynx: No oropharyngeal exudate. Eyes:      Conjunctiva/sclera: Conjunctivae normal.      Pupils: Pupils are equal, round, and reactive to light. Cardiovascular:      Rate and Rhythm: Normal rate and regular rhythm. Heart sounds: Normal heart sounds. No murmur heard. No friction rub. No gallop. Pulmonary:      Effort: Pulmonary effort is normal. No respiratory distress. Breath sounds: Normal breath sounds. No wheezing or rales. Chest:      Chest wall: No tenderness. Abdominal:      General: Bowel sounds are normal. There is no distension. Palpations: Abdomen is soft. There is no mass. Tenderness: There is no abdominal tenderness. There is no guarding or rebound. Musculoskeletal:         General: No tenderness or deformity. Normal range of motion. Cervical back: Normal range of motion. Skin:     General: Skin is warm. Findings: No rash. Neurological:      Mental Status: She is alert and oriented to person, place, and time. Cranial Nerves: No cranial nerve deficit. Motor: No abnormal muscle tone. Coordination: Coordination normal.      Deep Tendon Reflexes: Reflexes normal.     Diagnostic Study Results     Laboratory Data  I have personally reviewed and interpreted all available laboratory results.    Recent Results (from the past 24 hour(s))   URINALYSIS W/ REFLEX CULTURE    Collection Time: 08/27/22  5:51 AM    Specimen: Urine   Result Value Ref Range    Color YELLOW/STRAW      Appearance CLEAR CLEAR      Specific gravity 1.010      pH (UA) 7.0 5.0 - 8.0      Protein Negative NEG mg/dL    Glucose Negative NEG mg/dL    Ketone Negative NEG mg/dL    Bilirubin Negative NEG      Blood Negative NEG      Urobilinogen 1.0 0.2 - 1.0 EU/dL    Nitrites Negative NEG      Leukocyte Esterase Negative NEG      WBC 0-4 0 - 4 /hpf    RBC 0-5 0 - 5 /hpf    Epithelial cells FEW FEW /lpf    Bacteria Negative NEG /hpf    UA:UC IF INDICATED CULTURE NOT INDICATED BY UA RESULT CNI     WET PREP    Collection Time: 08/27/22  5:51 AM    Specimen: Miscellaneous sample   Result Value Ref Range    Clue cells CLUE CELLS ABSENT      Wet prep NO TRICHOMONAS SEEN     KOH, OTHER SOURCES    Collection Time: 08/27/22  5:51 AM    Specimen: Vagina; Other   Result Value Ref Range    Special Requests: NO SPECIAL REQUESTS      KOH NO YEAST SEEN     HCG URINE, QL. - POC    Collection Time: 08/27/22  6:01 AM   Result Value Ref Range    Pregnancy test,urine (POC) Negative NEG         Radiologic Studies   I have personally reviewed and interpreted all available imaging studies and agree with radiology interpretation. No orders to display     CT Results  (Last 48 hours)      None          CXR Results  (Last 48 hours)      None          Medical Decision Making   I am the first and primary ED physician for this patient's ED visit today. I reviewed our electronic medical record system for any past medical records that may contribute to the patient's current condition, including their past medical history, surgical history, social and family history. This also includes their most recent ED visits, previous hospitalizations and prior diagnostic data. I have reviewed and summarized the most pertinent findings in my HPI and MDM.     Vital Signs Reviewed:  Patient Vitals for the past 24 hrs:   Temp Pulse Resp BP SpO2   08/27/22 0542 98 °F (36.7 °C) 77 16 (!) 162/103 99 %     Pulse Oximetry Analysis: 99% on RA    Cardiac Monitor:   Rate: 77 bpm  The cardiac monitor revealed the following rhythm as interpreted by me: Normal Sinus Rhythm  Cardiac monitoring was ordered to monitor patient for signs of cardiac dysrhythmia, which they are at risk for based on their history and/or risk for cardiovascular disease and/or metabolic abnormalities. Records Reviewed: Nursing Notes, Old Medical Records, Previous electrocardiograms, Previous Radiology Studies and Previous Laboratory Studies, EMS reports    Provider Notes (Medical Decision Making):   Patient presents with vaginal discharge; afebrile, stable vitals with benign exam; DDx: pregnancy, acute cystitis, ureteral stone, pyelonephritis, STI, BV, yeast infection, Will obtain urinalysis, urine pregnancy, send pelvic labs. If patient expresses concern for STI exposure, will test and empirically treat. Also, I provided education on the importance of testing and treating partners and using safe sex practices in the future. Discussed with the patient diagnosis and test results and all questioned fully answered. They understand the importance of staying well hydrated, taking antibiotics as prescribed to completion. She will follow-up with PCP and or their OB/GYN if any problems arise. ED Course:   Initial assessment performed. I discussed presenting problems and concerns, and my formulated plan for today's visit with the patient and any available family members. I have encouraged them to ask questions as they arise throughout the visit.    Social History     Tobacco Use    Smoking status: Never    Smokeless tobacco: Never   Substance Use Topics    Alcohol use: Not Currently    Drug use: Never       ED Orders Placed:  Orders Placed This Encounter    WET PREP    CHLAMYDIA/GC PCR, SWAB    KOH, OTHER SOURCES    URINALYSIS W/ REFLEX CULTURE    POC URINE PREGNANCY TEST    HCG URINE, QL. - POC    cefTRIAXone (ROCEPHIN) 500 mg in lidocaine (PF) (XYLOCAINE) 10 mg/mL (1 %) IM injection    azithromycin (ZITHROMAX) tablet 1,000 mg       ED Medications Administered During ED Course:  Medications   cefTRIAXone (ROCEPHIN) 500 mg in lidocaine (PF) (XYLOCAINE) 10 mg/mL (1 %) IM injection (has no administration in time range) azithromycin (ZITHROMAX) tablet 1,000 mg (has no administration in time range)        Progress Note:  I have just re-evaluated the patient. Patient reports improvement of sx's. I have reviewed Her vital signs and determined there is currently no worsening in their condition or physical exam. Results have been reviewed with them and their questions have been answered. We will continue to review further results as they come available. Progress Note:  Pt reassessed and symptoms noted to have improved significantly after ED treatment. Pt is clinically stable for discharge. Leobardo Barker's labs and imaging have been reviewed with her and available family. She verbally conveys understanding and agreement of the signs, symptoms, diagnosis, treatment and prognosis and additionally agrees to follow up as recommended with Dr. None and/or specialist as instructed. She agrees with the care plan we have created and conveys that all of her questions have been answered. Additionally, I have put together a packet of discharge instructions for her that include: 1) educational information regarding their diagnosis, 2) how to care for their diagnosis at home, as well a 3) list of reasons why they would want to return to the ED prior to their follow-up appointment should the patient's condition change or symptoms worsen. I have answered all questions to the patient's satisfaction. Strict return precautions given. She conveyed understanding and agreement with care plan. Vital signs stable for discharge. Disposition:  DISCHARGE  The pt is ready for discharge. The pt's signs, symptoms, diagnosis, and discharge instructions have been discussed and pt has conveyed their understanding. The pt is to follow up as recommended or return to ER should their symptoms worsen. Plan has been discussed and pt is in agreement. Plan:  1. Return precautions as discussed with patient and available family/caregiver.      2.   Current Discharge Medication List        3. Follow-up Information       Follow up With Specialties Details Why [address] EMERGENCY DEPT Emergency Medicine   [address] [phone]          Instructed to return to ED if worse  Diagnosis   Clinical Impression:  1. Encounter for assessment of STD exposure    2. Vaginitis and vulvovaginitis    3. Vagina itching      Attestation:  Kellee Siegel MD, am the attending of record for this patient. I personally performed the services described in this documentation on this date, 8/27/2022 for [email or web address]. I have reviewed the chart and verified that the record is accurate and complete.

## 2022-08-29 PROCEDURE — 99283 EMERGENCY DEPT VISIT LOW MDM: CPT

## 2022-08-30 ENCOUNTER — APPOINTMENT (OUTPATIENT)
Dept: GENERAL RADIOLOGY | Age: 47
End: 2022-08-30
Attending: EMERGENCY MEDICINE
Payer: MEDICAID

## 2022-08-30 ENCOUNTER — HOSPITAL ENCOUNTER (EMERGENCY)
Age: 47
Discharge: HOME | End: 2022-08-30
Attending: EMERGENCY MEDICINE
Payer: MEDICAID

## 2022-08-30 VITALS
DIASTOLIC BLOOD PRESSURE: 99 MMHG | OXYGEN SATURATION: 100 % | HEIGHT: 62 IN | SYSTOLIC BLOOD PRESSURE: 159 MMHG | BODY MASS INDEX: 25.4 KG/M2 | RESPIRATION RATE: 16 BRPM | TEMPERATURE: 98.6 F | HEART RATE: 75 BPM | WEIGHT: 138 LBS

## 2022-08-30 DIAGNOSIS — M79.632 LEFT FOREARM PAIN: ICD-10-CM

## 2022-08-30 DIAGNOSIS — M25.531 ACUTE PAIN OF RIGHT WRIST: ICD-10-CM

## 2022-08-30 DIAGNOSIS — I10 ACCELERATED HYPERTENSION: ICD-10-CM
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
## 2022-08-30 DIAGNOSIS — M54.50 ACUTE MIDLINE LOW BACK PAIN WITHOUT SCIATICA: ICD-10-CM

## 2022-08-30 PROCEDURE — 73110 X-RAY EXAM OF WRIST: CPT

## 2022-08-30 PROCEDURE — 74011000250 HC RX REV CODE- 250: Performed by: EMERGENCY MEDICINE

## 2022-08-30 PROCEDURE — 99284 EMERGENCY DEPT VISIT MOD MDM: CPT

## 2022-08-30 PROCEDURE — 74011250637 HC RX REV CODE- 250/637: Performed by: EMERGENCY MEDICINE

## 2022-08-30 PROCEDURE — 73090 X-RAY EXAM OF FOREARM: CPT

## 2022-08-30 PROCEDURE — 72100 X-RAY EXAM L-S SPINE 2/3 VWS: CPT

## 2022-08-30 RX ORDER — OXYCODONE AND ACETAMINOPHEN 5; 325 MG/1; MG/1
1 TABLET ORAL
Status: COMPLETED | OUTPATIENT
Start: 2022-08-30 | End: 2022-08-30

## 2022-08-30 RX ORDER — ACETAMINOPHEN 325 MG/1
650 TABLET ORAL
Qty: 20 TABLET | Refills: 0 | Status: ON HOLD | OUTPATIENT
Start: 2022-08-30 | End: 2022-10-07

## 2022-08-30 RX ORDER — LIDOCAINE 4 G/100G
1 PATCH TOPICAL ONCE
Status: DISCONTINUED | OUTPATIENT
Start: 2022-08-30 | End: 2022-08-30 | Stop reason: HOSPADM

## 2022-08-30 RX ORDER — CYCLOBENZAPRINE HCL 10 MG
10 TABLET ORAL
Qty: 14 TABLET | Refills: 0 | Status: ON HOLD | OUTPATIENT
Start: 2022-08-30 | End: 2022-10-07

## 2022-08-30 RX ORDER — CYCLOBENZAPRINE HCL 10 MG
10 TABLET ORAL
Status: COMPLETED | OUTPATIENT
Start: 2022-08-30 | End: 2022-08-30

## 2022-08-30 RX ORDER — LIDOCAINE 50 MG/G
PATCH TOPICAL
Qty: 15 EACH | Refills: 0 | Status: ON HOLD | OUTPATIENT
Start: 2022-08-30 | End: 2022-10-07

## 2022-08-30 RX ADMIN — OXYCODONE HYDROCHLORIDE AND ACETAMINOPHEN 1 TABLET: 5; 325 TABLET ORAL at 00:42

## 2022-08-30 RX ADMIN — CYCLOBENZAPRINE 10 MG: 10 TABLET, FILM COATED ORAL at 00:47
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
## 2022-08-30 NOTE — ED NOTES
Pt signed a pregnancy waiver. Pt verbalized understanding of the risks of fetal radiation. Pt's LMP approximately 8/23/22.
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
## 2022-08-30 NOTE — DISCHARGE INSTRUCTIONS
Thank You! It was a pleasure taking care of you in our Emergency Department today. We know that when you come to [address] [place] [address], you are entrusting us with your health, comfort, and safety. Our physicians and nurses honor that trust, and truly appreciate the opportunity to care for you and your loved ones. We also value your feedback. If you receive a survey about your Emergency Department experience today, please fill it out. We care about our patients' feedback, and we listen to what you have to say. Thank you. Dr. Yaya Nagy M.D.      ____________________________________________________________________  I have included a copy of your lab results and/or radiologic studies from today's visit so you can have them easily available at your follow-up visit. We hope you feel better and please do not hesitate to contact the ED if you have any questions at all! No results found for this or any previous visit (from the past 12 hour(s)). XR WRIST RT AP/LAT/OBL MIN 3V    (Results Pending)   XR FOREARM LT AP/LAT    (Results Pending)   XR SPINE LUMB 2 OR 3 V    (Results Pending)     CT Results  (Last 48 hours)      None          The exam and treatment you received in the Emergency Department were for an urgent problem and are not intended as complete care. It is important that you follow up with a doctor, nurse practitioner, or physician assistant for ongoing care. If your symptoms become worse or you do not improve as expected and you are unable to reach your usual health care provider, you should return to the Emergency Department. We are available 24 hours a day. Please take your discharge instructions with you when you go to your follow-up appointment. If a prescription has been provided, please have it filled as soon as possible to prevent a delay in treatment. Read the entire medication instruction sheet provided to you by the pharmacy.  If you have any questions or reservations about taking the medication due to side effects or interactions with other medications, please call your primary care physician or contact the ER to speak with the charge nurse. Please make an appointment with your family doctor or the physician you were referred to for follow-up of this visit as instructed on your discharge paperwork. Return to the ER if you are unable to be seen or if you are unable to be seen in a timely manner. If you have any problem arranging the follow-up visit, contact the Emergency Department immediately.
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
## 2022-08-30 NOTE — ED NOTES
Pt states she didn't want to speak w/ [facility] and wants to go home. Pt states she has to put her kids on the bus and needs to leave. MD aware. Pt given [facility]'s number and told she is able to return to ED for help if needed.

## 2022-09-13 ENCOUNTER — APPOINTMENT (OUTPATIENT)
Dept: GENERAL RADIOLOGY | Age: 47
End: 2022-09-13
Attending: EMERGENCY MEDICINE
Payer: MEDICAID

## 2022-09-13 ENCOUNTER — HOSPITAL ENCOUNTER (EMERGENCY)
Age: 47
Discharge: HOME | End: 2022-09-13
Attending: EMERGENCY MEDICINE
Payer: MEDICAID

## 2022-09-13 VITALS
HEIGHT: 62 IN | OXYGEN SATURATION: 94 % | BODY MASS INDEX: 27.23 KG/M2 | DIASTOLIC BLOOD PRESSURE: 108 MMHG | SYSTOLIC BLOOD PRESSURE: 155 MMHG | TEMPERATURE: 99.2 F | HEART RATE: 103 BPM | RESPIRATION RATE: 18 BRPM | WEIGHT: 148 LBS

## 2022-09-13 DIAGNOSIS — B34.9 VIRAL SYNDROME: ICD-10-CM

## 2022-09-13 DIAGNOSIS — J01.00 ACUTE NON-RECURRENT MAXILLARY SINUSITIS: ICD-10-CM

## 2022-09-13 DIAGNOSIS — R55 SYNCOPE AND COLLAPSE: Primary | ICD-10-CM

## 2022-09-13 LAB
ALBUMIN SERPL-MCNC: 3.9 G/DL (ref 3.5–5)
ALBUMIN/GLOB SERPL: 1 {RATIO} (ref 1.1–2.2)
ALP SERPL-CCNC: 76 U/L (ref 45–117)
ALT SERPL-CCNC: 34 U/L (ref 12–78)
ANION GAP SERPL CALC-SCNC: 7 MMOL/L (ref 5–15)
AST SERPL-CCNC: 21 U/L (ref 15–37)
BASOPHILS # BLD: 0.1 K/UL (ref 0–0.1)
BASOPHILS NFR BLD: 1 % (ref 0–1)
BILIRUB SERPL-MCNC: 0.2 MG/DL (ref 0.2–1)
BUN SERPL-MCNC: 11 MG/DL (ref 6–20)
BUN/CREAT SERPL: 13 (ref 12–20)
CALCIUM SERPL-MCNC: 8.6 MG/DL (ref 8.5–10.1)
CHLORIDE SERPL-SCNC: 102 MMOL/L (ref 97–108)
CO2 SERPL-SCNC: 30 MMOL/L (ref 21–32)
CREAT SERPL-MCNC: 0.88 MG/DL (ref 0.55–1.02)
DIFFERENTIAL METHOD BLD: ABNORMAL
EOSINOPHIL # BLD: 0 K/UL (ref 0–0.4)
EOSINOPHIL NFR BLD: 0 % (ref 0–7)
ERYTHROCYTE [DISTWIDTH] IN BLOOD BY AUTOMATED COUNT: 11.9 % (ref 11.5–14.5)
FLUAV RNA SPEC QL NAA+PROBE: NOT DETECTED
FLUBV RNA SPEC QL NAA+PROBE: NOT DETECTED
GLOBULIN SER CALC-MCNC: 3.8 G/DL (ref 2–4)
GLUCOSE SERPL-MCNC: 104 MG/DL (ref 65–100)
HCG SERPL QL: NEGATIVE
HCT VFR BLD AUTO: 39.8 % (ref 35–47)
HGB BLD-MCNC: 13.7 G/DL (ref 11.5–16)
IMM GRANULOCYTES # BLD AUTO: 0.1 K/UL (ref 0–0.04)
IMM GRANULOCYTES NFR BLD AUTO: 0 % (ref 0–0.5)
LYMPHOCYTES # BLD: 2.1 K/UL (ref 0.8–3.5)
LYMPHOCYTES NFR BLD: 18 % (ref 12–49)
MCH RBC QN AUTO: 31.1 PG (ref 26–34)
MCHC RBC AUTO-ENTMCNC: 34.4 G/DL (ref 30–36.5)
MCV RBC AUTO: 90.2 FL (ref 80–99)
MONOCYTES # BLD: 1 K/UL (ref 0–1)
MONOCYTES NFR BLD: 9 % (ref 5–13)
NEUTS SEG # BLD: 8.5 K/UL (ref 1.8–8)
NEUTS SEG NFR BLD: 72 % (ref 32–75)
NRBC # BLD: 0 K/UL (ref 0–0.01)
NRBC BLD-RTO: 0 PER 100 WBC
PLATELET # BLD AUTO: 456 K/UL (ref 150–400)
PMV BLD AUTO: 9.1 FL (ref 8.9–12.9)
POTASSIUM SERPL-SCNC: 3.3 MMOL/L (ref 3.5–5.1)
PROT SERPL-MCNC: 7.7 G/DL (ref 6.4–8.2)
RBC # BLD AUTO: 4.41 M/UL (ref 3.8–5.2)
SARS-COV-2, COV2: NOT DETECTED
SODIUM SERPL-SCNC: 139 MMOL/L (ref 136–145)
TROPONIN-HIGH SENSITIVITY: 7 NG/L (ref 0–51)
WBC # BLD AUTO: 11.7 K/UL (ref 3.6–11)

## 2022-09-13 PROCEDURE — 87636 SARSCOV2 & INF A&B AMP PRB: CPT

## 2022-09-13 PROCEDURE — 80053 COMPREHEN METABOLIC PANEL: CPT

## 2022-09-13 PROCEDURE — 71045 X-RAY EXAM CHEST 1 VIEW: CPT

## 2022-09-13 PROCEDURE — 84703 CHORIONIC GONADOTROPIN ASSAY: CPT

## 2022-09-13 PROCEDURE — 85025 COMPLETE CBC W/AUTO DIFF WBC: CPT

## 2022-09-13 PROCEDURE — 84484 ASSAY OF TROPONIN QUANT: CPT

## 2022-09-13 PROCEDURE — 36415 COLL VENOUS BLD VENIPUNCTURE: CPT

## 2022-09-13 PROCEDURE — 74011250637 HC RX REV CODE- 250/637: Performed by: EMERGENCY MEDICINE

## 2022-09-13 PROCEDURE — 74011250636 HC RX REV CODE- 250/636: Performed by: EMERGENCY MEDICINE

## 2022-09-13 PROCEDURE — 96360 HYDRATION IV INFUSION INIT: CPT

## 2022-09-13 PROCEDURE — 99284 EMERGENCY DEPT VISIT MOD MDM: CPT

## 2022-09-13 RX ORDER — FLUTICASONE PROPIONATE 50 MCG
2 SPRAY, SUSPENSION (ML) NASAL DAILY
Qty: 16 G | Refills: 0 | Status: ON HOLD | OUTPATIENT
Start: 2022-09-13 | End: 2022-10-07

## 2022-09-13 RX ORDER — PSEUDOEPHEDRINE HCL 120 MG/1
120 TABLET, FILM COATED, EXTENDED RELEASE ORAL
Qty: 20 TABLET | Refills: 0 | Status: SHIPPED | OUTPATIENT
Start: 2022-09-13 | End: 2022-10-08

## 2022-09-13 RX ORDER — IBUPROFEN 600 MG/1
600 TABLET ORAL
Status: COMPLETED | OUTPATIENT
Start: 2022-09-13 | End: 2022-09-13

## 2022-09-13 RX ORDER — ACETAMINOPHEN 500 MG
1000 TABLET ORAL
Status: COMPLETED | OUTPATIENT
Start: 2022-09-13 | End: 2022-09-13

## 2022-09-13 RX ADMIN — ACETAMINOPHEN 1000 MG: 500 TABLET, FILM COATED ORAL at 20:08

## 2022-09-13 RX ADMIN — IBUPROFEN 600 MG: 600 TABLET ORAL at 20:08

## 2022-09-13 RX ADMIN — SODIUM CHLORIDE 1000 ML: 9 INJECTION, SOLUTION INTRAVENOUS at 20:08

## 2022-09-13 NOTE — ED TRIAGE NOTES
C/o generalized body aches, headache, runny nose, sore throat that started Sunday    Pt reports passing out 40 mins PTA. States she felt dizzy and went out.

## 2022-09-13 NOTE — ED NOTES
Patient presents to the ED with c/o headache, facial pain, nasal congestion and sore throat that started on Sunday. Patient reports her whole family has the same symptoms. Denies chest pain, N/V/D. Pt reports taking OTC cold medicine, D3, vitamin C and zinc without relief. Pt is not vaccinated for COVID. Pt is alert, oriented and appropriate. Pt refused the EKG and cardiac monitor. Pt stated her body aches and she did not want anything on her body. MD aware. Pt did not mention anything about passing out to this RN. Emergency Department Nursing Plan of Care       The Nursing Plan of Care is developed from the Nursing assessment and Emergency Department Attending provider initial evaluation. The plan of care may be reviewed in the ED Provider note.     The Plan of Care was developed with the following considerations:   Patient / Family readiness to learn indicated by:verbalized understanding  Persons(s) to be included in education: patient  Barriers to Learning/Limitations:No    Signed     Nela Hammer    9/13/2022   7:26 PM

## 2022-09-14 NOTE — ED PROVIDER NOTES
EMERGENCY DEPARTMENT HISTORY AND PHYSICAL EXAM        Date: 9/13/2022  Patient Name: Timothy Torrez    History of Presenting Illness     Chief Complaint   Patient presents with    Syncope    Headache       History Provided By: Patient    HPI: Timothy Torrez, 56 y.o. female with history of hypertension, arrhythmia, TIA, anxiety and depression presents to the ED with cc of myalgias, body aches, headache, sinus pressure, congestion, syncope. Patient developed a headache 2 days ago, no thunderclap onset, it started slowly and progressively worsened. She also developed nasal congestion and body aches throughout her entire body. Over the past 2 days she has developed worsening sinus pressure and congestion. She states that everybody at home has been sick but she did take a negative COVID test yesterday. Denies fevers, chills, abdominal pain, chest pain, shortness of breath, nausea, vomiting, or diarrhea. She has tried multiple over-the-counter medications without relief of symptoms. Today she got out of bed and developed lightheadedness and had a syncopal episode and woke up on the ground. Denies striking her head. Denies any preceding shortness of breath, chest pain, or palpitations. There are no other complaints, changes, or physical findings at this time. PCP: Crystal Prieto MD    No current facility-administered medications on file prior to encounter. Current Outpatient Medications on File Prior to Encounter   Medication Sig Dispense Refill    hydroCHLOROthiazide (MICROZIDE) 12.5 mg capsule Take 1 Capsule by mouth in the morning. Indications: high blood pressure 90 Capsule 3    amLODIPine (NORVASC) 10 mg tablet Take 1 Tab by mouth daily. Indications: high blood pressure 30 Tab 0    acetaminophen (TYLENOL) 325 mg tablet Take 2 Tablets by mouth every four (4) hours as needed for Pain.  (Patient not taking: Reported on 9/13/2022) 20 Tablet 0    cyclobenzaprine (FLEXERIL) 10 mg tablet Take 1 Tablet by mouth three (3) times daily as needed for Muscle Spasm(s). (Patient not taking: Reported on 9/13/2022) 14 Tablet 0    lidocaine (Lidoderm) 5 % Apply patch to the affected area for 12 hours a day and remove for 12 hours a day. (Patient not taking: Reported on 9/13/2022) 15 Each 0    cyclobenzaprine (FLEXERIL) 10 mg tablet Take 1 Tablet by mouth three (3) times daily as needed for Muscle Spasm(s). (Patient not taking: Reported on 9/13/2022) 20 Tablet 0    lidocaine 4 % patch 1 Patch by TransDERmal route every twelve (12) hours. (Patient not taking: Reported on 9/13/2022) 5 Patch 2    naproxen (NAPROSYN) 500 mg tablet Take 1 Tablet by mouth every twelve (12) hours as needed for Pain. (Patient not taking: Reported on 9/13/2022) 20 Tablet 0    hydrOXYzine HCL (ATARAX) 25 mg tablet Take  by mouth as needed. (Patient not taking: Reported on 9/13/2022)      aspirin 81 mg chewable tablet Take 81 mg by mouth daily.  (Patient not taking: No sig reported)         Past History     Past Medical History:  Past Medical History:   Diagnosis Date    angina     Angina at rest [facility])     Breast pain     since 2016, right breast, on and off    Chiari malformation     Heart abnormalities     leaky heart valve, murmur    Hypertension     Other ill-defined conditions(799.89)     cardiac arrhythmias    Other ill-defined conditions(799.89)     neuropathy    Psychiatric disorder     anxiety and depression    Stroke ([facility] [address]     TIA       Past Surgical History:  Past Surgical History:   Procedure Laterality Date    HX GYN      HX ORTHOPAEDIC      screws in left ankle    HX OTHER SURGICAL      surgery to back of head due to fractured skull    NEUROLOGICAL PROCEDURE UNLISTED      GA ABDOMEN SURGERY PROC UNLISTED      laporscopic surgery post ruptured fallopian tube       Family History:  Family History   Problem Relation Age of Onset    Hypertension Mother     Stroke Mother     Diabetes Mother     Drug Abuse Father        Social History:  Social History     Tobacco Use    Smoking status: Former     Years: 23.00     Types: Cigarettes     Quit date: 3/5/2021     Years since quittin.5    Smokeless tobacco: Current   Vaping Use    Vaping Use: Some days   Substance Use Topics    Alcohol use: Yes     Comment: social    Drug use: Not Currently     Types: Marijuana     Comment: former crack user approx clean 2017       Allergies: Allergies   Allergen Reactions    Metoprolol Other (comments)     headache    Toradol [Ketorolac] Hives         Review of Systems   Review of Systems   Constitutional:  Negative for chills and fever. HENT:  Positive for congestion, sinus pressure and sinus pain. Respiratory:  Negative for cough and shortness of breath. Cardiovascular:  Negative for chest pain and palpitations. Gastrointestinal:  Negative for abdominal pain, nausea and vomiting. Genitourinary: Negative. Musculoskeletal:  Positive for myalgias. Skin:  Negative for color change and rash. Neurological:  Positive for syncope, light-headedness and headaches. All other systems reviewed and are negative. Physical Exam   Physical Exam  Vitals and nursing note reviewed. Constitutional:       General: She is not in acute distress. Appearance: Normal appearance. She is not ill-appearing or toxic-appearing. HENT:      Head: Normocephalic and atraumatic. Comments: There is tenderness to percussion frontal, ethmoid, and maxillary sinuses bilaterally     Nose: Congestion present. Mouth/Throat:      Mouth: Mucous membranes are moist.   Eyes:      Extraocular Movements: Extraocular movements intact. Pupils: Pupils are equal, round, and reactive to light. Neck:      Comments: No meningismus  Cardiovascular:      Rate and Rhythm: Normal rate and regular rhythm. Heart sounds: No murmur heard. Pulmonary:      Effort: Pulmonary effort is normal. No respiratory distress. Breath sounds: Normal breath sounds.  No wheezing. Abdominal:      General: There is no distension. Palpations: Abdomen is soft. Tenderness: There is no abdominal tenderness. There is no guarding or rebound. Musculoskeletal:         General: No swelling or tenderness. Normal range of motion. Cervical back: Normal range of motion and neck supple. No rigidity. Right lower leg: No edema. Left lower leg: No edema. Skin:     General: Skin is warm and dry. Coloration: Skin is not pale. Findings: No erythema. Neurological:      General: No focal deficit present. Mental Status: She is alert and oriented to person, place, and time. Diagnostic Study Results     Labs -  Labs Reviewed   CBC WITH AUTOMATED DIFF - Abnormal; Notable for the following components:       Result Value    WBC 11.7 (*)     PLATELET 439 (*)     ABS. NEUTROPHILS 8.5 (*)     ABS. IMM. GRANS. 0.1 (*)     All other components within normal limits   METABOLIC PANEL, COMPREHENSIVE - Abnormal; Notable for the following components:    Potassium 3.3 (*)     Glucose 104 (*)     A-G Ratio 1.0 (*)     All other components within normal limits   COVID-19 WITH INFLUENZA A/B   TROPONIN-HIGH SENSITIVITY   HCG QL SERUM       Radiologic Studies -   XR CHEST PORT   Final Result   No acute cardiopulmonary disease. CT Results  (Last 48 hours)      None          CXR Results  (Last 48 hours)                 09/13/22 2120  XR CHEST PORT Final result    Impression:  No acute cardiopulmonary disease. Narrative:  INDICATION: Syncope, headache. Portable AP view of the chest.       Direct comparison made to prior chest x-ray dated July 2022. Cardiomediastinal silhouette is stable. Lungs are clear bilaterally. Pleural   spaces are normal and there is no pneumothorax. Osseous structures are intact. Medical Decision Making   I am the first provider for this patient.     I reviewed the vital signs, available nursing notes, past medical history, past surgical history, family history and social history. Vital Signs-Reviewed the patient's vital signs. Visit Vitals  BP (!) 155/108   Pulse (!) 103   Temp 99.2 °F (37.3 °C)   Resp 18   Ht 5' 2\" (1.575 m)   Wt 67.1 kg (148 lb)   SpO2 94%   BMI 27.07 kg/m²           Records Reviewed: Nursing Notes    Provider Notes (Medical Decision Making):   45-year-old female here with headache, congestion, sinus pressure, body aches, and a syncopal episode. Multiple sick contacts at home. Symptoms were consistent with a viral syndrome. No red flags concerning for subarachnoid or meningitis. We will treat symptomatically. Regarding her syncopal episode, likely orthostatic as she had just stood up when she got suddenly lightheaded and then passed out. I will obtain EKG, chest x-ray, blood work, and administer IV fluids. ED Course:   Initial assessment performed. The patients presenting problems have been discussed, and they are in agreement with the care plan formulated and outlined with them. I have encouraged them to ask questions as they arise throughout their visit. Discharge Note:  The patient has been re-evaluated and is ready for discharge. Reviewed available results with patient. Counseled patient on diagnosis and care plan. Patient has expressed understanding, and all questions have been answered. Patient agrees with plan and agrees to follow up as recommended, or to return to the ED if their symptoms worsen. Discharge instructions have been provided and explained to the patient, along with reasons to return to the ED. Disposition:  discharge    DISCHARGE PLAN:  1. Discharge Medication List as of 9/13/2022 10:19 PM        START taking these medications    Details   pseudoephedrine CR (SUDAFED) 120 mg CR tablet Take 1 Tablet by mouth two (2) times daily as needed for Congestion. , Normal, Disp-20 Tablet, R-0      fluticasone propionate (FLONASE) 50 mcg/actuation nasal spray 2 Sprays by Both Nostrils route daily. , Normal, Disp-16 g, R-0           CONTINUE these medications which have NOT CHANGED    Details   hydroCHLOROthiazide (MICROZIDE) 12.5 mg capsule Take 1 Capsule by mouth in the morning. Indications: high blood pressure, Normal, Disp-90 Capsule, R-3      amLODIPine (NORVASC) 10 mg tablet Take 1 Tab by mouth daily. Indications: high blood pressure, Normal, Disp-30 Tab, R-0      acetaminophen (TYLENOL) 325 mg tablet Take 2 Tablets by mouth every four (4) hours as needed for Pain., Normal, Disp-20 Tablet, R-0      !! cyclobenzaprine (FLEXERIL) 10 mg tablet Take 1 Tablet by mouth three (3) times daily as needed for Muscle Spasm(s). , Normal, Disp-14 Tablet, R-0      lidocaine (Lidoderm) 5 % Apply patch to the affected area for 12 hours a day and remove for 12 hours a day., Normal, Disp-15 Each, R-0      !! cyclobenzaprine (FLEXERIL) 10 mg tablet Take 1 Tablet by mouth three (3) times daily as needed for Muscle Spasm(s). , Normal, Disp-20 Tablet, R-0      lidocaine 4 % patch 1 Patch by TransDERmal route every twelve (12) hours. , Normal, Disp-5 Patch, R-2      naproxen (NAPROSYN) 500 mg tablet Take 1 Tablet by mouth every twelve (12) hours as needed for Pain., Normal, Disp-20 Tablet, R-0      hydrOXYzine HCL (ATARAX) 25 mg tablet Take  by mouth as needed., Historical Med      aspirin 81 mg chewable tablet Take 81 mg by mouth daily. , Historical Med       !! - Potential duplicate medications found. Please discuss with provider. 2.   Follow-up Information       Follow up With Specialties Details Why Contact Info    vinod Interiano MD Family Medicine Schedule an appointment as soon as possible for a visit   [address]  [id number]  [phone]      [facility] EMERGENCY DEPT Emergency Medicine Go to  As needed, If symptoms worsen [address]          3.   Return to ED if worse     Diagnosis     Clinical Impression: 1. Syncope and collapse    2. Acute non-recurrent maxillary sinusitis    3. Viral syndrome        Attestations:  I am the first and primary provider of record for this patient's ED encounter. I personally performed the services described above in this documentation. Janice Tamayo MD    Please note that this dictation was completed with [employer], the [employer] voice recognition software. Quite often unanticipated grammatical, syntax, homophones, and other interpretive errors are inadvertently transcribed by the computer software. Please disregard these errors. Please excuse any errors that have escaped final proofreading. Thank you.

## 2022-10-06 ENCOUNTER — APPOINTMENT (OUTPATIENT)
Dept: CT IMAGING | Age: 47
DRG: 045 | End: 2022-10-06
Attending: EMERGENCY MEDICINE
Payer: MEDICAID

## 2022-10-06 ENCOUNTER — HOSPITAL ENCOUNTER (INPATIENT)
Age: 47
LOS: 2 days | Discharge: HOME | DRG: 045 | End: 2022-10-08
Attending: EMERGENCY MEDICINE | Admitting: STUDENT IN AN ORGANIZED HEALTH CARE EDUCATION/TRAINING PROGRAM
Payer: MEDICAID

## 2022-10-06 DIAGNOSIS — I10 PRIMARY HYPERTENSION: ICD-10-CM

## 2022-10-06 DIAGNOSIS — R29.90 STROKE-LIKE SYMPTOMS: Primary | ICD-10-CM

## 2022-10-06 PROBLEM — I63.9 CVA (CEREBRAL VASCULAR ACCIDENT) (HCC): Status: ACTIVE | Noted: 2022-10-06

## 2022-10-06 LAB
ALBUMIN SERPL-MCNC: 3.8 G/DL (ref 3.5–5)
ALBUMIN/GLOB SERPL: 1 {RATIO} (ref 1.1–2.2)
ALP SERPL-CCNC: 67 U/L (ref 45–117)
ALT SERPL-CCNC: 33 U/L (ref 12–78)
AMPHET UR QL SCN: NEGATIVE
ANION GAP SERPL CALC-SCNC: 10 MMOL/L (ref 5–15)
APPEARANCE UR: CLEAR
AST SERPL-CCNC: 17 U/L (ref 15–37)
BACTERIA URNS QL MICRO: NEGATIVE /HPF
BARBITURATES UR QL SCN: NEGATIVE
BASOPHILS # BLD: 0.1 K/UL (ref 0–0.1)
BASOPHILS NFR BLD: 1 % (ref 0–1)
BENZODIAZ UR QL: NEGATIVE
BILIRUB SERPL-MCNC: 0.3 MG/DL (ref 0.2–1)
BILIRUB UR QL: NEGATIVE
BUN SERPL-MCNC: 17 MG/DL (ref 6–20)
BUN/CREAT SERPL: 17 (ref 12–20)
CALCIUM SERPL-MCNC: 8.7 MG/DL (ref 8.5–10.1)
CANNABINOIDS UR QL SCN: NEGATIVE
CHLORIDE SERPL-SCNC: 103 MMOL/L (ref 97–108)
CO2 SERPL-SCNC: 27 MMOL/L (ref 21–32)
COCAINE UR QL SCN: NEGATIVE
COLOR UR: ABNORMAL
CREAT SERPL-MCNC: 1.01 MG/DL (ref 0.55–1.02)
DIFFERENTIAL METHOD BLD: ABNORMAL
DRUG SCRN COMMENT,DRGCM: NORMAL
EOSINOPHIL # BLD: 0.2 K/UL (ref 0–0.4)
EOSINOPHIL NFR BLD: 2 % (ref 0–7)
EPITH CASTS URNS QL MICRO: ABNORMAL /LPF
ERYTHROCYTE [DISTWIDTH] IN BLOOD BY AUTOMATED COUNT: 11.9 % (ref 11.5–14.5)
GLOBULIN SER CALC-MCNC: 3.8 G/DL (ref 2–4)
GLUCOSE BLD STRIP.AUTO-MCNC: 124 MG/DL (ref 65–117)
GLUCOSE SERPL-MCNC: 106 MG/DL (ref 65–100)
GLUCOSE UR STRIP.AUTO-MCNC: NEGATIVE MG/DL
HCG UR QL: NEGATIVE
HCT VFR BLD AUTO: 36.3 % (ref 35–47)
HGB BLD-MCNC: 12.9 G/DL (ref 11.5–16)
HGB UR QL STRIP: NEGATIVE
IMM GRANULOCYTES # BLD AUTO: 0 K/UL (ref 0–0.04)
IMM GRANULOCYTES NFR BLD AUTO: 1 % (ref 0–0.5)
INR PPP: 1 (ref 0.9–1.1)
KETONES UR QL STRIP.AUTO: ABNORMAL MG/DL
LEUKOCYTE ESTERASE UR QL STRIP.AUTO: NEGATIVE
LYMPHOCYTES # BLD: 3 K/UL (ref 0.8–3.5)
LYMPHOCYTES NFR BLD: 35 % (ref 12–49)
MCH RBC QN AUTO: 32.2 PG (ref 26–34)
MCHC RBC AUTO-ENTMCNC: 35.5 G/DL (ref 30–36.5)
MCV RBC AUTO: 90.5 FL (ref 80–99)
METHADONE UR QL: NEGATIVE
MONOCYTES # BLD: 0.6 K/UL (ref 0–1)
MONOCYTES NFR BLD: 7 % (ref 5–13)
NEUTS SEG # BLD: 4.7 K/UL (ref 1.8–8)
NEUTS SEG NFR BLD: 54 % (ref 32–75)
NITRITE UR QL STRIP.AUTO: NEGATIVE
NRBC # BLD: 0 K/UL (ref 0–0.01)
NRBC BLD-RTO: 0 PER 100 WBC
OPIATES UR QL: NEGATIVE
PCP UR QL: NEGATIVE
PH UR STRIP: 6 [PH] (ref 5–8)
PLATELET # BLD AUTO: 407 K/UL (ref 150–400)
PMV BLD AUTO: 9.3 FL (ref 8.9–12.9)
POTASSIUM SERPL-SCNC: 3.6 MMOL/L (ref 3.5–5.1)
PROT SERPL-MCNC: 7.6 G/DL (ref 6.4–8.2)
PROT UR STRIP-MCNC: NEGATIVE MG/DL
PROTHROMBIN TIME: 9.6 SEC (ref 9–11.1)
RBC # BLD AUTO: 4.01 M/UL (ref 3.8–5.2)
RBC #/AREA URNS HPF: ABNORMAL /HPF (ref 0–5)
SERVICE CMNT-IMP: ABNORMAL
SODIUM SERPL-SCNC: 140 MMOL/L (ref 136–145)
SP GR UR REFRACTOMETRY: 1.02
UA: UC IF INDICATED,UAUC: ABNORMAL
UROBILINOGEN UR QL STRIP.AUTO: 1 EU/DL (ref 0.2–1)
WBC # BLD AUTO: 8.7 K/UL (ref 3.6–11)
WBC URNS QL MICRO: ABNORMAL /HPF (ref 0–4)

## 2022-10-06 PROCEDURE — 70450 CT HEAD/BRAIN W/O DYE: CPT

## 2022-10-06 PROCEDURE — 80053 COMPREHEN METABOLIC PANEL: CPT

## 2022-10-06 PROCEDURE — 36415 COLL VENOUS BLD VENIPUNCTURE: CPT

## 2022-10-06 PROCEDURE — 85025 COMPLETE CBC W/AUTO DIFF WBC: CPT

## 2022-10-06 PROCEDURE — 96374 THER/PROPH/DIAG INJ IV PUSH: CPT

## 2022-10-06 PROCEDURE — 80307 DRUG TEST PRSMV CHEM ANLYZR: CPT

## 2022-10-06 PROCEDURE — 93005 ELECTROCARDIOGRAM TRACING: CPT

## 2022-10-06 PROCEDURE — 65270000029 HC RM PRIVATE

## 2022-10-06 PROCEDURE — 70496 CT ANGIOGRAPHY HEAD: CPT

## 2022-10-06 PROCEDURE — 81001 URINALYSIS AUTO W/SCOPE: CPT

## 2022-10-06 PROCEDURE — 85610 PROTHROMBIN TIME: CPT

## 2022-10-06 PROCEDURE — 4A03X5D MEASUREMENT OF ARTERIAL FLOW, INTRACRANIAL, EXTERNAL APPROACH: ICD-10-PCS | Performed by: EMERGENCY MEDICINE

## 2022-10-06 PROCEDURE — 94762 N-INVAS EAR/PLS OXIMTRY CONT: CPT

## 2022-10-06 PROCEDURE — 82962 GLUCOSE BLOOD TEST: CPT

## 2022-10-06 PROCEDURE — 74011250636 HC RX REV CODE- 250/636: Performed by: EMERGENCY MEDICINE

## 2022-10-06 PROCEDURE — 81025 URINE PREGNANCY TEST: CPT

## 2022-10-06 PROCEDURE — 74011250637 HC RX REV CODE- 250/637: Performed by: EMERGENCY MEDICINE

## 2022-10-06 PROCEDURE — 99285 EMERGENCY DEPT VISIT HI MDM: CPT

## 2022-10-06 PROCEDURE — 96375 TX/PRO/DX INJ NEW DRUG ADDON: CPT

## 2022-10-06 PROCEDURE — 74011000636 HC RX REV CODE- 636: Performed by: EMERGENCY MEDICINE

## 2022-10-06 RX ORDER — GUAIFENESIN 100 MG/5ML
325 LIQUID (ML) ORAL
Status: COMPLETED | OUTPATIENT
Start: 2022-10-06 | End: 2022-10-06

## 2022-10-06 RX ORDER — ACETAMINOPHEN 650 MG/1
650 SUPPOSITORY RECTAL
Status: DISCONTINUED | OUTPATIENT
Start: 2022-10-06 | End: 2022-10-08 | Stop reason: HOSPADM

## 2022-10-06 RX ORDER — AMLODIPINE BESYLATE 5 MG/1
10 TABLET ORAL DAILY
Status: DISCONTINUED | OUTPATIENT
Start: 2022-10-07 | End: 2022-10-08 | Stop reason: HOSPADM

## 2022-10-06 RX ORDER — PROCHLORPERAZINE EDISYLATE 5 MG/ML
10 INJECTION INTRAMUSCULAR; INTRAVENOUS
Status: DISCONTINUED | OUTPATIENT
Start: 2022-10-06 | End: 2022-10-08 | Stop reason: HOSPADM

## 2022-10-06 RX ORDER — HYDROCHLOROTHIAZIDE 25 MG/1
12.5 TABLET ORAL DAILY
Refills: 3 | Status: DISCONTINUED | OUTPATIENT
Start: 2022-10-07 | End: 2022-10-08 | Stop reason: HOSPADM

## 2022-10-06 RX ORDER — SODIUM CHLORIDE 0.9 % (FLUSH) 0.9 %
5-10 SYRINGE (ML) INJECTION
Status: DISPENSED | OUTPATIENT
Start: 2022-10-06 | End: 2022-10-07

## 2022-10-06 RX ORDER — ACETAMINOPHEN 325 MG/1
650 TABLET ORAL
Status: DISCONTINUED | OUTPATIENT
Start: 2022-10-06 | End: 2022-10-08 | Stop reason: HOSPADM

## 2022-10-06 RX ORDER — DIPHENHYDRAMINE HYDROCHLORIDE 50 MG/ML
25 INJECTION, SOLUTION INTRAMUSCULAR; INTRAVENOUS
Status: COMPLETED | OUTPATIENT
Start: 2022-10-06 | End: 2022-10-06

## 2022-10-06 RX ADMIN — DIPHENHYDRAMINE HYDROCHLORIDE 25 MG: 50 INJECTION INTRAMUSCULAR; INTRAVENOUS at 20:10

## 2022-10-06 RX ADMIN — PROCHLORPERAZINE EDISYLATE 10 MG: 5 INJECTION INTRAMUSCULAR; INTRAVENOUS at 20:10

## 2022-10-06 RX ADMIN — ASPIRIN 81 MG CHEWABLE TABLET 324 MG: 81 TABLET CHEWABLE at 20:09

## 2022-10-06 RX ADMIN — IOPAMIDOL 100 ML: 755 INJECTION, SOLUTION INTRAVENOUS at 22:06
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
## 2022-10-06 NOTE — ED NOTES
Bedside and Verbal shift change report given to Apolinar Augustine (oncoming nurse) by Kyler Spring RN (offgoing nurse). Report included the following information SBAR and ED Summary.
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
## 2022-10-07 ENCOUNTER — APPOINTMENT (OUTPATIENT)
Dept: NON INVASIVE DIAGNOSTICS | Age: 47
DRG: 045 | End: 2022-10-07
Attending: STUDENT IN AN ORGANIZED HEALTH CARE EDUCATION/TRAINING PROGRAM
Payer: MEDICAID

## 2022-10-07 ENCOUNTER — APPOINTMENT (OUTPATIENT)
Dept: MRI IMAGING | Age: 47
DRG: 045 | End: 2022-10-07
Attending: STUDENT IN AN ORGANIZED HEALTH CARE EDUCATION/TRAINING PROGRAM
Payer: MEDICAID

## 2022-10-07 LAB
ATRIAL RATE: 77 BPM
CALCULATED P AXIS, ECG09: 22 DEGREES
CALCULATED R AXIS, ECG10: 16 DEGREES
CALCULATED T AXIS, ECG11: 23 DEGREES
CHOLEST SERPL-MCNC: 159 MG/DL
CRP SERPL HS-MCNC: 2.5 MG/L
DIAGNOSIS, 93000: NORMAL
ECHO AO ROOT DIAM: 2.2 CM
ECHO AO ROOT INDEX: 1.31 CM/M2
ECHO AR MAX VEL PISA: 5.3 M/S
ECHO AV AREA PLAN/BSA: 1.49 CM2/M2
ECHO AV AREA PLAN: 2.5 CM2
ECHO AV REGURGITANT PHT: 584.7 MILLISECOND
ECHO LA DIAMETER INDEX: 1.37 CM/M2
ECHO LA DIAMETER: 2.3 CM
ECHO LA TO AORTIC ROOT RATIO: 1.05
ECHO LA VOL 4C: 18 ML (ref 22–52)
ECHO LA VOLUME INDEX A4C: 11 ML/M2 (ref 16–34)
ECHO LV EDV A4C: 101 ML
ECHO LV EDV INDEX A4C: 60 ML/M2
ECHO LV EJECTION FRACTION A4C: 65 %
ECHO LV ESV A4C: 35 ML
ECHO LV ESV INDEX A4C: 21 ML/M2
ECHO LV FRACTIONAL SHORTENING: 35 % (ref 28–44)
ECHO LV INTERNAL DIMENSION DIASTOLE INDEX: 2.38 CM/M2
ECHO LV INTERNAL DIMENSION DIASTOLIC: 4 CM (ref 3.9–5.3)
ECHO LV INTERNAL DIMENSION SYSTOLIC INDEX: 1.55 CM/M2
ECHO LV INTERNAL DIMENSION SYSTOLIC: 2.6 CM
ECHO LV IVSD: 1 CM (ref 0.6–0.9)
ECHO LV MASS 2D: 175.8 G (ref 67–162)
ECHO LV MASS INDEX 2D: 104.7 G/M2 (ref 43–95)
ECHO LV POSTERIOR WALL DIASTOLIC: 1.5 CM (ref 0.6–0.9)
ECHO LV RELATIVE WALL THICKNESS RATIO: 0.75
ECHO LVOT AREA: 3.5 CM2
ECHO LVOT DIAM: 2.1 CM
ECHO LVOT PEAK GRADIENT: 6 MMHG
ECHO LVOT PEAK VELOCITY: 1.3 M/S
ECHO MV A VELOCITY: 0.48 M/S
ECHO MV AREA INDEX PLAN: 2.7 CM2/M2
ECHO MV AREA PHT: 4.3 CM2
ECHO MV AREA PLAN: 4.5 CM2
ECHO MV E DECELERATION TIME (DT): 126.8 MS
ECHO MV E VELOCITY: 0.42 M/S
ECHO MV E/A RATIO: 0.88
ECHO MV MAX VELOCITY: 0.8 M/S
ECHO MV MEAN GRADIENT: 1 MMHG
ECHO MV MEAN VELOCITY: 0.5 M/S
ECHO MV PEAK GRADIENT: 2 MMHG
ECHO MV PRESSURE HALF TIME (PHT): 51.6 MS
ECHO MV REGURGITANT PEAK GRADIENT: 46 MMHG
ECHO MV REGURGITANT PEAK VELOCITY: 3.4 M/S
ECHO MV VTI: 18.7 CM
ECHO PV MAX VELOCITY: 0.9 M/S
ECHO PV PEAK GRADIENT: 3 MMHG
ERYTHROCYTE [SEDIMENTATION RATE] IN BLOOD: 14 MM/HR (ref 0–20)
EST. AVERAGE GLUCOSE BLD GHB EST-MCNC: 114 MG/DL
HBA1C MFR BLD: 5.6 % (ref 4–5.6)
HDLC SERPL-MCNC: 44 MG/DL
HDLC SERPL: 3.6 {RATIO} (ref 0–5)
LDLC SERPL CALC-MCNC: 93.2 MG/DL (ref 0–100)
MAGNESIUM SERPL-MCNC: 1.9 MG/DL (ref 1.6–2.4)
P-R INTERVAL, ECG05: 154 MS
PHOSPHATE SERPL-MCNC: 4.5 MG/DL (ref 2.6–4.7)
Q-T INTERVAL, ECG07: 396 MS
QRS DURATION, ECG06: 76 MS
QTC CALCULATION (BEZET), ECG08: 448 MS
TRIGL SERPL-MCNC: 109 MG/DL (ref ?–150)
TROPONIN-HIGH SENSITIVITY: 9 NG/L (ref 0–51)
TSH SERPL DL<=0.05 MIU/L-ACNC: 1.35 UIU/ML (ref 0.36–3.74)
VENTRICULAR RATE, ECG03: 77 BPM
VLDLC SERPL CALC-MCNC: 21.8 MG/DL

## 2022-10-07 PROCEDURE — 70551 MRI BRAIN STEM W/O DYE: CPT

## 2022-10-07 PROCEDURE — 84484 ASSAY OF TROPONIN QUANT: CPT

## 2022-10-07 PROCEDURE — 84443 ASSAY THYROID STIM HORMONE: CPT

## 2022-10-07 PROCEDURE — 97116 GAIT TRAINING THERAPY: CPT | Performed by: PHYSICAL THERAPIST

## 2022-10-07 PROCEDURE — 84100 ASSAY OF PHOSPHORUS: CPT

## 2022-10-07 PROCEDURE — 93306 TTE W/DOPPLER COMPLETE: CPT | Performed by: SPECIALIST

## 2022-10-07 PROCEDURE — 92523 SPEECH SOUND LANG COMPREHEN: CPT

## 2022-10-07 PROCEDURE — 74011250637 HC RX REV CODE- 250/637: Performed by: STUDENT IN AN ORGANIZED HEALTH CARE EDUCATION/TRAINING PROGRAM

## 2022-10-07 PROCEDURE — 86141 C-REACTIVE PROTEIN HS: CPT

## 2022-10-07 PROCEDURE — 85652 RBC SED RATE AUTOMATED: CPT

## 2022-10-07 PROCEDURE — 74011250636 HC RX REV CODE- 250/636: Performed by: STUDENT IN AN ORGANIZED HEALTH CARE EDUCATION/TRAINING PROGRAM

## 2022-10-07 PROCEDURE — 97161 PT EVAL LOW COMPLEX 20 MIN: CPT | Performed by: PHYSICAL THERAPIST

## 2022-10-07 PROCEDURE — 74011000250 HC RX REV CODE- 250: Performed by: STUDENT IN AN ORGANIZED HEALTH CARE EDUCATION/TRAINING PROGRAM

## 2022-10-07 PROCEDURE — 93306 TTE W/DOPPLER COMPLETE: CPT

## 2022-10-07 PROCEDURE — 83735 ASSAY OF MAGNESIUM: CPT

## 2022-10-07 PROCEDURE — 80061 LIPID PANEL: CPT

## 2022-10-07 PROCEDURE — 36415 COLL VENOUS BLD VENIPUNCTURE: CPT

## 2022-10-07 PROCEDURE — 92610 EVALUATE SWALLOWING FUNCTION: CPT

## 2022-10-07 PROCEDURE — 83036 HEMOGLOBIN GLYCOSYLATED A1C: CPT

## 2022-10-07 PROCEDURE — 65270000032 HC RM SEMIPRIVATE

## 2022-10-07 RX ORDER — HYDROXYZINE 25 MG/1
25 TABLET, FILM COATED ORAL
COMMUNITY

## 2022-10-07 RX ORDER — SODIUM CHLORIDE 0.9 % (FLUSH) 0.9 %
5-40 SYRINGE (ML) INJECTION EVERY 8 HOURS
Status: DISCONTINUED | OUTPATIENT
Start: 2022-10-07 | End: 2022-10-08 | Stop reason: HOSPADM

## 2022-10-07 RX ORDER — PANTOPRAZOLE SODIUM 20 MG/1
20 TABLET, DELAYED RELEASE ORAL
COMMUNITY

## 2022-10-07 RX ORDER — DICLOFENAC SODIUM 10 MG/G
GEL TOPICAL
COMMUNITY

## 2022-10-07 RX ORDER — ENOXAPARIN SODIUM 100 MG/ML
40 INJECTION SUBCUTANEOUS DAILY
Status: DISCONTINUED | OUTPATIENT
Start: 2022-10-07 | End: 2022-10-08 | Stop reason: HOSPADM

## 2022-10-07 RX ORDER — ONDANSETRON 2 MG/ML
4 INJECTION INTRAMUSCULAR; INTRAVENOUS
Status: DISCONTINUED | OUTPATIENT
Start: 2022-10-07 | End: 2022-10-08 | Stop reason: HOSPADM

## 2022-10-07 RX ORDER — SODIUM CHLORIDE 0.9 % (FLUSH) 0.9 %
5-40 SYRINGE (ML) INJECTION AS NEEDED
Status: DISCONTINUED | OUTPATIENT
Start: 2022-10-07 | End: 2022-10-08 | Stop reason: HOSPADM

## 2022-10-07 RX ORDER — POLYETHYLENE GLYCOL 3350 17 G/17G
17 POWDER, FOR SOLUTION ORAL DAILY PRN
Status: DISCONTINUED | OUTPATIENT
Start: 2022-10-07 | End: 2022-10-08 | Stop reason: HOSPADM

## 2022-10-07 RX ORDER — CLOPIDOGREL BISULFATE 75 MG/1
75 TABLET ORAL DAILY
Status: DISCONTINUED | OUTPATIENT
Start: 2022-10-07 | End: 2022-10-08 | Stop reason: HOSPADM

## 2022-10-07 RX ORDER — GUAIFENESIN 100 MG/5ML
81 LIQUID (ML) ORAL DAILY
Status: DISCONTINUED | OUTPATIENT
Start: 2022-10-07 | End: 2022-10-08 | Stop reason: HOSPADM

## 2022-10-07 RX ORDER — SODIUM CHLORIDE 0.9 % (FLUSH) 0.9 %
10 SYRINGE (ML) INJECTION AS NEEDED
Status: DISCONTINUED | OUTPATIENT
Start: 2022-10-07 | End: 2022-10-08 | Stop reason: HOSPADM

## 2022-10-07 RX ORDER — LIDOCAINE 50 MG/G
1 PATCH TOPICAL
COMMUNITY

## 2022-10-07 RX ORDER — ONDANSETRON 4 MG/1
4 TABLET, ORALLY DISINTEGRATING ORAL
Status: DISCONTINUED | OUTPATIENT
Start: 2022-10-07 | End: 2022-10-08 | Stop reason: HOSPADM

## 2022-10-07 RX ORDER — ATORVASTATIN CALCIUM 40 MG/1
40 TABLET, FILM COATED ORAL
Status: DISCONTINUED | OUTPATIENT
Start: 2022-10-07 | End: 2022-10-08 | Stop reason: HOSPADM

## 2022-10-07 RX ORDER — FLUTICASONE PROPIONATE 50 MCG
2 SPRAY, SUSPENSION (ML) NASAL
COMMUNITY

## 2022-10-07 RX ADMIN — ATORVASTATIN CALCIUM 40 MG: 40 TABLET, FILM COATED ORAL at 22:42

## 2022-10-07 RX ADMIN — SODIUM CHLORIDE, PRESERVATIVE FREE 10 ML: 5 INJECTION INTRAVENOUS at 10:00

## 2022-10-07 RX ADMIN — SODIUM CHLORIDE, PRESERVATIVE FREE 10 ML: 5 INJECTION INTRAVENOUS at 14:00

## 2022-10-07 RX ADMIN — SODIUM CHLORIDE, PRESERVATIVE FREE 10 ML: 5 INJECTION INTRAVENOUS at 22:42

## 2022-10-07 RX ADMIN — ASPIRIN 81 MG CHEWABLE TABLET 81 MG: 81 TABLET CHEWABLE at 11:31

## 2022-10-07 RX ADMIN — CLOPIDOGREL BISULFATE 75 MG: 75 TABLET, FILM COATED ORAL at 14:28

## 2022-10-07 RX ADMIN — ENOXAPARIN SODIUM 40 MG: 100 INJECTION SUBCUTANEOUS at 11:30

## 2022-10-07 RX ADMIN — HYDROCHLOROTHIAZIDE 12.5 MG: 25 TABLET ORAL at 09:42

## 2022-10-07 RX ADMIN — AMLODIPINE BESYLATE 10 MG: 5 TABLET ORAL at 09:42

## 2022-10-07 NOTE — ED NOTES
TRANSFER - OUT REPORT:    Verbal report given to [facility] RN(name) on Delores Barker  being transferred to [facility] Tele Room 212(unit) for routine progression of care       Report consisted of patients Situation, Background, Assessment and   Recommendations(SBAR). Information from the following report(s) SBAR, Kardex, ED Summary, Intake/Output, MAR, Recent Results, and Quality Measures was reviewed with the receiving nurse. Lines:   Peripheral IV 10/06/22 Left Antecubital (Active)   Site Assessment Clean, dry, & intact 10/06/22 1924   Phlebitis Assessment 0 10/06/22 1924   Infiltration Assessment 0 10/06/22 1924   Dressing Status Clean, dry, & intact 10/06/22 1924   Hub Color/Line Status Pink 10/06/22 1924   Action Taken Blood drawn 10/06/22 1924        Opportunity for questions and clarification was provided.       Patient transported with:   Monitor  RN

## 2022-10-07 NOTE — ACP (ADVANCE CARE PLANNING)
Advance Care Planning   Advance Care Planning Inpatient Note  [address] [facility]    Today's Date: 10/7/2022  Unit: [facility] [place] [address] MED SURG & TELE    Received request from IDT member for advance medical directive consult on Med Surg & Tele. Upon review of chart and communication with care team, patient's decision making abilities are not in question. Patient was present in the room during visit. Goals of ACP Conversation:  Discuss Advance Care planning documents  Establish healthcare decision makers    Health Care Decision Makers:      Primary Decision Maker: Parish Perez - Boyfriend - [phone]    Secondary Decision Maker: Josué Richardson - Daughter - [phone]    Summary:  Completed New Documents    Advance Care Planning Documents (Patient Wishes) on file:  Healthcare Power of /Advance Directive appointment of Health care agent  Living Will/ Advance Directive     Assessment:    Received request from IDT member for advance medical directive consult on Med Surg & Tele. Upon review of chart and communication with care team, patient's decision making abilities are not in question. Patient was present in the room during visit. Explained advance medical directive sections and assisted in completion of document. Made copies; returned two copies and the original to patient, kept one copy for patient's chart. Interventions:  Provided education on documents for clarity and greater understanding  Assisted in the completion of documents according to patient's wishes at this time    Care Preferences Communicated:  No    Outcomes/Plan:  ACP Discussion Completed  New Advance Directive completed  Copy of Advance Directive placed on patient's chart.      Venecia Suarez [facility]  on 10/7/2022 at 3:52 PM

## 2022-10-07 NOTE — PROGRESS NOTES
Bedside shift change report given to Rolly Rodriguez (oncoming nurse) by Jacey Jacobs (offgoing nurse). Report included the following information Guanaco Burns, and [facility]      [facility] Master Dr Juanito Hoover, Patient is NPO,can I give Morning  medication? I will perform a swallow screen prior!

## 2022-10-07 NOTE — PROGRESS NOTES
Problem: Mobility Impaired (Adult and Pediatric)  Goal: *Acute Goals and Plan of Care (Insert Text)  Description:   FUNCTIONAL STATUS PRIOR TO ADMISSION: Patient was independent and active without use of DME. Patient drives and works at [employer]. HOME SUPPORT PRIOR TO ADMISSION: The patient lived with her , son, and 3year old grandchild but did not require assist.    Physical Therapy Goals  Initiated 10/7/2022  1. Patient will move from supine to sit and sit to supine  in bed with modified independence within 7 day(s). 2.  Patient will transfer from bed to chair and chair to bed with supervision/set-up using the least restrictive device within 7 day(s). 3.  Patient will perform sit to stand with supervision/set-up within 7 day(s). 4.  Patient will ambulate with supervision/set-up for 300 feet with the least restrictive device within 7 day(s). 5.  Patient will ascend/descend 10 stairs with unilateral handrail(s) with supervision/set-up within 7 day(s). 6.  Patient will improve Nunez Balance score by 7 points within 7 days. Outcome: Not Met     PHYSICAL THERAPY EVALUATION- NEURO POPULATION  Patient: Dee Wilkins (84 y.o. female)  Date: 10/7/2022  Primary Diagnosis: CVA (cerebral vascular accident) [facility]) [I63.9]       Precautions:          ASSESSMENT  Based on the objective data described below, the patient presents with decreased balance, coordination, RUE/RLE sensation, RUE/RLE strength (4/5), and overall functional mobility. Patient independent with ADLs and mobility at baseline. She drives and works for [employer]. Patient lives with her , son, and 3year old grandchild. Patient drowsy this AM but more alert once sitting at side of bed. Patient with slight slurred speech and difficulty word finding at times. Patient able to don shoes independently prior to mobility. Patient required SBA and increased time for bed mobility. She stood with CGA and ambulated 200 feet with CGA. Patient with mild unsteadiness but able to correct on her own. Patient ascended and descended 10 steps with unilateral handrail and CGA, alternating between step to and step over step pattern. Spoke with MD, RN, OT, and SLP about findings. Recommend assist x1 in hospital and outpatient PT at discharge to improve balance and strength. Current Level of Function Impacting Discharge (mobility/balance): CGA for ambulation    Functional Outcome Measure: The patient scored Total: 32/56 on the Nunez Balance Assessment which is indicative of moderate fall risk. Other factors to consider for discharge: good family support     Patient will benefit from skilled therapy intervention to address the above noted impairments. PLAN :  Recommendations and Planned Interventions: bed mobility training, transfer training, gait training, therapeutic exercises, neuromuscular re-education, patient and family training/education, and therapeutic activities      Frequency/Duration: Patient will be followed by physical therapy:  5 times a week to address goals. Recommendation for discharge: (in order for the patient to meet his/her long term goals)  Outpatient physical therapy follow up recommended for balance training and strengthening    This discharge recommendation:  Has been made in collaboration with the attending provider and/or case management    IF patient discharges home will need the following DME: none         SUBJECTIVE:   Patient stated It feels a little weaker and different on the right side.     OBJECTIVE DATA SUMMARY:   HISTORY:    Past Medical History:   Diagnosis Date    angina     Angina at rest [facility])     Breast pain     since 2016, right breast, on and off    Chiari malformation     Heart abnormalities     leaky heart valve, murmur    Hypertension     Other ill-defined conditions(799.89)     cardiac arrhythmias    Other ill-defined conditions(799.89)     neuropathy    Psychiatric disorder     anxiety and depression    Stroke ([facility] [address]     TIA     Past Surgical History:   Procedure Laterality Date    HX GYN      HX ORTHOPAEDIC      screws in left ankle    HX OTHER SURGICAL      surgery to back of head due to fractured skull    NEUROLOGICAL PROCEDURE UNLISTED      [place] ABDOMEN SURGERY PROC UNLISTED      laporscopic surgery post ruptured fallopian tube       Home Situation  Home Environment: Private residence  # Steps to Enter: 0  One/Two Story Residence: Two story  # of Interior Steps: 14  Living Alone: No  Support Systems: Spouse/Significant Other, Child(mitul) (lives with , son, and grandchild)  Patient Expects to be Discharged to[de-identified] Home  Current DME Used/Available at Home: None    EXAMINATION/PRESENTATION/DECISION MAKING:   Critical Behavior:  Neurologic State: Alert, Drowsy, Eyes open spontaneously  Orientation Level: Oriented X4  Cognition: Appropriate decision making, Follows commands     Hearing: Auditory  Auditory Impairment: None    Range Of Motion:  AROM: Within functional limits BUE/BLE  PROM: Within functional limits BUE/BLE    Strength:    Strength: Generally decreased, functional (4/5 RLE, 5/5 LLE)   4/4 RUE, 5/5 LUE    Tone & Sensation:   Tone: Normal BUE/BLE  Sensation: Impaired (RUE/RLE)    Coordination:  Coordination: Generally decreased, functional (RLE)  Mildly decreased, functional RUE    Functional Mobility:  Bed Mobility:     Supine to Sit: Stand-by assistance  Sit to Supine: Stand-by assistance  Scooting: Stand-by assistance    Transfers:  Sit to Stand: Contact guard assistance  Stand to Sit: Contact guard assistance        Bed to Chair: Contact guard assistance    Balance:   Sitting: Intact  Standing: Impaired; Without support  Standing - Static: Good  Standing - Dynamic : Fair    Ambulation/Gait Training:  Distance (ft): 200 Feet (ft)  Assistive Device: Gait belt  Ambulation - Level of Assistance: Contact guard assistance  Gait Description (WDL): Exceptions to WDL  Gait Abnormalities: Decreased step clearance  Speed/Isabelle: Pace decreased (<100 feet/min)  Step Length: Right shortened;Left shortened     Stairs:  Number of Stairs Trained: 10  Stairs - Level of Assistance: Contact guard assistance   Rail Use: Left     Functional Measure  Nunez Balance Test:    Sitting to Standin  Standing Unsupported: 3  Sitting with Back Unsupported: 4  Standing to Sitting: 3  Transfers: 1  Standing Unsupported with Eyes Closed: 3  Standing Unsupported with Feet Together: 3  Reach Forward with Outstretched Arm: 3   Object: 3  Turn to Look Over Shoulders: 2  Turn 360 Degrees: 2  Alternate Foot on Step/Stool: 1  Standing Unsupported One Foot in Front: 1  Stand on One Le  Total: 32/56         56=Maximum possible score;   0-20=High fall risk  21-40=Moderate fall risk   41-56=Low fall risk     Based on the above components, the patient evaluation is determined to be of the following complexity level: LOW     Pain Rating:  No pain    Activity Tolerance:   Good      After treatment patient left in no apparent distress:   Supine in bed, Call bell within reach, and Side rails x 3    COMMUNICATION/EDUCATION:   The patients plan of care was discussed with: Occupational therapist, Speech therapist, and Registered nurse. Patient and/or family was verbally educated on the BE FAST acronym for signs/symptoms of CVA and TIA. BE FAST was written on patient's communication board  for visual education and reinforcement. All questions answered with patient indicating good understanding. Fall prevention education was provided and the patient/caregiver indicated understanding., Patient/family have participated as able in goal setting and plan of care. , and Patient/family agree to work toward stated goals and plan of care.     Thank you for this referral.  Chai Donahue, PT   Time Calculation: 21 mins

## 2022-10-07 NOTE — PROGRESS NOTES
Care Management:    Transition of Care Plan:     RUR: 10%  PT recommends outpatient PT  OT evaluation pending  Disposition: outpatient PT  Transportation: boyfriend  PCP follow up: 10-10-22 3:00 PM  Neuro follow up: left voicemail to return call to schedule new patient appointment  Outpatient PT  at [facility] (pending)      Met with patient in room. Demographics confirmed: yes, updated  Living Situation: Patient lives in an apartment with her boyfriend, her stepson (14 years), her son (21 years), and her granddaughter (4 years). DME: none  ADLs: indpendent prior to admission  [facility] [address]  Previous HH/SNF/rehab: outpatient PT at [facility] on [facility]: [facility]: self    Discussed need for outpatient PT. Patient     Reason for Admission:  [facility], slurred speech                   RUR Score:          10%           Plan for utilizing home health:      PT recommending outpatient    PCP: First and Last name:  Robert Manjarrez MD     Name of Practice: [address] [facility]   Are you a current patient: Yes/No: yes   Approximate date of last visit: May 13, 2022   Can you participate in a virtual visit with your PCP: yes                    Current Advanced Directive/Advance Care Plan: Full Code    Patient is legally . Spouse is incarcerated. Patient would like her daughter Catherine Spencer to be her primary healthcare decision maker. Patient would like to complete an AMD. Paged spiritual care. Order for pastoral care is in chart. Healthcare Decision Maker:   Primary Healthcare Decision Maker: gideon Spencer ([phone])             Care Management Interventions  PCP Verified by CM: Yes (Dr. Eryn Diez)  Last Visit to PCP: 05/13/22  Palliative Care Criteria Met (RRAT>21 & CHF Dx)?: No  Mode of Transport at Discharge:  Other (see comment)  Transition of Care Consult (CM Consult): Discharge Planning  Discharge Durable Medical Equipment: No  Physical Therapy Consult: Yes  Occupational Therapy Consult: Yes  Speech Therapy Consult: Yes  Support Systems: Spouse/Significant Other, Child(mitul)  Confirm Follow Up Transport: Self  The Plan for Transition of Care is Related to the Following Treatment Goals : outpatient PT  The Patient and/or Patient Representative was Provided with a Choice of Provider and Agrees with the Discharge Plan?: Yes  Name of the Patient Representative Who was Provided with a Choice of Provider and Agrees with the Discharge Plan: patient  Freedom of Choice List was Provided with Basic Dialogue that Supports the Patient's Individualized Plan of Care/Goals, Treatment Preferences and Shares the Quality Data Associated with the Providers?: Yes   Resource Information Provided?: No  Discharge Location  Patient Expects to be Discharged to[de-identified] Home with outpatient services    FRAN Ruff      Called PT at [address] [place] [address] at [phone]. Left voicemail. Faxed referral to them at [phone]. Gave hand off to unit CM.   FRAN Ruff

## 2022-10-07 NOTE — PROGRESS NOTES
[facility]: MED RECONCILIATION    Comments/Recommendations:   Med rec performed via interview with patient over the phone. Patient states she often misses her aspirin dose and takes \"when she remembers. \"     Allergies: Metoprolol and Toradol [ketorolac]    Prior to Admission Medications:       Prior to Admission Medications   Prescriptions Last Dose Informant Patient Reported? Taking? amLODIPine (NORVASC) 10 mg tablet 2022  No Yes   Sig: Take 1 Tab by mouth daily. Indications: high blood pressure   aspirin 81 mg chewable tablet   Yes Yes   Sig: Take 81 mg by mouth daily. cyclobenzaprine (FLEXERIL) 10 mg tablet   No Yes   Sig: Take 1 Tablet by mouth three (3) times daily as needed for Muscle Spasm(s). diclofenac (VOLTAREN) 1 % gel   Yes Yes   Sig: Apply  to affected area four (4) times daily as needed for Pain. fluticasone propionate (Flonase Allergy Relief) 50 mcg/actuation nasal spray   Yes Yes   Si Sprays by Both Nostrils route daily as needed for Allergies. hydrOXYzine HCL (ATARAX) 25 mg tablet   Yes Yes   Sig: Take 25 mg by mouth three (3) times daily as needed for Anxiety. hydroCHLOROthiazide (MICROZIDE) 12.5 mg capsule 2022  No Yes   Sig: Take 1 Capsule by mouth in the morning. Indications: high blood pressure   lidocaine (LIDODERM) 5 %   Yes Yes   Si Patch by TransDERmal route daily as needed for Pain. Apply patch to the affected area for 12 hours a day and remove for 12 hours a day. naproxen (NAPROSYN) 500 mg tablet   No Yes   Sig: Take 1 Tablet by mouth every twelve (12) hours as needed for Pain.   pantoprazole (PROTONIX) 20 mg tablet   Yes Yes   Sig: Take 20 mg by mouth Daily (before breakfast). pseudoephedrine CR (SUDAFED) 120 mg CR tablet   No Yes   Sig: Take 1 Tablet by mouth two (2) times daily as needed for Congestion.       Facility-Administered Medications: None             NELSON Cerna   Contact: [phone]

## 2022-10-07 NOTE — PROCEDURES
Hospitalist Progress Note    NAME: Adarsh Clemens   :  1975   MRN:  [id number]   Room Number:  824/25  @ [facility]     Please note that this dictation was completed with Fatuma Sifuentes, the computer voice recognition software. Quite often unanticipated grammatical, syntax, homophones, and other interpretive errors are inadvertently transcribed by the computer software. Please disregard these errors. Please excuse any errors that have escaped final proofreading. Interim Hospital Summary: 56 y.o. female whom presented on 10/6/2022 with      Assessment / Plan:  Anticipated discharge date :   Anticipated disposition :   Barriers to discharge :         #Dysarthria POA  #Facial droop POA  #Ataxia POA  -CT head with small area of left head of caudate and coronary are concerning for area of ischemia  -CTA head and neck no large vessel occlusion noted  -A1c pending and LDL 93    -MRI pending  -PT OT and speech  -Neurovascular checks  -Permissive hypertension  -DVT prophylaxis  -Aspirin plavix and statin      #Hypertension  -Allow permissive hypertension      #Tobacco use  - Patient was counseled extensively on the need to abstain from tobacco, its addictive tendencies, its deleterious effects on the lungs, cardiovascular  as well as its financial & social sequelae              Code status: Full  Prophylaxis: Lovenox  Recommended Disposition: Home w/Family     Subjective:     Chief Complaint / Reason for Physician Visit  Feeling better   Discussed with RN events overnight. Review of Systems:  No fevers, chills, appetite change, cough, sputum production, shortness of breath, dyspnea on exertion, nausea, vomitting, diarrhea, constipation, chest pain, leg edema, abdominal pain, joint pain, rash, itching. Tolerating PT/OT. Tolerating diet. Objective:     VITALS:   Last 24hrs VS reviewed since prior progress note.  Most recent are:  Patient Vitals for the past 24 hrs:   Temp Pulse Resp BP SpO2   10/07/22 0939 -- 61 -- (!) 124/91 100 %   10/07/22 0758 98.7 °F (37.1 °C) 65 18 129/87 98 %   10/07/22 0414 97 °F (36.1 °C) 60 18 126/88 98 %   10/07/22 0400 -- 67 -- -- --   10/07/22 0056 -- -- -- -- 99 %   10/07/22 0047 (!) 96.2 °F (35.7 °C) 62 16 (!) 137/90 99 %   10/07/22 0000 -- 65 -- -- --   10/06/22 2345 -- 69 14 (!) 144/106 --   10/06/22 2315 -- 62 14 (!) 147/104 --   10/06/22 2245 -- 63 13 (!) 150/98 95 %   10/06/22 2215 -- 75 -- (!) 156/107 --   10/06/22 2000 -- 68 17 (!) 160/102 98 %   10/06/22 1945 -- 70 18 (!) 146/101 98 %   10/06/22 1930 -- 69 14 (!) 154/92 98 %   10/06/22 1926 -- 68 17 (!) 153/101 98 %   10/06/22 1808 98.7 °F (37.1 °C) 85 19 (!) 153/118 99 %     No intake or output data in the 24 hours ending 10/07/22 0952     PHYSICAL EXAM:  General: WD, WN. Alert, cooperative, no acute distress    EENT:  EOMI. Anicteric sclerae. MMM  Resp:  CTA bilaterally, no wheezing or rales. No accessory muscle use  CV:  Regular  rhythm,  normal S1/S2, no murmurs rubs gallops, No edema  GI:  Soft, Non distended, Non tender. +Bowel sounds  Neurologic:  Alert and oriented X 3, some impediment   Psych:   Good insight. Not anxious nor agitated  Skin:  No rashes. No jaundice    Reviewed most current lab test results and cultures  YES  Reviewed most current radiology test results   YES  Review and summation of old records today    NO  Reviewed patient's current orders and MAR    YES  PMH/SH reviewed - no change compared to H&P  ________________________________________________________________________  Care Plan discussed with:    Comments   Patient x    Family      RN x    Care Manager x    Consultant                       x Multidiciplinary team rounds were held today with , nursing, pharmacist and clinical coordinator. Patient's plan of care was discussed; medications were reviewed and discharge planning was addressed. ________________________________________________________________________  Total NON critical care TIME:  35   Minutes    Total CRITICAL CARE TIME Spent:   Minutes non procedure based      Comments   >50% of visit spent in counseling and coordination of care x    ________________________________________________________________________  Sebastian Singleton MD     Procedures: see electronic medical records for all procedures/Xrays and details which were not copied into this note but were reviewed prior to creation of Plan. LABS:  I reviewed today's most current labs and imaging studies.   Pertinent labs include:  Recent Labs     10/06/22  1923   WBC 8.7   HGB 12.9   HCT 36.3   *     Recent Labs     10/07/22  0421 10/06/22  1923   NA  --  140   K  --  3.6   CL  --  103   CO2  --  27   GLU  --  106*   BUN  --  17   CREA  --  1.01   CA  --  8.7   MG 1.9  --    PHOS 4.5  --    ALB  --  3.8   TBILI  --  0.3   ALT  --  33   INR  --  1.0       Signed: Sebastian Singleton MD

## 2022-10-07 NOTE — ED NOTES
Spoke with ED doctor about RN concerns of BP and MD states: \"Pt is allowed permissive hypertension, I'm not concerned about that blood pressure. \"

## 2022-10-07 NOTE — H&P
History & Physical    Primary Care Provider: Carla Cardona MD  Source of Information: Patient and chart    History of Presenting Illness:   Josselin Moore is a 56 y.o. female with pmh of htn, tia, mdd w/ mary jane, polysubstance abuse who presented to ed with multiple neuo symptoms. Was in her usual state of health until 3 days ago when she noted disequilibrium, intermittent riht sided weakness. States she had difficulty controlling her right arm and pressing down on foot pedals inn her car with her right foot. The patient denies any fever, chills, chest or abdominal pain, nausea, vomiting, cough, congestion, recent illness, palpitations, or dysuria. Remarkable vitals on ER Presentations: bp to 160/102  Labs Remarkable for: unremarkable  ER Images: ct head: Small area of diminished attenuation at the margin of the left head of the caudate and corona radiata concerning for an area of ischemia. CTA Head and Neck: no acute process. ER Rx: asa 324, benadryl     Review of Systems:  A comprehensive review of systems was negative except for that written in the History of Present Illness.      Past Medical History:   Diagnosis Date    angina     Angina at rest [facility])     Breast pain     since 2016, right breast, on and off    Chiari malformation     Heart abnormalities     leaky heart valve, murmur    Hypertension     Other ill-defined conditions(799.89)     cardiac arrhythmias    Other ill-defined conditions(799.89)     neuropathy    Psychiatric disorder     anxiety and depression    Stroke ([facility] [address]     TIA      Past Surgical History:   Procedure Laterality Date    HX GYN      HX ORTHOPAEDIC      screws in left ankle    HX OTHER SURGICAL      surgery to back of head due to fractured skull    NEUROLOGICAL PROCEDURE UNLISTED      GA ABDOMEN SURGERY PROC UNLISTED      laporscopic surgery post ruptured fallopian tube     Prior to Admission medications    Medication Sig Start Date End Date Taking? Authorizing Provider   hydroCHLOROthiazide (MICROZIDE) 12.5 mg capsule Take 1 Capsule by mouth in the morning. Indications: high blood pressure 7/30/22  Yes Ryan Lyman MD   amLODIPine (NORVASC) 10 mg tablet Take 1 Tab by mouth daily. Indications: high blood pressure 5/11/21  Yes Joan Grey NP   pseudoephedrine CR (SUDAFED) 120 mg CR tablet Take 1 Tablet by mouth two (2) times daily as needed for Congestion. 9/13/22   Shayna Schwab MD   fluticasone propionate (FLONASE) 50 mcg/actuation nasal spray 2 Sprays by Both Nostrils route daily. 9/13/22   Shayna Schwab MD   acetaminophen (TYLENOL) 325 mg tablet Take 2 Tablets by mouth every four (4) hours as needed for Pain. Patient not taking: No sig reported 8/30/22   Rhona Allen MD   cyclobenzaprine (FLEXERIL) 10 mg tablet Take 1 Tablet by mouth three (3) times daily as needed for Muscle Spasm(s). Patient not taking: No sig reported 8/30/22   Rhona Allen MD   lidocaine (Lidoderm) 5 % Apply patch to the affected area for 12 hours a day and remove for 12 hours a day. Patient not taking: No sig reported 8/30/22   Rhona Allen MD   cyclobenzaprine (FLEXERIL) 10 mg tablet Take 1 Tablet by mouth three (3) times daily as needed for Muscle Spasm(s). Patient not taking: No sig reported 7/30/22   Ryan Lyman MD   lidocaine 4 % patch 1 Patch by TransDERmal route every twelve (12) hours. Patient not taking: No sig reported 7/30/22   Ryan Lyman MD   naproxen (NAPROSYN) 500 mg tablet Take 1 Tablet by mouth every twelve (12) hours as needed for Pain. Patient not taking: No sig reported 7/30/22   Ryan Lyman MD   hydrOXYzine HCL (ATARAX) 25 mg tablet Take  by mouth as needed. Patient not taking: No sig reported    Provider, Historical   aspirin 81 mg chewable tablet Take 81 mg by mouth daily.   Patient not taking: No sig reported    Provider, Historical     Allergies   Allergen Reactions    Metoprolol Other (comments)     headache    Toradol [Ketorolac] Hives      Family History   Problem Relation Age of Onset    Hypertension Mother     Stroke Mother     Diabetes Mother     Drug Abuse Father         SOCIAL HISTORY:  Patient resides:  Independently x   Assisted Living    SNF    With family care       Smoking history:   None x   Former    Chronic      Alcohol history:   None x   Social    Chronic      Ambulates:   Independently    w/cane    w/walker    w/wc    CODE STATUS:  DNR    Full x   Other      Objective:     Physical Exam:     Visit Vitals  BP (!) 160/102   Pulse 68   Temp 98.7 °F (37.1 °C)   Resp 17   Ht 5' 2\" (1.575 m)   Wt 67.1 kg (148 lb)   SpO2 98%   BMI 27.07 kg/m²      O2 Device: None (Room air)    General:  Alert, cooperative, no distress, appears stated age. Head:  Normocephalic, without obvious abnormality, atraumatic. Eyes:  Conjunctivae/corneas clear. PERRL, EOMs intact. Nose: Nares normal. Septum midline. Mucosa normal.        Neck: Supple, symmetrical, trachea midline       Lungs:   Clear to auscultation bilaterally. Chest wall:  No tenderness or deformity. Heart:  Regular rate and rhythm, S1, S2 normal   Abdomen:   Soft,  No organomegaly. Extremities: Extremities normal, atraumatic, no cyanosis or edema. Pulses: 2+ and symmetric all extremities. Skin: Skin color, texture, turgor normal. No rashes or lesions   Neurologic: CNII-XII intact. EKG:  nsr      Data Review:     Recent Days:  Recent Labs     10/06/22  1923   WBC 8.7   HGB 12.9   HCT 36.3   *     Recent Labs     10/06/22  1923      K 3.6      CO2 27   *   BUN 17   CREA 1.01   CA 8.7   ALB 3.8   ALT 33   INR 1.0     No results for input(s): PH, PCO2, PO2, HCO3, FIO2 in the last 72 hours.     24 Hour Results:  Recent Results (from the past 24 hour(s))   GLUCOSE, POC    Collection Time: 10/06/22  6:08 PM   Result Value Ref Range    Glucose (POC) 124 (H) 65 - 117 mg/dL    Performed by Elham Benson RN    EKG, 12 LEAD, INITIAL Collection Time: 10/06/22  7:19 PM   Result Value Ref Range    Ventricular Rate 77 BPM    Atrial Rate 77 BPM    P-R Interval 154 ms    QRS Duration 76 ms    Q-T Interval 396 ms    QTC Calculation (Bezet) 448 ms    Calculated P Axis 22 degrees    Calculated R Axis 16 degrees    Calculated T Axis 23 degrees    Diagnosis       Normal sinus rhythm  When compared with ECG of 30-JUL-2022 11:45,  No significant change was found     CBC WITH AUTOMATED DIFF    Collection Time: 10/06/22  7:23 PM   Result Value Ref Range    WBC 8.7 3.6 - 11.0 K/uL    RBC 4.01 3.80 - 5.20 M/uL    HGB 12.9 11.5 - 16.0 g/dL    HCT 36.3 35.0 - 47.0 %    MCV 90.5 80.0 - 99.0 FL    MCH 32.2 26.0 - 34.0 PG    MCHC 35.5 30.0 - 36.5 g/dL    RDW 11.9 11.5 - 14.5 %    PLATELET 483 (H) 882 - 400 K/uL    MPV 9.3 8.9 - 12.9 FL    NRBC 0.0 0  WBC    ABSOLUTE NRBC 0.00 0.00 - 0.01 K/uL    NEUTROPHILS 54 32 - 75 %    LYMPHOCYTES 35 12 - 49 %    MONOCYTES 7 5 - 13 %    EOSINOPHILS 2 0 - 7 %    BASOPHILS 1 0 - 1 %    IMMATURE GRANULOCYTES 1 (H) 0.0 - 0.5 %    ABS. NEUTROPHILS 4.7 1.8 - 8.0 K/UL    ABS. LYMPHOCYTES 3.0 0.8 - 3.5 K/UL    ABS. MONOCYTES 0.6 0.0 - 1.0 K/UL    ABS. EOSINOPHILS 0.2 0.0 - 0.4 K/UL    ABS. BASOPHILS 0.1 0.0 - 0.1 K/UL    ABS. IMM. GRANS. 0.0 0.00 - 0.04 K/UL    DF AUTOMATED     METABOLIC PANEL, COMPREHENSIVE    Collection Time: 10/06/22  7:23 PM   Result Value Ref Range    Sodium 140 136 - 145 mmol/L    Potassium 3.6 3.5 - 5.1 mmol/L    Chloride 103 97 - 108 mmol/L    CO2 27 21 - 32 mmol/L    Anion gap 10 5 - 15 mmol/L    Glucose 106 (H) 65 - 100 mg/dL    BUN 17 6 - 20 MG/DL    Creatinine 1.01 0.55 - 1.02 MG/DL    BUN/Creatinine ratio 17 12 - 20      eGFR >60 >60 ml/min/1.73m2    Calcium 8.7 8.5 - 10.1 MG/DL    Bilirubin, total 0.3 0.2 - 1.0 MG/DL    ALT (SGPT) 33 12 - 78 U/L    AST (SGOT) 17 15 - 37 U/L    Alk.  phosphatase 67 45 - 117 U/L    Protein, total 7.6 6.4 - 8.2 g/dL    Albumin 3.8 3.5 - 5.0 g/dL    Globulin 3.8 2.0 - 4.0 g/dL    A-G Ratio 1.0 (L) 1.1 - 2.2     PROTHROMBIN TIME + INR    Collection Time: 10/06/22  7:23 PM   Result Value Ref Range    INR 1.0 0.9 - 1.1      Prothrombin time 9.6 9.0 - 11.1 sec   DRUG SCREEN, URINE    Collection Time: 10/06/22  7:23 PM   Result Value Ref Range    AMPHETAMINES Negative NEG      BARBITURATES Negative NEG      BENZODIAZEPINES Negative NEG      COCAINE Negative NEG      METHADONE Negative NEG      OPIATES Negative NEG      PCP(PHENCYCLIDINE) Negative NEG      THC (TH-CANNABINOL) Negative NEG      Drug screen comment (NOTE)    URINALYSIS W/ REFLEX CULTURE    Collection Time: 10/06/22  7:23 PM    Specimen: Urine   Result Value Ref Range    Color YELLOW/STRAW      Appearance CLEAR CLEAR      Specific gravity 1.025      pH (UA) 6.0 5.0 - 8.0      Protein Negative NEG mg/dL    Glucose Negative NEG mg/dL    Ketone TRACE (A) NEG mg/dL    Bilirubin Negative NEG      Blood Negative NEG      Urobilinogen 1.0 0.2 - 1.0 EU/dL    Nitrites Negative NEG      Leukocyte Esterase Negative NEG      WBC 0-4 0 - 4 /hpf    RBC 0-5 0 - 5 /hpf    Epithelial cells FEW FEW /lpf    Bacteria Negative NEG /hpf    UA:UC IF INDICATED CULTURE NOT INDICATED BY UA RESULT CNI     HCG URINE, QL. - POC    Collection Time: 10/06/22  9:33 PM   Result Value Ref Range    Pregnancy test,urine (POC) Negative NEG           Imaging:     Assessment:     Timothy Man is a 56 y.o. female with pmh of htn, tia, mdd w/ mary jane, polysubstance abuse who is admitted for acute cva. Plan:       Acute CVA  -CT Head: Small area of diminished attenuation at the margin of the left head of the  caudate and corona radiata concerning for an area of ischemia.   - ASA   - MRI/MRA Head without contrast, MRA neck with contrast   - TTE with bubble study   - Carotid dopplers   - Lipid panel, TSH, ESR/CRP, cardiac enzymes   - Check A1C, fingersticks and ISS in interim - avoid hypoglycemia   - Avoid hyperthermia - acetaminophen PRN fever   - Permissive HTN   - Neuro checks and vital signs q4h   - Bedside swallow evaluation per nursing   - Observe on telemetry   - PT/OT   - Fall precautions   - Neurology following, appreciate recommendations     HTN  -Home amlodipine and hctz    Hx of Polysubstance Abuse  -SSI +Hypoglycemic protocols    Obesity  -Counseled on weight loss, dieting and exercise        FEN/GI -  po hydration  Activity - as tolerated  DVT prophylaxis - scds  GI prophylaxis -  ni  Disposition - home    CODE STATUS:  full code       Signed By: Emmanuel Sebastian MD     October 6, 2022

## 2022-10-07 NOTE — PROGRESS NOTES
TRANSFER - IN REPORT:    Verbal report received from [facility] on Delores Barker  being received from ED for routine progression of care      Report consisted of patients Situation, Background, Assessment and   Recommendations(SBAR). Information from the following report(s) SBAR, Kardex, Intake/Output, MAR, and Recent Results was reviewed with the receiving nurse. Opportunity for questions and clarification was provided. Assessment completed upon patients arrival to unit and care assumed.

## 2022-10-07 NOTE — PROGRESS NOTES
Spiritual Care Assessment/Progress Note  [facility]      NAME: Toñito Whittaker      MRN: [id number]  AGE: 56 y.o.  SEX: female  Rastafari Affiliation: No Yarsanism   Language: English     10/7/2022     Total Time (in minutes): 37     Spiritual Assessment begun in [address] through conversation with:         [x]Patient        [] Family    [] Friend(s)        Reason for Consult: Advance medical directive consult     Spiritual beliefs: (Please include comment if needed)     [] Identifies with a lina tradition:         [] Supported by a lina community:            [] Claims no spiritual orientation:           [] Seeking spiritual identity:                [] Adheres to an individual form of spirituality:           [x] Not able to assess:  Did not indicate                         Identified resources for coping:      [] Prayer                               [] Music                  [] Guided Imagery     [x] Family/friends                 [] Pet visits     [] Devotional reading                         [] Unknown     [] Other:                                                Interventions offered during this visit: (See comments for more details)    Patient Interventions: Advance medical directive consult, Advance medical directive completed, End of life issues discussed, Initial/Spiritual assessment, patient floor           Plan of Care:     [] Support spiritual and/or cultural needs    [] Support AMD and/or advance care planning process      [] Support grieving process   [] Coordinate Rites and/or Rituals    [] Coordination with community clergy   [x] No spiritual needs identified at this time   [] Detailed Plan of Care below (See Comments)  [] Make referral to Music Therapy  [] Make referral to Pet Therapy     [] Make referral to Addiction services  [] Make referral to [facility]  [] Make referral to Spiritual Care Partner  [] No future visits requested        [x] Contact Spiritual Care for further referrals     Comments:  ACP Assessment:    Received request from IDT member for advance medical directive consult on Med Surg & Tele. Upon review of chart and communication with care team, patient's decision making abilities are not in question. Patient was present in the room during visit. Explained advance medical directive sections and assisted in completion of document. Made copies; returned two copies and the original to patient, kept one copy for patient's chart.

## 2022-10-07 NOTE — PROGRESS NOTES
Meena) ED nurse was told to ask ED doctor if patient can have her blood pressure now to reduce her blood pressure of 144/106 prior to admitting patient to Med surg unit. Per ED nurse, MD was informed of patient's elevated blood pressure reading of 144/106. ED doctor stated that,\"Pt is allowed permissive hypertension, I'm not concerned about that blood pressure. \"      [id number]) Patient arrived at the unit with ED staff. Hospitalist was notified of patient's elevated blood pressure. Hospitalist stated that, \"Give Amlodipine if patient's blood pressure keep going up\". Patient's blood pressure at this time is 137/90. Previous blood pressure reading was 144/106.    0056) Admission assessment done. Patient is AOX4 with slurred speech. No facial droop. VS taken. Patient is drowsy but arousal and able to answer all admission questions. Patient already had benadryl at the ED. Dual skin assessment done with ZULLY Hughes. Skin intact with multiple tatoos. Patient has slight right hand weakness. Patient was educated on stroke but drowsy and unable to follow education. Will try later. Patient has 22 g peripheral IV  at left [facility]. Call light within reach. Bed alarm activated due to patient looking drowsy. 6504) Patient was rounded on. Patient is sleeping but arousal.     0414) Patient was reassessed. VS taken. No new changes from previous assessment. 0421) Morning labs collected and sent to the lab.    0725) Bedside and Verbal shift change report given to [address] (oncoming nurse) by Joel Jackson (offgoing nurse). Report included the following information SBAR, Kardex, Intake/Output, MAR and Recent Results. AM was notified of inability to complete the MRI checklist. Patient was drowsy then.  AM nurse told to complete the MRI checklist.

## 2022-10-07 NOTE — PROGRESS NOTES
SPEECH LANGUAGE PATHOLOGY BEDSIDE SWALLOW AND SPEECH EVALUATIONS  Patient: Brcye Daigle (55 y.o. female)  Date: 10/7/2022  Primary Diagnosis: CVA (cerebral vascular accident) [facility]) [I63.9]       Precautions:        ASSESSMENT :  Based on the objective data described below, the patient presents with no oral/pharyngeal dysphagia, and no overt s/s aspiration observed. Patient reported continued slurred speech and word-retrieval deficits. No anomia noted in informal conversation or formal testing, however provided word-retrieval compensatory strategies for when anomia does occur. Patient with minimal dysarthria characterized by imprecise articulation and blended word boundaries, however intelligibility 100% in conversation despite this. Patient reported that she is from [place] and talks fast at baseline. Provided education regarding motor speech compensatory strategies as well, and patient verbalized understanding to all education provided. Suspect excellent prognosis for resolution of reported deficits given mild severity and good progress to date. Patient will benefit from skilled intervention to address the above impairments. Patients rehabilitation potential is considered to be Excellent     PLAN :  Recommendations and Planned Interventions:  -Regular/thin liquid diet  -Cue patient for slow, loud, clear speech if patient is unintelligible. Also, cue patient to describe the word if she is experiencing word-retrieval issues  -SLP to follow PRN for additional education if needed  Frequency/Duration: Patient will be followed by speech-language pathology PRN to address goals. Discharge Recommendations:  To Be Determined and None     SUBJECTIVE:   Patient stated It's better re: symptoms that brought her to hospital. Note MRI still pending however CT showed Small area of diminished attenuation at the margin of the left head of the caudate and corona radiata concerning for an area of ischemia. OBJECTIVE:     Past Medical History:   Diagnosis Date    angina     Angina at rest [facility])     Breast pain     since 2016, right breast, on and off    Chiari malformation     Heart abnormalities     leaky heart valve, murmur    Hypertension     Other ill-defined conditions(799.89)     cardiac arrhythmias    Other ill-defined conditions(799.89)     neuropathy    Psychiatric disorder     anxiety and depression    Stroke ([facility] [address]     TIA     Past Surgical History:   Procedure Laterality Date    HX GYN      HX ORTHOPAEDIC      screws in left ankle    HX OTHER SURGICAL      surgery to back of head due to fractured skull    NEUROLOGICAL PROCEDURE UNLISTED      PA ABDOMEN SURGERY PROC UNLISTED      laporscopic surgery post ruptured fallopian tube     Prior Level of Function/Home Situation:   Home Situation  Home Environment: Private residence  # Steps to Enter: 0  One/Two Story Residence: Two story  # of Interior Steps: 14  Living Alone: No  Support Systems: Spouse/Significant Other, Child(mitul) (lives with , son, and grandchild)  Patient Expects to be Discharged to[de-identified] Home  Current DME Used/Available at Home: None  Diet prior to admission: regular/thin  Current Diet:  regular/thin   Cognitive and Communication Status:  Neurologic State: Alert, Appropriate for age  Orientation Level: Oriented X4  Cognition: Follows commands           Swallowing Evaluation:   Oral Assessment:  Oral Assessment  Labial: No impairment  Dentition: Natural;Full  Oral Hygiene: moist  Lingual: No impairment  Velum: Unable to visualize  Mandible: No impairment  P.O. Trials:  Patient Position: upright in bed  Vocal quality prior to P.O.: No impairment  Consistency Presented: Solid; Thin liquid  How Presented: Self-fed/presented;Straw;Successive swallows     Bolus Acceptance: No impairment  Bolus Formation/Control: No impairment     Propulsion: No impairment  Oral Residue: None        Aspiration Signs/Symptoms: None  Pharyngeal Phase Characteristics: No impairment, issues, or problems              Oral Phase Severity: No impairment  Pharyngeal Phase Severity : No impairment    NOMS:   The NOMS functional outcome measure was used to quantify this patient's level of swallowing impairment. Based on the NOMS, the patient was determined to be at level 77 for swallow function       NOMS Swallowing Levels:  Level 1 (CN): NPO  Level 2 (CM): NPO but takes consistency in therapy  Level 3 (CL): Takes less than 50% of nutrition p.o. and continues with nonoral feedings; and/or safe with mod cues; and/or max diet restriction  Level 4 (CK): Safe swallow but needs mod cues; and/or mod diet restriction; and/or still requires some nonoral feeding/supplements  Level 5 (CJ): Safe swallow with min diet restriction; and/or needs min cues  Level 6 (CI): Independent with p.o.; rare cues; usually self cues; may need to avoid some foods or needs extra time  Level 7 ([address] [place] [address]): Independent for all p.o.  SOFÍA (2003). National Outcomes Measurement System (NOMS): Adult Speech-Language Pathology User's Guide. Speech/Language Evaluation  Motor Speech:  Oral-Motor Structure/Motor Speech  Labial: No impairment  Dentition: Natural;Full  Oral Hygiene: moist  Lingual: No impairment  Velum: Unable to visualize  Mandible: No impairment  Apraxic Characteristics: None  Dysarthric Characteristics: Blended word boundaries; Imprecise  Intelligibility: No impairment  Overall Impairment Severity: Minimal  Compensatory Strategies for Motor Speech: slow, loud, clear  Language Comprehension and Expression:  Auditory Comprehension  Auditory Impairment: No   Verbal Expression  Primary Mode of Expression: Verbal  Repetition: No impairment  Naming: No impairment  Confrontation (%): 100 %  Sentence Formulation: No impairment  Overall Impairment: None         Voice:                 Vocal Quality: No impairment     Pain:  Pain Scale 1: Numeric (0 - 10)  Pain Intensity 1: 0       After treatment: Patient left in no apparent distress in bed, Call bell within reach, and Nursing notified    COMMUNICATION/EDUCATION:   Patient was educated regarding her deficit(s) of WFL swallow, strategies for dysarthria and anomia as this relates to her diagnosis of CVA. She demonstrated Good understanding as evidenced by verbalizing understanding. The patient's plan of care including recommendations, planned interventions, and recommended diet changes were discussed with: Registered nurse. Patient/family have participated as able in goal setting and plan of care. Patient/family agree to work toward stated goals and plan of care.     Thank you for this referral.  Hunter Sargent, SLP  Time Calculation: 20 mins

## 2022-10-08 VITALS
RESPIRATION RATE: 18 BRPM | DIASTOLIC BLOOD PRESSURE: 84 MMHG | BODY MASS INDEX: 27.23 KG/M2 | SYSTOLIC BLOOD PRESSURE: 129 MMHG | HEIGHT: 62 IN | WEIGHT: 148 LBS | TEMPERATURE: 98.4 F | OXYGEN SATURATION: 99 % | HEART RATE: 77 BPM

## 2022-10-08 PROCEDURE — 74011250636 HC RX REV CODE- 250/636: Performed by: STUDENT IN AN ORGANIZED HEALTH CARE EDUCATION/TRAINING PROGRAM

## 2022-10-08 PROCEDURE — 74011250637 HC RX REV CODE- 250/637: Performed by: STUDENT IN AN ORGANIZED HEALTH CARE EDUCATION/TRAINING PROGRAM

## 2022-10-08 RX ORDER — GUAIFENESIN 100 MG/5ML
81 LIQUID (ML) ORAL DAILY
Qty: 30 TABLET | Refills: 1 | Status: SHIPPED | OUTPATIENT
Start: 2022-10-08 | End: 2022-10-09 | Stop reason: SDUPTHER

## 2022-10-08 RX ORDER — AMLODIPINE BESYLATE 10 MG/1
10 TABLET ORAL DAILY
Qty: 30 TABLET | Refills: 0 | Status: SHIPPED | OUTPATIENT
Start: 2022-10-08

## 2022-10-08 RX ORDER — ATORVASTATIN CALCIUM 40 MG/1
40 TABLET, FILM COATED ORAL
Qty: 30 TABLET | Refills: 0 | Status: SHIPPED | OUTPATIENT
Start: 2022-10-08 | End: 2022-10-31

## 2022-10-08 RX ORDER — HYDROCHLOROTHIAZIDE 12.5 MG/1
12.5 CAPSULE ORAL DAILY
Qty: 90 CAPSULE | Refills: 0 | Status: SHIPPED | OUTPATIENT
Start: 2022-10-08

## 2022-10-08 RX ORDER — CLOPIDOGREL BISULFATE 75 MG/1
75 TABLET ORAL DAILY
Qty: 21 TABLET | Refills: 0 | Status: ON HOLD | OUTPATIENT
Start: 2022-10-09 | End: 2022-10-31 | Stop reason: SDUPTHER

## 2022-10-08 RX ADMIN — ENOXAPARIN SODIUM 40 MG: 100 INJECTION SUBCUTANEOUS at 08:45

## 2022-10-08 RX ADMIN — CLOPIDOGREL BISULFATE 75 MG: 75 TABLET, FILM COATED ORAL at 08:44

## 2022-10-08 RX ADMIN — ASPIRIN 81 MG CHEWABLE TABLET 81 MG: 81 TABLET CHEWABLE at 08:44

## 2022-10-08 NOTE — PROGRESS NOTES
PADDY     RUR  10 %    IDR Discussed with MD today. BEATRIZ discharged home today     Transition of Care Plan:     RUR: 10%  Disposition: outpatient PT  Transportation: boyfriend  PCP follow up: 10-10-22 3:00 PM  Neuro follow up: left voicemail to return call to schedule new patient appointment  Outpatient PT  at [facility] (pending)  CM to follow-up on Monday to make sure they have orders and patient has contact information to call- May need to see PCP prior to first visit. Icon Checklist header    Your Next Steps    Icon do   Do    Icon Checkbox     these medications from [facility]/pharmacy #[id number]- [place], [facility]  amLODIPine  aspirin  atorvastatin  clopidogreL  hydroCHLOROthiazide  Icon Checkbox    Follow up with Leyda Valverde MD  Specialty: Neurology  [address] [address] [facility] [address]  Please call to schedule appointment. Icon Checkbox    Follow up with Soniya Cagle MD  Specialty: [address]   [phone]     Outpatient PT  Next steps: Follow up  Please follow-up With [address] to schedule time- You may have to see your PCP prior to appointment      Follow up with Babatunde Bauman NP on 10/10/2022  Specialty: Nurse Practitioner  [address]   [phone]  3:00 PM Please arrive 15 minutes early. Please note this is at the [place] [address] location    CM to do follow-up call next week.        [address] MSW RN   [phone]
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
## 2022-10-08 NOTE — PROGRESS NOTES
Problem: Aspiration - Risk of  Goal: *Absence of aspiration  Outcome: Progressing Towards Goal     Problem: Patient Education: Go to Patient Education Activity  Goal: Patient/Family Education  Outcome: Progressing Towards Goal     Problem: Falls - Risk of  Goal: *Absence of Falls  Description: Document Manuel Fail Fall Risk and appropriate interventions in the flowsheet.   Outcome: Progressing Towards Goal  Note: Fall Risk Interventions:  Mobility Interventions: Patient to call before getting OOB         Medication Interventions: Patient to call before getting OOB    Elimination Interventions: Call light in reach

## 2022-10-08 NOTE — DISCHARGE INSTRUCTIONS
- Take aspirin 81 mg and plavix 75 mg for 21 days then take only aspirin 81 mg daily   - Take Lipitor 40 mg daily   - Please avoid tobacco use

## 2022-10-08 NOTE — PROGRESS NOTES
Bedside and Verbal shift change report given to [address] (oncoming nurse) by Bony Courtney (offgoing nurse).  Report included the following information SBAR, Kardex, Intake/Output, MAR, Recent Results, and Cardiac Rhythm SR .
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
## 2022-10-08 NOTE — PROGRESS NOTES
Bedside shift change report given to Lorenza Evans (oncoming nurse) by Petrina Cushing (offgoing nurse). Report included the following information SBAR, Intake/Output, and MAR.     0262 Patient in bed resting, due medication administered. 9299 Patient rounded on per protocol. 1100 Patient rounded on.     1200 Reviewed discharge instructions with pt including follow-up appointments, new medications and side effects, medications to continue, medications to discontinue, signs/symptoms of stroke and heart attack, and MyChart information. Pt expressed understanding. IV was removed. Belongings sent home with pt. Patient left the unit at 1240 Escorted by relative.

## 2022-10-09 RX ORDER — GUAIFENESIN 100 MG/5ML
81 LIQUID (ML) ORAL DAILY
Qty: 30 TABLET | Refills: 1 | Status: SHIPPED | OUTPATIENT
Start: 2022-10-09

## 2022-10-09 NOTE — PROGRESS NOTES
Received phone call from patient stating missing Rx's at [facility] on [address]. Followed up with pharmacy and spoke with pharmacy representative, Shanna Lawler, states received all Rx's and will fill them today, except Aspirin 81 mg. Message sent to Dr. Jose L Collins on [employer]: Can you resend Rx for Aspirin to [facility] on [address]? I can notify patient. Written reply from Dr. Jose L Collins states he will send Rx for Aspirin to pharmacy. Called patient and notified her pharmacy is preparing the rest of her Rx's and will send notify her when they are ready for . Told her new Rx for Aspirin will be sent over today or she can buy OTC Aspirin 81 mg. Patient verbalized understanding and denied further questions.

## 2022-10-10 NOTE — PROGRESS NOTES
Late Entry from 10/7/22  Occupational Therapy Note:  Orders received and appreciated. Chart reviewed. Spoke with PT. Pt with mildly decreased RUE strength and sensation, but without functional impairments. MRI positive for left ganglial capsular acute versus subacute infarct. Pt to follow up with OP OT if RUE symptoms do not resolve and cause decline in function. Thank you for the consult.   Brenda Ramirez, OTR/L, CBIS

## 2022-10-11 ENCOUNTER — TELEPHONE (OUTPATIENT)
Dept: CASE MANAGEMENT | Age: 47
End: 2022-10-11

## 2022-10-11 NOTE — TELEPHONE ENCOUNTER
CM called patient by telephone to perform post discharge assessment and for the purpose of follow up call from inpatient discharge to check on environmental challenges/medications/appointment follow up/and questions/concerns. The call was answered by patient/family/caretaker/agency, introduction self, and explanation and reason for call, name and  confirmed. Pt stated she did not go to scheduled PCP appt. CM informed pt to call [facility] and reschedule appt as well as discussed [address] [place] [address] Outpatient therapy will need to receive an order from her PCP. KURT also discussed stroke protocol with pt, pt has understanding. Recognize signs and symptoms of STROKE:    F-face looks uneven    A-arms unable to move or move unevenly    S-speech slurred or non-existent    T-time-call 911 as soon as signs and symptoms begin.              Edgar Valenzuela Florian Croft -[phone]

## 2022-10-29 ENCOUNTER — APPOINTMENT (OUTPATIENT)
Dept: CT IMAGING | Age: 47
End: 2022-10-29
Attending: EMERGENCY MEDICINE
Payer: MEDICAID

## 2022-10-29 ENCOUNTER — HOSPITAL ENCOUNTER (OUTPATIENT)
Age: 47
Setting detail: OBSERVATION
Discharge: HOME | End: 2022-10-31
Attending: EMERGENCY MEDICINE | Admitting: STUDENT IN AN ORGANIZED HEALTH CARE EDUCATION/TRAINING PROGRAM
Payer: MEDICAID

## 2022-10-29 ENCOUNTER — APPOINTMENT (OUTPATIENT)
Dept: GENERAL RADIOLOGY | Age: 47
End: 2022-10-29
Attending: EMERGENCY MEDICINE
Payer: MEDICAID

## 2022-10-29 DIAGNOSIS — I63.9 CEREBROVASCULAR ACCIDENT (CVA), UNSPECIFIED MECHANISM (HCC): Primary | ICD-10-CM

## 2022-10-29 DIAGNOSIS — G43.009 ATYPICAL MIGRAINE: ICD-10-CM

## 2022-10-29 PROBLEM — R20.0 NUMBNESS: Status: ACTIVE | Noted: 2022-10-29

## 2022-10-29 LAB
ALBUMIN SERPL-MCNC: 4.3 G/DL (ref 3.5–5)
ALBUMIN/GLOB SERPL: 1.1 {RATIO} (ref 1.1–2.2)
ALP SERPL-CCNC: 66 U/L (ref 45–117)
ALT SERPL-CCNC: 28 U/L (ref 12–78)
ANION GAP SERPL CALC-SCNC: 9 MMOL/L (ref 5–15)
APTT PPP: 26.7 SEC (ref 22.1–31)
AST SERPL-CCNC: 19 U/L (ref 15–37)
BASOPHILS # BLD: 0 K/UL (ref 0–0.1)
BASOPHILS NFR BLD: 1 % (ref 0–1)
BILIRUB SERPL-MCNC: 0.7 MG/DL (ref 0.2–1)
BUN SERPL-MCNC: 12 MG/DL (ref 6–20)
BUN/CREAT SERPL: 13 (ref 12–20)
CALCIUM SERPL-MCNC: 8.9 MG/DL (ref 8.5–10.1)
CHLORIDE SERPL-SCNC: 101 MMOL/L (ref 97–108)
CO2 SERPL-SCNC: 28 MMOL/L (ref 21–32)
CREAT SERPL-MCNC: 0.92 MG/DL (ref 0.55–1.02)
DIFFERENTIAL METHOD BLD: ABNORMAL
EOSINOPHIL # BLD: 0 K/UL (ref 0–0.4)
EOSINOPHIL NFR BLD: 0 % (ref 0–7)
ERYTHROCYTE [DISTWIDTH] IN BLOOD BY AUTOMATED COUNT: 11.7 % (ref 11.5–14.5)
GLOBULIN SER CALC-MCNC: 3.9 G/DL (ref 2–4)
GLUCOSE BLD STRIP.AUTO-MCNC: 84 MG/DL (ref 65–117)
GLUCOSE SERPL-MCNC: 91 MG/DL (ref 65–100)
HCT VFR BLD AUTO: 40.5 % (ref 35–47)
HGB BLD-MCNC: 13.8 G/DL (ref 11.5–16)
IMM GRANULOCYTES # BLD AUTO: 0 K/UL (ref 0–0.04)
IMM GRANULOCYTES NFR BLD AUTO: 0 % (ref 0–0.5)
INR PPP: 1 (ref 0.9–1.1)
LYMPHOCYTES # BLD: 2.6 K/UL (ref 0.8–3.5)
LYMPHOCYTES NFR BLD: 30 % (ref 12–49)
MCH RBC QN AUTO: 30.2 PG (ref 26–34)
MCHC RBC AUTO-ENTMCNC: 34.1 G/DL (ref 30–36.5)
MCV RBC AUTO: 88.6 FL (ref 80–99)
MONOCYTES # BLD: 0.6 K/UL (ref 0–1)
MONOCYTES NFR BLD: 7 % (ref 5–13)
NEUTS SEG # BLD: 5.3 K/UL (ref 1.8–8)
NEUTS SEG NFR BLD: 62 % (ref 32–75)
NRBC # BLD: 0 K/UL (ref 0–0.01)
NRBC BLD-RTO: 0 PER 100 WBC
PLATELET # BLD AUTO: 459 K/UL (ref 150–400)
PMV BLD AUTO: 9.3 FL (ref 8.9–12.9)
POTASSIUM SERPL-SCNC: 3.3 MMOL/L (ref 3.5–5.1)
PROT SERPL-MCNC: 8.2 G/DL (ref 6.4–8.2)
PROTHROMBIN TIME: 9.6 SEC (ref 9–11.1)
RBC # BLD AUTO: 4.57 M/UL (ref 3.8–5.2)
SERVICE CMNT-IMP: NORMAL
SODIUM SERPL-SCNC: 138 MMOL/L (ref 136–145)
THERAPEUTIC RANGE,PTTT: NORMAL SECS (ref 58–77)
TROPONIN-HIGH SENSITIVITY: 8 NG/L (ref 0–51)
WBC # BLD AUTO: 8.5 K/UL (ref 3.6–11)

## 2022-10-29 PROCEDURE — 85730 THROMBOPLASTIN TIME PARTIAL: CPT

## 2022-10-29 PROCEDURE — 80053 COMPREHEN METABOLIC PANEL: CPT

## 2022-10-29 PROCEDURE — 85610 PROTHROMBIN TIME: CPT

## 2022-10-29 PROCEDURE — 84484 ASSAY OF TROPONIN QUANT: CPT

## 2022-10-29 PROCEDURE — 74011250637 HC RX REV CODE- 250/637: Performed by: STUDENT IN AN ORGANIZED HEALTH CARE EDUCATION/TRAINING PROGRAM

## 2022-10-29 PROCEDURE — 96374 THER/PROPH/DIAG INJ IV PUSH: CPT

## 2022-10-29 PROCEDURE — G0378 HOSPITAL OBSERVATION PER HR: HCPCS

## 2022-10-29 PROCEDURE — 36415 COLL VENOUS BLD VENIPUNCTURE: CPT

## 2022-10-29 PROCEDURE — 74011000250 HC RX REV CODE- 250: Performed by: STUDENT IN AN ORGANIZED HEALTH CARE EDUCATION/TRAINING PROGRAM

## 2022-10-29 PROCEDURE — 74011000250 HC RX REV CODE- 250: Performed by: EMERGENCY MEDICINE

## 2022-10-29 PROCEDURE — 85025 COMPLETE CBC W/AUTO DIFF WBC: CPT

## 2022-10-29 PROCEDURE — 74011250636 HC RX REV CODE- 250/636: Performed by: EMERGENCY MEDICINE

## 2022-10-29 PROCEDURE — 93005 ELECTROCARDIOGRAM TRACING: CPT

## 2022-10-29 PROCEDURE — 96372 THER/PROPH/DIAG INJ SC/IM: CPT

## 2022-10-29 PROCEDURE — 74011250636 HC RX REV CODE- 250/636: Performed by: INTERNAL MEDICINE

## 2022-10-29 PROCEDURE — 82962 GLUCOSE BLOOD TEST: CPT

## 2022-10-29 PROCEDURE — 65270000032 HC RM SEMIPRIVATE

## 2022-10-29 PROCEDURE — 96375 TX/PRO/DX INJ NEW DRUG ADDON: CPT

## 2022-10-29 PROCEDURE — 99285 EMERGENCY DEPT VISIT HI MDM: CPT

## 2022-10-29 PROCEDURE — 74011250636 HC RX REV CODE- 250/636: Performed by: STUDENT IN AN ORGANIZED HEALTH CARE EDUCATION/TRAINING PROGRAM

## 2022-10-29 PROCEDURE — 70450 CT HEAD/BRAIN W/O DYE: CPT

## 2022-10-29 PROCEDURE — 71045 X-RAY EXAM CHEST 1 VIEW: CPT

## 2022-10-29 RX ORDER — ATORVASTATIN CALCIUM 40 MG/1
80 TABLET, FILM COATED ORAL
Status: DISCONTINUED | OUTPATIENT
Start: 2022-10-29 | End: 2022-10-31 | Stop reason: HOSPADM

## 2022-10-29 RX ORDER — ENOXAPARIN SODIUM 100 MG/ML
40 INJECTION SUBCUTANEOUS EVERY 24 HOURS
Status: DISCONTINUED | OUTPATIENT
Start: 2022-10-29 | End: 2022-10-31 | Stop reason: HOSPADM

## 2022-10-29 RX ORDER — PANTOPRAZOLE SODIUM 40 MG/1
40 TABLET, DELAYED RELEASE ORAL
Status: DISCONTINUED | OUTPATIENT
Start: 2022-10-30 | End: 2022-10-31 | Stop reason: HOSPADM

## 2022-10-29 RX ORDER — ONDANSETRON 2 MG/ML
4 INJECTION INTRAMUSCULAR; INTRAVENOUS
Status: COMPLETED | OUTPATIENT
Start: 2022-10-29 | End: 2022-10-29

## 2022-10-29 RX ORDER — DICLOFENAC SODIUM 10 MG/G
2 GEL TOPICAL
Status: DISCONTINUED | OUTPATIENT
Start: 2022-10-29 | End: 2022-10-31 | Stop reason: HOSPADM

## 2022-10-29 RX ORDER — GUAIFENESIN 100 MG/5ML
81 LIQUID (ML) ORAL DAILY
Status: DISCONTINUED | OUTPATIENT
Start: 2022-10-30 | End: 2022-10-31 | Stop reason: HOSPADM

## 2022-10-29 RX ORDER — ACETAMINOPHEN 650 MG/1
650 SUPPOSITORY RECTAL
Status: DISCONTINUED | OUTPATIENT
Start: 2022-10-29 | End: 2022-10-31 | Stop reason: HOSPADM

## 2022-10-29 RX ORDER — HYDROXYZINE 25 MG/1
25 TABLET, FILM COATED ORAL
Status: DISCONTINUED | OUTPATIENT
Start: 2022-10-29 | End: 2022-10-31 | Stop reason: HOSPADM

## 2022-10-29 RX ORDER — ACETAMINOPHEN 325 MG/1
650 TABLET ORAL
Status: DISCONTINUED | OUTPATIENT
Start: 2022-10-29 | End: 2022-10-31 | Stop reason: HOSPADM

## 2022-10-29 RX ORDER — SODIUM CHLORIDE 0.9 % (FLUSH) 0.9 %
5-40 SYRINGE (ML) INJECTION AS NEEDED
Status: DISCONTINUED | OUTPATIENT
Start: 2022-10-29 | End: 2022-10-31 | Stop reason: HOSPADM

## 2022-10-29 RX ORDER — LIDOCAINE 50 MG/G
1 PATCH TOPICAL
Status: DISCONTINUED | OUTPATIENT
Start: 2022-10-29 | End: 2022-10-29 | Stop reason: CLARIF

## 2022-10-29 RX ORDER — ONDANSETRON 2 MG/ML
4 INJECTION INTRAMUSCULAR; INTRAVENOUS
Status: DISCONTINUED | OUTPATIENT
Start: 2022-10-29 | End: 2022-10-31 | Stop reason: HOSPADM

## 2022-10-29 RX ORDER — MORPHINE SULFATE 2 MG/ML
2 INJECTION, SOLUTION INTRAMUSCULAR; INTRAVENOUS
Status: COMPLETED | OUTPATIENT
Start: 2022-10-29 | End: 2022-10-29

## 2022-10-29 RX ORDER — CLOPIDOGREL BISULFATE 75 MG/1
75 TABLET ORAL DAILY
Status: DISCONTINUED | OUTPATIENT
Start: 2022-10-30 | End: 2022-10-31 | Stop reason: HOSPADM

## 2022-10-29 RX ORDER — ONDANSETRON 4 MG/1
4 TABLET, ORALLY DISINTEGRATING ORAL
Status: DISCONTINUED | OUTPATIENT
Start: 2022-10-29 | End: 2022-10-31 | Stop reason: HOSPADM

## 2022-10-29 RX ORDER — POLYETHYLENE GLYCOL 3350 17 G/17G
17 POWDER, FOR SOLUTION ORAL DAILY PRN
Status: DISCONTINUED | OUTPATIENT
Start: 2022-10-29 | End: 2022-10-31 | Stop reason: HOSPADM

## 2022-10-29 RX ORDER — SODIUM CHLORIDE, SODIUM LACTATE, POTASSIUM CHLORIDE, CALCIUM CHLORIDE 600; 310; 30; 20 MG/100ML; MG/100ML; MG/100ML; MG/100ML
75 INJECTION, SOLUTION INTRAVENOUS CONTINUOUS
Status: DISCONTINUED | OUTPATIENT
Start: 2022-10-29 | End: 2022-10-30

## 2022-10-29 RX ORDER — AMLODIPINE BESYLATE 5 MG/1
10 TABLET ORAL DAILY
Status: DISCONTINUED | OUTPATIENT
Start: 2022-10-30 | End: 2022-10-31 | Stop reason: HOSPADM

## 2022-10-29 RX ORDER — SODIUM CHLORIDE 0.9 % (FLUSH) 0.9 %
5-40 SYRINGE (ML) INJECTION AS NEEDED
Status: DISCONTINUED | OUTPATIENT
Start: 2022-10-29 | End: 2022-10-29 | Stop reason: SDUPTHER

## 2022-10-29 RX ORDER — SODIUM CHLORIDE 0.9 % (FLUSH) 0.9 %
5-40 SYRINGE (ML) INJECTION EVERY 8 HOURS
Status: DISCONTINUED | OUTPATIENT
Start: 2022-10-29 | End: 2022-10-29 | Stop reason: SDUPTHER

## 2022-10-29 RX ORDER — SODIUM CHLORIDE 0.9 % (FLUSH) 0.9 %
5-40 SYRINGE (ML) INJECTION EVERY 8 HOURS
Status: DISCONTINUED | OUTPATIENT
Start: 2022-10-29 | End: 2022-10-31 | Stop reason: HOSPADM

## 2022-10-29 RX ORDER — LIDOCAINE 4 G/100G
1 PATCH TOPICAL
Status: DISCONTINUED | OUTPATIENT
Start: 2022-10-29 | End: 2022-10-31 | Stop reason: HOSPADM

## 2022-10-29 RX ADMIN — ATORVASTATIN CALCIUM 80 MG: 40 TABLET, FILM COATED ORAL at 22:14

## 2022-10-29 RX ADMIN — SODIUM CHLORIDE, PRESERVATIVE FREE 10 ML: 5 INJECTION INTRAVENOUS at 22:14

## 2022-10-29 RX ADMIN — MORPHINE SULFATE 2 MG: 2 INJECTION, SOLUTION INTRAMUSCULAR; INTRAVENOUS at 18:15

## 2022-10-29 RX ADMIN — ENOXAPARIN SODIUM 40 MG: 100 INJECTION SUBCUTANEOUS at 22:13

## 2022-10-29 RX ADMIN — ONDANSETRON 4 MG: 2 INJECTION INTRAMUSCULAR; INTRAVENOUS at 18:15

## 2022-10-29 RX ADMIN — ACETAMINOPHEN 650 MG: 325 TABLET, FILM COATED ORAL at 20:26

## 2022-10-29 RX ADMIN — SODIUM CHLORIDE, POTASSIUM CHLORIDE, SODIUM LACTATE AND CALCIUM CHLORIDE 75 ML/HR: 600; 310; 30; 20 INJECTION, SOLUTION INTRAVENOUS at 22:43

## 2022-10-29 RX ADMIN — SODIUM CHLORIDE, PRESERVATIVE FREE 10 ML: 5 INJECTION INTRAVENOUS at 18:15

## 2022-10-29 NOTE — ED NOTES
Emergency Department Nursing Plan of Care       The Nursing Plan of Care is developed from the Nursing assessment and Emergency Department Attending provider initial evaluation. The plan of care may be reviewed in the ED Provider note.     The Plan of Care was developed with the following considerations:   Patient / Family readiness to learn indicated by:verbalized understanding  Persons(s) to be included in education: patient  Barriers to Learning/Limitations:No    [address] [facility] ZULLY Caballero    10/29/2022   5:22 PM

## 2022-10-29 NOTE — PROGRESS NOTES
TRANSFER - IN REPORT:    Verbal report received from ZULLY Romero(name) on Delores Barker  being received from ED(unit) for routine progression of care      Report consisted of patients Situation, Background, Assessment and   Recommendations(SBAR). Information from the following report(s) Kardex, ED Summary, Procedure Summary, Intake/Output, MAR, and Recent Results was reviewed with the receiving nurse. Opportunity for questions and clarification was provided. Assessment completed upon patients arrival to unit and care assumed.

## 2022-10-29 NOTE — ED NOTES
TRANSFER - OUT REPORT:    Verbal report given to Luciana Tom RN (name) on Whitney Rowe  being transferred to Tele(unit) for routine progression of care       Report consisted of patients Situation, Background, Assessment and   Recommendations(SBAR). Information from the following report(s) SBAR was reviewed with the receiving nurse. Lines:   Peripheral IV 10/29/22 Left Antecubital (Active)   Site Assessment Clean, dry, & intact 10/29/22 1808   Phlebitis Assessment 0 10/29/22 1808   Infiltration Assessment 0 10/29/22 1808   Dressing Type Transparent 10/29/22 1808   Hub Color/Line Status Patent 10/29/22 1808   Action Taken Blood drawn 10/29/22 1808        Opportunity for questions and clarification was provided.       Patient transported with:   Monitor

## 2022-10-29 NOTE — ED NOTES
Bedside and Verbal shift change report given to ZULLY Craig (oncoming nurse) by Isaiah Henry (offgoing nurse). Report included the following information SBAR.

## 2022-10-29 NOTE — ED PROVIDER NOTES
EMERGENCY DEPARTMENT HISTORY AND PHYSICAL EXAM      Date: 10/29/2022  Patient Name: Timothy Man    History of Presenting Illness     Chief Complaint   Patient presents with    Headache     Patient presents to ED with c/o headache for couple days and chest pain for couple days       History Provided By: Patient and boyfriend     HPI: Timothy Man, 56 y.o. female presents to the ED with cc of headache and numbness. Patient has a recent complex history of stroke. She visit here on October 6 complaining of unsteadiness with her gait and was admitted and found to have a stroke. She was started on Plavix. 2 days ago she developed a left-sided headache that has decelerated. She does get headaches and this is not the worst headache of her life. It was not acute thunderclap-like. Starting at 4 AM she noted when working that she had numbness in her left hand and foot but no other complaints. She denies any illicit drug use or excessive alcohol use. Right-hand-dominant. She does mention she has a history of a leaky aortic valve diagnosed 2 years ago but has had no follow-up on that. There are no other complaints, changes, or physical findings at this time. PCP: Sharri Pierson MD    No current facility-administered medications on file prior to encounter. Current Outpatient Medications on File Prior to Encounter   Medication Sig Dispense Refill    aspirin 81 mg chewable tablet Take 1 Tablet by mouth daily. 30 Tablet 1    atorvastatin (LIPITOR) 40 mg tablet Take 1 Tablet by mouth nightly. 30 Tablet 0    clopidogreL (PLAVIX) 75 mg tab Take 1 Tablet by mouth daily. 21 Tablet 0    amLODIPine (NORVASC) 10 mg tablet Take 1 Tablet by mouth daily. Indications: high blood pressure 30 Tablet 0    hydroCHLOROthiazide (MICROZIDE) 12.5 mg capsule Take 1 Capsule by mouth daily.  Indications: high blood pressure 90 Capsule 0    diclofenac (VOLTAREN) 1 % gel Apply  to affected area four (4) times daily as needed for Pain.      pantoprazole (PROTONIX) 20 mg tablet Take 20 mg by mouth Daily (before breakfast). fluticasone propionate (FLONASE) 50 mcg/actuation nasal spray 2 Sprays by Both Nostrils route daily as needed for Allergies. hydrOXYzine HCL (ATARAX) 25 mg tablet Take 25 mg by mouth three (3) times daily as needed for Anxiety. lidocaine (LIDODERM) 5 % 1 Patch by TransDERmal route daily as needed for Pain. Apply patch to the affected area for 12 hours a day and remove for 12 hours a day. cyclobenzaprine (FLEXERIL) 10 mg tablet Take 1 Tablet by mouth three (3) times daily as needed for Muscle Spasm(s). 20 Tablet 0    cariprazine (VRAYLAR) 3 mg capsule Take 3 mg by mouth daily.          Past History     Past Medical History:  Past Medical History:   Diagnosis Date    angina     Angina at rest [facility])     Breast pain     since 2016, right breast, on and off    Chiari malformation     Heart abnormalities     leaky heart valve, murmur    Hypertension     Other ill-defined conditions(799.89)     cardiac arrhythmias    Other ill-defined conditions(799.89)     neuropathy    Psychiatric disorder     anxiety and depression    Stroke ([facility] [address]     TIA       Past Surgical History:  Past Surgical History:   Procedure Laterality Date    HX GYN      HX ORTHOPAEDIC      screws in left ankle    HX OTHER SURGICAL      surgery to back of head due to fractured skull    NEUROLOGICAL PROCEDURE UNLISTED      NE ABDOMEN SURGERY PROC UNLISTED      laporscopic surgery post ruptured fallopian tube       Family History:  Family History   Problem Relation Age of Onset    Hypertension Mother     Stroke Mother     Diabetes Mother     Drug Abuse Father        Social History:  Social History     Tobacco Use    Smoking status: Former     Years: 23.00     Types: Cigarettes     Quit date: 3/5/2021     Years since quittin.6    Smokeless tobacco: Current   Vaping Use    Vaping Use: Some days   Substance Use Topics    Alcohol use: Yes     Comment: social    Drug use: Not Currently     Types: Marijuana     Comment: former crack user approx clean 2017       Allergies: Allergies   Allergen Reactions    Metoprolol Other (comments)     headache    Toradol [Ketorolac] Hives     Tolerated aspirin 10/6         Review of Systems   Review of Systems   Constitutional: Negative. Negative for chills and fever. HENT: Negative. Negative for congestion and rhinorrhea. Respiratory: Negative. Negative for cough, chest tightness and wheezing. Cardiovascular: Negative. Negative for chest pain and palpitations. Gastrointestinal: Negative. Negative for abdominal pain, constipation, nausea and vomiting. Endocrine: Negative. Genitourinary: Negative. Negative for decreased urine volume, flank pain, hematuria and pelvic pain. Musculoskeletal: Negative. Negative for back pain and neck pain. Skin: Negative. Negative for color change, pallor and rash. Neurological:  Positive for numbness and headaches. Negative for dizziness, seizures and weakness. Hematological: Negative. Negative for adenopathy. Psychiatric/Behavioral: Negative. All other systems reviewed and are negative. Physical Exam   Physical Exam  Vitals reviewed. Constitutional:       General: She is not in acute distress. Appearance: She is well-developed. She is not diaphoretic. HENT:      Head: Normocephalic and atraumatic. Mouth/Throat:      Pharynx: No oropharyngeal exudate. Eyes:      General: No scleral icterus. Right eye: No discharge. Left eye: No discharge. Conjunctiva/sclera: Conjunctivae normal.      Pupils: Pupils are equal, round, and reactive to light. Neck:      Vascular: No JVD. Cardiovascular:      Rate and Rhythm: Normal rate and regular rhythm. Heart sounds: Normal heart sounds. No murmur heard. No friction rub. No gallop. Pulmonary:      Effort: Pulmonary effort is normal. No respiratory distress. Breath sounds: Normal breath sounds. No stridor. No wheezing or rales. Chest:      Chest wall: No tenderness. Abdominal:      General: Bowel sounds are normal. There is no distension. Palpations: Abdomen is soft. There is no mass. Tenderness: There is no abdominal tenderness. There is no guarding or rebound. Musculoskeletal:      Cervical back: Normal range of motion and neck supple. Skin:     General: Skin is warm. Coloration: Skin is not pale. Findings: No rash. Neurological:      Mental Status: She is alert and oriented to person, place, and time. Cranial Nerves: No cranial nerve deficit. Sensory: Sensory deficit present. Motor: No weakness or abnormal muscle tone. Coordination: Coordination normal.      Gait: Gait normal.      Deep Tendon Reflexes: Reflexes normal.       6:15 PM Talked with DR Loredo Neurology he reviewed the CT noncontrast.  At this time he sees no acute findings and states the patient has an NIH score of 0. She does not meet tPA criteria. He does recommend admission for stroke evaluation. We will discuss his case with the hospitalist.    7:05PM-made aware of current plan. Discussed case with hospitalist Dr Rowan Tay agrees to admit the patient. Patient at this time is not a candidate for tPA. They will continue to evaluate. Made admitting provider aware of the patient's history of aortic valve regurgitation.     CRITICAL CARE NOTE :    2:03 PM      IMPENDING DETERIORATION -CNS    ASSOCIATED RISK FACTORS - Dysrhythmia, Metabolic changes, Dehydration, and Vascular Compromise    MANAGEMENT- Bedside Assessment and Supervision of Care    INTERPRETATION -  ECG, Blood Pressure, and tPA evalaution    INTERVENTIONS - hemodynamic mngmt, Neurologic interventions , and Metobolic interventions    CASE REVIEW - Hospitalist/Intensivist, Medical Sub-Specialist, Nursing, and Family    TREATMENT RESPONSE -Stable    PERFORMED BY - Self        NOTES   :      I have spent 35 minutes of critical care time involved in lab review, consultations with specialist, family decision- making, bedside attention and documentation. During this entire length of time I was immediately available to the patient . Eva Ba MD          Diagnostic Study Results     Labs -     Recent Results (from the past 12 hour(s))   DRUG SCREEN, URINE    Collection Time: 10/30/22  4:30 AM   Result Value Ref Range    AMPHETAMINES Negative NEG      BARBITURATES Negative NEG      BENZODIAZEPINES Negative NEG      COCAINE Negative NEG      METHADONE Negative NEG      OPIATES Positive (A) NEG      PCP(PHENCYCLIDINE) Negative NEG      THC (TH-CANNABINOL) Negative NEG      Drug screen comment (NOTE)    URINALYSIS W/MICROSCOPIC    Collection Time: 10/30/22  4:30 AM   Result Value Ref Range    Color DARK YELLOW      Appearance CLEAR CLEAR      Specific gravity 1.025 1.003 - 1.030      pH (UA) 5.5 5.0 - 8.0      Protein TRACE (A) NEG mg/dL    Glucose Negative NEG mg/dL    Ketone TRACE (A) NEG mg/dL    Bilirubin Negative NEG      Blood Negative NEG      Urobilinogen 1.0 0.2 - 1.0 EU/dL    Nitrites Negative NEG      Leukocyte Esterase TRACE (A) NEG      WBC 0-4 0 - 4 /hpf    RBC 0-5 0 - 5 /hpf    Epithelial cells FEW FEW /lpf    Bacteria Negative NEG /hpf       Radiologic Studies -   XR CHEST PORT   Final Result      CT CODE NEURO HEAD WO CONTRAST   Final Result      MRI BRAIN WO CONT    (Results Pending)   MRA NECK W CONT    (Results Pending)   MRA BRAIN W CONT    (Results Pending)     CT Results  (Last 48 hours)                 10/29/22 1800  CT CODE NEURO HEAD WO CONTRAST Final result    Impression:  Stable focal area of decreased attenuation left basal ganglia. No acute   abnormality is identified. Narrative:      INDICATION: Headache, left hand and foot weakness       COMPARISON: 10/6/2022. CONTRAST: None. TECHNIQUE: Unenhanced CT of the head was performed using 5 mm images. Brain and   bone windows were generated. Coronal and sagittal reformats. CT dose reduction   was achieved through use of a standardized protocol tailored for this   examination and automatic exposure control for dose modulation. FINDINGS:   The ventricles and sulci are normal in size, shape and configuration. . Focal   area of decreased attenuation in the left basal ganglia is unchanged. There is   no intracranial hemorrhage, extra-axial collection, or mass effect. The basilar   cisterns are open. No CT evidence of acute infarct. The bone windows demonstrate no abnormalities. The visualized portions of the   paranasal sinuses and mastoid air cells are clear. CXR Results  (Last 48 hours)                 10/29/22 1800  XR CHEST PORT Final result    Impression:  Negative. Narrative:  Clinical indication: Headache. Frontal view of the chest obtained portable AP. Comparison September 13, 2022. The a heart size is normal. No acute infiltrate. Medical Decision Making   I am the first provider for this patient. I reviewed the vital signs, available nursing notes, past medical history, past surgical history, family history and social history. Vital Signs-Reviewed the patient's vital signs. Patient Vitals for the past 12 hrs:   Temp Pulse Resp BP SpO2   10/30/22 1149 -- 67 -- -- --   10/30/22 1126 97.5 °F (36.4 °C) 67 16 135/80 98 %   10/30/22 0753 -- 76 -- -- --   10/30/22 0723 97.6 °F (36.4 °C) 76 16 127/83 99 %   10/30/22 0404 98 °F (36.7 °C) 62 18 118/74 97 %       EKG interpretation: (Preliminary)  Rhythm: normal sinus rhythm; and regular .  Rate (approx.): 85; Axis: normal; OR interval: normal; QRS interval: normal ; ST/T wave: normal; Other findings: normal.    Records Reviewed: Nursing Notes and Old Medical Records    Provider Notes (Medical Decision Making):   Differential diagnosis-CVA, seizure, atypical migraine    Impression/plan-56year-old female recently here with a diagnosed stroke returns with new symptoms in her left hand. She also has a headache. Differential could be atypical migraine versus new stroke. Discussed case at length with neurology and family. At this time she is not a tPA candidate but the neurologist does recommend readmission for stroke eval and possible echocardiogram.  Patient in agreement with this plan    ED Course:   Initial assessment performed. The patients presenting problems have been discussed, and they are in agreement with the care plan formulated and outlined with them. I have encouraged them to ask questions as they arise throughout their visit. Disposition:    Admitted to Floor Medical Floor the case was discussed with the admitting physician Dr Shantel Gleason      Diagnosis     Clinical Impression: Acute CVA, atypical migraine    Attestations:    Mandy Alonso MD        Please note that this dictation was completed with [employer], the computer voice recognition software. Quite often unanticipated grammatical, syntax, homophones, and other interpretive errors are inadvertently transcribed by the computer software. Please disregard these errors. Please excuse any errors that have escaped final proofreading. Thank you.

## 2022-10-30 ENCOUNTER — APPOINTMENT (OUTPATIENT)
Dept: MRI IMAGING | Age: 47
End: 2022-10-30
Attending: STUDENT IN AN ORGANIZED HEALTH CARE EDUCATION/TRAINING PROGRAM
Payer: MEDICAID

## 2022-10-30 LAB
ALBUMIN SERPL-MCNC: 3.2 G/DL (ref 3.5–5)
ALBUMIN/GLOB SERPL: 1 {RATIO} (ref 1.1–2.2)
ALP SERPL-CCNC: 54 U/L (ref 45–117)
ALT SERPL-CCNC: 21 U/L (ref 12–78)
AMPHET UR QL SCN: NEGATIVE
ANION GAP SERPL CALC-SCNC: 6 MMOL/L (ref 5–15)
APPEARANCE UR: CLEAR
AST SERPL-CCNC: 17 U/L (ref 15–37)
BACTERIA URNS QL MICRO: NEGATIVE /HPF
BARBITURATES UR QL SCN: NEGATIVE
BENZODIAZ UR QL: NEGATIVE
BILIRUB SERPL-MCNC: 0.5 MG/DL (ref 0.2–1)
BILIRUB UR QL: NEGATIVE
BUN SERPL-MCNC: 15 MG/DL (ref 6–20)
BUN/CREAT SERPL: 16 (ref 12–20)
CALCIUM SERPL-MCNC: 9.3 MG/DL (ref 8.5–10.1)
CANNABINOIDS UR QL SCN: NEGATIVE
CHLORIDE SERPL-SCNC: 104 MMOL/L (ref 97–108)
CHOLEST SERPL-MCNC: 120 MG/DL
CO2 SERPL-SCNC: 28 MMOL/L (ref 21–32)
COCAINE UR QL SCN: NEGATIVE
COLOR UR: ABNORMAL
CREAT SERPL-MCNC: 0.94 MG/DL (ref 0.55–1.02)
CRP SERPL-MCNC: <0.29 MG/DL (ref 0–0.6)
DRUG SCRN COMMENT,DRGCM: ABNORMAL
EPITH CASTS URNS QL MICRO: ABNORMAL /LPF
FOLATE SERPL-MCNC: 17.3 NG/ML (ref 5–21)
GLOBULIN SER CALC-MCNC: 3.3 G/DL (ref 2–4)
GLUCOSE SERPL-MCNC: 99 MG/DL (ref 65–100)
GLUCOSE UR STRIP.AUTO-MCNC: NEGATIVE MG/DL
HDLC SERPL-MCNC: 43 MG/DL
HDLC SERPL: 2.8 {RATIO} (ref 0–5)
HGB UR QL STRIP: NEGATIVE
KETONES UR QL STRIP.AUTO: ABNORMAL MG/DL
LDLC SERPL CALC-MCNC: 56 MG/DL (ref 0–100)
LEUKOCYTE ESTERASE UR QL STRIP.AUTO: ABNORMAL
MAGNESIUM SERPL-MCNC: 2.2 MG/DL (ref 1.6–2.4)
METHADONE UR QL: NEGATIVE
NITRITE UR QL STRIP.AUTO: NEGATIVE
OPIATES UR QL: POSITIVE
PCP UR QL: NEGATIVE
PH UR STRIP: 5.5 [PH] (ref 5–8)
PHOSPHATE SERPL-MCNC: 5.4 MG/DL (ref 2.6–4.7)
POTASSIUM SERPL-SCNC: 3.6 MMOL/L (ref 3.5–5.1)
PROT SERPL-MCNC: 6.5 G/DL (ref 6.4–8.2)
PROT UR STRIP-MCNC: ABNORMAL MG/DL
RBC #/AREA URNS HPF: ABNORMAL /HPF (ref 0–5)
SODIUM SERPL-SCNC: 138 MMOL/L (ref 136–145)
SP GR UR REFRACTOMETRY: 1.02 (ref 1–1.03)
TRIGL SERPL-MCNC: 105 MG/DL (ref ?–150)
TROPONIN-HIGH SENSITIVITY: 10 NG/L (ref 0–51)
TSH SERPL DL<=0.05 MIU/L-ACNC: 1.13 UIU/ML (ref 0.36–3.74)
UROBILINOGEN UR QL STRIP.AUTO: 1 EU/DL (ref 0.2–1)
VIT B12 SERPL-MCNC: 717 PG/ML (ref 193–986)
VLDLC SERPL CALC-MCNC: 21 MG/DL
WBC URNS QL MICRO: ABNORMAL /HPF (ref 0–4)

## 2022-10-30 PROCEDURE — G0378 HOSPITAL OBSERVATION PER HR: HCPCS

## 2022-10-30 PROCEDURE — 80307 DRUG TEST PRSMV CHEM ANLYZR: CPT

## 2022-10-30 PROCEDURE — 83735 ASSAY OF MAGNESIUM: CPT

## 2022-10-30 PROCEDURE — 82746 ASSAY OF FOLIC ACID SERUM: CPT

## 2022-10-30 PROCEDURE — 84443 ASSAY THYROID STIM HORMONE: CPT

## 2022-10-30 PROCEDURE — 74011250637 HC RX REV CODE- 250/637: Performed by: STUDENT IN AN ORGANIZED HEALTH CARE EDUCATION/TRAINING PROGRAM

## 2022-10-30 PROCEDURE — 96372 THER/PROPH/DIAG INJ SC/IM: CPT

## 2022-10-30 PROCEDURE — 84484 ASSAY OF TROPONIN QUANT: CPT

## 2022-10-30 PROCEDURE — 82607 VITAMIN B-12: CPT

## 2022-10-30 PROCEDURE — 74011250637 HC RX REV CODE- 250/637: Performed by: INTERNAL MEDICINE

## 2022-10-30 PROCEDURE — 36415 COLL VENOUS BLD VENIPUNCTURE: CPT

## 2022-10-30 PROCEDURE — 74011250636 HC RX REV CODE- 250/636: Performed by: STUDENT IN AN ORGANIZED HEALTH CARE EDUCATION/TRAINING PROGRAM

## 2022-10-30 PROCEDURE — 86140 C-REACTIVE PROTEIN: CPT

## 2022-10-30 PROCEDURE — 80061 LIPID PANEL: CPT

## 2022-10-30 PROCEDURE — 65270000032 HC RM SEMIPRIVATE

## 2022-10-30 PROCEDURE — 80053 COMPREHEN METABOLIC PANEL: CPT

## 2022-10-30 PROCEDURE — 81001 URINALYSIS AUTO W/SCOPE: CPT

## 2022-10-30 PROCEDURE — 84100 ASSAY OF PHOSPHORUS: CPT

## 2022-10-30 RX ADMIN — CLOPIDOGREL BISULFATE 75 MG: 75 TABLET, FILM COATED ORAL at 08:25

## 2022-10-30 RX ADMIN — PANTOPRAZOLE SODIUM 40 MG: 40 TABLET, DELAYED RELEASE ORAL at 08:25

## 2022-10-30 RX ADMIN — ASPIRIN 81 MG CHEWABLE TABLET 81 MG: 81 TABLET CHEWABLE at 08:25

## 2022-10-30 RX ADMIN — AMLODIPINE BESYLATE 10 MG: 5 TABLET ORAL at 08:25

## 2022-10-30 RX ADMIN — ATORVASTATIN CALCIUM 80 MG: 40 TABLET, FILM COATED ORAL at 21:28

## 2022-10-30 RX ADMIN — ALUMINUM HYDROXIDE AND MAGNESIUM HYDROXIDE 30 ML: 200; 200 SUSPENSION ORAL at 20:01

## 2022-10-30 RX ADMIN — ENOXAPARIN SODIUM 40 MG: 100 INJECTION SUBCUTANEOUS at 21:28

## 2022-10-30 NOTE — H&P
[facility]  HISTORY AND PHYSICAL    Name:  Rodolfo Del Angel  MR#:  [id number]  :  1975  ACCOUNT #:  [de-identified]  ADMIT DATE:  10/29/2022    The patient was seen, evaluated, and admitted by me on 10/29/2022. PRIMARY CARE PHYSICIAN:  Dr. Pablo Blandon INFORMATION:  The patient and review of ED and all the electronic medical record. CHIEF COMPLAINT:  Headache and right hand numbness. HISTORY OF PRESENT ILLNESS:  This is a 44-year-old woman with a past medical history significant for hypertension, anxiety/depression, dyslipidemia, status post recent CVA without any significant deficit, presented at the emergency room with headache and left arm numbness. The headache is located at the left side of the head. The headache started two days ago, constant, not throbbing and 6 out of 10 in severity. The patient also complained of left arm numbness and tingling. She also stated that she is unsteady on her feet. Because of that, the patient came to the emergency room for further evaluation. When the patient arrived at the emergency room, CT scan of the head was obtained which showed no acute pathology. The patient was last admitted to the hospital from 10/06/2022 to 10/08/2022. The patient at that time presented with slurred speech and unsteady gait and subsequent evaluation revealed acute stroke confirmed with an MRI of the brain. The patient was discharged home on aspirin and Plavix. The patient stated that he has not taken his aspirin and Plavix as prescribed. The patient was subsequently referred to the hospitalist service for admission. PAST MEDICAL HISTORY:  Hypertension, anxiety/depression, dyslipidemia, recent acute stroke without any significant deficit. ALLERGIES:  THE PATIENT IS ALLERGIC TO METOPROLOL AND TORADOL. MEDICATIONS:  1. Aspirin 81 mg daily. 2.  Lipitor 40 mg daily. 3.  Plavix 75 mg daily. 4.  Norvasc 10 mg daily.   5. Hydrochlorothiazide 12.5 mg daily. 6.  Potron 1% gel applied to affected area as needed. 7.  Protonix 20 mg daily. 8.  Atarax 25 mg three times daily as needed for anxiety. 9.  Lidoderm patch 5% apply to the skin daily. 10.  Flexeril 10 mg two times daily as needed for muscle spasm. FAMILY HISTORY:  This was reviewed. Her mother has hypertension, stroke and diabetes. Father has history of drug abuse. PAST SURGICAL HISTORY:  This is significant for laparoscopy, surgery for rupture of fallopian tube, back surgery, left ankle surgery precisely. SOCIAL HISTORY:  The patient smokes less than a pack of cigarettes daily. Admits to social consumption of alcohol. REVIEW OF SYSTEMS:  HEAD, EYES, EARS, NOSE, AND THROAT:  This is positive for headache and dizziness. No blurring of vision. No photophobia. RESPIRATORY SYSTEM:  No cough. No shortness of breath. No hemoptysis. CARDIOVASCULAR SYSTEM:  No chest pain. No orthopnea. No palpitations. GASTROINTESTINAL SYSTEM:  No nausea or vomiting. No diarrhea. No constipation. GENITOURINARY SYSTEM:  No dysuria. No urgency. No frequency. All other systems are reviewed and they are negative. PHYSICAL EXAMINATION:  GENERAL APPEARANCE:  The patient appeared ill and in moderate distress. VITAL SIGNS:  On arrival at the emergency room, temperature 99.1, pulse rate 79, respiratory rate of 15, blood pressure 149/95, oxygen saturation 100%. HEENT:  Head:  Normocephalic, atraumatic. Eyes:  Normal eye movement. No redness, no drainage, no discharge. Ears:  Normal external ears with no aggressive drainage. Nose:  No deformity and no drainage. Mouth and Throat:  No visible oral lesion. NECK:  Supple. No JVD. No thyromegaly. CHEST:  Clear breath sounds. No wheezing. No crackles. HEART:  Normal S1 and S2.  Regular. No clinically appreciable murmur. ABDOMEN:  Soft and nontender. Normal bowel sounds. CNS:  Alert and oriented x3.   No gross focal neurological deficits. EXTREMITIES:  No edema. Pulses 2+ bilaterally. MUSCULOSKELETAL SYSTEM:  No evidence of any joint deformity or swelling. SKIN:  No active skin lesions seen in the exposed part of the body. PSYCHIATRIC:  Normal mood and affect. LYMPHATIC:  No cervical lymphadenopathy. DIAGNOSTIC DATA:   EKG shows normal sinus rhythm. No significant ST and T-wave abnormalities. Chest x-ray was negative. CT scan of the head without contrast no acute pathology. LABORATORY DATA:  Hematology:  WBC 8.5, hemoglobin 13.8, hematocrit 40.5, platelets 008. Chemistry:  Sodium 138, potassium 3.3, chloride 101, CO2 28, glucose 91, BUN 12, creatinine 0.92. Calcium 8.9. Total bilirubin 0.7, ALT 28, AST 19, alkaline phosphatase 56, total protein 8.2, albumin level 4.3, globulin at 3.9. Comprehensive profile:  INR 1.03, PT 9.6, PTT 27.6. Cardiac profile:  Troponin 8. ASSESSMENT:  1. Suspected acute recurrent stroke. 2.  Hypertension. 3.  Anxiety/depression. 4.  Dyslipidemia. 5.  Hypokalemia. 6.  Thrombocytosis. 7.  Tobacco abuse. PLAN:  1. Suspected acute recurrent stroke. We will admit at this time for further evaluation and treatment. We will obtain an MRE of the neck as well as MRE of the head to evaluate the patient for acute recurrent stroke. We will check lipid profile and hemoglobin A1c level if one has not been checked recently. We will check E43 and folic acid level. We will check C-reactive protein to evaluate the patient for systemic inflammation. We will also check urine drug screen. We will obtain echocardiogram if one has not been done already  2. Hypertension. We will resume preadmission medication. We will monitor the patient's blood pressure closely. 3.  Anxiety/depression. We will resume home medication. 4.  Dyslipidemia. We will continue with Lipitor. 5.  Hypokalemia. We will repeat level. We will also check magnesium level. 6.  Thrombocytosis.   This is most likely reactive thrombocytosis. We will monitor the patient's platelet count. 7.  Tobacco abuse. The patient advised to quit smoking. She does not want to be placed on Nicoderm patch at this time. OTHER ISSUES:  Code status: The patient is a full code. We will place the patient on Lovenox for DVT prophylaxis. THIS ENCOUNTER IS THROUGH TELEMETRY MEDICINE.         MD LUIS E [email or web address]  D:  10/30/2022 1:48  T:  10/30/2022 6:35  JOB #:  [id number]  CC:  Dr. Neri Negron

## 2022-10-30 NOTE — PROGRESS NOTES
2030 VSS. C/o Head ache . Medicated with tylenol as per order. 2100 Pt watching tv and resting.  2200 pt resting with eyes closed. 636 Yaakov Townsend [facility] hospitalist talking to patient. 2400 troponin drawn and sent down. 0130 labs drawn and sent down.   0230 pt resting with eyes closed  0330 VSS  0430 ambulated to the BR  0530 pt resting

## 2022-10-30 NOTE — PROGRESS NOTES
Reason for Admission:  Per Dr. Alana Dandy ED MD Admit Note: 48 y.o. female presents to the ED with cc of headache and numbness. Patient has a recent complex history of stroke. She visit here on October 6 complaining of unsteadiness with her gait and was admitted and found to have a stroke. She was started on Plavix.   Admitting Dx: Acute CVA                    RUR Score: 12%                     Plan for utilizing home health:      TBD     PCP: First and Last name:  Melissa Garg MD     Name of Practice:    Are you a current patient: Yes/No:  YES   Approximate date of last visit:    Can you participate in a virtual visit with your PCP:                     Current Advanced Directive/Advance Care Plan: Full Code - ACP documents are in pt chart. Healthcare Decision Maker:   Click here to complete [address] including selection of the Healthcare Decision Maker Relationship (ie \"Primary\")             Primary Decision MakerMmaykel Maldonado - Alexeyfriend - [phone]    Secondary Decision Maker: Kevinsteffany Shafferle - Daughter - [phone]    Pt confirmed the above Healthcare Decision Makers and their contact phone numbers as documented in chart. Transition of Care Plan:    CM reviewed pt chart. CM called pt and spoke to her in her hospital room. Pt confirmed her contact address and phone number as documented on Face Sheet. She was admitted under IP class status. She confirmed she has medical insurance coverage through [facility]. She was a patient on the [address] [place] [address] medical floor 10/8/22 to 10/10/22. Pt reported she attempted to go to her PCP discharge follow up appointment on 10/10 however she got confused as to where the PCP office was and therefore did not attend this scheduled appointment. Pt reported she lives with her boyfriend, Jordi Cruz and identified him as her [address] [[phone]].  She reported that she was independent with ADLs prior to admission, however she reported since her discharge on 10/10/22, she has not felt well. She confirmed she does not use any DMEs at home. She confirmed she uses the [facility] located [facility] for her prescriptions. She said her boyfriend would transport her home when discharged from [address] [place] [address]. CM will continue to meet with pt and medical team to assist with discharge planning needs. Care Management Interventions  PCP Verified by CM: Yes (Pt reported she missed her 10/10/22 appointment with her PCP.)  Last Visit to PCP: 10/10/22  Mode of Transport at Discharge:  Other (see comment) (Pt's Marquis Sherri nelson [phone] will transport pt at D/C home.)  Transition of Care Consult (CM Consult): Discharge Planning  Support Systems: Spouse/Significant Other, Child(mitul) (Pt lives with her boyfriend and other )  Confirm Follow Up Transport: Self (Pt reported she usually drives herself to follow up appointments.)  The Patient and/or Patient Representative was Provided with a Choice of Provider and Agrees with the Discharge Plan?:  (Pt will be provided with a Freedom of Choice list of outpatient providers if ordered for discharge.)  Discharge Location  Patient Expects to be Discharged to[de-identified] Home with outpatient services

## 2022-10-30 NOTE — PROGRESS NOTES
Problem: Falls - Risk of  Goal: *Absence of Falls  Description: Document Janis Duverney Fall Risk and appropriate interventions in the flowsheet.   Outcome: Progressing Towards Goal  Note: Fall Risk Interventions:            Medication Interventions: Teach patient to arise slowly                   Problem: Patient Education: Go to Patient Education Activity  Goal: Patient/Family Education  Outcome: Progressing Towards Goal

## 2022-10-30 NOTE — PROGRESS NOTES
Assumed care of patient from Camelia Georgina. Patient currently asleep with even, unlabored respirations. 0830  Patient awake, eating breakfast.    1145  Patient denies complaints. 1420  Patient resting quietly, denies complaints. 1530  Patient taking shower.

## 2022-10-30 NOTE — PROGRESS NOTES
Physical therapy:     Chart reviewed.  Patient unavailable this PM. Will attempt formal PT evaluation tomorrow AM.     Kuldip Mckee, PT

## 2022-10-30 NOTE — PROGRESS NOTES
[facility] Admission Pharmacy Medication Reconciliation    Information obtained from:  Patient interview, [address], recent [facility] discharge summary on 10/8/22  RxQuery data available1:yes    Comments/recommendations:    1) The patient was interviewed regarding current PTA medication list, use and drug allergies. Patient was discharged earlier this month from [facility] encounter 10/6/22 - 10/8/22. PTA medication list is the same with the exception of one addition:    Added for information only:  Cariprazine (VRAYLAR) 3 mg po daily - new RX on 10/11/22 but patient has not yet started taking this. (Originally, patient was not able to filled due to insurance issues, but patient stated this issue has been resolved, but patient has not yet started taking.)       1RxQuery pharmacy benefit data reflects medications filled and processed through the patient's insurance, however                this data does NOT capture whether the medication was picked up or is currently being taken by the patient. Time spent: 15 minutes    Past Medical History/Disease States:  Past Medical History:   Diagnosis Date    angina     Angina at rest [facility])     Breast pain     since 2016, right breast, on and off    Chiari malformation     Heart abnormalities     leaky heart valve, murmur    Hypertension     Other ill-defined conditions(799.89)     cardiac arrhythmias    Other ill-defined conditions(799.89)     neuropathy    Psychiatric disorder     anxiety and depression    Stroke [facility])     TIA         Patient allergies: Allergies as of 10/29/2022 - Fully Reviewed 10/29/2022   Allergen Reaction Noted    Metoprolol Other (comments) 11/18/2021    Toradol [ketorolac] Hives 03/30/2015         Prior to Admission Medications   Prescriptions Last Dose Informant  Taking? amLODIPine (NORVASC) 10 mg tablet 10/28/2022   Yes   Sig: Take 1 Tablet by mouth daily.  Indications: high blood pressure   aspirin 81 mg chewable tablet 10/27/2022   Yes   Sig: Take 1 Tablet by mouth daily. atorvastatin (LIPITOR) 40 mg tablet 10/27/2022   Yes   Sig: Take 1 Tablet by mouth nightly. cariprazine (VRAYLAR) 3 mg capsule New RX not yet started   Bandspeed not yet started   Sig: Take 3 mg by mouth daily. clopidogreL (PLAVIX) 75 mg tab 10/27/2022   Yes   Sig: Take 1 Tablet by mouth daily. cyclobenzaprine (FLEXERIL) 10 mg tablet 2022   Yes   Sig: Take 1 Tablet by mouth three (3) times daily as needed for Muscle Spasm(s). diclofenac (VOLTAREN) 1 % gel    Yes   Sig: Apply  to affected area four (4) times daily as needed for Pain. fluticasone propionate (FLONASE) 50 mcg/actuation nasal spray 10/23/2022   Yes   Si Sprays by Both Nostrils route daily as needed for Allergies. hydrOXYzine HCL (ATARAX) 25 mg tablet 10/23/2022   Yes   Sig: Take 25 mg by mouth three (3) times daily as needed for Anxiety. hydroCHLOROthiazide (MICROZIDE) 12.5 mg capsule 10/27/2022   Yes   Sig: Take 1 Capsule by mouth daily. Indications: high blood pressure   lidocaine (LIDODERM) 5 % 10/23/2022   Yes   Si Patch by TransDERmal route daily as needed for Pain. Apply patch to the affected area for 12 hours a day and remove for 12 hours a day. pantoprazole (PROTONIX) 20 mg tablet 10/23/2022   Yes   Sig: Take 20 mg by mouth Daily (before breakfast).             Thank you,  Carlos Sesay, [facility] [place]

## 2022-10-31 ENCOUNTER — APPOINTMENT (OUTPATIENT)
Dept: MRI IMAGING | Age: 47
End: 2022-10-31
Attending: STUDENT IN AN ORGANIZED HEALTH CARE EDUCATION/TRAINING PROGRAM
Payer: MEDICAID

## 2022-10-31 VITALS
SYSTOLIC BLOOD PRESSURE: 134 MMHG | DIASTOLIC BLOOD PRESSURE: 100 MMHG | BODY MASS INDEX: 24.93 KG/M2 | HEART RATE: 76 BPM | TEMPERATURE: 97.8 F | HEIGHT: 62 IN | WEIGHT: 135.5 LBS | RESPIRATION RATE: 18 BRPM | OXYGEN SATURATION: 98 %

## 2022-10-31 LAB
ALBUMIN SERPL-MCNC: 3.3 G/DL (ref 3.5–5)
ALBUMIN/GLOB SERPL: 1 {RATIO} (ref 1.1–2.2)
ALP SERPL-CCNC: 62 U/L (ref 45–117)
ALT SERPL-CCNC: 18 U/L (ref 12–78)
ANION GAP SERPL CALC-SCNC: 6 MMOL/L (ref 5–15)
ASPIRIN TEST, ASPIRN: 380 ARU
AST SERPL-CCNC: 15 U/L (ref 15–37)
ATRIAL RATE: 70 BPM
BASOPHILS # BLD: 0 K/UL (ref 0–0.1)
BASOPHILS NFR BLD: 1 % (ref 0–1)
BILIRUB DIRECT SERPL-MCNC: 0.1 MG/DL (ref 0–0.2)
BILIRUB SERPL-MCNC: 0.2 MG/DL (ref 0.2–1)
BUN SERPL-MCNC: 14 MG/DL (ref 6–20)
BUN/CREAT SERPL: 17 (ref 12–20)
CALCIUM SERPL-MCNC: 8.7 MG/DL (ref 8.5–10.1)
CALCULATED P AXIS, ECG09: 42 DEGREES
CALCULATED R AXIS, ECG10: 4 DEGREES
CALCULATED T AXIS, ECG11: 23 DEGREES
CHLORIDE SERPL-SCNC: 106 MMOL/L (ref 97–108)
CO2 SERPL-SCNC: 26 MMOL/L (ref 21–32)
CREAT SERPL-MCNC: 0.84 MG/DL (ref 0.55–1.02)
DIAGNOSIS, 93000: NORMAL
DIFFERENTIAL METHOD BLD: NORMAL
EOSINOPHIL # BLD: 0 K/UL (ref 0–0.4)
EOSINOPHIL NFR BLD: 0 % (ref 0–7)
ERYTHROCYTE [DISTWIDTH] IN BLOOD BY AUTOMATED COUNT: 11.8 % (ref 11.5–14.5)
GLOBULIN SER CALC-MCNC: 3.3 G/DL (ref 2–4)
GLUCOSE SERPL-MCNC: 107 MG/DL (ref 65–100)
HCT VFR BLD AUTO: 35.2 % (ref 35–47)
HGB BLD-MCNC: 12.3 G/DL (ref 11.5–16)
IMM GRANULOCYTES # BLD AUTO: 0 K/UL (ref 0–0.04)
IMM GRANULOCYTES NFR BLD AUTO: 0 % (ref 0–0.5)
LYMPHOCYTES # BLD: 1.6 K/UL (ref 0.8–3.5)
LYMPHOCYTES NFR BLD: 27 % (ref 12–49)
MAGNESIUM SERPL-MCNC: 2.2 MG/DL (ref 1.6–2.4)
MCH RBC QN AUTO: 31.7 PG (ref 26–34)
MCHC RBC AUTO-ENTMCNC: 34.9 G/DL (ref 30–36.5)
MCV RBC AUTO: 90.7 FL (ref 80–99)
MONOCYTES # BLD: 0.4 K/UL (ref 0–1)
MONOCYTES NFR BLD: 8 % (ref 5–13)
NEUTS SEG # BLD: 3.8 K/UL (ref 1.8–8)
NEUTS SEG NFR BLD: 64 % (ref 32–75)
NRBC # BLD: 0 K/UL (ref 0–0.01)
NRBC BLD-RTO: 0 PER 100 WBC
P-R INTERVAL, ECG05: 154 MS
P2Y12 PLT RESPONSE,PPPR: 182 PRU (ref 194–418)
PHOSPHATE SERPL-MCNC: 3.6 MG/DL (ref 2.6–4.7)
PLATELET # BLD AUTO: 390 K/UL (ref 150–400)
PMV BLD AUTO: 9.1 FL (ref 8.9–12.9)
POTASSIUM SERPL-SCNC: 4.1 MMOL/L (ref 3.5–5.1)
PROT SERPL-MCNC: 6.6 G/DL (ref 6.4–8.2)
Q-T INTERVAL, ECG07: 410 MS
QRS DURATION, ECG06: 82 MS
QTC CALCULATION (BEZET), ECG08: 442 MS
RBC # BLD AUTO: 3.88 M/UL (ref 3.8–5.2)
SODIUM SERPL-SCNC: 138 MMOL/L (ref 136–145)
VENTRICULAR RATE, ECG03: 70 BPM
WBC # BLD AUTO: 5.9 K/UL (ref 3.6–11)

## 2022-10-31 PROCEDURE — 36415 COLL VENOUS BLD VENIPUNCTURE: CPT

## 2022-10-31 PROCEDURE — 99223 1ST HOSP IP/OBS HIGH 75: CPT | Performed by: PSYCHIATRY & NEUROLOGY

## 2022-10-31 PROCEDURE — 70553 MRI BRAIN STEM W/O & W/DYE: CPT

## 2022-10-31 PROCEDURE — 97161 PT EVAL LOW COMPLEX 20 MIN: CPT | Performed by: PHYSICAL THERAPIST

## 2022-10-31 PROCEDURE — 70549 MR ANGIOGRAPH NECK W/O&W/DYE: CPT

## 2022-10-31 PROCEDURE — 80048 BASIC METABOLIC PNL TOTAL CA: CPT

## 2022-10-31 PROCEDURE — 74011250637 HC RX REV CODE- 250/637: Performed by: STUDENT IN AN ORGANIZED HEALTH CARE EDUCATION/TRAINING PROGRAM

## 2022-10-31 PROCEDURE — 85576 BLOOD PLATELET AGGREGATION: CPT

## 2022-10-31 PROCEDURE — A9576 INJ PROHANCE MULTIPACK: HCPCS | Performed by: STUDENT IN AN ORGANIZED HEALTH CARE EDUCATION/TRAINING PROGRAM

## 2022-10-31 PROCEDURE — 84100 ASSAY OF PHOSPHORUS: CPT

## 2022-10-31 PROCEDURE — 85025 COMPLETE CBC W/AUTO DIFF WBC: CPT

## 2022-10-31 PROCEDURE — 74011250637 HC RX REV CODE- 250/637: Performed by: INTERNAL MEDICINE

## 2022-10-31 PROCEDURE — 80076 HEPATIC FUNCTION PANEL: CPT

## 2022-10-31 PROCEDURE — 70544 MR ANGIOGRAPHY HEAD W/O DYE: CPT

## 2022-10-31 PROCEDURE — G0378 HOSPITAL OBSERVATION PER HR: HCPCS

## 2022-10-31 PROCEDURE — 74011250636 HC RX REV CODE- 250/636: Performed by: STUDENT IN AN ORGANIZED HEALTH CARE EDUCATION/TRAINING PROGRAM

## 2022-10-31 PROCEDURE — 83735 ASSAY OF MAGNESIUM: CPT

## 2022-10-31 RX ORDER — BUTALBITAL, ACETAMINOPHEN AND CAFFEINE 300; 40; 50 MG/1; MG/1; MG/1
1 CAPSULE ORAL
Qty: 14 CAPSULE | Refills: 0 | Status: SHIPPED | OUTPATIENT
Start: 2022-10-31

## 2022-10-31 RX ORDER — CLOPIDOGREL BISULFATE 75 MG/1
75 TABLET ORAL DAILY
Qty: 30 TABLET | Refills: 0 | Status: SHIPPED | OUTPATIENT
Start: 2022-10-31 | End: 2022-11-30

## 2022-10-31 RX ORDER — ATORVASTATIN CALCIUM 80 MG/1
80 TABLET, FILM COATED ORAL
Qty: 30 TABLET | Refills: 1 | Status: SHIPPED | OUTPATIENT
Start: 2022-10-31

## 2022-10-31 RX ADMIN — ASPIRIN 81 MG CHEWABLE TABLET 81 MG: 81 TABLET CHEWABLE at 08:52

## 2022-10-31 RX ADMIN — GADOTERIDOL 14 ML: 279.3 INJECTION, SOLUTION INTRAVENOUS at 12:44

## 2022-10-31 RX ADMIN — CLOPIDOGREL BISULFATE 75 MG: 75 TABLET, FILM COATED ORAL at 08:52

## 2022-10-31 RX ADMIN — AMLODIPINE BESYLATE 10 MG: 5 TABLET ORAL at 08:52

## 2022-10-31 RX ADMIN — PANTOPRAZOLE SODIUM 40 MG: 40 TABLET, DELAYED RELEASE ORAL at 08:52

## 2022-10-31 NOTE — PROGRESS NOTES
PHYSICAL THERAPY EVALUATION WITH DISCHARGE  Patient: Mazni Alvarado (56 y.o. female)  Date: 10/31/2022  Primary Diagnosis: Numbness [R20.0]       Precautions:          ASSESSMENT  Based on the objective data described below, the patient presents with independent mobility following admission to rule out CVA. 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 Patient lives with her boyfriend, son, step son, and grandchild. Patient reporting numbness in left foot and hand in ED, but no longer reporting these deficits. Patient's BLE strength, sensation, and coordination is equal and symmetrical. She does report difficulty writing and opening objects since her previous CVA. Patient with no acute change in speech, but reports that it has not been at her baseline since previous CVA. Patient independent with bed mobility and transfers. She ambulated 200 feet independently with steady gait. Patient ascended and descended 10 steps with no handrail and step over step pattern independently. Patient interested in outpatient OT and SLP; she denies need for acute SLP services. Further skilled acute physical therapy is not indicated at this time. Functional Outcome Measure: The patient scored Total: 56/56 on the Nunez Balance Assessment which is indicative of low fall risk. PLAN :  Recommendation for discharge: (in order for the patient to meet his/her long term goals)  No skilled physical therapy/ follow up rehabilitation needs identified at this time. Patient interested in outpatient OT and SLP (not available through [facility])    This discharge recommendation:  Has been made in collaboration with the attending provider and/or case management    IF patient discharges home will need the following DME: none         SUBJECTIVE:   Patient stated I feel fine walking. I have a harder time writing and opening things.     OBJECTIVE DATA SUMMARY: HISTORY:    Past Medical History:   Diagnosis Date    angina     Angina at rest [facility])     Breast pain     since 2016, right breast, on and off    Chiari malformation     Heart abnormalities     leaky heart valve, murmur    Hypertension     Other ill-defined conditions(799.89)     cardiac arrhythmias    Other ill-defined conditions(799.89)     neuropathy    Psychiatric disorder     anxiety and depression    Stroke ([facility] [address]     TIA     Past Surgical History:   Procedure Laterality Date    HX GYN      HX ORTHOPAEDIC      screws in left ankle    HX OTHER SURGICAL      surgery to back of head due to fractured skull    NEUROLOGICAL PROCEDURE UNLISTED      AK ABDOMEN SURGERY PROC UNLISTED      laporscopic surgery post ruptured fallopian tube         Home Situation  Home Environment: Private residence  # Steps to Enter: 5  One/Two Story Residence: One story  Living Alone: No  Support Systems: Spouse/Significant Other  Patient Expects to be Discharged to[de-identified] Home with outpatient services  Current DME Used/Available at Home: None    EXAMINATION/PRESENTATION/DECISION MAKING:   Critical Behavior:  Neurologic State: Alert  Orientation Level: Oriented X4  Cognition: Appropriate decision making     Hearing: Auditory  Auditory Impairment: None    Range Of Motion:  AROM: Within functional limits (BLEs)    PROM: Within functional limits (BLEs)    Strength:    Strength:  Within functional limits (BLEs)    Tone & Sensation:   Tone: Normal (BLEs)  Sensation: Intact (BLEs)    Coordination:  Coordination: Within functional limits (BLEs)    Functional Mobility:  Bed Mobility:     Supine to Sit: Independent  Sit to Supine: Independent  Scooting: Independent    Transfers:  Sit to Stand: Independent  Stand to Sit: Independent        Bed to Chair: Independent              Balance:   Sitting: Intact  Standing: Intact    Ambulation/Gait Training:  Distance (ft): 200 Feet (ft)  Assistive Device: Other (comment) (None)  Ambulation - Level of Assistance: Independent     Stairs:  Number of Stairs Trained: 10  Stairs - Level of Assistance: Independent   Rail Use: None    Functional Measure  Nunez Balance Test:    Sitting to Standin  Standing Unsupported: 4  Sitting with Back Unsupported: 4  Standing to Sittin  Transfers: 4  Standing Unsupported with Eyes Closed: 4  Standing Unsupported with Feet Together: 4  Reach Forward with Outstretched Arm: 4   Object: 4  Turn to Look Over Shoulders: 4  Turn 360 Degrees: 4  Alternate Foot on Step/Stool: 4  Standing Unsupported One Foot in Front: 4  Stand on One Le  Total: 56/56         56=Maximum possible score;   0-20=High fall risk  21-40=Moderate fall risk   41-56=Low fall risk     Based on the above components, the patient evaluation is determined to be of the following complexity level: LOW     Pain Rating:  No pain    Activity Tolerance:   Good    After treatment patient left in no apparent distress:   Supine in bed and Call bell within reach    COMMUNICATION/EDUCATION:   The patients plan of care was discussed with: Occupational therapist, Registered nurse, Physician, and Case management. Patient and/or family was verbally educated on the BE FAST acronym for signs/symptoms of CVA and TIA. BE FAST was written on patient's communication board  for visual education and reinforcement. All questions answered with patient indicating good understanding. Fall prevention education was provided and the patient/caregiver indicated understanding., Patient/family have participated as able in goal setting and plan of care. , and Patient/family agree to work toward stated goals and plan of care.     Thank you for this referral.  Guevara Lemons, PT   Time Calculation: 17 mins

## 2022-10-31 NOTE — PROGRESS NOTES
Occupational therapy: OT order received, chart reviewed, team consulted. Patient off floor to MRI during earlier attempt at eval. Spoke with patient briefly prior to discharge- patient is completing ADL with Mod I, with some additional time required for certain activities due to dominant RUE weakness and impaired coordination. Patient reports additional issues with R/L discriminations, slowed cognitive processing, and difficulty spelling words when writing. Symptoms have been present since prior CVA (occurred within last 2 months). New symptoms for which she presented to ED have resolved. She is safe to return home but would benefit from out patient OT and SLP assessments at discharge.  Nurse and MD made aware of recommendation  Kofi Munoz, OTR/L

## 2022-10-31 NOTE — PROGRESS NOTES
PADDY    RUR-12%      Plan  Disposition-home  PCP-on AVS  Neuro-On AVS  Transportation- boyfriend       Follow up with [address] [address]   [phone]  Outpatient Occupational Therapy- please follow up with them and schedule an appointment, you may need to see your primary care doctor first before starting therapy        Follow up with Freedom Rosa MD  Specialty: [facility]  [id number] [address]       Follow up with Paulette Garcia NP on 11/8/2022  Specialty: Nurse Practitioner  [address] [facility]   [phone]  You have a virtual appointment scheduled with Farhat Freire NP on 11/08/22 at 10:30 am.  A nurse will call you prior to the appointment. Please have your phone available for the call on 11/08/22. Discussed in IDRs this AM with MD and team.    CM discussed with Joe Hurtado with therapy and she stated pt is interested in Outpatient OT. CM provided pt with a copy of Outpatient order, and informed her that she may need to see PCP first or have PCP write order. Pt stated she is moving soon and will contact therapy facilities closer to her area. Care Management Interventions  PCP Verified by CM: Yes  Last Visit to PCP: 10/10/22  Mode of Transport at Discharge:  Other (see comment)  Transition of Care Consult (CM Consult): Discharge Planning  MyChart Signup: Yes  Physical Therapy Consult: Yes  Occupational Therapy Consult: Yes  Speech Therapy Consult: Yes  Support Systems: Spouse/Significant Other  Confirm Follow Up Transport: Self  The Plan for Transition of Care is Related to the Following Treatment Goals : home, pcp, neuro  The Patient and/or Patient Representative was Provided with a Choice of Provider and Agrees with the Discharge Plan?:  (Pt will be provided with a Freedom of Choice list of outpatient providers if ordered for discharge.)  Discharge Location  Patient Expects to be Discharged to[de-identified] Home with outpatient services      [place], [address] [place] [address]
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
## 2022-10-31 NOTE — PROGRESS NOTES
Speech pathology note  Reviewed chart and note patient admitted with headache and numbness with concern for CVA. Note CT showed Stable focal area of decreased attenuation left basal ganglia, No acute abnormality is identified. MRI pending. Note patient passed the nursing dysphagia screen and a regular diet was ordered. NIHSS=0. Discussed case with PT who reported no SLP-related concerns. Introduced self and role of SLP to patient. Formal SLP evaluation not clinically indicated at this time. Will sign off. Please re-consult if further needs arise. Thank you.     Ty More, SLP

## 2022-11-01 ENCOUNTER — TELEPHONE (OUTPATIENT)
Dept: CASE MANAGEMENT | Age: 47
End: 2022-11-01

## 2022-11-01 NOTE — TELEPHONE ENCOUNTER
CM called patient by telephone to perform post discharge assessment and for the purpose of follow up call from inpatient discharge to check on environmental challenges/medications/appointment follow up/and questions/concerns. The call was answered by patient, introduction self, and explanation and reason for call, name and  confirmed. Pt stated she picked up meds but needs an inhaler. CM suggested pt call her pcp to find out if they can refill or see her earlier. Pt stated she still has discharge paperwork and will the office to find out. CM informed pt to follow up at ED if need be. CM discussed STROKE protocol with pt, pt had no other questions/concerns. Recognize signs and symptoms of STROKE:    F-face looks uneven    A-arms unable to move or move unevenly    S-speech slurred or non-existent    T-time-call 911 as soon as signs and symptoms begin.              Arturo Rhoades, [id number] [phone]

## 2022-12-01 ENCOUNTER — TRANSCRIBE ORDER (OUTPATIENT)
Dept: SCHEDULING | Age: 47
End: 2022-12-01

## 2022-12-01 DIAGNOSIS — R10.13 EPIGASTRIC ABDOMINAL PAIN: Primary | ICD-10-CM

## 2022-12-05 ENCOUNTER — APPOINTMENT (OUTPATIENT)
Dept: GENERAL RADIOLOGY | Age: 47
End: 2022-12-05
Attending: EMERGENCY MEDICINE
Payer: MEDICAID

## 2022-12-05 ENCOUNTER — HOSPITAL ENCOUNTER (EMERGENCY)
Age: 47
Discharge: HOME | End: 2022-12-05
Attending: EMERGENCY MEDICINE
Payer: MEDICAID

## 2022-12-05 VITALS
HEART RATE: 76 BPM | WEIGHT: 137.35 LBS | HEIGHT: 62 IN | TEMPERATURE: 98.7 F | RESPIRATION RATE: 20 BRPM | SYSTOLIC BLOOD PRESSURE: 143 MMHG | HEIGHT: 62 IN | SYSTOLIC BLOOD PRESSURE: 143 MMHG | DIASTOLIC BLOOD PRESSURE: 97 MMHG | OXYGEN SATURATION: 98 % | TEMPERATURE: 98.7 F | BODY MASS INDEX: 25.27 KG/M2 | WEIGHT: 137.35 LBS | DIASTOLIC BLOOD PRESSURE: 97 MMHG | OXYGEN SATURATION: 98 % | HEART RATE: 76 BPM | BODY MASS INDEX: 25.27 KG/M2 | RESPIRATION RATE: 20 BRPM

## 2022-12-05 DIAGNOSIS — K29.90 GASTRITIS AND DUODENITIS: Primary | ICD-10-CM

## 2022-12-05 LAB
ALBUMIN SERPL-MCNC: 3.5 G/DL (ref 3.5–5)
ALBUMIN SERPL-MCNC: 3.5 G/DL (ref 3.5–5)
ALBUMIN/GLOB SERPL: 1.1 {RATIO} (ref 1.1–2.2)
ALBUMIN/GLOB SERPL: 1.1 {RATIO} (ref 1.1–2.2)
ALP SERPL-CCNC: 59 U/L (ref 45–117)
ALP SERPL-CCNC: 59 U/L (ref 45–117)
ALT SERPL-CCNC: 45 U/L (ref 12–78)
ALT SERPL-CCNC: 45 U/L (ref 12–78)
AMPHET UR QL SCN: NEGATIVE
AMPHET UR QL SCN: NEGATIVE
ANION GAP SERPL CALC-SCNC: 6 MMOL/L (ref 5–15)
ANION GAP SERPL CALC-SCNC: 6 MMOL/L (ref 5–15)
APPEARANCE UR: CLEAR
APPEARANCE UR: CLEAR
AST SERPL-CCNC: 23 U/L (ref 15–37)
AST SERPL-CCNC: 23 U/L (ref 15–37)
BACTERIA URNS QL MICRO: NEGATIVE /HPF
BACTERIA URNS QL MICRO: NEGATIVE /HPF
BARBITURATES UR QL SCN: NEGATIVE
BARBITURATES UR QL SCN: NEGATIVE
BASOPHILS # BLD: 0.1 K/UL (ref 0–0.1)
BASOPHILS # BLD: 0.1 K/UL (ref 0–0.1)
BASOPHILS NFR BLD: 1 % (ref 0–1)
BASOPHILS NFR BLD: 1 % (ref 0–1)
BENZODIAZ UR QL: NEGATIVE
BENZODIAZ UR QL: NEGATIVE
BILIRUB SERPL-MCNC: 0.2 MG/DL (ref 0.2–1)
BILIRUB SERPL-MCNC: 0.2 MG/DL (ref 0.2–1)
BILIRUB UR QL: NEGATIVE
BILIRUB UR QL: NEGATIVE
BUN SERPL-MCNC: 12 MG/DL (ref 6–20)
BUN SERPL-MCNC: 12 MG/DL (ref 6–20)
BUN/CREAT SERPL: 14 (ref 12–20)
BUN/CREAT SERPL: 14 (ref 12–20)
CALCIUM SERPL-MCNC: 8.5 MG/DL (ref 8.5–10.1)
CALCIUM SERPL-MCNC: 8.5 MG/DL (ref 8.5–10.1)
CANNABINOIDS UR QL SCN: NEGATIVE
CANNABINOIDS UR QL SCN: NEGATIVE
CHLORIDE SERPL-SCNC: 106 MMOL/L (ref 97–108)
CHLORIDE SERPL-SCNC: 106 MMOL/L (ref 97–108)
CO2 SERPL-SCNC: 29 MMOL/L (ref 21–32)
CO2 SERPL-SCNC: 29 MMOL/L (ref 21–32)
COCAINE UR QL SCN: NEGATIVE
COCAINE UR QL SCN: NEGATIVE
COLOR UR: ABNORMAL
COLOR UR: ABNORMAL
CREAT SERPL-MCNC: 0.88 MG/DL (ref 0.55–1.02)
CREAT SERPL-MCNC: 0.88 MG/DL (ref 0.55–1.02)
DIFFERENTIAL METHOD BLD: ABNORMAL
DIFFERENTIAL METHOD BLD: ABNORMAL
DRUG SCRN COMMENT,DRGCM: NORMAL
DRUG SCRN COMMENT,DRGCM: NORMAL
EOSINOPHIL # BLD: 0 K/UL (ref 0–0.4)
EOSINOPHIL # BLD: 0 K/UL (ref 0–0.4)
EOSINOPHIL NFR BLD: 0 % (ref 0–7)
EOSINOPHIL NFR BLD: 0 % (ref 0–7)
EPITH CASTS URNS QL MICRO: ABNORMAL /LPF
EPITH CASTS URNS QL MICRO: ABNORMAL /LPF
ERYTHROCYTE [DISTWIDTH] IN BLOOD BY AUTOMATED COUNT: 11.9 % (ref 11.5–14.5)
ERYTHROCYTE [DISTWIDTH] IN BLOOD BY AUTOMATED COUNT: 11.9 % (ref 11.5–14.5)
GLOBULIN SER CALC-MCNC: 3.3 G/DL (ref 2–4)
GLOBULIN SER CALC-MCNC: 3.3 G/DL (ref 2–4)
GLUCOSE SERPL-MCNC: 102 MG/DL (ref 65–100)
GLUCOSE SERPL-MCNC: 102 MG/DL (ref 65–100)
GLUCOSE UR STRIP.AUTO-MCNC: NEGATIVE MG/DL
GLUCOSE UR STRIP.AUTO-MCNC: NEGATIVE MG/DL
HCG SERPL QL: NEGATIVE
HCG SERPL QL: NEGATIVE
HCG UR QL: NEGATIVE
HCG UR QL: NEGATIVE
HCT VFR BLD AUTO: 33.9 % (ref 35–47)
HCT VFR BLD AUTO: 33.9 % (ref 35–47)
HGB BLD-MCNC: 12.2 G/DL (ref 11.5–16)
HGB BLD-MCNC: 12.2 G/DL (ref 11.5–16)
HGB UR QL STRIP: NEGATIVE
HGB UR QL STRIP: NEGATIVE
IMM GRANULOCYTES # BLD AUTO: 0 K/UL (ref 0–0.04)
IMM GRANULOCYTES # BLD AUTO: 0 K/UL (ref 0–0.04)
IMM GRANULOCYTES NFR BLD AUTO: 0 % (ref 0–0.5)
IMM GRANULOCYTES NFR BLD AUTO: 0 % (ref 0–0.5)
KETONES UR QL STRIP.AUTO: ABNORMAL MG/DL
KETONES UR QL STRIP.AUTO: ABNORMAL MG/DL
LEUKOCYTE ESTERASE UR QL STRIP.AUTO: ABNORMAL
LEUKOCYTE ESTERASE UR QL STRIP.AUTO: ABNORMAL
LIPASE SERPL-CCNC: 112 U/L (ref 73–393)
LIPASE SERPL-CCNC: 112 U/L (ref 73–393)
LYMPHOCYTES # BLD: 2.8 K/UL (ref 0.8–3.5)
LYMPHOCYTES # BLD: 2.8 K/UL (ref 0.8–3.5)
LYMPHOCYTES NFR BLD: 30 % (ref 12–49)
LYMPHOCYTES NFR BLD: 30 % (ref 12–49)
MCH RBC QN AUTO: 32.2 PG (ref 26–34)
MCH RBC QN AUTO: 32.2 PG (ref 26–34)
MCHC RBC AUTO-ENTMCNC: 36 G/DL (ref 30–36.5)
MCHC RBC AUTO-ENTMCNC: 36 G/DL (ref 30–36.5)
MCV RBC AUTO: 89.4 FL (ref 80–99)
MCV RBC AUTO: 89.4 FL (ref 80–99)
METHADONE UR QL: NEGATIVE
METHADONE UR QL: NEGATIVE
MONOCYTES # BLD: 0.7 K/UL (ref 0–1)
MONOCYTES # BLD: 0.7 K/UL (ref 0–1)
MONOCYTES NFR BLD: 7 % (ref 5–13)
MONOCYTES NFR BLD: 7 % (ref 5–13)
NEUTS SEG # BLD: 5.9 K/UL (ref 1.8–8)
NEUTS SEG # BLD: 5.9 K/UL (ref 1.8–8)
NEUTS SEG NFR BLD: 62 % (ref 32–75)
NEUTS SEG NFR BLD: 62 % (ref 32–75)
NITRITE UR QL STRIP.AUTO: NEGATIVE
NITRITE UR QL STRIP.AUTO: NEGATIVE
NRBC # BLD: 0 K/UL (ref 0–0.01)
NRBC # BLD: 0 K/UL (ref 0–0.01)
NRBC BLD-RTO: 0 PER 100 WBC
NRBC BLD-RTO: 0 PER 100 WBC
OPIATES UR QL: NEGATIVE
OPIATES UR QL: NEGATIVE
PCP UR QL: NEGATIVE
PCP UR QL: NEGATIVE
PH UR STRIP: 6 [PH] (ref 5–8)
PH UR STRIP: 6 [PH] (ref 5–8)
PLATELET # BLD AUTO: 396 K/UL (ref 150–400)
PLATELET # BLD AUTO: 396 K/UL (ref 150–400)
PMV BLD AUTO: 9.3 FL (ref 8.9–12.9)
PMV BLD AUTO: 9.3 FL (ref 8.9–12.9)
POTASSIUM SERPL-SCNC: 3.5 MMOL/L (ref 3.5–5.1)
POTASSIUM SERPL-SCNC: 3.5 MMOL/L (ref 3.5–5.1)
PROT SERPL-MCNC: 6.8 G/DL (ref 6.4–8.2)
PROT SERPL-MCNC: 6.8 G/DL (ref 6.4–8.2)
PROT UR STRIP-MCNC: NEGATIVE MG/DL
PROT UR STRIP-MCNC: NEGATIVE MG/DL
RBC # BLD AUTO: 3.79 M/UL (ref 3.8–5.2)
RBC # BLD AUTO: 3.79 M/UL (ref 3.8–5.2)
RBC #/AREA URNS HPF: ABNORMAL /HPF (ref 0–5)
RBC #/AREA URNS HPF: ABNORMAL /HPF (ref 0–5)
SODIUM SERPL-SCNC: 141 MMOL/L (ref 136–145)
SODIUM SERPL-SCNC: 141 MMOL/L (ref 136–145)
SP GR UR REFRACTOMETRY: 1.02
SP GR UR REFRACTOMETRY: 1.02
TROPONIN-HIGH SENSITIVITY: 9 NG/L (ref 0–51)
TROPONIN-HIGH SENSITIVITY: 9 NG/L (ref 0–51)
UA: UC IF INDICATED,UAUC: ABNORMAL
UA: UC IF INDICATED,UAUC: ABNORMAL
UROBILINOGEN UR QL STRIP.AUTO: 1 EU/DL (ref 0.2–1)
UROBILINOGEN UR QL STRIP.AUTO: 1 EU/DL (ref 0.2–1)
WBC # BLD AUTO: 9.5 K/UL (ref 3.6–11)
WBC # BLD AUTO: 9.5 K/UL (ref 3.6–11)
WBC URNS QL MICRO: ABNORMAL /HPF (ref 0–4)
WBC URNS QL MICRO: ABNORMAL /HPF (ref 0–4)

## 2022-12-05 PROCEDURE — 74011000250 HC RX REV CODE- 250: Performed by: EMERGENCY MEDICINE

## 2022-12-05 PROCEDURE — 74011250636 HC RX REV CODE- 250/636: Performed by: EMERGENCY MEDICINE

## 2022-12-05 PROCEDURE — 83690 ASSAY OF LIPASE: CPT

## 2022-12-05 PROCEDURE — 80307 DRUG TEST PRSMV CHEM ANLYZR: CPT

## 2022-12-05 PROCEDURE — 81001 URINALYSIS AUTO W/SCOPE: CPT

## 2022-12-05 PROCEDURE — 36415 COLL VENOUS BLD VENIPUNCTURE: CPT

## 2022-12-05 PROCEDURE — 85025 COMPLETE CBC W/AUTO DIFF WBC: CPT

## 2022-12-05 PROCEDURE — 96374 THER/PROPH/DIAG INJ IV PUSH: CPT

## 2022-12-05 PROCEDURE — 71045 X-RAY EXAM CHEST 1 VIEW: CPT

## 2022-12-05 PROCEDURE — 99285 EMERGENCY DEPT VISIT HI MDM: CPT

## 2022-12-05 PROCEDURE — 84703 CHORIONIC GONADOTROPIN ASSAY: CPT

## 2022-12-05 PROCEDURE — 80053 COMPREHEN METABOLIC PANEL: CPT

## 2022-12-05 PROCEDURE — 93005 ELECTROCARDIOGRAM TRACING: CPT

## 2022-12-05 PROCEDURE — 81025 URINE PREGNANCY TEST: CPT

## 2022-12-05 PROCEDURE — 96375 TX/PRO/DX INJ NEW DRUG ADDON: CPT

## 2022-12-05 PROCEDURE — 84484 ASSAY OF TROPONIN QUANT: CPT

## 2022-12-05 RX ORDER — SODIUM CHLORIDE 0.9 % (FLUSH) 0.9 %
5-40 SYRINGE (ML) INJECTION EVERY 8 HOURS
Status: DISCONTINUED | OUTPATIENT
Start: 2022-12-05 | End: 2022-12-05 | Stop reason: HOSPADM

## 2022-12-05 RX ORDER — PANTOPRAZOLE SODIUM 40 MG/1
40 TABLET, DELAYED RELEASE ORAL DAILY
Qty: 20 TABLET | Refills: 0 | Status: SHIPPED | OUTPATIENT
Start: 2022-12-05 | End: 2022-12-25

## 2022-12-05 RX ORDER — ONDANSETRON 2 MG/ML
4 INJECTION INTRAMUSCULAR; INTRAVENOUS
Status: COMPLETED | OUTPATIENT
Start: 2022-12-05 | End: 2022-12-05

## 2022-12-05 RX ORDER — HYDROMORPHONE HYDROCHLORIDE 1 MG/ML
0.5 INJECTION, SOLUTION INTRAMUSCULAR; INTRAVENOUS; SUBCUTANEOUS
Status: COMPLETED | OUTPATIENT
Start: 2022-12-05 | End: 2022-12-05

## 2022-12-05 RX ADMIN — SODIUM CHLORIDE 1000 ML: 9 INJECTION, SOLUTION INTRAVENOUS at 17:38

## 2022-12-05 RX ADMIN — ONDANSETRON 4 MG: 2 INJECTION INTRAMUSCULAR; INTRAVENOUS at 17:39

## 2022-12-05 RX ADMIN — FAMOTIDINE 20 MG: 10 INJECTION, SOLUTION INTRAVENOUS at 17:39

## 2022-12-05 RX ADMIN — HYDROMORPHONE HYDROCHLORIDE 0.5 MG: 1 INJECTION, SOLUTION INTRAMUSCULAR; INTRAVENOUS; SUBCUTANEOUS at 18:44

## 2022-12-05 NOTE — ED NOTES
Emergency Department Nursing Plan of Care       The Nursing Plan of Care is developed from the Nursing assessment and Emergency Department Attending provider initial evaluation. The plan of care may be reviewed in the ED Provider note.     The Plan of Care was developed with the following considerations:   Patient / Family readiness to learn indicated by:verbalized understanding  Persons(s) to be included in education: patient  Barriers to Learning/Limitations:No    Signed     Elisha Jeffers RN    12/5/2022   6:50 PM

## 2022-12-05 NOTE — ED TRIAGE NOTES
Pt presents to ED reporting epigastric pain x years. Pt reports it has always been a dull pain but three days ago it became diffuse abdominal pain that resembles a pins and needles sensation. Pt not very clear with answers and describing what she is feeling as she is reporting several different unrelated complaints. States the \"pain\" is mostly localized in her abdomen but also states that a few days ago is when she felt a pins and needles sensation in her left lower leg. Pt reports PMH of stroke, unsure if TIA. No deficits noted. NIH 0 in ED. Pt has purposeful and spontaneous movement of extremities and is ambulatory in ED with steady gait. A/O x 4, speech is clear. Pt able to respond to questions appropriately and follows all commands. Pt endorsing hallucinations and states she is seeing people but did not elaborate further.

## 2022-12-05 NOTE — ED PROVIDER NOTES
EMERGENCY DEPARTMENT HISTORY AND PHYSICAL EXAM      Date: 12/5/2022  Patient Name: Bryce Daigle    History of Presenting Illness     Chief Complaint   Patient presents with    Epigastric Pain       History Provided By: Patient    HPI: Bryce Daigle, 56 y.o. female presents to the ED with cc of epigastric pain associated with nausea on and off for the past 4 to 6 weeks. Patient had been seeing another emergency department in the past for same, no improvement. Denies shortness of breath, cough, chest pain, vaginal bleeding or dysuria. There are no other associated symptoms, patient concerns, or physical findings at this time. I reviewed the vital signs, available nursing notes, past medical history, past surgical history, family history and social history. Vital Signs:  Patient Vitals for the past 12 hrs:   Temp Pulse Resp BP SpO2   12/05/22 1629 98.7 °F (37.1 °C) 93 15 (!) 160/114 97 %     Vital signs reviewed. Current Medications:  No current facility-administered medications on file prior to encounter. Current Outpatient Medications on File Prior to Encounter   Medication Sig Dispense Refill    atorvastatin (LIPITOR) 80 mg tablet Take 1 Tablet by mouth nightly. 30 Tablet 1    butalbital-acetaminophen-caff (FIORICET) -40 mg per capsule Take 1 Capsule by mouth every six (6) hours as needed for Headache. 14 Capsule 0    cariprazine (VRAYLAR) 3 mg capsule Take 3 mg by mouth daily. aspirin 81 mg chewable tablet Take 1 Tablet by mouth daily. 30 Tablet 1    amLODIPine (NORVASC) 10 mg tablet Take 1 Tablet by mouth daily. Indications: high blood pressure 30 Tablet 0    hydroCHLOROthiazide (MICROZIDE) 12.5 mg capsule Take 1 Capsule by mouth daily. Indications: high blood pressure 90 Capsule 0    diclofenac (VOLTAREN) 1 % gel Apply  to affected area four (4) times daily as needed for Pain.      pantoprazole (PROTONIX) 20 mg tablet Take 20 mg by mouth Daily (before breakfast). fluticasone propionate (FLONASE) 50 mcg/actuation nasal spray 2 Sprays by Both Nostrils route daily as needed for Allergies. hydrOXYzine HCL (ATARAX) 25 mg tablet Take 25 mg by mouth three (3) times daily as needed for Anxiety. lidocaine (LIDODERM) 5 % 1 Patch by TransDERmal route daily as needed for Pain. Apply patch to the affected area for 12 hours a day and remove for 12 hours a day. cyclobenzaprine (FLEXERIL) 10 mg tablet Take 1 Tablet by mouth three (3) times daily as needed for Muscle Spasm(s). 20 Tablet 0       Past History     Past Medical History:  Past Medical History:   Diagnosis Date    angina     Angina at rest [facility])     Breast pain     since 2016, right breast, on and off    Chiari malformation     Heart abnormalities     leaky heart valve, murmur    Hypertension     Other ill-defined conditions(799.89)     cardiac arrhythmias    Other ill-defined conditions(799.89)     neuropathy    Psychiatric disorder     anxiety and depression    Stroke ([facility] [address]     TIA       Past Surgical History:  Past Surgical History:   Procedure Laterality Date    HX GYN      HX ORTHOPAEDIC      screws in left ankle    HX OTHER SURGICAL      surgery to back of head due to fractured skull    NEUROLOGICAL PROCEDURE UNLISTED      WA ABDOMEN SURGERY PROC UNLISTED      laporscopic surgery post ruptured fallopian tube       Family History:  Family History   Problem Relation Age of Onset    Hypertension Mother     Stroke Mother     Diabetes Mother     Drug Abuse Father        Social History:  Social History     Tobacco Use    Smoking status: Former     Years: 23.00     Types: Cigarettes     Quit date: 3/5/2021     Years since quittin.7    Smokeless tobacco: Current   Vaping Use    Vaping Use: Some days   Substance Use Topics    Alcohol use: Yes     Comment: social    Drug use: Not Currently     Types: Marijuana     Comment: former crack user approx clean 2017       Allergies:   Allergies   Allergen Reactions Metoprolol Other (comments)     headache    Toradol [Ketorolac] Hives     Tolerated aspirin 10/6         Review of Systems   Review of Systems   Constitutional:  Negative for fever. HENT:  Negative for sore throat. Eyes:  Negative for photophobia and redness. Respiratory:  Negative for shortness of breath and wheezing. Cardiovascular:  Negative for chest pain and leg swelling. Gastrointestinal:  Positive for abdominal pain and nausea. Negative for blood in stool and vomiting. Genitourinary:  Negative for difficulty urinating, dysuria, hematuria, menstrual problem and vaginal bleeding. Musculoskeletal:  Negative for back pain and joint swelling. Neurological:  Negative for dizziness, seizures, syncope, speech difficulty, weakness, numbness and headaches. Hematological:  Negative for adenopathy. Psychiatric/Behavioral:  Negative for agitation, confusion and suicidal ideas. The patient is not nervous/anxious. All other systems reviewed and are negative. Physical Exam   Physical Exam  Vitals and nursing note reviewed. Exam conducted with a chaperone present. Constitutional:       General: She is not in acute distress. Appearance: She is well-developed. HENT:      Head: Normocephalic and atraumatic. Mouth/Throat:      Pharynx: No oropharyngeal exudate. Eyes:      General:         Right eye: No discharge. Left eye: No discharge. Extraocular Movements: Extraocular movements intact. Conjunctiva/sclera: Conjunctivae normal.      Pupils: Pupils are equal, round, and reactive to light. Neck:      Vascular: No JVD. Cardiovascular:      Rate and Rhythm: Normal rate and regular rhythm. Heart sounds: Normal heart sounds. Pulmonary:      Effort: Pulmonary effort is normal. No respiratory distress. Breath sounds: Normal breath sounds. No wheezing. Abdominal:      General: Bowel sounds are normal. There is no distension. Palpations: Abdomen is soft. Tenderness: There is abdominal tenderness. There is no guarding or rebound. Comments: Tenderness on the epigastric area, no rebound or guarding. Musculoskeletal:         General: No tenderness. Normal range of motion. Cervical back: Normal range of motion and neck supple. Lymphadenopathy:      Cervical: No cervical adenopathy. Skin:     General: Skin is warm and dry. Findings: No rash. Neurological:      Mental Status: She is alert and oriented to person, place, and time. Cranial Nerves: No cranial nerve deficit. Deep Tendon Reflexes: Reflexes are normal and symmetric. Psychiatric:         Behavior: Behavior normal.       Emergency Department Course   ED Course:  Initial assessment performed. The patient's complaints have been discussed, and they are in agreement with the care plan formulated and outlined with them. I have encouraged them to ask questions as they arise throughout their visit. EKG interpretation: (Preliminary)  Rhythm: normal sinus rhythm; and regular . Rate (approx.): 79; Axis: normal; IA interval: normal; QRS interval: normal ; ST/T wave: normal; Other findings: normal.  EKG read and interpreted by Jair Chambers MD     Medical Decision Making:  Gastritis, GERD, pancreatitis, biliary colic, ACS. Critical Care Time:      Procedure:      Progress note:   Time:    Disposition:  DISCHARGED at 7:45 pm,  I reviewed exam findings, diagnostic results, and clinical impression with patient. Counseled patient on diagnosis and care plan. Encouraged patient to ask questions and discussed need for follow up with primary care and to return to ED precautions. Patient expresses understanding at this time. I have reviewed discharge instructions with the patient and/or family/caregiver who verbalized understanding. The patient has been re-evaluated and is ready for discharge. Discharge instructions have been provided and explained to the patient.  Ready for discharge. DISCHARGE PLAN:  1. Current Discharge Medication List        2. Follow-up Information    None       3. Return to ED if current symptoms worsen or new symptoms arise. 4. Follow up with Jn Reed MD in 3-5 days. Diagnosis     Clinical Impression: No diagnosis found.

## 2022-12-06 NOTE — ED NOTES
Pt requesting different pain medications, Dr Miesha Gimenez bedside to discuss with pt.  No new prescriptions given

## 2022-12-06 NOTE — ED NOTES
Discharge instructions were given to the patient by Erin Chan RN. The patient left the Emergency Department ambulatory, alert and oriented and in no acute distress with 1   prescriptions. The patient was encouraged to call or return to the ED for worsening issues or problems and was encouraged to schedule a follow up appointment for continuing care. The patient verbalized understanding of discharge instructions and prescriptions, all questions were answered. The patient has no further concerns at this time.

## 2022-12-07 LAB
ATRIAL RATE: 79 BPM
ATRIAL RATE: 79 BPM
CALCULATED P AXIS, ECG09: 52 DEGREES
CALCULATED P AXIS, ECG09: 52 DEGREES
CALCULATED R AXIS, ECG10: 47 DEGREES
CALCULATED R AXIS, ECG10: 47 DEGREES
CALCULATED T AXIS, ECG11: 37 DEGREES
CALCULATED T AXIS, ECG11: 37 DEGREES
DIAGNOSIS, 93000: NORMAL
DIAGNOSIS, 93000: NORMAL
P-R INTERVAL, ECG05: 158 MS
P-R INTERVAL, ECG05: 158 MS
Q-T INTERVAL, ECG07: 390 MS
Q-T INTERVAL, ECG07: 390 MS
QRS DURATION, ECG06: 84 MS
QRS DURATION, ECG06: 84 MS
QTC CALCULATION (BEZET), ECG08: 447 MS
QTC CALCULATION (BEZET), ECG08: 447 MS
VENTRICULAR RATE, ECG03: 79 BPM
VENTRICULAR RATE, ECG03: 79 BPM

## 2022-12-16 ENCOUNTER — HOSPITAL ENCOUNTER (OUTPATIENT)
Dept: CT IMAGING | Age: 47
Discharge: HOME | End: 2022-12-16
Attending: EMERGENCY MEDICINE

## 2022-12-27 ENCOUNTER — TELEPHONE (OUTPATIENT)
Dept: CARDIOLOGY CLINIC | Age: 47
End: 2022-12-27

## 2022-12-27 ENCOUNTER — OFFICE VISIT (OUTPATIENT)
Dept: CARDIOLOGY CLINIC | Age: 47
End: 2022-12-27
Payer: MEDICAID

## 2022-12-27 ENCOUNTER — TRANSCRIBE ORDER (OUTPATIENT)
Dept: SCHEDULING | Age: 47
End: 2022-12-27

## 2022-12-27 VITALS
OXYGEN SATURATION: 98 % | DIASTOLIC BLOOD PRESSURE: 97 MMHG | WEIGHT: 137.6 LBS | SYSTOLIC BLOOD PRESSURE: 147 MMHG | BODY MASS INDEX: 25.32 KG/M2 | RESPIRATION RATE: 14 BRPM | HEART RATE: 82 BPM | HEIGHT: 62 IN

## 2022-12-27 DIAGNOSIS — R07.9 CHEST PAIN: Primary | ICD-10-CM

## 2022-12-27 DIAGNOSIS — R07.89 OTHER CHEST PAIN: ICD-10-CM

## 2022-12-27 DIAGNOSIS — I10 PRIMARY HYPERTENSION: ICD-10-CM

## 2022-12-27 DIAGNOSIS — I35.1 NONRHEUMATIC AORTIC VALVE INSUFFICIENCY: ICD-10-CM

## 2022-12-27 DIAGNOSIS — R00.2 PALPITATIONS: Primary | ICD-10-CM

## 2022-12-27 PROCEDURE — 99214 OFFICE O/P EST MOD 30 MIN: CPT | Performed by: SPECIALIST

## 2022-12-27 PROCEDURE — 3078F DIAST BP <80 MM HG: CPT | Performed by: SPECIALIST

## 2022-12-27 PROCEDURE — 3074F SYST BP LT 130 MM HG: CPT | Performed by: SPECIALIST

## 2022-12-27 RX ORDER — GUAIFENESIN 100 MG/5ML
81 LIQUID (ML) ORAL DAILY
Qty: 90 TABLET | Refills: 1 | Status: SHIPPED | OUTPATIENT
Start: 2022-12-27

## 2022-12-27 RX ORDER — ATORVASTATIN CALCIUM 80 MG/1
80 TABLET, FILM COATED ORAL
Qty: 90 TABLET | Refills: 1 | Status: SHIPPED | OUTPATIENT
Start: 2022-12-27

## 2022-12-27 RX ORDER — HYDROCHLOROTHIAZIDE 12.5 MG/1
12.5 CAPSULE ORAL DAILY
Qty: 90 CAPSULE | Refills: 1 | Status: SHIPPED | OUTPATIENT
Start: 2022-12-27

## 2022-12-27 RX ORDER — CLOPIDOGREL BISULFATE 75 MG/1
75 TABLET ORAL DAILY
COMMUNITY
End: 2022-12-27 | Stop reason: SDUPTHER

## 2022-12-27 RX ORDER — CLOPIDOGREL BISULFATE 75 MG/1
75 TABLET ORAL DAILY
Qty: 90 TABLET | Refills: 1 | Status: SHIPPED | OUTPATIENT
Start: 2022-12-27

## 2022-12-27 RX ORDER — DOXAZOSIN 2 MG/1
2 TABLET ORAL
Qty: 90 TABLET | Refills: 1 | Status: SHIPPED | OUTPATIENT
Start: 2022-12-27

## 2022-12-27 RX ORDER — AMLODIPINE BESYLATE 10 MG/1
10 TABLET ORAL DAILY
Qty: 90 TABLET | Refills: 1 | Status: SHIPPED | OUTPATIENT
Start: 2022-12-27

## 2022-12-27 NOTE — TELEPHONE ENCOUNTER
Requested Prescriptions     Pending Prescriptions Disp Refills    amLODIPine (NORVASC) 10 mg tablet 90 Tablet 1     Sig: Take 1 Tablet by mouth daily. Indications: high blood pressure    aspirin 81 mg chewable tablet 90 Tablet 1     Sig: Take 1 Tablet by mouth daily. atorvastatin (LIPITOR) 80 mg tablet 90 Tablet 1     Sig: Take 1 Tablet by mouth nightly. clopidogreL (Plavix) 75 mg tab 90 Tablet 1     Sig: Take 1 Tablet by mouth daily. hydroCHLOROthiazide (MICROZIDE) 12.5 mg capsule 90 Capsule 1     Sig: Take 1 Capsule by mouth daily. Indications: high blood pressure    doxazosin (CARDURA) 2 mg tablet 90 Tablet 1     Sig: Take 1 Tablet by mouth nightly. Patient verified her pharmacy.

## 2022-12-27 NOTE — PROGRESS NOTES
HISTORY OF PRESENT ILLNESS  Cindy Pappas is a 56 y.o. female     SUMMARY:   Problem List  Date Reviewed: 11/14/2022            Codes Class Noted    Numbness ICD-10-CM: R20.0  ICD-9-CM: 782.0  10/29/2022        Acute CVA (cerebrovascular accident) [facility]) ICD-10-CM: I63.9  ICD-9-CM: 434.91  10/6/2022        Nonrheumatic aortic valve insufficiency ICD-10-CM: I35.1  ICD-9-CM: 424.1  5/20/2021    Overview Signed 5/20/2021  8:19 AM by Sim Sunshine MD     07/15/20  echo normal lvef, lvh, mild to mod aortic regurg, mild tr             Bipolar disorder ([facility] [address] ICD-10-CM: F31.9  ICD-9-CM: 296.80  5/6/2021        [facility] ICD-10-CM: F25.9  ICD-9-CM: 295.70  5/6/2021        Hypertension ICD-10-CM: I10  ICD-9-CM: 401.9  5/6/2021           Current Outpatient Medications on File Prior to Visit   Medication Sig    clopidogreL (Plavix) 75 mg tab Take 75 mg by mouth daily. atorvastatin (LIPITOR) 80 mg tablet Take 1 Tablet by mouth nightly. butalbital-acetaminophen-caff (FIORICET) -40 mg per capsule Take 1 Capsule by mouth every six (6) hours as needed for Headache.    cariprazine (VRAYLAR) 3 mg capsule Take 3 mg by mouth daily. aspirin 81 mg chewable tablet Take 1 Tablet by mouth daily. amLODIPine (NORVASC) 10 mg tablet Take 1 Tablet by mouth daily. Indications: high blood pressure    hydroCHLOROthiazide (MICROZIDE) 12.5 mg capsule Take 1 Capsule by mouth daily. Indications: high blood pressure    diclofenac (VOLTAREN) 1 % gel Apply  to affected area four (4) times daily as needed for Pain.    pantoprazole (PROTONIX) 20 mg tablet Take 20 mg by mouth Daily (before breakfast). fluticasone propionate (FLONASE) 50 mcg/actuation nasal spray 2 Sprays by Both Nostrils route daily as needed for Allergies. hydrOXYzine HCL (ATARAX) 25 mg tablet Take 25 mg by mouth three (3) times daily as needed for Anxiety.     lidocaine (LIDODERM) 5 % 1 Patch by TransDERmal route daily as needed for Pain. Apply patch to the affected area for 12 hours a day and remove for 12 hours a day. cyclobenzaprine (FLEXERIL) 10 mg tablet Take 1 Tablet by mouth three (3) times daily as needed for Muscle Spasm(s). No current facility-administered medications on file prior to visit. CARDIOLOGY STUDIES TO DATE:  07/15/20  echo normal lvef, lvh, mild to mod aortic regurg, mild tr  11/21  echo normal lv size and lvef, mild lvh, mod aortic regurg  10/22 echo normal lvef, lvh mild to mod aortic regurg    Chief Complaint   Patient presents with    Follow-up     HPI :  When we met in May she was having chest pains and palpitations and her blood pressure was elevated. We started on Cardura which she never took and ordered both an event monitor and a stress test which she never did. She was scheduled for an endoscopy and colonoscopy at [address] [place] [address] but because of her complaints and the issues related to her not getting the testing done those studies were canceled. She has a lot of symptoms of reflux as well. CARDIAC ROS:   negative for syncope, orthopnea, paroxysmal nocturnal dyspnea, claudication, lower extremity edema    Family History   Problem Relation Age of Onset    Hypertension Mother     Stroke Mother     Diabetes Mother     Drug Abuse Father        Past Medical History:   Diagnosis Date    angina     Angina at rest [facility])     Breast pain     since 2016, right breast, on and off    Chiari malformation     Heart abnormalities     leaky heart valve, murmur    Hypertension     Other ill-defined conditions(799.89)     cardiac arrhythmias    Other ill-defined conditions(799.89)     neuropathy    Psychiatric disorder     anxiety and depression    Stroke ([facility] [address]     TIA       GENERAL ROS:  A comprehensive review of systems was negative except for that written in the HPI.     Visit Vitals  BP (!) 147/97   Pulse 82   Resp 14   Ht 5' 2\" (1.575 m)   Wt 137 lb 9.6 oz (62.4 kg)   SpO2 98%   BMI 25.17 kg/m²       Wt Readings from Last 3 Encounters:   12/27/22 137 lb 9.6 oz (62.4 kg)   12/05/22 137 lb 5.6 oz (62.3 kg)   10/30/22 135 lb 8 oz (61.5 kg)            BP Readings from Last 3 Encounters:   12/27/22 (!) 147/97   12/05/22 (!) 143/97   10/31/22 (!) 134/100       PHYSICAL EXAM  General appearance: alert, cooperative, no distress, appears stated age  Neurologic: Alert and oriented X 3  Neck: supple, symmetrical, trachea midline, no adenopathy, no carotid bruit, and no JVD  Lungs: clear to auscultation bilaterally  Heart: regular rate and rhythm, S1, S2 normal, no murmur, click, rub or gallop  Extremities: extremities normal, atraumatic, no cyanosis or edema    Lab Results   Component Value Date/Time    Cholesterol, total 120 10/30/2022 01:24 AM    Cholesterol, total 159 10/07/2022 04:21 AM    Cholesterol, total 210 (H) 05/07/2021 06:12 AM    HDL Cholesterol 43 10/30/2022 01:24 AM    HDL Cholesterol 44 10/07/2022 04:21 AM    HDL Cholesterol 54 05/07/2021 06:12 AM    LDL, calculated 56 10/30/2022 01:24 AM    LDL, calculated 93.2 10/07/2022 04:21 AM    LDL, calculated 112.6 (H) 05/07/2021 06:12 AM    Triglyceride 105 10/30/2022 01:24 AM    Triglyceride 109 10/07/2022 04:21 AM    Triglyceride 217 (H) 05/07/2021 06:12 AM    CHOL/HDL Ratio 2.8 10/30/2022 01:24 AM    CHOL/HDL Ratio 3.6 10/07/2022 04:21 AM    CHOL/HDL Ratio 3.9 05/07/2021 06:12 AM     ASSESSMENT :      Working to put her back on Cardura 2 mg at night and schedule her stress test and an event monitor again. Assuming all this looks okay she should be fine for GI procedures without further testing. We will recheck an echo in about a year to evaluate her aortic regurg. current treatment plan is effective, no change in therapy  lab results and schedule of future lab studies reviewed with patient  reviewed diet, exercise and weight control    Encounter Diagnoses   Name Primary?     Palpitations Yes    Nonrheumatic aortic valve insufficiency     Primary hypertension Other chest pain      Orders Placed This Encounter    clopidogreL (Plavix) 75 mg tab           Carine Han MD  12/27/2022  Please note that this dictation was completed with [employer], the computer voice recognition software. Quite often unanticipated grammatical, syntax, homophones, and other interpretive errors are inadvertently transcribed by the computer software. Please disregard these errors. Please excuse any errors that have escaped final proofreading. Thank you.

## 2023-01-03 ENCOUNTER — HOSPITAL ENCOUNTER (OUTPATIENT)
Dept: VASCULAR SURGERY | Age: 48
Discharge: HOME | End: 2023-01-03
Attending: SPECIALIST
Payer: MEDICAID

## 2023-01-03 ENCOUNTER — HOSPITAL ENCOUNTER (OUTPATIENT)
Dept: NON INVASIVE DIAGNOSTICS | Age: 48
Discharge: HOME | End: 2023-01-03
Attending: SPECIALIST
Payer: MEDICAID

## 2023-01-03 ENCOUNTER — HOSPITAL ENCOUNTER (OUTPATIENT)
Dept: NUCLEAR MEDICINE | Age: 48
Discharge: HOME | End: 2023-01-03
Attending: SPECIALIST
Payer: MEDICAID

## 2023-01-03 VITALS
WEIGHT: 135 LBS | DIASTOLIC BLOOD PRESSURE: 94 MMHG | HEIGHT: 62 IN | SYSTOLIC BLOOD PRESSURE: 135 MMHG | BODY MASS INDEX: 24.84 KG/M2

## 2023-01-03 DIAGNOSIS — R00.2 PALPITATIONS: ICD-10-CM

## 2023-01-03 DIAGNOSIS — I35.1 NONRHEUMATIC AORTIC VALVE INSUFFICIENCY: ICD-10-CM

## 2023-01-03 DIAGNOSIS — I10 PRIMARY HYPERTENSION: ICD-10-CM

## 2023-01-03 DIAGNOSIS — R07.89 OTHER CHEST PAIN: ICD-10-CM

## 2023-01-03 LAB
STRESS BASELINE DIAS BP: 94 MMHG
STRESS BASELINE HR: 88 BPM
STRESS BASELINE ST DEPRESSION: 0 MM
STRESS BASELINE SYS BP: 135 MMHG
STRESS ESTIMATED WORKLOAD: 10.1 METS
STRESS EXERCISE DUR MIN: 8 MIN
STRESS EXERCISE DUR SEC: 2 SEC
STRESS O2 SAT PEAK: 98 %
STRESS O2 SAT REST: 99 %
STRESS PEAK DIAS BP: 96 MMHG
STRESS PEAK SYS BP: 194 MMHG
STRESS PERCENT HR ACHIEVED: 89 %
STRESS POST PEAK HR: 155 BPM
STRESS RATE PRESSURE PRODUCT: NORMAL BPM*MMHG
STRESS ST DEPRESSION: 0 MM
STRESS TARGET HR: 174 BPM

## 2023-01-03 PROCEDURE — 93017 CV STRESS TEST TRACING ONLY: CPT

## 2023-01-03 RX ORDER — TETRAKIS(2-METHOXYISOBUTYLISOCYANIDE)COPPER(I) TETRAFLUOROBORATE 1 MG/ML
10 INJECTION, POWDER, LYOPHILIZED, FOR SOLUTION INTRAVENOUS
Status: COMPLETED | OUTPATIENT
Start: 2023-01-03 | End: 2023-01-03

## 2023-01-03 RX ORDER — TETRAKIS(2-METHOXYISOBUTYLISOCYANIDE)COPPER(I) TETRAFLUOROBORATE 1 MG/ML
30 INJECTION, POWDER, LYOPHILIZED, FOR SOLUTION INTRAVENOUS
Status: COMPLETED | OUTPATIENT
Start: 2023-01-03 | End: 2023-01-03

## 2023-01-03 RX ADMIN — TETRAKIS(2-METHOXYISOBUTYLISOCYANIDE)COPPER(I) TETRAFLUOROBORATE 30 MILLICURIE: 1 INJECTION, POWDER, LYOPHILIZED, FOR SOLUTION INTRAVENOUS at 11:00

## 2023-01-03 RX ADMIN — TETRAKIS(2-METHOXYISOBUTYLISOCYANIDE)COPPER(I) TETRAFLUOROBORATE 10 MILLICURIE: 1 INJECTION, POWDER, LYOPHILIZED, FOR SOLUTION INTRAVENOUS at 08:50

## 2023-01-03 NOTE — PROGRESS NOTES
7 day event monitor placed on patient. Patient educated on device and extra supplies provided. No questions at this time. Monitor # [id number]  .

## 2023-01-04 ENCOUNTER — HOSPITAL ENCOUNTER (EMERGENCY)
Age: 48
Discharge: HOME | End: 2023-01-04
Attending: EMERGENCY MEDICINE
Payer: MEDICAID

## 2023-01-04 VITALS
HEART RATE: 91 BPM | DIASTOLIC BLOOD PRESSURE: 87 MMHG | BODY MASS INDEX: 25.88 KG/M2 | SYSTOLIC BLOOD PRESSURE: 126 MMHG | TEMPERATURE: 98.6 F | HEIGHT: 62 IN | OXYGEN SATURATION: 98 % | RESPIRATION RATE: 18 BRPM | WEIGHT: 140.65 LBS

## 2023-01-04 DIAGNOSIS — N91.1 SECONDARY AMENORRHEA: ICD-10-CM

## 2023-01-04 DIAGNOSIS — N76.0 VAGINITIS AND VULVOVAGINITIS: Primary | ICD-10-CM

## 2023-01-04 LAB
APPEARANCE UR: CLEAR
BACTERIA URNS QL MICRO: NEGATIVE /HPF
BILIRUB UR QL: NEGATIVE
C TRACH DNA SPEC QL NAA+PROBE: NEGATIVE
CLUE CELLS VAG QL WET PREP: NORMAL
COLOR UR: ABNORMAL
EPITH CASTS URNS QL MICRO: ABNORMAL /LPF
GLUCOSE UR STRIP.AUTO-MCNC: NEGATIVE MG/DL
HCG UR QL: NEGATIVE
HGB UR QL STRIP: NEGATIVE
KETONES UR QL STRIP.AUTO: ABNORMAL MG/DL
KOH PREP SPEC: NORMAL
LEUKOCYTE ESTERASE UR QL STRIP.AUTO: ABNORMAL
N GONORRHOEA DNA SPEC QL NAA+PROBE: NEGATIVE
NITRITE UR QL STRIP.AUTO: NEGATIVE
PH UR STRIP: 6 [PH] (ref 5–8)
PROT UR STRIP-MCNC: NEGATIVE MG/DL
RBC #/AREA URNS HPF: ABNORMAL /HPF (ref 0–5)
SAMPLE TYPE: NORMAL
SERVICE CMNT-IMP: NORMAL
SERVICE CMNT-IMP: NORMAL
SP GR UR REFRACTOMETRY: 1.02
SPECIMEN SOURCE: NORMAL
T VAGINALIS VAG QL WET PREP: NEGATIVE
UA: UC IF INDICATED,UAUC: ABNORMAL
UROBILINOGEN UR QL STRIP.AUTO: 1 EU/DL (ref 0.2–1)
WBC URNS QL MICRO: ABNORMAL /HPF (ref 0–4)

## 2023-01-04 PROCEDURE — 99283 EMERGENCY DEPT VISIT LOW MDM: CPT

## 2023-01-04 PROCEDURE — 81001 URINALYSIS AUTO W/SCOPE: CPT

## 2023-01-04 PROCEDURE — 87210 SMEAR WET MOUNT SALINE/INK: CPT

## 2023-01-04 PROCEDURE — 81025 URINE PREGNANCY TEST: CPT

## 2023-01-04 PROCEDURE — 87491 CHLMYD TRACH DNA AMP PROBE: CPT

## 2023-01-04 RX ORDER — METRONIDAZOLE 500 MG/1
500 TABLET ORAL 2 TIMES DAILY
Qty: 14 TABLET | Refills: 0 | Status: SHIPPED | OUTPATIENT
Start: 2023-01-04 | End: 2023-01-11

## 2023-01-04 NOTE — ED PROVIDER NOTES
[facility] EMERGENCY DEPT  EMERGENCY DEPARTMENT ENCOUNTER       Pt Name: Feliz Hoyt  MRN: [id number]  [place] 1975  Date of evaluation: 1/4/2023  Provider: Walter Shelton NP   PCP: None  Note Started: 1:16 PM 1/4/23     CHIEF COMPLAINT       Chief Complaint   Patient presents with    Pelvic Pain    Vaginal Discharge        HISTORY OF PRESENT ILLNESS: 1 or more elements      History From: Patient  HPI Limitations : None     Feliz Hoyt is a 56 y.o. female who presents with pelvic pain and vaginal discharge. She reports pain located to the lower left side however she also reports pain in the middle of her abdomen. She has chronic abdominal pain and unsure if that may be the cause. Patient denies nausea, vomiting, constipation, diarrhea. In addition she reports clear discharge without odor. Denies vaginal itching. She states she has had bacterial vaginosis because she gets them recurrently. She reports recent use of bath bombs for Terri and likely be contributing to her symptoms. She states she is not sexually active for the last 3 months since her last diagnosis of trichomonas. Nursing Notes were all reviewed and agreed with or any disagreements were addressed in the HPI. REVIEW OF SYSTEMS      Review of Systems   Constitutional:  Negative for chills and fever. Respiratory:  Negative for cough and shortness of breath. Gastrointestinal:  Positive for abdominal pain. Negative for constipation, diarrhea, nausea and vomiting. Genitourinary:  Positive for menstrual problem and vaginal discharge. Negative for dysuria, frequency, genital sores, hematuria, pelvic pain, urgency, vaginal bleeding and vaginal pain. Skin:  Negative for rash. Neurological:  Negative for dizziness and headaches. All other systems reviewed and are negative. Positives and Pertinent negatives as per HPI. PAST HISTORY     Past Medical History:  No past medical history on file.     Past Surgical History:  No past surgical history on file. Family History:  No family history on file. Social History:  Social History     Tobacco Use    Smoking status: Never    Smokeless tobacco: Never   Substance Use Topics    Alcohol use: Not Currently    Drug use: Never       Allergies: Allergies   Allergen Reactions    Toradol [Ketorolac] Itching       CURRENT MEDICATIONS      Previous Medications    ACETAMINOPHEN (TYLENOL) 500 MG TABLET    Take 2 Tabs by mouth every eight (8) hours as needed for Pain. DIPHENHYDRAMINE-ZINC ACETATE 1%-0.1% (BENADRYL ITCH STOPPING) TOPICAL CREAM    Apply  to affected area three (3) times daily as needed for Itching. IBUPROFEN (MOTRIN) 400 MG TABLET    Take 1 Tab by mouth every six (6) hours as needed for Pain. LIDOCAINE (LIDODERM) 5 %    Apply patch to the affected area for 12 hours a day and remove for 12 hours a day. SCREENINGS               No data recorded         PHYSICAL EXAM      ED Triage Vitals [01/04/23 1135]   ED Encounter Vitals Group      /87      Pulse (Heart Rate) 91      Resp Rate 18      Temp 98.6 °F (37 °C)      Temp src       O2 Sat (%) 98 %      Weight 140 lb 10.5 oz      Height 5' 2\"        Physical Exam  Vitals and nursing note reviewed. Constitutional:       General: She is not in acute distress. Appearance: She is well-developed. She is not ill-appearing. Cardiovascular:      Rate and Rhythm: Normal rate and regular rhythm. Pulses: Normal pulses. Heart sounds: Normal heart sounds. Pulmonary:      Effort: Pulmonary effort is normal.      Breath sounds: Normal breath sounds. Abdominal:      General: Bowel sounds are normal. There is no distension. Palpations: Abdomen is soft. There is no mass. Tenderness: There is abdominal tenderness in the epigastric area and periumbilical area. There is no right CVA tenderness, left CVA tenderness or guarding.       Comments:  exam deferred    Musculoskeletal: General: Normal range of motion. Cervical back: Normal range of motion and neck supple. Skin:     General: Skin is warm and dry. Neurological:      Mental Status: She is alert and oriented to person, place, and time. DIAGNOSTIC RESULTS   LABS:     Recent Results (from the past 12 hour(s))   URINALYSIS W/ REFLEX CULTURE    Collection Time: 01/04/23 12:26 PM    Specimen: Urine   Result Value Ref Range    Color YELLOW/STRAW      Appearance CLEAR CLEAR      Specific gravity 1.020      pH (UA) 6.0 5.0 - 8.0      Protein Negative NEG mg/dL    Glucose Negative NEG mg/dL    Ketone TRACE (A) NEG mg/dL    Bilirubin Negative NEG      Blood Negative NEG      Urobilinogen 1.0 0.2 - 1.0 EU/dL    Nitrites Negative NEG      Leukocyte Esterase SMALL (A) NEG      WBC 0-4 0 - 4 /hpf    RBC 0-5 0 - 5 /hpf    Epithelial cells FEW FEW /lpf    Bacteria Negative NEG /hpf    UA:UC IF INDICATED CULTURE NOT INDICATED BY UA RESULT CNI     WET PREP    Collection Time: 01/04/23 12:26 PM    Specimen: Miscellaneous sample   Result Value Ref Range    Clue cells CLUE CELLS ABSENT      Wet prep Negative     WENDY, OTHER SOURCES    Collection Time: 01/04/23 12:26 PM    Specimen: Vagina; Other   Result Value Ref Range    Special Requests: NO SPECIAL REQUESTS      KOH NO YEAST SEEN     HCG URINE, QL. - POC    Collection Time: 01/04/23 12:33 PM   Result Value Ref Range    Pregnancy test,urine (POC) Negative NEG          EKG: When ordered, EKG's are interpreted by the Emergency Department Physician in the absence of a cardiologist.  Please see their note for interpretation of EKG. RADIOLOGY:  Non-plain film images such as CT, Ultrasound and MRI are read by the radiologist. Plain radiographic images are visualized and preliminarily interpreted by the ED Provider with the below findings:       Interpretation per the Radiologist below, if available at the time of this note:     No results found.       PROCEDURES   Unless otherwise noted below, none  Procedures     CRITICAL CARE TIME   None     EMERGENCY DEPARTMENT COURSE and DIFFERENTIAL DIAGNOSIS/MDM   Vitals:    Vitals:    01/04/23 1135   BP: 126/87   Pulse: 91   Resp: 18   Temp: 98.6 °F (37 °C)   SpO2: 98%   Weight: 63.8 kg (140 lb 10.5 oz)   Height: 5' 2\" (1.575 m)        Patient was given the following medications:  Medications - No data to display    CONSULTS: (Who and What was discussed)  None    Chronic Conditions: None     Social Determinants affecting Dx or Tx: None    Records Reviewed (source and summary of external records): Nursing Notes, Old Medical Records, and Previous Laboratory Studies    CC/HPI Summary, DDx, ED Course, and Reassessment:    52-year-old female presenting with pelvic pain exhibiting no pelvic tenderness however she does have mid abdominal tenderness and left upper quadrant tenderness. I explained this may likely be constipation versus gastritis. Discussed GI cocktail or Maalox with patient however she declines at this time stating that she takes Pepcid and will restart her medication. Advise her genital swabs are unremarkable however patient states she believes she has BV and is requesting for Flagyl. Plan to discharge with a dose and advise if no symptom improvement to follow-up with GYN. Disposition Considerations (Tests not done, Shared Decision Making, Pt Expectation of Test or Tx.): Diagnoses include gonorrhea, chlamydia, trichomonas, UTI, gastritis, constipation, secondary amenorrhea, pregnancy    FINAL IMPRESSION     1. Vaginitis and vulvovaginitis    2. Secondary amenorrhea          DISPOSITION/PLAN   Discharged    Discharge Note: The patient is stable for discharge home. The signs, symptoms, diagnosis, and discharge instructions have been discussed, understanding conveyed, and agreed upon. The patient is to follow up as recommended or return to ER should their symptoms worsen.       PATIENT REFERRED TO:  Follow-up Information       Follow up With Specialties Details Why [facility]  Schedule an appointment as soon as possible for a visit  Make an appointment with GYN for missed menses [address] [facility],[address]  [phone]              DISCHARGE MEDICATIONS:  Current Discharge Medication List        START taking these medications    Details   metroNIDAZOLE (FlagyL) 500 mg tablet Take 1 Tablet by mouth two (2) times a day for 7 days. Qty: 14 Tablet, Refills: 0  Start date: 1/4/2023, End date: 1/11/2023               DISCONTINUED MEDICATIONS:  Current Discharge Medication List          Shared Not Shared AYDE: I have seen and evaluated the patient. My supervision physician was available for consultation. I am the Primary Clinician of Record. Vasiliy Macedo NP (electronically signed)    (Please note that parts of this dictation were completed with voice recognition software. Quite often unanticipated grammatical, syntax, homophones, and other interpretive errors are inadvertently transcribed by the computer software. Please disregards these errors.  Please excuse any errors that have escaped final proofreading.)

## 2023-01-04 NOTE — Clinical Note
[facility] EMERGENCY DEPT  [address] [address] [phone]    Work/School Note    Date: 1/4/2023    To Whom It May concern:      Josse Leonardo was seen and treated today in the emergency room by the following provider(s):  Attending Provider: Dara Allen MD  Nurse Practitioner: Amelie Araujo NP. Josse Leonardo is excused from work/school on 01/04/23. She is clear to return to work/school on 01/05/23.         Sincerely,          Vasiliy Macedo NP

## 2023-01-04 NOTE — DISCHARGE INSTRUCTIONS
It was a pleasure taking care of you at [facility] Emergency Department today. We know that when you come to [facility], you are entrusting us with your health, comfort, and safety. Our physicians and nurses honor that trust, and we truly appreciate the opportunity to care for you and your loved ones. We also value our feedback. If you receive a survey about your Emergency Department experience today, please fill it out. We care about our patients' feedback, and we listen to what you have to say. Thank you!

## 2023-01-04 NOTE — ED NOTES
Pt presents ambulatory to ED complaining of a white vaginal discharge with a foul odor for the past few days. Pt reports she hasn't had a period in a few months but also states that she is not sexually active. Pt is alert and oriented x 4, RR even and unlabored, skin is warm and dry. Assesment completed and pt updated on plan of care. Emergency Department Nursing Plan of Care       The Nursing Plan of Care is developed from the Nursing assessment and Emergency Department Attending provider initial evaluation. The plan of care may be reviewed in the ED Provider note.     The Plan of Care was developed with the following considerations:   Patient / Family readiness to learn indicated by:verbalized understanding  Persons(s) to be included in education: patient  Barriers to Learning/Limitations:No    Signed     Daria Khanna RN    1/4/2023   12:37 PM

## 2023-01-05 ENCOUNTER — TELEPHONE (OUTPATIENT)
Dept: CARDIOLOGY CLINIC | Age: 48
End: 2023-01-05

## 2023-01-05 NOTE — TELEPHONE ENCOUNTER
I called and spoke with the patient, two identifiers verified. I have notified patient about the result of her Nuclear Cardiac Stress Test:  ----- Message from Rocio Heaton MD sent at 1/3/2023  5:14 PM EST -----  Normal, no evidence of any blockages as cause for symptoms. Looks great    She verbalized understanding.

## 2023-01-05 NOTE — ED NOTES
Discharge instructions were given to the patient by Hugo Hernandez RN. The patient left the Emergency Department ambulatory, alert and oriented and in no acute distress with 1 prescriptions. The patient was encouraged to call or return to the ED for worsening issues or problems and was encouraged to schedule a follow up appointment for continuing care. The patient verbalized understanding of discharge instructions and prescriptions, all questions were answered. The patient has no further concerns at this time.     franki

## 2023-01-12 ENCOUNTER — TELEPHONE (OUTPATIENT)
Dept: CARDIOLOGY CLINIC | Age: 48
End: 2023-01-12

## 2023-01-12 NOTE — TELEPHONE ENCOUNTER
I called and spoke with the patient, two identifiers verified. Patient is notified about the result of her Cardiac Event Monitor:  ----- Message from Stephania Hickman MD sent at 1/12/2023  8:33 AM EST -----  Few skipped beats but nothing serious or too worry about. Looks good    Patient is advised to limit or/ avoid caffeine, alcohol, and smoking. Avoid recreational drug use that will affect heart rate and rhythm. She verbalized understanding. No concerns at this time.

## 2023-01-27 ENCOUNTER — HOSPITAL ENCOUNTER (EMERGENCY)
Age: 48
Discharge: HOME | End: 2023-01-27
Attending: STUDENT IN AN ORGANIZED HEALTH CARE EDUCATION/TRAINING PROGRAM
Payer: MEDICAID

## 2023-01-27 VITALS
HEART RATE: 125 BPM | DIASTOLIC BLOOD PRESSURE: 97 MMHG | SYSTOLIC BLOOD PRESSURE: 157 MMHG | WEIGHT: 140 LBS | HEIGHT: 62 IN | BODY MASS INDEX: 25.76 KG/M2 | OXYGEN SATURATION: 98 % | TEMPERATURE: 98.7 F | RESPIRATION RATE: 18 BRPM

## 2023-01-27 DIAGNOSIS — K08.89 DENTALGIA: ICD-10-CM

## 2023-01-27 DIAGNOSIS — K08.409 S/P TOOTH EXTRACTION: Primary | ICD-10-CM

## 2023-01-27 PROCEDURE — 74011250637 HC RX REV CODE- 250/637: Performed by: NURSE PRACTITIONER

## 2023-01-27 PROCEDURE — 99283 EMERGENCY DEPT VISIT LOW MDM: CPT

## 2023-01-27 RX ORDER — HYDROCODONE BITARTRATE AND ACETAMINOPHEN 5; 325 MG/1; MG/1
1 TABLET ORAL
Status: COMPLETED | OUTPATIENT
Start: 2023-01-27 | End: 2023-01-27

## 2023-01-27 RX ORDER — ACETAMINOPHEN 500 MG
1000 TABLET ORAL
Qty: 20 TABLET | Refills: 0 | Status: CANCELLED | OUTPATIENT
Start: 2023-01-27

## 2023-01-27 RX ADMIN — HYDROCODONE BITARTRATE AND ACETAMINOPHEN 1 TABLET: 5; 325 TABLET ORAL at 19:26

## 2023-02-28 ENCOUNTER — HOSPITAL ENCOUNTER (EMERGENCY)
Age: 48
Discharge: HOME | End: 2023-02-28
Attending: EMERGENCY MEDICINE
Payer: MEDICAID

## 2023-02-28 VITALS
HEART RATE: 80 BPM | HEIGHT: 62 IN | TEMPERATURE: 98.5 F | OXYGEN SATURATION: 98 % | DIASTOLIC BLOOD PRESSURE: 93 MMHG | WEIGHT: 141.5 LBS | RESPIRATION RATE: 18 BRPM | SYSTOLIC BLOOD PRESSURE: 136 MMHG | BODY MASS INDEX: 26.04 KG/M2

## 2023-02-28 DIAGNOSIS — B96.89 BV (BACTERIAL VAGINOSIS): Primary | ICD-10-CM

## 2023-02-28 DIAGNOSIS — N76.0 BV (BACTERIAL VAGINOSIS): Primary | ICD-10-CM

## 2023-02-28 DIAGNOSIS — Z20.2 POSSIBLE EXPOSURE TO STD: ICD-10-CM

## 2023-02-28 LAB
APPEARANCE UR: CLEAR
BACTERIA URNS QL MICRO: NEGATIVE /HPF
BILIRUB UR QL: NEGATIVE
CLUE CELLS VAG QL WET PREP: NORMAL
COLOR UR: ABNORMAL
EPITH CASTS URNS QL MICRO: ABNORMAL /LPF
GLUCOSE UR STRIP.AUTO-MCNC: NEGATIVE MG/DL
HCG UR QL: NEGATIVE
HGB UR QL STRIP: NEGATIVE
KETONES UR QL STRIP.AUTO: ABNORMAL MG/DL
KOH PREP SPEC: NORMAL
LEUKOCYTE ESTERASE UR QL STRIP.AUTO: NEGATIVE
MUCOUS THREADS URNS QL MICRO: ABNORMAL /LPF
NITRITE UR QL STRIP.AUTO: NEGATIVE
PH UR STRIP: 5.5 (ref 5–8)
PROT UR STRIP-MCNC: ABNORMAL MG/DL
RBC #/AREA URNS HPF: ABNORMAL /HPF (ref 0–5)
SERVICE CMNT-IMP: NORMAL
SP GR UR REFRACTOMETRY: 1.03 (ref 1–1.03)
T VAGINALIS VAG QL WET PREP: NORMAL
UA: UC IF INDICATED,UAUC: ABNORMAL
UROBILINOGEN UR QL STRIP.AUTO: 1 EU/DL (ref 0.2–1)
WBC URNS QL MICRO: ABNORMAL /HPF (ref 0–4)

## 2023-02-28 PROCEDURE — 96372 THER/PROPH/DIAG INJ SC/IM: CPT

## 2023-02-28 PROCEDURE — 87210 SMEAR WET MOUNT SALINE/INK: CPT

## 2023-02-28 PROCEDURE — 74011250636 HC RX REV CODE- 250/636: Performed by: PHYSICIAN ASSISTANT

## 2023-02-28 PROCEDURE — 99284 EMERGENCY DEPT VISIT MOD MDM: CPT

## 2023-02-28 PROCEDURE — 87491 CHLMYD TRACH DNA AMP PROBE: CPT

## 2023-02-28 PROCEDURE — 81025 URINE PREGNANCY TEST: CPT

## 2023-02-28 PROCEDURE — 74011250637 HC RX REV CODE- 250/637: Performed by: PHYSICIAN ASSISTANT

## 2023-02-28 PROCEDURE — 74011000250 HC RX REV CODE- 250: Performed by: PHYSICIAN ASSISTANT

## 2023-02-28 PROCEDURE — 81001 URINALYSIS AUTO W/SCOPE: CPT

## 2023-02-28 RX ORDER — AZITHROMYCIN 500 MG/1
1000 TABLET, FILM COATED ORAL
Status: COMPLETED | OUTPATIENT
Start: 2023-02-28 | End: 2023-02-28

## 2023-02-28 RX ORDER — METRONIDAZOLE 7.5 MG/G
1 GEL VAGINAL DAILY
Qty: 25 G | Refills: 0 | Status: SHIPPED | OUTPATIENT
Start: 2023-02-28 | End: 2023-03-05

## 2023-02-28 RX ORDER — METRONIDAZOLE 500 MG/1
500 TABLET ORAL 2 TIMES DAILY
Qty: 14 TABLET | Refills: 0 | Status: SHIPPED | OUTPATIENT
Start: 2023-02-28 | End: 2023-02-28

## 2023-02-28 RX ADMIN — AZITHROMYCIN MONOHYDRATE 1000 MG: 500 TABLET ORAL at 15:16

## 2023-02-28 RX ADMIN — LIDOCAINE HYDROCHLORIDE 500 MG: 10 INJECTION, SOLUTION EPIDURAL; INFILTRATION; INTRACAUDAL; PERINEURAL at 15:15

## 2023-02-28 NOTE — ED PROVIDER NOTES
[facility] EMERGENCY DEPT  EMERGENCY DEPARTMENT ENCOUNTER       Pt Name: Nancy Moulton  MRN: [id number]  [place] 1975  Date of evaluation: 2/28/2023  Provider: ARCHIE Delaney   PCP: Tony Esteban MD  Note Started: 2:22 PM 2/28/23     CHIEF COMPLAINT       Chief Complaint   Patient presents with    Vaginal Discharge        HISTORY OF PRESENT ILLNESS: 1 or more elements      History From: Patient  HPI Limitations : None     Nancy Moulton is a 56 y.o. female who presents ambulatory with a day or 2 of 7 out of 10 constant, irritated and itching vaginal discharge. Nothing seems to make her symptoms better or worse. She tells me day before yesterday she noticed a slight vaginal discharge with odor. She tells me yesterday the discharge worsened and is now \"itchy\". She tells me she confronted her fiancé and she does not believe what he is saying. She presents today concerned about a possible STD and requests empiric treatment. She denies any dysuria, urgency or frequency. She denies any unusual lumps, bumps or sores of the pelvic region. She has been well lately without fever. She does complain of some vaginal discomfort but denies abdominal pain. There has been no nausea, vomiting or diarrhea. There is no skin rash. She denies any joint pain. Nursing Notes were all reviewed and agreed with or any disagreements were addressed in the HPI. REVIEW OF SYSTEMS      Review of Systems   Constitutional:  Positive for fever. Gastrointestinal:  Negative for abdominal pain, diarrhea, nausea and vomiting. Genitourinary:  Positive for vaginal discharge. Negative for dysuria, frequency, genital sores and urgency. Musculoskeletal:  Negative for joint swelling. Skin:  Negative for rash. Positives and Pertinent negatives as per HPI.     PAST HISTORY     Past Medical History:  Past Medical History:   Diagnosis Date    angina     Angina at rest [facility])     Breast pain     since 2016, right breast, on and off    Chiari malformation     Heart abnormalities     leaky heart valve, murmur    Hypertension     Other ill-defined conditions(799.89)     cardiac arrhythmias    Other ill-defined conditions(799.89)     neuropathy    Psychiatric disorder     anxiety and depression    Stroke ([facility] [address]     TIA       Past Surgical History:  Past Surgical History:   Procedure Laterality Date    HX GYN      HX ORTHOPAEDIC      screws in left ankle    HX OTHER SURGICAL      surgery to back of head due to fractured skull    ND UNLISTED NEUROLOGICAL/NEUROMUSCULAR DX PX      ND UNLISTED PROCEDURE ABDOMEN PERITONEUM & OMENTUM      laporscopic surgery post ruptured fallopian tube       Family History:  Family History   Problem Relation Age of Onset    Hypertension Mother     Stroke Mother     Diabetes Mother     Drug Abuse Father        Social History:  Social History     Tobacco Use    Smoking status: Every Day     Packs/day: 0.25     Years: 23.00     Pack years: 5.75     Types: Cigarettes     Last attempt to quit: 3/5/2021     Years since quittin.9    Smokeless tobacco: Never   Vaping Use    Vaping Use: Some days   Substance Use Topics    Alcohol use: Yes    Drug use: Not Currently     Comment: former crack user approx clean        Allergies: Allergies   Allergen Reactions    Metoprolol Other (comments)     headache    Toradol [Ketorolac] Hives     Tolerated aspirin 10/6    Toradol [Ketorolac] Itching       CURRENT MEDICATIONS      Previous Medications    ACETAMINOPHEN (TYLENOL) 500 MG TABLET    Take 2 Tabs by mouth every eight (8) hours as needed for Pain. AMLODIPINE (NORVASC) 10 MG TABLET    Take 1 Tablet by mouth daily. Indications: high blood pressure    ASPIRIN 81 MG CHEWABLE TABLET    Take 1 Tablet by mouth daily. ATORVASTATIN (LIPITOR) 80 MG TABLET    Take 1 Tablet by mouth nightly.     BUTALBITAL-ACETAMINOPHEN-CAFF (FIORICET) -40 MG PER CAPSULE    Take 1 Capsule by mouth every six (6) hours as needed for Headache. CARIPRAZINE (VRAYLAR) 3 MG CAPSULE    Take 3 mg by mouth daily. CLOPIDOGREL (PLAVIX) 75 MG TAB    Take 1 Tablet by mouth daily. CYCLOBENZAPRINE (FLEXERIL) 10 MG TABLET    Take 1 Tablet by mouth three (3) times daily as needed for Muscle Spasm(s). DICLOFENAC (VOLTAREN) 1 % GEL    Apply  to affected area four (4) times daily as needed for Pain. DIPHENHYDRAMINE-ZINC ACETATE 1%-0.1% (BENADRYL ITCH STOPPING) TOPICAL CREAM    Apply  to affected area three (3) times daily as needed for Itching. DOXAZOSIN (CARDURA) 2 MG TABLET    Take 1 Tablet by mouth nightly. FLUTICASONE PROPIONATE (FLONASE) 50 MCG/ACTUATION NASAL SPRAY    2 Sprays by Both Nostrils route daily as needed for Allergies. HYDROCHLOROTHIAZIDE (MICROZIDE) 12.5 MG CAPSULE    Take 1 Capsule by mouth daily. Indications: high blood pressure    HYDROXYZINE HCL (ATARAX) 25 MG TABLET    Take 25 mg by mouth three (3) times daily as needed for Anxiety. IBUPROFEN (MOTRIN) 400 MG TABLET    Take 1 Tab by mouth every six (6) hours as needed for Pain. LIDOCAINE (LIDODERM) 5 %    Apply patch to the affected area for 12 hours a day and remove for 12 hours a day. LIDOCAINE (LIDODERM) 5 %    1 Patch by TransDERmal route daily as needed for Pain. Apply patch to the affected area for 12 hours a day and remove for 12 hours a day. PANTOPRAZOLE (PROTONIX) 20 MG TABLET    Take 20 mg by mouth Daily (before breakfast). PHYSICAL EXAM      ED Triage Vitals [02/28/23 1405]   ED Encounter Vitals Group      BP (!) 136/93      Pulse (Heart Rate) 80      Resp Rate 18      Temp 98.5 °F (36.9 °C)      Temp src       O2 Sat (%) 98 %      Weight 141 lb 8 oz      Height 5' 2\"        Physical Exam  Vitals and nursing note reviewed. Constitutional:       General: She is not in acute distress. HENT:      Head: Normocephalic and atraumatic.       Right Ear: External ear normal.      Left Ear: External ear normal.      Nose: Nose normal.   Eyes: General: Vision grossly intact. Conjunctiva/sclera: Conjunctivae normal.   Cardiovascular:      Rate and Rhythm: Normal rate. Pulmonary:      Effort: Pulmonary effort is normal.   Genitourinary:     Comments:   Deferred  Musculoskeletal:         General: Normal range of motion. Skin:     Findings: No rash. Neurological:      Mental Status: She is oriented to person, place, and time. She is not disoriented. Psychiatric:         Speech: Speech normal.        DIAGNOSTIC RESULTS   LABS:     Recent Results (from the past 12 hour(s))   URINALYSIS W/ REFLEX CULTURE    Collection Time: 02/28/23  2:15 PM    Specimen: Urine   Result Value Ref Range    Color DARK YELLOW      Appearance CLEAR CLEAR      Specific gravity 1.030 1.003 - 1.030      pH (UA) 5.5 5.0 - 8.0      Protein TRACE (A) NEG mg/dL    Glucose Negative NEG mg/dL    Ketone TRACE (A) NEG mg/dL    Bilirubin Negative NEG      Blood Negative NEG      Urobilinogen 1.0 0.2 - 1.0 EU/dL    Nitrites Negative NEG      Leukocyte Esterase Negative NEG      WBC 0-4 0 - 4 /hpf    RBC 0-5 0 - 5 /hpf    Epithelial cells FEW FEW /lpf    Bacteria Negative NEG /hpf    UA:UC IF INDICATED CULTURE NOT INDICATED BY UA RESULT CNI      Mucus 1+ (A) NEG /lpf   WET PREP    Collection Time: 02/28/23  2:15 PM    Specimen: Miscellaneous sample   Result Value Ref Range    Clue cells CLUE CELLS PRESENT      Wet prep NO TRICHOMONAS SEEN     KOH, OTHER SOURCES    Collection Time: 02/28/23  2:15 PM    Specimen: Endocervix; Other   Result Value Ref Range    Special Requests: NO SPECIAL REQUESTS      KOH NO YEAST SEEN     HCG URINE, QL. - POC    Collection Time: 02/28/23  2:21 PM   Result Value Ref Range    Pregnancy test,urine (POC) Negative NEG          EKG: When ordered, EKG's are interpreted by the Emergency Department Physician in the absence of a cardiologist.  Please see their note for interpretation of EKG.       RADIOLOGY:  Non-plain film images such as CT, Ultrasound and MRI are read by the radiologist. Plain radiographic images are visualized and preliminarily interpreted by the ED Provider with the below findings:      Interpretation per the Radiologist below, if available at the time of this note:     No results found. PROCEDURES   Unless otherwise noted below, none  Procedures     CRITICAL CARE TIME   None    EMERGENCY DEPARTMENT COURSE and DIFFERENTIAL DIAGNOSIS/MDM   Vitals:    Vitals:    02/28/23 1405   BP: (!) 136/93   Pulse: 80   Resp: 18   Temp: 98.5 °F (36.9 °C)   SpO2: 98%   Weight: 64.2 kg (141 lb 8 oz)   Height: 5' 2\" (1.575 m)        Patient was given the following medications:  Medications   cefTRIAXone (ROCEPHIN) 500 mg in lidocaine (PF) (XYLOCAINE) 10 mg/mL (1 %) IM injection (has no administration in time range)   azithromycin (ZITHROMAX) tablet 1,000 mg (has no administration in time range)       CONSULTS: (Who and What was discussed)  None    Chronic Conditions: Chiari malformation, HTN, anxiety and depression, smoking addiction, TIA and others as above    Social Determinants affecting Dx or Tx: None    Records Reviewed (source and summary of external records): Prior medical records    CC/HPI Summary, DDx, ED Course, and Reassessment: DDx includes STI, BV, candidiasis, UTI, other. Self swab for: KOH / wet prep / Ollis Labrum; will obtain UA / POC UhCG; treatment based on results; patient requests empiric treatment for GC         Disposition Considerations (Tests not done, Shared Decision Making, Pt Expectation of Test or Tx.):      FINAL IMPRESSION     1. BV (bacterial vaginosis)    2.  Possible exposure to STD          DISPOSITION/PLAN   Discharged     PATIENT REFERRED TO:  Follow-up Information       Follow up With Specialties Details Why Contact Thania Sanchez MD Family Medicine Call  PRIMARY CARE: call to schedule follow up [address]  [phone]                DISCHARGE MEDICATIONS:  Current Discharge Medication List        START taking these medications    Details   metroNIDAZOLE (FlagyL) 500 mg tablet Take 1 Tablet by mouth two (2) times a day for 7 days. Qty: 14 Tablet, Refills: 0  Start date: 2/28/2023, End date: 3/7/2023               DISCONTINUED MEDICATIONS:  Current Discharge Medication List          ED Attending Involvement : I have seen and evaluated the patient. My supervision physician was available for consultation. I am the Primary Clinician of Record. ARCHIE Hansen (electronically signed)    (Please note that parts of this dictation were completed with voice recognition software. Quite often unanticipated grammatical, syntax, homophones, and other interpretive errors are inadvertently transcribed by the computer software. Please disregards these errors.  Please excuse any errors that have escaped final proofreading.)

## 2023-03-01 NOTE — BH NOTES
GROUP THERAPY PROGRESS NOTE    Patient is participating in Treatment Team Group. Group time: 60 minutes    Personal goal for participation: To participate in treatment plan and discharge     Goal orientation: Personal    Group therapy participation: active     Therapeutic interventions reviewed and discussed: Group members met with their treatment team individually and discussed their treatment plan with their provider, , nurse, and pharmacist. Each patient was given the opportunity to ask questions related to their discharge and/or medications. Impression of participation: Patient participated in treatment team. Engaged in conversation and discussion. Asked questions about treatment plan and discharge.     Courtney Grover, Supervisee in Social Work Ketoconazole Counseling:   Patient counseled regarding improving absorption with orange juice.  Adverse effects include but are not limited to breast enlargement, headache, diarrhea, nausea, upset stomach, liver function test abnormalities, taste disturbance, and stomach pain.  There is a rare possibility of liver failure that can occur when taking ketoconazole. The patient understands that monitoring of LFTs may be required, especially at baseline. The patient verbalized understanding of the proper use and possible adverse effects of ketoconazole.  All of the patient's questions and concerns were addressed.

## 2023-04-20 ENCOUNTER — APPOINTMENT (OUTPATIENT)
Dept: CT IMAGING | Age: 48
End: 2023-04-20
Attending: STUDENT IN AN ORGANIZED HEALTH CARE EDUCATION/TRAINING PROGRAM
Payer: MEDICAID

## 2023-04-20 ENCOUNTER — HOSPITAL ENCOUNTER (OUTPATIENT)
Age: 48
Setting detail: OBSERVATION
Discharge: HOME | End: 2023-04-21
Attending: STUDENT IN AN ORGANIZED HEALTH CARE EDUCATION/TRAINING PROGRAM | Admitting: STUDENT IN AN ORGANIZED HEALTH CARE EDUCATION/TRAINING PROGRAM
Payer: MEDICAID

## 2023-04-20 ENCOUNTER — APPOINTMENT (OUTPATIENT)
Dept: GENERAL RADIOLOGY | Age: 48
End: 2023-04-20
Attending: STUDENT IN AN ORGANIZED HEALTH CARE EDUCATION/TRAINING PROGRAM
Payer: MEDICAID

## 2023-04-20 DIAGNOSIS — Z86.73 HISTORY OF STROKE: ICD-10-CM

## 2023-04-20 DIAGNOSIS — R26.9 GAIT ABNORMALITY: Primary | ICD-10-CM

## 2023-04-20 PROBLEM — G43.909 MIGRAINES: Status: ACTIVE | Noted: 2023-04-20

## 2023-04-20 LAB
ALBUMIN SERPL-MCNC: 3.5 G/DL (ref 3.5–5)
ALBUMIN/GLOB SERPL: 0.9 (ref 1.1–2.2)
ALP SERPL-CCNC: 61 U/L (ref 45–117)
ALT SERPL-CCNC: 34 U/L (ref 12–78)
ANION GAP SERPL CALC-SCNC: 9 MMOL/L (ref 5–15)
APPEARANCE UR: CLEAR
AST SERPL-CCNC: 19 U/L (ref 15–37)
ATRIAL RATE: 75 BPM
BACTERIA URNS QL MICRO: NEGATIVE /HPF
BASOPHILS # BLD: 0.1 K/UL (ref 0–0.1)
BASOPHILS NFR BLD: 1 % (ref 0–1)
BILIRUB SERPL-MCNC: 0.3 MG/DL (ref 0.2–1)
BILIRUB UR QL: NEGATIVE
BUN SERPL-MCNC: 20 MG/DL (ref 6–20)
BUN/CREAT SERPL: 22 (ref 12–20)
CALCIUM SERPL-MCNC: 8.6 MG/DL (ref 8.5–10.1)
CALCULATED P AXIS, ECG09: 39 DEGREES
CALCULATED R AXIS, ECG10: 13 DEGREES
CALCULATED T AXIS, ECG11: 23 DEGREES
CHLORIDE SERPL-SCNC: 103 MMOL/L (ref 97–108)
CO2 SERPL-SCNC: 29 MMOL/L (ref 21–32)
COLOR UR: ABNORMAL
CREAT SERPL-MCNC: 0.93 MG/DL (ref 0.55–1.02)
DIAGNOSIS, 93000: NORMAL
DIFFERENTIAL METHOD BLD: ABNORMAL
EOSINOPHIL # BLD: 0.5 K/UL (ref 0–0.4)
EOSINOPHIL NFR BLD: 5 % (ref 0–7)
EPITH CASTS URNS QL MICRO: ABNORMAL /LPF
ERYTHROCYTE [DISTWIDTH] IN BLOOD BY AUTOMATED COUNT: 11.8 % (ref 11.5–14.5)
GLOBULIN SER CALC-MCNC: 3.7 G/DL (ref 2–4)
GLUCOSE SERPL-MCNC: 114 MG/DL (ref 65–100)
GLUCOSE UR STRIP.AUTO-MCNC: NEGATIVE MG/DL
HCG SERPL QL: NEGATIVE
HCT VFR BLD AUTO: 35.4 % (ref 35–47)
HGB BLD-MCNC: 12.5 G/DL (ref 11.5–16)
HGB UR QL STRIP: NEGATIVE
IMM GRANULOCYTES # BLD AUTO: 0 K/UL (ref 0–0.04)
IMM GRANULOCYTES NFR BLD AUTO: 0 % (ref 0–0.5)
INR PPP: 0.9 (ref 0.9–1.1)
KETONES UR QL STRIP.AUTO: NEGATIVE MG/DL
LEUKOCYTE ESTERASE UR QL STRIP.AUTO: ABNORMAL
LYMPHOCYTES # BLD: 2.3 K/UL (ref 0.8–3.5)
LYMPHOCYTES NFR BLD: 24 % (ref 12–49)
MCH RBC QN AUTO: 31.3 PG (ref 26–34)
MCHC RBC AUTO-ENTMCNC: 35.3 G/DL (ref 30–36.5)
MCV RBC AUTO: 88.5 FL (ref 80–99)
MONOCYTES # BLD: 0.7 K/UL (ref 0–1)
MONOCYTES NFR BLD: 7 % (ref 5–13)
NEUTS SEG # BLD: 6.2 K/UL (ref 1.8–8)
NEUTS SEG NFR BLD: 63 % (ref 32–75)
NITRITE UR QL STRIP.AUTO: NEGATIVE
NRBC # BLD: 0 K/UL (ref 0–0.01)
NRBC BLD-RTO: 0 PER 100 WBC
P-R INTERVAL, ECG05: 166 MS
PH UR STRIP: 6 (ref 5–8)
PLATELET # BLD AUTO: 410 K/UL (ref 150–400)
PMV BLD AUTO: 9.4 FL (ref 8.9–12.9)
POTASSIUM SERPL-SCNC: 3.4 MMOL/L (ref 3.5–5.1)
PROT SERPL-MCNC: 7.2 G/DL (ref 6.4–8.2)
PROT UR STRIP-MCNC: NEGATIVE MG/DL
PROTHROMBIN TIME: 9.3 SEC (ref 9–11.1)
Q-T INTERVAL, ECG07: 414 MS
QRS DURATION, ECG06: 84 MS
QTC CALCULATION (BEZET), ECG08: 462 MS
RBC # BLD AUTO: 4 M/UL (ref 3.8–5.2)
RBC #/AREA URNS HPF: ABNORMAL /HPF (ref 0–5)
SODIUM SERPL-SCNC: 141 MMOL/L (ref 136–145)
SP GR UR REFRACTOMETRY: 1.02
UA: UC IF INDICATED,UAUC: ABNORMAL
UROBILINOGEN UR QL STRIP.AUTO: 1 EU/DL (ref 0.2–1)
VENTRICULAR RATE, ECG03: 75 BPM
WBC # BLD AUTO: 9.8 K/UL (ref 3.6–11)
WBC URNS QL MICRO: ABNORMAL /HPF (ref 0–4)

## 2023-04-20 PROCEDURE — G0378 HOSPITAL OBSERVATION PER HR: HCPCS

## 2023-04-20 PROCEDURE — 93005 ELECTROCARDIOGRAM TRACING: CPT

## 2023-04-20 PROCEDURE — 80053 COMPREHEN METABOLIC PANEL: CPT

## 2023-04-20 PROCEDURE — 85610 PROTHROMBIN TIME: CPT

## 2023-04-20 PROCEDURE — 74011250637 HC RX REV CODE- 250/637: Performed by: STUDENT IN AN ORGANIZED HEALTH CARE EDUCATION/TRAINING PROGRAM

## 2023-04-20 PROCEDURE — 99285 EMERGENCY DEPT VISIT HI MDM: CPT

## 2023-04-20 PROCEDURE — 85025 COMPLETE CBC W/AUTO DIFF WBC: CPT

## 2023-04-20 PROCEDURE — 84703 CHORIONIC GONADOTROPIN ASSAY: CPT

## 2023-04-20 PROCEDURE — 74011250636 HC RX REV CODE- 250/636: Performed by: STUDENT IN AN ORGANIZED HEALTH CARE EDUCATION/TRAINING PROGRAM

## 2023-04-20 PROCEDURE — 71045 X-RAY EXAM CHEST 1 VIEW: CPT

## 2023-04-20 PROCEDURE — 81001 URINALYSIS AUTO W/SCOPE: CPT

## 2023-04-20 PROCEDURE — 94762 N-INVAS EAR/PLS OXIMTRY CONT: CPT

## 2023-04-20 PROCEDURE — 70450 CT HEAD/BRAIN W/O DYE: CPT

## 2023-04-20 PROCEDURE — 36415 COLL VENOUS BLD VENIPUNCTURE: CPT

## 2023-04-20 RX ORDER — ACETAMINOPHEN 325 MG/1
650 TABLET ORAL
Status: DISCONTINUED | OUTPATIENT
Start: 2023-04-20 | End: 2023-04-21 | Stop reason: HOSPADM

## 2023-04-20 RX ORDER — DOXAZOSIN 2 MG/1
2 TABLET ORAL
Status: DISCONTINUED | OUTPATIENT
Start: 2023-04-20 | End: 2023-04-21

## 2023-04-20 RX ORDER — HYDROCHLOROTHIAZIDE 25 MG/1
12.5 TABLET ORAL DAILY
Status: DISCONTINUED | OUTPATIENT
Start: 2023-04-20 | End: 2023-04-21 | Stop reason: HOSPADM

## 2023-04-20 RX ORDER — SODIUM CHLORIDE 0.9 % (FLUSH) 0.9 %
5-40 SYRINGE (ML) INJECTION AS NEEDED
Status: DISCONTINUED | OUTPATIENT
Start: 2023-04-20 | End: 2023-04-21 | Stop reason: HOSPADM

## 2023-04-20 RX ORDER — SODIUM CHLORIDE 0.9 % (FLUSH) 0.9 %
5-40 SYRINGE (ML) INJECTION EVERY 8 HOURS
Status: DISCONTINUED | OUTPATIENT
Start: 2023-04-20 | End: 2023-04-21 | Stop reason: HOSPADM

## 2023-04-20 RX ORDER — POLYETHYLENE GLYCOL 3350 17 G/17G
17 POWDER, FOR SOLUTION ORAL DAILY PRN
Status: DISCONTINUED | OUTPATIENT
Start: 2023-04-20 | End: 2023-04-21 | Stop reason: HOSPADM

## 2023-04-20 RX ORDER — HYDROXYZINE 25 MG/1
25 TABLET, FILM COATED ORAL
Status: DISCONTINUED | OUTPATIENT
Start: 2023-04-20 | End: 2023-04-21 | Stop reason: HOSPADM

## 2023-04-20 RX ORDER — ATORVASTATIN CALCIUM 40 MG/1
80 TABLET, FILM COATED ORAL
Status: DISCONTINUED | OUTPATIENT
Start: 2023-04-20 | End: 2023-04-21 | Stop reason: HOSPADM

## 2023-04-20 RX ORDER — GUAIFENESIN 100 MG/5ML
81 LIQUID (ML) ORAL DAILY
Status: DISCONTINUED | OUTPATIENT
Start: 2023-04-21 | End: 2023-04-21 | Stop reason: HOSPADM

## 2023-04-20 RX ORDER — ONDANSETRON 4 MG/1
4 TABLET, ORALLY DISINTEGRATING ORAL
Status: DISCONTINUED | OUTPATIENT
Start: 2023-04-20 | End: 2023-04-21 | Stop reason: HOSPADM

## 2023-04-20 RX ORDER — AMLODIPINE BESYLATE 5 MG/1
10 TABLET ORAL DAILY
Status: DISCONTINUED | OUTPATIENT
Start: 2023-04-21 | End: 2023-04-21 | Stop reason: HOSPADM

## 2023-04-20 RX ORDER — CLOPIDOGREL BISULFATE 75 MG/1
75 TABLET ORAL DAILY
Status: DISCONTINUED | OUTPATIENT
Start: 2023-04-21 | End: 2023-04-21 | Stop reason: HOSPADM

## 2023-04-20 RX ORDER — ONDANSETRON 2 MG/ML
4 INJECTION INTRAMUSCULAR; INTRAVENOUS
Status: DISCONTINUED | OUTPATIENT
Start: 2023-04-20 | End: 2023-04-21 | Stop reason: HOSPADM

## 2023-04-20 RX ORDER — ACETAMINOPHEN 650 MG/1
650 SUPPOSITORY RECTAL
Status: DISCONTINUED | OUTPATIENT
Start: 2023-04-20 | End: 2023-04-21 | Stop reason: HOSPADM

## 2023-04-20 RX ORDER — SODIUM CHLORIDE 9 MG/ML
125 INJECTION, SOLUTION INTRAVENOUS CONTINUOUS
Status: DISCONTINUED | OUTPATIENT
Start: 2023-04-20 | End: 2023-04-21 | Stop reason: HOSPADM

## 2023-04-20 RX ORDER — PANTOPRAZOLE SODIUM 40 MG/1
40 TABLET, DELAYED RELEASE ORAL
Status: DISCONTINUED | OUTPATIENT
Start: 2023-04-21 | End: 2023-04-21 | Stop reason: HOSPADM

## 2023-04-20 RX ADMIN — ATORVASTATIN CALCIUM 80 MG: 40 TABLET, FILM COATED ORAL at 22:11

## 2023-04-20 RX ADMIN — HYDROCHLOROTHIAZIDE 12.5 MG: 25 TABLET ORAL at 22:11

## 2023-04-20 RX ADMIN — SODIUM CHLORIDE 125 ML/HR: 9 INJECTION, SOLUTION INTRAVENOUS at 23:09

## 2023-04-20 NOTE — ED NOTES
Pt presents to ED with c/o generalized weakness, posterior HA, & bilateral ear pain for 3 days. Pt reports some loss of balance, having \"run into the wall a few times,\" but denies falls, injuries, or LOC. Pt also reports numbness & tingling in the back of bilateral legs. Pt reports some blurred vision, for which she saw her eye doctor yesterday, pt stating they \"told me to follow up with my PCP, but the earliest appointment they had was 5/23/23. \" Pt reports some urinary frequency & increased thirst as well, but no other urinary symptoms. Pt has hx of CVA in Oct. 2022. Assessment completed, pt in NAD, updated on plan of care, call light within reach. Emergency Department Nursing Plan of Care       The Nursing Plan of Care is developed from the Nursing assessment and Emergency Department Attending provider initial evaluation. The plan of care may be reviewed in the ED Provider note.     The Plan of Care was developed with the following considerations:   Patient / Family readiness to learn indicated by:verbalized understanding  Persons(s) to be included in education: patient  Barriers to Learning/Limitations:No    Signed     Nasrin Medeiros RN    4/20/2023   4:57 PM

## 2023-04-20 NOTE — ED PROVIDER NOTES
[facility] EMERGENCY DEPT  EMERGENCY DEPARTMENT ENCOUNTER       Pt Name: Jasper Kinsye  MRN: [id number]  [place] 1975  Date of evaluation: 4/20/2023  Provider: Sushma Daniel DO   PCP: Kate Colorado MD  Note Started: 7:48 PM 4/20/23     CHIEF COMPLAINT       Chief Complaint   Patient presents with    Dizziness        HISTORY OF PRESENT ILLNESS: 1 or more elements      History From: Patient  HPI Limitations : None     Jasper Kinsey is a 56 y.o. female who presents with cc of ataxia. She states that she has history of stroke and has been falling lately, difficulty ambulating due o feeling unsteady and running into walls. She denies any falling to one side. She denies any nausea, vomiting, diarrhea. She does report recent headaches. She states the symptoms are similar to when she was diagnosed with a stroke. She said she has not followed up with neurology as she has been under a lot of stress recently. She denies any chest pain or shortness of breath. She states she feels confused states that she feels like her speech is more slurred. He states family members have also noticed this. Nursing Notes were all reviewed and agreed with or any disagreements were addressed in the HPI. REVIEW OF SYSTEMS      Review of Systems     Positives and Pertinent negatives as per HPI.     PAST HISTORY     Past Medical History:  Past Medical History:   Diagnosis Date    angina     Angina at rest [facility])     Breast pain     since 2016, right breast, on and off    Chiari malformation     Heart abnormalities     leaky heart valve, murmur    Hypertension     Other ill-defined conditions(799.89)     cardiac arrhythmias    Other ill-defined conditions(799.89)     neuropathy    Psychiatric disorder     anxiety and depression    Stroke ([facility] [address] 10/2022    CVA, per chart encounter       Past Surgical History:  Past Surgical History:   Procedure Laterality Date    HX GYN      HX ORTHOPAEDIC      screws in left ankle HX OTHER SURGICAL      surgery to back of head due to fractured skull    [place] UNLISTED NEUROLOGICAL/NEUROMUSCULAR DX PX      KY UNLISTED PROCEDURE ABDOMEN PERITONEUM & OMENTUM      laporscopic surgery post ruptured fallopian tube       Family History:  Family History   Problem Relation Age of Onset    Hypertension Mother     Stroke Mother     Diabetes Mother     Drug Abuse Father        Social History:  Social History     Tobacco Use    Smoking status: Every Day     Packs/day: 0.25     Years: 23.00     Pack years: 5.75     Types: Cigarettes     Last attempt to quit: 3/5/2021     Years since quittin.1    Smokeless tobacco: Never   Vaping Use    Vaping Use: Some days   Substance Use Topics    Alcohol use: Yes     Comment: occ    Drug use: Not Currently     Comment: former crack user approx clean        Allergies: Allergies   Allergen Reactions    Metoprolol Other (comments)     headache    Toradol [Ketorolac] Hives     Tolerated aspirin 10/6    Toradol [Ketorolac] Itching       CURRENT MEDICATIONS      Previous Medications    AMLODIPINE (NORVASC) 10 MG TABLET    Take 1 Tablet by mouth daily. Indications: high blood pressure    ASPIRIN 81 MG CHEWABLE TABLET    Take 1 Tablet by mouth daily. ATORVASTATIN (LIPITOR) 80 MG TABLET    Take 1 Tablet by mouth nightly. BUTALBITAL-ACETAMINOPHEN-CAFF (FIORICET) -40 MG PER CAPSULE    Take 1 Capsule by mouth every six (6) hours as needed for Headache. CARIPRAZINE (VRAYLAR) 3 MG CAPSULE    Take 3 mg by mouth daily. CLOPIDOGREL (PLAVIX) 75 MG TAB    Take 1 Tablet by mouth daily. CYCLOBENZAPRINE (FLEXERIL) 10 MG TABLET    Take 1 Tablet by mouth three (3) times daily as needed for Muscle Spasm(s). DICLOFENAC (VOLTAREN) 1 % GEL    Apply  to affected area four (4) times daily as needed for Pain. DIPHENHYDRAMINE-ZINC ACETATE 1%-0.1% (BENADRYL ITCH STOPPING) TOPICAL CREAM    Apply  to affected area three (3) times daily as needed for Itching. DOXAZOSIN (CARDURA) 2 MG TABLET    Take 1 Tablet by mouth nightly. FLUTICASONE PROPIONATE (FLONASE) 50 MCG/ACTUATION NASAL SPRAY    2 Sprays by Both Nostrils route daily as needed for Allergies. HYDROCHLOROTHIAZIDE (MICROZIDE) 12.5 MG CAPSULE    Take 1 Capsule by mouth daily. Indications: high blood pressure    HYDROXYZINE HCL (ATARAX) 25 MG TABLET    Take 1 Tablet by mouth three (3) times daily as needed for Anxiety. LIDOCAINE (LIDODERM) 5 %    Apply patch to the affected area for 12 hours a day and remove for 12 hours a day. PANTOPRAZOLE (PROTONIX) 20 MG TABLET    Take 1 Tablet by mouth Daily (before breakfast). SCREENINGS               No data recorded         PHYSICAL EXAM      ED Triage Vitals [04/20/23 1619]   ED Encounter Vitals Group      BP (!) 143/98      Pulse (Heart Rate) 95      Resp Rate 18      Temp 98.1 °F (36.7 °C)      Temp src       O2 Sat (%) 99 %      Weight 140 lb      Height 5' 2\"        Physical Exam  Vitals and nursing note reviewed. Constitutional:       Appearance: Normal appearance. HENT:      Head: Normocephalic and atraumatic. Mouth/Throat:      Mouth: Mucous membranes are moist.   Eyes:      Conjunctiva/sclera: Conjunctivae normal.   Cardiovascular:      Rate and Rhythm: Normal rate and regular rhythm. Pulmonary:      Effort: Pulmonary effort is normal.      Breath sounds: Normal breath sounds. Abdominal:      General: Abdomen is flat. Palpations: Abdomen is soft. Musculoskeletal:      Right lower leg: No edema. Left lower leg: No edema. Skin:     General: Skin is warm. Capillary Refill: Capillary refill takes less than 2 seconds. Neurological:      Mental Status: She is alert. Mental status is at baseline. Gait: Gait abnormal.      Comments: Alert and oriented x3, cranial nerves II through XII grossly intact, her speech does appear somewhat slow and she does have slight right-sided pronator drift without specific ataxia.  No dysarthria. DIAGNOSTIC RESULTS   LABS:     Recent Results (from the past 12 hour(s))   CBC WITH AUTOMATED DIFF    Collection Time: 04/20/23  6:10 PM   Result Value Ref Range    WBC 9.8 3.6 - 11.0 K/uL    RBC 4.00 3.80 - 5.20 M/uL    HGB 12.5 11.5 - 16.0 g/dL    HCT 35.4 35.0 - 47.0 %    MCV 88.5 80.0 - 99.0 FL    MCH 31.3 26.0 - 34.0 PG    MCHC 35.3 30.0 - 36.5 g/dL    RDW 11.8 11.5 - 14.5 %    PLATELET 335 (H) 209 - 400 K/uL    MPV 9.4 8.9 - 12.9 FL    NRBC 0.0 0  WBC    ABSOLUTE NRBC 0.00 0.00 - 0.01 K/uL    NEUTROPHILS 63 32 - 75 %    LYMPHOCYTES 24 12 - 49 %    MONOCYTES 7 5 - 13 %    EOSINOPHILS 5 0 - 7 %    BASOPHILS 1 0 - 1 %    IMMATURE GRANULOCYTES 0 0.0 - 0.5 %    ABS. NEUTROPHILS 6.2 1.8 - 8.0 K/UL    ABS. LYMPHOCYTES 2.3 0.8 - 3.5 K/UL    ABS. MONOCYTES 0.7 0.0 - 1.0 K/UL    ABS. EOSINOPHILS 0.5 (H) 0.0 - 0.4 K/UL    ABS. BASOPHILS 0.1 0.0 - 0.1 K/UL    ABS. IMM. GRANS. 0.0 0.00 - 0.04 K/UL    DF AUTOMATED     METABOLIC PANEL, COMPREHENSIVE    Collection Time: 04/20/23  6:10 PM   Result Value Ref Range    Sodium 141 136 - 145 mmol/L    Potassium 3.4 (L) 3.5 - 5.1 mmol/L    Chloride 103 97 - 108 mmol/L    CO2 29 21 - 32 mmol/L    Anion gap 9 5 - 15 mmol/L    Glucose 114 (H) 65 - 100 mg/dL    BUN 20 6 - 20 MG/DL    Creatinine 0.93 0.55 - 1.02 MG/DL    BUN/Creatinine ratio 22 (H) 12 - 20      eGFR >60 >60 ml/min/1.73m2    Calcium 8.6 8.5 - 10.1 MG/DL    Bilirubin, total 0.3 0.2 - 1.0 MG/DL    ALT (SGPT) 34 12 - 78 U/L    AST (SGOT) 19 15 - 37 U/L    Alk.  phosphatase 61 45 - 117 U/L    Protein, total 7.2 6.4 - 8.2 g/dL    Albumin 3.5 3.5 - 5.0 g/dL    Globulin 3.7 2.0 - 4.0 g/dL    A-G Ratio 0.9 (L) 1.1 - 2.2     HCG QL SERUM    Collection Time: 04/20/23  6:10 PM   Result Value Ref Range    HCG, Ql. Negative NEG     URINALYSIS W/ REFLEX CULTURE    Collection Time: 04/20/23  6:45 PM    Specimen: Urine   Result Value Ref Range    Color YELLOW/STRAW      Appearance CLEAR CLEAR      Specific gravity 1.025      pH (UA) 6.0 5.0 - 8.0      Protein Negative NEG mg/dL    Glucose Negative NEG mg/dL    Ketone Negative NEG mg/dL    Bilirubin Negative NEG      Blood Negative NEG      Urobilinogen 1.0 0.2 - 1.0 EU/dL    Nitrites Negative NEG      Leukocyte Esterase SMALL (A) NEG      WBC 5-10 0 - 4 /hpf    RBC 0-5 0 - 5 /hpf    Epithelial cells FEW FEW /lpf    Bacteria Negative NEG /hpf    UA:UC IF INDICATED CULTURE NOT INDICATED BY UA RESULT CNI     EKG, 12 LEAD, INITIAL    Collection Time: 04/20/23  8:08 PM   Result Value Ref Range    Ventricular Rate 75 BPM    Atrial Rate 75 BPM    P-R Interval 166 ms    QRS Duration 84 ms    Q-T Interval 414 ms    QTC Calculation (Bezet) 462 ms    Calculated P Axis 39 degrees    Calculated R Axis 13 degrees    Calculated T Axis 23 degrees    Diagnosis       Normal sinus rhythm  When compared with ECG of 05-DEC-2022 17:29,  No significant change was found     PROTHROMBIN TIME + INR    Collection Time: 04/20/23  8:19 PM   Result Value Ref Range    INR 0.9 0.9 - 1.1      Prothrombin time 9.3 9.0 - 11.1 sec        EKG interpreted by me: nsr with no evidence of ACS      RADIOLOGY:  Non-plain film images such as CT, Ultrasound and MRI are read by the radiologist. Plain radiographic images are visualized and preliminarily interpreted by the ED Provider with the below findings:        Interpretation per the Radiologist below, if available at the time of this note:     CT HEAD WO CONT    Result Date: 4/20/2023  EXAM: CT HEAD WO CONT INDICATION: headache COMPARISON: 10/31/2022 MRI and 10/29/2022 CT. CONTRAST: None. TECHNIQUE: Unenhanced CT of the head was performed using 5 mm images. Brain and bone windows were generated. Coronal and sagittal reformats. CT dose reduction was achieved through use of a standardized protocol tailored for this examination and automatic exposure control for dose modulation. FINDINGS: The ventricles and sulci are normal in size, shape and configuration. Redeposition of left ganglial capsular encephalomalacia, consistent with remote infarct. There is no intracranial hemorrhage, extra-axial collection, or mass effect. The basilar cisterns are open. No CT evidence of acute infarct. The bone windows demonstrate no abnormalities. The visualized portions of the paranasal sinuses and mastoid air cells are clear. No evidence of acute intracranial abnormality. Old left ganglial capsular infarct. XR CHEST PORT    Result Date: 4/20/2023  EXAM:  XR CHEST PORT INDICATION:   headache COMPARISON: 12/5/2022 radiograph. FINDINGS: AP radiograph of the chest was obtained. No evidence of focal consolidation. No pleural effusion or pneumothorax. Heart, daryl, mediastinum are within normal limits. No acute osseous abnormalities. No acute cardiopulmonary process.         PROCEDURES   Unless otherwise noted below, none  Procedures     CRITICAL CARE TIME       EMERGENCY DEPARTMENT COURSE and DIFFERENTIAL DIAGNOSIS/MDM   Vitals:    Vitals:    04/20/23 2023 04/20/23 2030 04/20/23 2130 04/20/23 2211   BP: (!) 147/102 (!) 167/110 (!) 150/106 (!) 135/97   Pulse: 77 89 92 87   Resp: 15 19 21    Temp:       SpO2: 99% 98% 98%    Weight:       Height:            Patient was given the following medications:  Medications   amLODIPine (NORVASC) tablet 10 mg (has no administration in time range)   aspirin chewable tablet 81 mg (has no administration in time range)   atorvastatin (LIPITOR) tablet 80 mg (80 mg Oral Given 4/20/23 2211)   clopidogreL (PLAVIX) tablet 75 mg (has no administration in time range)   doxazosin (CARDURA) tablet 2 mg (has no administration in time range)   hydroCHLOROthiazide (HYDRODIURIL) tablet 12.5 mg (12.5 mg Oral Given 4/20/23 2211)   hydrOXYzine HCL (ATARAX) tablet 25 mg (has no administration in time range)   pantoprazole (PROTONIX) tablet 20 mg (has no administration in time range)   sodium chloride (NS) flush 5-40 mL (has no administration in time range) sodium chloride (NS) flush 5-40 mL (has no administration in time range)   acetaminophen (TYLENOL) tablet 650 mg (has no administration in time range)     Or   acetaminophen (TYLENOL) suppository 650 mg (has no administration in time range)   polyethylene glycol (MIRALAX) packet 17 g (has no administration in time range)   ondansetron (ZOFRAN ODT) tablet 4 mg (has no administration in time range)     Or   ondansetron (ZOFRAN) injection 4 mg (has no administration in time range)   0.9% sodium chloride infusion (125 mL/hr IntraVENous New Bag 4/20/23 8932)       CONSULTS: (Who and What was discussed)  None    Chronic Conditions: stroke, aortic insufficiency    Social Determinants affecting Dx or Tx: None    Records Reviewed (source and summary of external notes): Prior medical records    CC/HPI Summary, DDx, ED Course, and Reassessment: Patient presenting to the emergency department with chief complaint of headache, ataxia, slurred speech ongoing for the past 3 days. History of strokes in the past symptoms somewhat similar to previous presentations. She denies any chest pain or shortness of breath. Her exam is positive for psych pronator drift on the right side with slight slurred speech but otherwise no focal neurologic deficits. Reviewed patient's chart and she has a history of a basal ganglia infarct in the left, did have a presentation in October for similar symptoms after initial stroke diagnosis and was admitted for further MRI. Does report some history of blurred vision as well that she is seen by ophthalmology for and recommended to follow-up with neurology as there was an abnormality on her ophthalmic exam.  She is unsure what this is but they recommended neuro follow up. .  States that her blurry vision is not significantly changed. No acute vision loss. Symptoms posibly consistent with atypical migraine vs migriane. We will send CBC, CMP, x-ray, head CT, EKG. Disposition pending work-up.     ED Course as of 04/20/23 2314   Thu Apr 20, 2023 2002 Labs are generally unremarkable, UA unremarkable, head CT negative for acute process, chest x-ray negative. Limit hospitalist for MRI, further stroke work-up. [NM]   2101 Patient accpeted by Dr. Hillary Greene, hospitalist, for admission [SS]      ED Course User Index  [NM] Sunni Simon DO  [SS] Ortiz Roche MD       Disposition Considerations (Tests not done, Shared Decision Making, Pt Expectation of Test or Tx.):      FINAL IMPRESSION     1. Gait abnormality    2. History of stroke          DISPOSITION/PLAN   Admitted         PATIENT REFERRED TO:  Follow-up Information    None           DISCHARGE MEDICATIONS:  Current Discharge Medication List            DISCONTINUED MEDICATIONS:  Current Discharge Medication List            (Please note that parts of this dictation were completed with voice recognition software. Quite often unanticipated grammatical, syntax, homophones, and other interpretive errors are inadvertently transcribed by the computer software. Please disregards these errors.  Please excuse any errors that have escaped final proofreading.)    Wolf Miller DO

## 2023-04-20 NOTE — ED TRIAGE NOTES
Pt reports that over the past 3 days she has had a headache bi-lateral ear pain and her body feels heavy. Pt also reports her balance has been off, since ear pain started. Pt reports posterior headache. Pt reports previous stroke history.

## 2023-04-21 ENCOUNTER — APPOINTMENT (OUTPATIENT)
Dept: MRI IMAGING | Age: 48
End: 2023-04-21
Attending: STUDENT IN AN ORGANIZED HEALTH CARE EDUCATION/TRAINING PROGRAM
Payer: MEDICAID

## 2023-04-21 VITALS
SYSTOLIC BLOOD PRESSURE: 139 MMHG | BODY MASS INDEX: 26.13 KG/M2 | WEIGHT: 142 LBS | HEIGHT: 62 IN | HEART RATE: 79 BPM | TEMPERATURE: 97.9 F | RESPIRATION RATE: 16 BRPM | DIASTOLIC BLOOD PRESSURE: 94 MMHG | OXYGEN SATURATION: 97 %

## 2023-04-21 LAB
ANION GAP SERPL CALC-SCNC: 9 MMOL/L (ref 5–15)
BUN SERPL-MCNC: 18 MG/DL (ref 6–20)
BUN/CREAT SERPL: 22 (ref 12–20)
CALCIUM SERPL-MCNC: 8.4 MG/DL (ref 8.5–10.1)
CHLORIDE SERPL-SCNC: 102 MMOL/L (ref 97–108)
CO2 SERPL-SCNC: 27 MMOL/L (ref 21–32)
CREAT SERPL-MCNC: 0.82 MG/DL (ref 0.55–1.02)
GLUCOSE SERPL-MCNC: 119 MG/DL (ref 65–100)
MAGNESIUM SERPL-MCNC: 1.9 MG/DL (ref 1.6–2.4)
POTASSIUM SERPL-SCNC: 3.4 MMOL/L (ref 3.5–5.1)
SODIUM SERPL-SCNC: 138 MMOL/L (ref 136–145)

## 2023-04-21 PROCEDURE — 74011250636 HC RX REV CODE- 250/636: Performed by: STUDENT IN AN ORGANIZED HEALTH CARE EDUCATION/TRAINING PROGRAM

## 2023-04-21 PROCEDURE — 99222 1ST HOSP IP/OBS MODERATE 55: CPT | Performed by: PSYCHIATRY & NEUROLOGY

## 2023-04-21 PROCEDURE — 97161 PT EVAL LOW COMPLEX 20 MIN: CPT | Performed by: PHYSICAL THERAPIST

## 2023-04-21 PROCEDURE — 74011250637 HC RX REV CODE- 250/637: Performed by: STUDENT IN AN ORGANIZED HEALTH CARE EDUCATION/TRAINING PROGRAM

## 2023-04-21 PROCEDURE — 94762 N-INVAS EAR/PLS OXIMTRY CONT: CPT

## 2023-04-21 PROCEDURE — 74011000250 HC RX REV CODE- 250: Performed by: STUDENT IN AN ORGANIZED HEALTH CARE EDUCATION/TRAINING PROGRAM

## 2023-04-21 PROCEDURE — 74011250637 HC RX REV CODE- 250/637

## 2023-04-21 PROCEDURE — G0378 HOSPITAL OBSERVATION PER HR: HCPCS

## 2023-04-21 PROCEDURE — 80048 BASIC METABOLIC PNL TOTAL CA: CPT

## 2023-04-21 PROCEDURE — 70551 MRI BRAIN STEM W/O DYE: CPT

## 2023-04-21 PROCEDURE — 36415 COLL VENOUS BLD VENIPUNCTURE: CPT

## 2023-04-21 PROCEDURE — 83735 ASSAY OF MAGNESIUM: CPT

## 2023-04-21 RX ORDER — POTASSIUM CHLORIDE 750 MG/1
20 TABLET, FILM COATED, EXTENDED RELEASE ORAL
Status: COMPLETED | OUTPATIENT
Start: 2023-04-21 | End: 2023-04-21

## 2023-04-21 RX ORDER — METHYLPREDNISOLONE 4 MG/1
TABLET ORAL
Qty: 1 DOSE PACK | Refills: 0 | Status: SHIPPED | OUTPATIENT
Start: 2023-04-21

## 2023-04-21 RX ORDER — BUTALBITAL, ACETAMINOPHEN AND CAFFEINE 50; 325; 40 MG/1; MG/1; MG/1
1 TABLET ORAL ONCE
Status: COMPLETED | OUTPATIENT
Start: 2023-04-21 | End: 2023-04-21

## 2023-04-21 RX ORDER — AMITRIPTYLINE HYDROCHLORIDE 25 MG/1
25 TABLET, FILM COATED ORAL
Qty: 30 TABLET | Refills: 0 | Status: SHIPPED | OUTPATIENT
Start: 2023-04-21 | End: 2023-05-21

## 2023-04-21 RX ORDER — BUTALBITAL, ACETAMINOPHEN AND CAFFEINE 300; 40; 50 MG/1; MG/1; MG/1
1 CAPSULE ORAL
Qty: 14 CAPSULE | Refills: 0 | Status: SHIPPED | OUTPATIENT
Start: 2023-04-21

## 2023-04-21 RX ORDER — AMITRIPTYLINE HYDROCHLORIDE 25 MG/1
25 TABLET, FILM COATED ORAL
Status: DISCONTINUED | OUTPATIENT
Start: 2023-04-21 | End: 2023-04-21 | Stop reason: HOSPADM

## 2023-04-21 RX ADMIN — BUTALBITAL, ACETAMINOPHEN, AND CAFFEINE 1 TABLET: 325; 50; 40 TABLET ORAL at 04:53

## 2023-04-21 RX ADMIN — SODIUM CHLORIDE, PRESERVATIVE FREE 10 ML: 5 INJECTION INTRAVENOUS at 05:05

## 2023-04-21 RX ADMIN — AMLODIPINE BESYLATE 10 MG: 5 TABLET ORAL at 08:12

## 2023-04-21 RX ADMIN — ASPIRIN 81 MG CHEWABLE TABLET 81 MG: 81 TABLET CHEWABLE at 08:12

## 2023-04-21 RX ADMIN — PANTOPRAZOLE SODIUM 40 MG: 40 TABLET, DELAYED RELEASE ORAL at 09:36

## 2023-04-21 RX ADMIN — POTASSIUM CHLORIDE 20 MEQ: 750 TABLET, EXTENDED RELEASE ORAL at 06:57

## 2023-04-21 RX ADMIN — SODIUM CHLORIDE 125 ML/HR: 9 INJECTION, SOLUTION INTRAVENOUS at 06:30

## 2023-04-21 RX ADMIN — CLOPIDOGREL BISULFATE 75 MG: 75 TABLET ORAL at 08:12

## 2023-04-21 NOTE — PROGRESS NOTES
[facility]  Hospitalist Group                                                                                          Hospitalist Progress Note  Michelle Abebe MD  Answering service: [phone] from in house phone        Date of Service:  2023  NAME:  Usha Barney  :  1975  MRN:  [id number]   [facility]    Admission Summary:   Per HPI: Usha Barney is a 56 y.o. female WITH HX OF MDD,PILAR, HTN, hx of cva, likely migraines who presents to hospital with multiple complaints. She complains of 3 days of posterior headnaches, associated blood intermittent episodes of dizziness. She denies any sensory losses. Admits to history of headaches for which she has been advised neurology follow-up. No other associated symptoms have included malaise fatigue, not feeling herself, intermittent episodes of nausea and some vomiting. She has had no abdominal pain, chest pain, fevers, chills, night sweats, urinary symptoms. No recent travel or sick contacts. Remarkable vitals on ER Presentation: vss  Labs Remarkable for: k-3.4  ER Images: CT head showed no acute process. Interval history / Subjective: Follow up the patient admitted for evaluation of intractable Headache, dizziness  NAD, event over night noted  No new neuro deficit  MRI brain reviewed: no acute CVA  Neurology evaluation is pending  Potassium has been replaced  HA improved after fioricet  Condition of the patient and plan of care was dicussed with RN, CM, pharmacy during morning rounds.      Assessment & Plan:     Headache  Complex migraines  -mivf, Tylenol, as needed Fioricet  -CT head, reviewed: unremarkable  - MRI brain reviewed: no acute CVA  -Anticipate discharge after MRI  Neurology consulted, discussed, input appreciated, recommended:  Medrol dose pack, Fioricet  Amitriptyline per neurologist     History of CVA  -Continue aspirin and Plavix, statins Hypertension, controlled  -Home amlodipine and HCTZ     GERD  -continue Protonix     Dyslipidemia  -continue lipitor     MDD/PILAR  -continue supportive care     Hypokalemia  Will replace and recheck        Code status: Full code  Prophylaxis: ambulatory, SCD  Care Plan discussed with: patient, CM, RN  Anticipated Disposition: home  today, OK to d/c per neurologist  Observation  Cardiac monitoring: Telemetry     Hospital Problems  Date Reviewed: 11/14/2022            Codes Class Noted POA    Migraines ICD-10-CM: X33.019  ICD-9-CM: 346.90  4/20/2023 Unknown           Social Determinants of Health     Tobacco Use: High Risk    Smoking Tobacco Use: Every Day    Smokeless Tobacco Use: Never    Passive Exposure: Not on file   Alcohol Use: Not on file   Financial Resource Strain: Not on file   Food Insecurity: Not on file   Transportation Needs: Not on file   Physical Activity: Not on file   Stress: Not on file   Social Connections: Not on file   Intimate Partner Violence: Not on file   Depression: Not at risk    PHQ-2 Score: 0   Housing Stability: Not on file       Review of Systems:   A comprehensive review of systems was negative except for that written in the HPI. Vital Signs:    Last 24hrs VS reviewed since prior progress note.  Most recent are:  Visit Vitals  BP (!) 139/94 (BP 1 Location: Right upper arm, BP Patient Position: Sitting)   Pulse 79   Temp 97.9 °F (36.6 °C)   Resp 16   Ht 5' 2\" (1.575 m)   Wt 64.4 kg (142 lb)   SpO2 97%   BMI 25.97 kg/m²         Intake/Output Summary (Last 24 hours) at 4/21/2023 1545  Last data filed at 4/21/2023 0830  Gross per 24 hour   Intake 240 ml   Output --   Net 240 ml        Physical Examination:     I had a face to face encounter with this patient and independently examined them on 4/21/2023 as outlined below:          General : alert x 3, awake, no acute distress, + weak, + HA  HEENT: PEERL, EOMI, moist mucus membrane,   Neck: supple, no JVD, no meningeal signs  Chest: Clear to auscultation bilaterally   CVS: S1 S2 heard,   Abd: soft/ non tender, non distended, BS physiological,   Ext: no clubbing, no cyanosis, no edema, brisk 2+ DP pulses  Neuro/Psych: pleasant mood and affect, CN 2-12 grossly intact, sensory grossly within normal limit, Strength 5/5 in all extremities, DTR 1+ x 4  Skin: warm     Data Review:    Review and/or order of clinical lab test      I have personally and independently reviewed all pertinent labs, diagnostic studies, imaging, and have provided independent interpretation of the same. Labs:     Recent Labs     04/20/23  1810   WBC 9.8   HGB 12.5   HCT 35.4   *     Recent Labs     04/21/23  0410 04/20/23  1810    141   K 3.4* 3.4*    103   CO2 27 29   BUN 18 20   CREA 0.82 0.93   * 114*   CA 8.4* 8.6   MG 1.9  --      Recent Labs     04/20/23 1810   ALT 34   AP 61   TBILI 0.3   TP 7.2   ALB 3.5   GLOB 3.7     Recent Labs     04/20/23  2019   INR 0.9   PTP 9.3      No results for input(s): FE, TIBC, PSAT, FERR in the last 72 hours. Lab Results   Component Value Date/Time    Folate 17.3 10/30/2022 01:24 AM      No results for input(s): PH, PCO2, PO2 in the last 72 hours. No results for input(s): CPK, CKNDX, TROIQ in the last 72 hours.     No lab exists for component: CPKMB  Lab Results   Component Value Date/Time    Cholesterol, total 120 10/30/2022 01:24 AM    HDL Cholesterol 43 10/30/2022 01:24 AM    LDL, calculated 56 10/30/2022 01:24 AM    Triglyceride 105 10/30/2022 01:24 AM    CHOL/HDL Ratio 2.8 10/30/2022 01:24 AM     Lab Results   Component Value Date/Time    Glucose (POC) 84 10/29/2022 05:43 PM    Glucose (POC) 124 (H) 10/06/2022 06:08 PM    Glucose (POC) 110 (H) 11/11/2021 09:42 AM    Glucose (POC) 104 05/16/2021 12:20 AM    Glucose (POC) 99 04/23/2017 06:48 PM    Glucose (POC) 93 05/11/2015 06:48 PM    Glucose (POC) 107 (H) 09/08/2014 07:56 PM    Glucose,  (H) 11/10/2021 11:36 AM     Lab Results   Component Value Date/Time    Color YELLOW/STRAW 04/20/2023 06:45 PM    Appearance CLEAR 04/20/2023 06:45 PM    Specific gravity 1.025 04/20/2023 06:45 PM    Specific gravity 1.030 02/28/2023 02:15 PM    pH (UA) 6.0 04/20/2023 06:45 PM    Protein Negative 04/20/2023 06:45 PM    Glucose Negative 04/20/2023 06:45 PM    Ketone Negative 04/20/2023 06:45 PM    Bilirubin Negative 04/20/2023 06:45 PM    Urobilinogen 1.0 04/20/2023 06:45 PM    Nitrites Negative 04/20/2023 06:45 PM    Leukocyte Esterase SMALL (A) 04/20/2023 06:45 PM    Epithelial cells FEW 04/20/2023 06:45 PM    Bacteria Negative 04/20/2023 06:45 PM    WBC 5-10 04/20/2023 06:45 PM    RBC 0-5 04/20/2023 06:45 PM       Notes reviewed from all clinical/nonclinical/nursing services involved in patient's clinical care. Care coordination discussions were held with appropriate clinical/nonclinical/ nursing providers based on care coordination needs. Patients current active Medications were reviewed, considered, added and adjusted based on the clinical condition today. Home Medications were reconciled to the best of my ability given all available resources at the time of admission. Route is PO if not otherwise noted.       Admission Status:[id number]:::1}      Medications Reviewed:     Current Facility-Administered Medications   Medication Dose Route Frequency    amLODIPine (NORVASC) tablet 10 mg  10 mg Oral DAILY    aspirin chewable tablet 81 mg  81 mg Oral DAILY    atorvastatin (LIPITOR) tablet 80 mg  80 mg Oral QHS    clopidogreL (PLAVIX) tablet 75 mg  75 mg Oral DAILY    hydroCHLOROthiazide (HYDRODIURIL) tablet 12.5 mg  12.5 mg Oral DAILY    hydrOXYzine HCL (ATARAX) tablet 25 mg  25 mg Oral TID PRN    pantoprazole (PROTONIX) tablet 40 mg  40 mg Oral ACB    sodium chloride (NS) flush 5-40 mL  5-40 mL IntraVENous Q8H    sodium chloride (NS) flush 5-40 mL  5-40 mL IntraVENous PRN    acetaminophen (TYLENOL) tablet 650 mg  650 mg Oral Q6H PRN    Or    acetaminophen (TYLENOL) suppository 650 mg  650 mg Rectal Q6H PRN    polyethylene glycol (MIRALAX) packet 17 g  17 g Oral DAILY PRN    ondansetron (ZOFRAN ODT) tablet 4 mg  4 mg Oral Q8H PRN    Or    ondansetron (ZOFRAN) injection 4 mg  4 mg IntraVENous Q6H PRN    0.9% sodium chloride infusion  125 mL/hr IntraVENous CONTINUOUS     ______________________________________________________________________  EXPECTED LENGTH OF STAY: - - -  ACTUAL LENGTH OF STAY:          0                 Meseret Montaño MD

## 2023-04-21 NOTE — PROGRESS NOTES
0717-Bedside shift change report given to [employer] Taina (oncoming nurse) by Marylou Herr (offgoing nurse). Report included the following information SBAR, Kardex, and MAR. Pt received awake in bed on the phone, no complaints of pain at this time. RN rounds in place, plan of care to continue. 0800-Pt calm and cooperative with care and assessment. Pt took scheduled medication without issues. 0855-Pt to MRI. 1000-Pt resting in bed using her phone. [id number] in bed resting. [id number]-Qm in bed resting, and talking on the phone. 1700-Pt verbalized understanding of discharge instructions and follow up appointments. Pt has belongings and discharge paperwork. This writer walked pt down to her car.

## 2023-04-21 NOTE — PROGRESS NOTES
Problem: Aspiration - Risk of  Goal: *Absence of aspiration  4/21/2023 1703 by Dyllan Guerra RN  Outcome: Resolved/Met  4/21/2023 1110 by Dyllan Guerra RN  Outcome: Progressing Towards Goal     Problem: Patient Education: Go to Patient Education Activity  Goal: Patient/Family Education  Outcome: Resolved/Met     Problem: Falls - Risk of  Goal: *Absence of Falls  Description: Document Hart Sureshpao Fall Risk and appropriate interventions in the flowsheet.   4/21/2023 1703 by Dyllan Guerra RN  Outcome: Resolved/Met  4/21/2023 1110 by Dyllan Guerra RN  Outcome: Progressing Towards Goal  Note: Fall Risk Interventions:            Medication Interventions: Evaluate medications/consider consulting pharmacy, Patient to call before getting OOB, Teach patient to arise slowly                   Problem: Patient Education: Go to Patient Education Activity  Goal: Patient/Family Education  Outcome: Resolved/Met     Problem: Pain  Goal: *Control of Pain  4/21/2023 1703 by Dyllan Guerra RN  Outcome: Resolved/Met  4/21/2023 1110 by Dyllan Guerra RN  Outcome: Progressing Towards Goal     Problem: Patient Education: Go to Patient Education Activity  Goal: Patient/Family Education  Outcome: Resolved/Met     Problem: Patient Education: Go to Patient Education Activity  Goal: Patient/Family Education  Outcome: Resolved/Met     Problem: Patient Education: Go to Patient Education Activity  Goal: Patient/Family Education  Outcome: Resolved/Met

## 2023-04-21 NOTE — PROGRESS NOTES
Problem: Aspiration - Risk of  Goal: *Absence of aspiration  Outcome: Progressing Towards Goal     Problem: Falls - Risk of  Goal: *Absence of Falls  Description: Document Migdalia Fall Risk and appropriate interventions in the flowsheet.   Outcome: Progressing Towards Goal  Note: Fall Risk Interventions:            Medication Interventions: Evaluate medications/consider consulting pharmacy, Patient to call before getting OOB, Teach patient to arise slowly                   Problem: Pain  Goal: *Control of Pain  Outcome: Progressing Towards Goal

## 2023-04-21 NOTE — PROGRESS NOTES
Spiritual Care Assessment/Progress Note  Jesica Lee      NAME: Lashaun Haines      MRN: [id number]  AGE: 56 y.o.  SEX: female  [employer] Affiliation: [employer]   Language: English     4/21/2023     Total Time (in minutes): 42     Spiritual Assessment begun in [address] through conversation with:         [x]Patient        [] Family    [] Friend(s)        Reason for Consult: Initial/Spiritual assessment, patient floor     Spiritual beliefs: (Please include comment if needed)     [x] Identifies with a lina tradition:  Confucianist       [] Supported by a lina community:            [] Claims no spiritual orientation:           [] Seeking spiritual identity:                [] Adheres to an individual form of spirituality:           [] Not able to assess:                           Identified resources for coping:      [x] Prayer                               [x] Music                  [] Guided Imagery     [x] Family/friends                 [] Pet visits     [x] Devotional reading                         [] Unknown     [] Other:                                               Interventions offered during this visit: (See comments for more details)    Patient Interventions: Affirmation of emotions/emotional suffering, Affirmation of lina, Catharsis/review of pertinent events in supportive environment, Coping skills reviewed/reinforced, Iconic (affirming the presence of God/Higher Power), Prayer (actual), Prayer (assurance of), Normalization of emotional/spiritual concerns, Methodist beliefs/image of God discussed           Plan of Care:     [x] Support spiritual and/or cultural needs    [] Support AMD and/or advance care planning process      [] Support grieving process   [] Coordinate Rites and/or Rituals    [] Coordination with community clergy   [] No spiritual needs identified at this time   [] Detailed Plan of Care below (See Comments)  [] Make referral to Music Therapy  [] Make referral to Pet Therapy     [] Make referral to Addiction services  [] Make referral to [facility]  [] Make referral to Spiritual Care Partner  [] No future visits requested        [x] Contact Spiritual Care for further referrals     Comments: Visited with patient while rounding in the Med Surg/Tele Unit 2. Introduced myself and offered spiritual care and emotional support. Talked with patient at length in regards to her family dynamics, health, and her gifts and calling. Patient shared that she is a [place], but doesn't have too much support as she is always the person supporting other people and being there for them and she knows she needs to take care of herself. Patient has 6 kids and one granddaughter who she loves dearly.  provided presence, words of encouragement, affirmation, guidance, and prayer. Advised of  availability. Rev.  Fela Aponte 55, [address]

## 2023-04-21 NOTE — PROGRESS NOTES
TRANSFER - IN REPORT:    Verbal report received from ZULLY Mendoza(name) on Delores Barker  being received from ED(unit) for routine progression of care      Report consisted of patients Situation, Background, Assessment and   Recommendations(SBAR). Information from the following report(s) SBAR, Kardex, Intake/Output, MAR, and Recent Results was reviewed with the receiving nurse. Opportunity for questions and clarification was provided. Assessment completed upon patients arrival to unit and care assumed.

## 2023-04-21 NOTE — H&P
History & Physical    Primary Care Provider: Stone Fraire MD  Source of Information: Patient and chart review    History of Presenting Illness:   Beka Beavers is a 56 y.o. female WITH HX OF MDD,PILAR, HTN, hx of cva, likely migraines who presents to hospital with multiple complaints. She complains of 3 days of posterior headnaches, associated blood intermittent episodes of dizziness. She denies any sensory losses. Admits to history of headaches for which she has been advised neurology follow-up. No other associated symptoms have included malaise fatigue, not feeling herself, intermittent episodes of nausea and some vomiting. She has had no abdominal pain, chest pain, fevers, chills, night sweats, urinary symptoms. No recent travel or sick contacts. Remarkable vitals on ER Presentation: vss  Labs Remarkable for: k-3.4  ER Images: CT head showed no acute process. ER treatment-none     Review of Systems:  Pertinent items are noted in the History of Present Illness. Past Medical History:   Diagnosis Date    angina     Angina at rest [facility])     Breast pain     since 2016, right breast, on and off    Chiari malformation     Heart abnormalities     leaky heart valve, murmur    Hypertension     Other ill-defined conditions(799.89)     cardiac arrhythmias    Other ill-defined conditions(799.89)     neuropathy    Psychiatric disorder     anxiety and depression    Stroke ([facility] [address] 10/2022    CVA, per chart encounter      Past Surgical History:   Procedure Laterality Date    HX GYN      HX ORTHOPAEDIC      screws in left ankle    HX OTHER SURGICAL      surgery to back of head due to fractured skull    AL UNLISTED NEUROLOGICAL/NEUROMUSCULAR DX PX      AL UNLISTED PROCEDURE ABDOMEN PERITONEUM & OMENTUM      laporscopic surgery post ruptured fallopian tube     Prior to Admission medications    Medication Sig Start Date End Date Taking?  Authorizing Provider   amLODIPine (NORVASC) 10 mg tablet Take 1 Tablet by mouth daily. Indications: high blood pressure 12/27/22  Yes Ibrahima Jimenez MD   aspirin 81 mg chewable tablet Take 1 Tablet by mouth daily. 12/27/22  Yes Ibrahima Jimenez MD   atorvastatin (LIPITOR) 80 mg tablet Take 1 Tablet by mouth nightly. 12/27/22  Yes Ibrahima Jimenez MD   clopidogreL (Plavix) 75 mg tab Take 1 Tablet by mouth daily. 12/27/22  Yes Ibrahima Jimenez MD   hydroCHLOROthiazide (MICROZIDE) 12.5 mg capsule Take 1 Capsule by mouth daily. Indications: high blood pressure 12/27/22  Yes Ibrahima Jimenez MD   diclofenac (VOLTAREN) 1 % gel Apply  to affected area four (4) times daily as needed for Pain. Yes Provider, Historical   pantoprazole (PROTONIX) 20 mg tablet Take 1 Tablet by mouth Daily (before breakfast). Yes Provider, Historical   hydrOXYzine HCL (ATARAX) 25 mg tablet Take 1 Tablet by mouth three (3) times daily as needed for Anxiety. Yes Provider, Historical   diphenhydrAMINE-zinc acetate 1%-0.1% (Benadryl Itch Stopping) topical cream Apply  to affected area three (3) times daily as needed for Itching. 8/27/22  Yes Marv Ruiz MD   cyclobenzaprine (FLEXERIL) 10 mg tablet Take 1 Tablet by mouth three (3) times daily as needed for Muscle Spasm(s). 7/30/22  Yes Leatha Quinn MD   lidocaine (LIDODERM) 5 % Apply patch to the affected area for 12 hours a day and remove for 12 hours a day. 11/28/19  Yes No Esposito MD   doxazosin (CARDURA) 2 mg tablet Take 1 Tablet by mouth nightly. Patient not taking: Reported on 4/20/2023 12/27/22   Ibrahima Jimenez MD   butalbital-acetaminophen-caff (FIORICET) -40 mg per capsule Take 1 Capsule by mouth every six (6) hours as needed for Headache. Patient not taking: Reported on 2/28/2023 10/31/22   Bonny Simon MD   cariprazine (VRAYLAR) 3 mg capsule Take 3 mg by mouth daily.   Patient not taking: Reported on 4/20/2023    Provider, Historical   fluticasone propionate (FLONASE) 50 mcg/actuation nasal spray 2 Sprays by Both Nostrils route daily as needed for Allergies. Provider, Historical     Allergies   Allergen Reactions    Metoprolol Other (comments)     headache    Toradol [Ketorolac] Hives     Tolerated aspirin 10/6    Toradol [Ketorolac] Itching      Family History   Problem Relation Age of Onset    Hypertension Mother     Stroke Mother     Diabetes Mother     Drug Abuse Father         SOCIAL HISTORY:  Patient resides:  Independently x   Assisted Living    SNF    With family care       Smoking history:   None x   Former    Chronic      Alcohol history:   None x   Social    Chronic      Ambulates:   Independently x   w/cane    w/walker    w/wc    CODE STATUS:  DNR    Full x   Other      Objective:     Physical Exam:     Visit Vitals  BP (!) 135/97   Pulse 87   Temp 98.1 °F (36.7 °C)   Resp 21   Ht 5' 2\" (1.575 m)   Wt 69.6 kg (153 lb 6.4 oz)   LMP 04/06/2023 (Approximate)   SpO2 98%   BMI 28.06 kg/m²      O2 Device: None (Room air)    General:  Alert, cooperative, no distress, appears stated age. Head:  Normocephalic, without obvious abnormality, atraumatic. Eyes:  Conjunctivae/corneas clear. PERRL, EOMs intact. Nose: Nares normal. Septum midline. Mucosa normal.        Neck: Supple, symmetrical, trachea midline. Lungs:   Clear to auscultation bilaterally. Chest wall:  No tenderness or deformity. Heart:  Regular rate and rhythm, S1, S2 normal   Abdomen:   Soft, non-tender. Bowel sounds normal. No masses,  No organomegaly. Extremities: Extremities normal, atraumatic, no cyanosis or edema. Pulses: 2+ and symmetric all extremities. Skin: Skin color, texture, turgor normal. No rashes or lesions   Neurologic: CNII-XII grossl intact.       Data Review:     Recent Days:  Recent Labs     04/20/23 1810   WBC 9.8   HGB 12.5   HCT 35.4   *     Recent Labs     04/20/23 2019 04/20/23 1810   NA  --  141   K  --  3.4*   CL  --  103   CO2  --  29 GLU  --  114*   BUN  --  20   CREA  --  0.93   CA  --  8.6   ALB  --  3.5   ALT  --  34   INR 0.9  --      No results for input(s): PH, PCO2, PO2, HCO3, FIO2 in the last 72 hours. 24 Hour Results:  Recent Results (from the past 24 hour(s))   CBC WITH AUTOMATED DIFF    Collection Time: 04/20/23  6:10 PM   Result Value Ref Range    WBC 9.8 3.6 - 11.0 K/uL    RBC 4.00 3.80 - 5.20 M/uL    HGB 12.5 11.5 - 16.0 g/dL    HCT 35.4 35.0 - 47.0 %    MCV 88.5 80.0 - 99.0 FL    MCH 31.3 26.0 - 34.0 PG    MCHC 35.3 30.0 - 36.5 g/dL    RDW 11.8 11.5 - 14.5 %    PLATELET 749 (H) 450 - 400 K/uL    MPV 9.4 8.9 - 12.9 FL    NRBC 0.0 0  WBC    ABSOLUTE NRBC 0.00 0.00 - 0.01 K/uL    NEUTROPHILS 63 32 - 75 %    LYMPHOCYTES 24 12 - 49 %    MONOCYTES 7 5 - 13 %    EOSINOPHILS 5 0 - 7 %    BASOPHILS 1 0 - 1 %    IMMATURE GRANULOCYTES 0 0.0 - 0.5 %    ABS. NEUTROPHILS 6.2 1.8 - 8.0 K/UL    ABS. LYMPHOCYTES 2.3 0.8 - 3.5 K/UL    ABS. MONOCYTES 0.7 0.0 - 1.0 K/UL    ABS. EOSINOPHILS 0.5 (H) 0.0 - 0.4 K/UL    ABS. BASOPHILS 0.1 0.0 - 0.1 K/UL    ABS. IMM. GRANS. 0.0 0.00 - 0.04 K/UL    DF AUTOMATED     METABOLIC PANEL, COMPREHENSIVE    Collection Time: 04/20/23  6:10 PM   Result Value Ref Range    Sodium 141 136 - 145 mmol/L    Potassium 3.4 (L) 3.5 - 5.1 mmol/L    Chloride 103 97 - 108 mmol/L    CO2 29 21 - 32 mmol/L    Anion gap 9 5 - 15 mmol/L    Glucose 114 (H) 65 - 100 mg/dL    BUN 20 6 - 20 MG/DL    Creatinine 0.93 0.55 - 1.02 MG/DL    BUN/Creatinine ratio 22 (H) 12 - 20      eGFR >60 >60 ml/min/1.73m2    Calcium 8.6 8.5 - 10.1 MG/DL    Bilirubin, total 0.3 0.2 - 1.0 MG/DL    ALT (SGPT) 34 12 - 78 U/L    AST (SGOT) 19 15 - 37 U/L    Alk.  phosphatase 61 45 - 117 U/L    Protein, total 7.2 6.4 - 8.2 g/dL    Albumin 3.5 3.5 - 5.0 g/dL    Globulin 3.7 2.0 - 4.0 g/dL    A-G Ratio 0.9 (L) 1.1 - 2.2     HCG QL SERUM    Collection Time: 04/20/23  6:10 PM   Result Value Ref Range    HCG, Ql. Negative NEG     URINALYSIS W/ REFLEX CULTURE Collection Time: 04/20/23  6:45 PM    Specimen: Urine   Result Value Ref Range    Color YELLOW/STRAW      Appearance CLEAR CLEAR      Specific gravity 1.025      pH (UA) 6.0 5.0 - 8.0      Protein Negative NEG mg/dL    Glucose Negative NEG mg/dL    Ketone Negative NEG mg/dL    Bilirubin Negative NEG      Blood Negative NEG      Urobilinogen 1.0 0.2 - 1.0 EU/dL    Nitrites Negative NEG      Leukocyte Esterase SMALL (A) NEG      WBC 5-10 0 - 4 /hpf    RBC 0-5 0 - 5 /hpf    Epithelial cells FEW FEW /lpf    Bacteria Negative NEG /hpf    UA:UC IF INDICATED CULTURE NOT INDICATED BY UA RESULT CNI     EKG, 12 LEAD, INITIAL    Collection Time: 04/20/23  8:08 PM   Result Value Ref Range    Ventricular Rate 75 BPM    Atrial Rate 75 BPM    P-R Interval 166 ms    QRS Duration 84 ms    Q-T Interval 414 ms    QTC Calculation (Bezet) 462 ms    Calculated P Axis 39 degrees    Calculated R Axis 13 degrees    Calculated T Axis 23 degrees    Diagnosis       Normal sinus rhythm  When compared with ECG of 05-DEC-2022 17:29,  No significant change was found     PROTHROMBIN TIME + INR    Collection Time: 04/20/23  8:19 PM   Result Value Ref Range    INR 0.9 0.9 - 1.1      Prothrombin time 9.3 9.0 - 11.1 sec         Imaging:     Assessment:     Tony Pena is a 56 y.o. female WITH HX OF MDD,PILAR, HTN, hx of cva, likely migraines who was admitted for complex migraines       Plan:       Complex migraines  -mivf, Tylenol, as needed sumatriptan  -CT head unremarkable  -Plan for MRI in a.m. to rule out CVA  -Anticipate discharge after MRI    History of CVA  -Continue aspirin and Plavix    Hypertension  -Home amlodipine and HCTZ    GERD  -Home Protonix    Dyslipidemia  -home lipitor    MDD/PILAR  -continue counseling         FEN/GI -  [email or web address]  Activity - as tolerated  DVT prophylaxis - scds  GI prophylaxis -  NI  Disposition - home    CODE STATUS:   full code         Signed By: Gomez Whatley MD     April 20, 2023

## 2023-04-21 NOTE — ED NOTES
TRANSFER - OUT REPORT:    Verbal report given to RN(name) on Delores Barker  being transferred to Med Surg Tele (unit) for routine progression of care       Report consisted of patients Situation, Background, Assessment and   Recommendations(SBAR). Information from the following report(s) SBAR, ED Summary, [facility] and Recent Results was reviewed with the receiving nurse. Lines:   Peripheral IV 04/20/23 Left Wrist (Active)   Site Assessment Clean, dry, & intact 04/20/23 1818   Phlebitis Assessment 0 04/20/23 1818   Infiltration Assessment 0 04/20/23 1818   Dressing Status Clean, dry, & intact 04/20/23 1818   Dressing Type Transparent;Tape 04/20/23 1818   Hub Color/Line Status Pink;Patent; Flushed 04/20/23 1818   Action Taken Blood drawn 04/20/23 1818   Alcohol Cap Used Yes 04/20/23 1818        Opportunity for questions and clarification was provided.       Patient transported with:   Monitor   Visit Vitals  BP (!) 135/97   Pulse 87   Temp 98.1 °F (36.7 °C)   Resp 21   Ht 5' 2\" (1.575 m)   Wt 69.6 kg (153 lb 6.4 oz)   SpO2 98%   BMI 28.06 kg/m²

## 2023-04-21 NOTE — PROGRESS NOTES
Patients case reviewed during interdisciplinary team meeting in Med Surg/Tele Unit 2.  Rev.  Fela Negron [address], [address]

## 2023-04-21 NOTE — PROGRESS NOTES
2325) Patient arrived on the unit with ED nurse via stretcher. 2335) Admission assessment done. Patient is alert and oriented x 4. Vital signs were taken. Patient was oriented to her room, call light, and other. Patient denies blurry vision, eye pain, or double vision at this time. Weight and height recorded in the flow sheet. Dual skin assessment done with ZULLY Calles. Skin intact. Patient has 20 g peripheral IV at left [facility]. Flushed and patient. Call light within reach. Bed in its lowest position. 0130) Good morning . Patient was admitted couple of minutes ago with migraine. The attending physician is NEIL Goins. I would like to get some clarification. Is the patient suppose to be on Cardiac monitoring? Is the patient suppose to be on Continuous Pulse oximetry? Please, your feedback is welcomed. Thank you.     7238) Patient stated that, she went to void. NP response: 2:18 AM  Looks like continuous pulse oximetry and only cardiac monitor while in ED.    0229) Yeah, that is what I thought but I wanted to clarify. Also, patient is complaining of posterior head pain of 7/10. She only has PRN Tylenol in her MAR. Please, can I get an order for pain management for her.    46) Patient refused to take PRN Tylenol while I get an order for pain medication. 0400)  Patient was reassessed. Vitals were taken. Patient complaining of posterior head pain. Fioricet ordered. Waiting for pharmacy verification. 0410) Morning labs were collected and sent to the lab.     0450) Patient's potassium level now is 3.4    0453) Scheduled Fioricet given for posterior head pain of 7/10.    0630) New bag of 0.9% sodium chloride solution hanged and infusing at a rate of 125 ml/hr.    3474) Scheduled Potassium SR 20 mEq tablet given for potassium level of 3.4.     0715) Bedside and Verbal shift change report given to [facility] (oncoming nurse) by Wolf Ibarra (offgoing nurse).  Report included the following information SBAR, Kardex, Intake/Output, MAR and Recent Results.

## 2023-04-21 NOTE — DISCHARGE SUMMARY
Physician Discharge Summary     Patient ID:    York Dubin  [id number]  [unfilled]  1975    Admit date: 4/20/2023    Discharge date and time: 4/21/2023      DISCHARGE CONDITION: Stable        Hospital Diagnoses and Treatment Rendered:    York Dubin is a 56 y.o. female WITH HX OF MDD,PILAR, HTN, hx of cva, likely migraines who presents to hospital with multiple complaints. She complains of 3 days of posterior headnaches, associated blood intermittent episodes of dizziness. She denies any sensory losses. Admits to history of headaches for which she has been advised neurology follow-up. No other associated symptoms have included malaise fatigue, not feeling herself, intermittent episodes of nausea and some vomiting. She has had no abdominal pain, chest pain, fevers, chills, night sweats, urinary symptoms. No recent travel or sick contacts. Remarkable vitals on ER Presentation: vss  Labs Remarkable for: k-3.4  ER Images: CT head showed no acute process. The patient was admitted to medicine, severe headache was controlled with Fioricet. MRI of the brain was done and was negative for acute cerebrovascular accident. CT head was unremarkable. Neurologist was consulted and recommended initiate amitriptyline at the bedtime. Complete steroids taper with Medrol dose pack and Fioricet as needed. Per neurologist the patient has cervicogenic referred pain and should use warm compress. On current medical management condition of patient improve, headache was better controlled, dizziness improved. Electrolytes were replaced. Rest of hospital stay patient remained hemodynamically stable, afebrile, was able to tolerate p.o. diet, ambulate and she expressed wish to be discharged home.       Headache  Complex migraines  Exacerbation of chronic medications/analgesic overuse headache and possible tension headache  -mivf, Tylenol, as needed Fioricet  -CT head, reviewed: unremarkable  - MRI brain reviewed: no acute CVA  -Anticipate discharge after MRI  Neurology consulted, discussed, input appreciated, recommended:  Medrol dose pack, Fioricet  Amitriptyline per neurologist  Also per neurologist was recommended to avoid analgesic completely for a week. History of CVA  -Continue aspirin and Plavix, statins     Hypertension, controlled  -Home amlodipine and HCTZ     GERD  -continue Protonix     Dyslipidemia  -continue lipitor     MDD/PILAR  -continue supportive care      Hypokalemia  Will replace and recheck            Chronic Diagnoses:    Problem List as of 4/21/2023 Date Reviewed: 11/14/2022            Codes Class Noted - Resolved    Migraines ICD-10-CM: P87.623  ICD-9-CM: 346.90  4/20/2023 - Present        Numbness ICD-10-CM: R20.0  ICD-9-CM: 782.0  10/29/2022 - Present        Acute CVA (cerebrovascular accident) [facility]) ICD-10-CM: I63.9  ICD-9-CM: 434.91  10/6/2022 - Present        Nonrheumatic aortic valve insufficiency ICD-10-CM: I35.1  ICD-9-CM: 424.1  5/20/2021 - Present    Overview Signed 5/20/2021  8:19 AM by Radha Vasques MD     07/15/20  echo normal lvef, lvh, mild to mod aortic regurg, mild tr             Bipolar disorder ([facility] [address] ICD-10-CM: F31.9  ICD-9-CM: 296.80  5/6/2021 - Present        Schizoaffective disorder ([facility] [address] ICD-10-CM: F25.9  ICD-9-CM: 295.70  5/6/2021 - Present        Hypertension ICD-10-CM: I10  ICD-9-CM: 401.9  5/6/2021 - Present           Discharge Medications:       Stable  Discharge Medication List as of 4/21/2023  5:08 PM        START taking these medications    Details   amitriptyline (ELAVIL) 25 mg tablet Take 1 Tablet by mouth nightly for 30 days. , Normal, Disp-30 Tablet, R-0      methylPREDNISolone (MEDROL DOSEPACK) 4 mg tablet Use as directed, Normal, Disp-1 Dose Pack, R-0           CONTINUE these medications which have CHANGED    Details   butalbital-acetaminophen-caff (FIORICET) -40 mg per capsule Take 1 Capsule by mouth every six (6) hours as needed for Headache., Normal, Disp-14 Capsule, R-0           CONTINUE these medications which have NOT CHANGED    Details   amLODIPine (NORVASC) 10 mg tablet Take 1 Tablet by mouth daily. Indications: high blood pressure, Normal, Disp-90 Tablet, R-1      aspirin 81 mg chewable tablet Take 1 Tablet by mouth daily. , Normal, Disp-90 Tablet, R-1      atorvastatin (LIPITOR) 80 mg tablet Take 1 Tablet by mouth nightly., Normal, Disp-90 Tablet, R-1      clopidogreL (Plavix) 75 mg tab Take 1 Tablet by mouth daily. , Normal, Disp-90 Tablet, R-1      hydroCHLOROthiazide (MICROZIDE) 12.5 mg capsule Take 1 Capsule by mouth daily. Indications: high blood pressure, Normal, Disp-90 Capsule, R-1      diclofenac (VOLTAREN) 1 % gel Apply  to affected area four (4) times daily as needed for Pain., Historical Med      pantoprazole (PROTONIX) 20 mg tablet Take 1 Tablet by mouth Daily (before breakfast). , Historical Med      hydrOXYzine HCL (ATARAX) 25 mg tablet Take 1 Tablet by mouth three (3) times daily as needed for Anxiety. , Historical Med      diphenhydrAMINE-zinc acetate 1%-0.1% (Benadryl Itch Stopping) topical cream Apply  to affected area three (3) times daily as needed for Itching., Normal, Disp-30 g, R-0      cyclobenzaprine (FLEXERIL) 10 mg tablet Take 1 Tablet by mouth three (3) times daily as needed for Muscle Spasm(s). , Normal, Disp-20 Tablet, R-0      lidocaine (LIDODERM) 5 % Apply patch to the affected area for 12 hours a day and remove for 12 hours a day., Print, Disp-1 Each, R-0      doxazosin (CARDURA) 2 mg tablet Take 1 Tablet by mouth nightly., Normal, Disp-90 Tablet, R-1      cariprazine (VRAYLAR) 3 mg capsule Take 3 mg by mouth daily. , Historical Med      fluticasone propionate (FLONASE) 50 mcg/actuation nasal spray 2 Sprays by Both Nostrils route daily as needed for Allergies. , Historical Med               Follow up Care:    1. Doug Simms MD in 1-2 weeks.   Please call to set up an appointment shortly after discharge. 2.  Follow-up neurologist as an outpatient in 1 to 2 weeks. 3.  Medrol Dosepak taper. 4.  Fioricet as needed for headache. 5.  Neck massage, warm compress. 6.  Avoid over-the-counter analgesics    Diet:  Cardiac Diet    Disposition:  Home. Advanced Directive:   FULL X   DNR      Discharge Exam:  See today's note. CONSULTATIONS: Neurology    Significant Diagnostic Studies:   4/20/2023: BUN 20 MG/DL (Ref range: 6 - 20 MG/DL); Calcium 8.6 MG/DL (Ref range: 8.5 - 10.1 MG/DL); CO2 29 mmol/L (Ref range: 21 - 32 mmol/L); Creatinine 0.93 MG/DL (Ref range: 0.55 - 1.02 MG/DL); Glucose 114 mg/dL (H; Ref range: 65 - 100 mg/dL); HCT 35.4 % (Ref range: 35.0 - 47.0 %); HGB 12.5 g/dL (Ref range: 11.5 - 16.0 g/dL); Potassium 3.4 mmol/L (L; Ref range: 3.5 - 5.1 mmol/L); Sodium 141 mmol/L (Ref range: 136 - 145 mmol/L)  4/21/2023: BUN 18 MG/DL (Ref range: 6 - 20 MG/DL); Calcium 8.4 MG/DL (L; Ref range: 8.5 - 10.1 MG/DL); CO2 27 mmol/L (Ref range: 21 - 32 mmol/L); Creatinine 0.82 MG/DL (Ref range: 0.55 - 1.02 MG/DL); Glucose 119 mg/dL (H; Ref range: 65 - 100 mg/dL); Potassium 3.4 mmol/L (L; Ref range: 3.5 - 5.1 mmol/L); Sodium 138 mmol/L (Ref range: 136 - 145 mmol/L)  Recent Labs     04/20/23  1810   WBC 9.8   HGB 12.5   HCT 35.4   *     Recent Labs     04/21/23  0410 04/20/23  1810    141   K 3.4* 3.4*    103   CO2 27 29   BUN 18 20   CREA 0.82 0.93   * 114*   CA 8.4* 8.6   MG 1.9  --      Recent Labs     04/20/23 1810   AP 61   TP 7.2   ALB 3.5   GLOB 3.7     Recent Labs     04/20/23 2019   INR 0.9   PTP 9.3      No results for input(s): FE, TIBC, PSAT, FERR in the last 72 hours. No results for input(s): PH, PCO2, PO2 in the last 72 hours. No results for input(s): CPK, CKMB in the last 72 hours.     No lab exists for component: TROPONINI  No components found for: [place]    Total time to discharge patient was 31 minutes more than 50% of time was spent for counseling and coordination of care.       Signed:  Mazin Welch MD  4/21/2023  4:00 PM

## 2023-04-21 NOTE — PROGRESS NOTES
[address] April 21, 2023       RE: Yuko Sunshine      To Whom It May Concern,    This is to certify that Yuko Sunshine was in the hospital since 04/20/2023 until 04/21/2023 and may return to work on 04/24/2023. Please feel free to contact my office if you have any questions or concerns. Thank you for your assistance in this matter.       Sincerely,          Walter Roach MD

## 2023-04-21 NOTE — DISCHARGE INSTRUCTIONS
You have been evaluated and treated for Headache. MRI brain was negative for acute stroke. You have complex Migraine. Neurologist was consulted, medications were adjusted, Amitriptyline was added. You should take and complete as directed Medrol dose pack. Per neurologist you have cervicogenic referred pain and should use warm compress. You should follow up PCP and neurologist as outpatient.

## 2023-04-21 NOTE — PROGRESS NOTES
PADDY:    RUR: N/A  ELOS: TBD  DISPOSITION: Home  TRANSPORTATION: Self  FOLLOW UP: PCP  PHARMACY: [facility] [address]  DME: None    Reason for Admission:  Pt reports that over the past 3 days she has had a headache bi-lateral ear pain and her body feels heavy. Pt also reports her balance has been off, since ear pain started. Pt reports posterior headache. Pt reports previous stroke history. RUR Score:  N/A                   Plan for utilizing home health:  None at this time. PCP: First and Last name:  Rubén Turner MD     Name of Practice:    Are you a current patient: Yes/No:    Approximate date of last visit:    Can you participate in a virtual visit with your PCP:                     Patient recently switched providers. Now a patient of Clemente Rodriguez MD, [facility]. Current Advanced Directive/Advance Care Plan: Full Code    Healthcare Decision Maker:   Click here to complete HealthCare Decision Makers including selection of the Healthcare Decision Maker Relationship (ie \"Primary\")             Primary Decision MakerEmmit Screen - Boyfriend - [phone]    Secondary Decision Maker: Peter Mckeon - Daughter - [phone]                  Transition of Care Plan:      CM reviewed chart. CM completed assessment with pt at bedside. Pt is alert and oriented throughout encounter. CM introduced self/role, verified demographics, and discussed discharge planning. Pt confirmed contact information, PCP, ACP, DME used at home, pharmacy, ability to perform ADLs, living situation, insurance, transportation, and other providers in the community. Pt lives alone and is able to complete ADLs independently. Pt is not currently employed. Pt does not use any DME at home and she drives herself to follow up appointments. CM discussed VOON letter with pt. Pt signed letter at 9:54 AM. Copy to pt and copy in chart. 10:19 AM: CM called [facility] and scheduled pt's hospital follow up appt.  Appt time and date added to AVS.    12:34 pm: CM called [facility] Neurology and scheduled follow up appt for 6/19. [facility] requested pt's paperwork to be faxed to [phone]. CM to inquire if pt needs any other follow up appts scheduled. Care Management Interventions  PCP Verified by CM:  Yes  Mode of Transport at Discharge: Self  Transition of Care Consult (CM Consult): Discharge Planning  Discharge Durable Medical Equipment: No  Health Maintenance Reviewed: Yes  Speech Therapy Consult: No  Support Systems: Spouse/Significant Other  Confirm Follow Up Transport: Self  The Plan for Transition of Care is Related to the Following Treatment Goals : PCP appt, specialist appt, discharge planning  The Patient and/or Patient Representative was Provided with a Choice of Provider and Agrees with the Discharge Plan?: Yes  Freedom of Choice List was Provided with Basic Dialogue that Supports the Patient's Individualized Plan of Care/Goals, Treatment Preferences and Shares the Quality Data Associated with the Providers?: Yes  Discharge Location  Patient Expects to be Discharged to[de-identified] Home with outpatient services     FRAN Hernandez, Care Manager  [phone]

## 2023-04-21 NOTE — PROGRESS NOTES
Problem: Mobility Impaired (Adult and Pediatric)  Goal: *Acute Goals and Plan of Care (Insert Text)  Description:   FUNCTIONAL STATUS PRIOR TO ADMISSION: Patient was independent and active without use of DME.    HOME SUPPORT PRIOR TO ADMISSION: The patient lived with family but did not require assist.    Physical Therapy Goals  Initiated 4/21/2023  1. Patient will ambulate with independence for 100 feet with the least restrictive device within 7 day(s). 2.  Patient will ascend/descend 5 stairs with unilateral handrail(s) with independence within 7 day(s). 3.  Patient will improve Nunez Balance score by 7 points within 7 days. Outcome: Not Met   PHYSICAL THERAPY EVALUATION- NEURO POPULATION  Patient: Danny Zacarias (7 y.o. female)  Date: 4/21/2023  Primary Diagnosis: Migraines [G43.909]       Precautions: fall risk      ASSESSMENT  Based on the objective data described below, the patient presents with decreased balance and difficulty with visual tracking. Patient is able to complete transfers and ambulation without need for assistive device with additional time to management unsteadiness. Patient reported increased symptoms with R visual tracking tasks and head nods. Patient and significant difficult with VOR x1 horizontally and vertically. PT recommends neurology follow up if MRI results return negative. No acute PT needs noted, patient may benefit from outpatient PT after neurology consult. Current Level of Function Impacting Discharge (mobility/balance): modified independent, requires rest breaks and slower pace with transitional movements    Functional Outcome Measure: The patient scored Total: 49/56 on the Nunez Balance Assessment which is indicative of low fall risk. Patient will benefit from skilled therapy intervention to address the above noted impairments.        PLAN :  Recommendations and Planned Interventions: bed mobility training, transfer training, gait training, therapeutic exercises, neuromuscular re-education, patient and family training/education, and therapeutic activities      Frequency/Duration: Patient will be followed by physical therapy:  5 times a week to address goals. Recommendation for discharge: (in order for the patient to meet his/her long term goals)  To be determined: awaiting MRI results and outpatient neuro consult    This discharge recommendation:  Has been made in collaboration with the attending provider and/or case management    IF patient discharges home will need the following DME: none         SUBJECTIVE:   Patient stated I've had this off and on, but I didn't know that other people were starting to notice too.     OBJECTIVE DATA SUMMARY:   HISTORY:    Past Medical History:   Diagnosis Date    angina     Angina at rest [facility])     Breast pain     since 2016, right breast, on and off    Chiari malformation     Heart abnormalities     leaky heart valve, murmur    Hypertension     Other ill-defined conditions(799.89)     cardiac arrhythmias    Other ill-defined conditions(799.89)     neuropathy    Psychiatric disorder     anxiety and depression    Stroke ([facility] [address] 10/2022    CVA, per chart encounter     Past Surgical History:   Procedure Laterality Date    HX GYN      HX ORTHOPAEDIC      screws in left ankle    HX OTHER SURGICAL      surgery to back of head due to fractured skull    [place] UNLISTED NEUROLOGICAL/NEUROMUSCULAR DX PX      [place] UNLISTED PROCEDURE ABDOMEN PERITONEUM & OMENTUM      laporscopic surgery post ruptured fallopian tube     Home Situation  Home Environment: Private residence  One/Two Story Residence: One story  Living Alone: No  Support Systems: Spouse/Significant Other  Patient Expects to be Discharged to[de-identified] Home with outpatient services  Current DME Used/Available at Home: None    EXAMINATION/PRESENTATION/DECISION MAKING:   Critical Behavior:  Neurologic State: Alert  Orientation Level: Oriented X4  Cognition: Appropriate decision making  Range Of Motion:  AROM: Within functional limits  PROM: Within functional limits  Strength:    Strength: Within functional limits  Coordination:  Coordination: Generally decreased, functional  Functional Mobility:  Bed Mobility:  Rolling: Independent  Supine to Sit: Independent  Sit to Supine: Independent  Scooting: Independent  Transfers:  Sit to Stand: Independent  Stand to Sit: Independent  Balance:   Sitting: Intact  Standing: Intact  Ambulation/Gait Training:  Distance (ft): 25 Feet (ft)  Ambulation - Level of Assistance: Supervision  Gait Description (WDL): Exceptions to WDL  Gait Abnormalities: Decreased step clearance; Step to gait  Speed/Isabelle: Slow  Step Length: Right shortened;Left shortened    Functional Measure  Nunez Balance Test:    Sitting to Standin  Standing Unsupported: 4  Sitting with Back Unsupported: 4  Standing to Sittin  Transfers: 4  Standing Unsupported with Eyes Closed: 4  Standing Unsupported with Feet Together: 4  Reach Forward with Outstretched Arm: 4   Object: 2  Turn to Look Over Shoulders: 3  Turn 360 Degrees: 3  Alternate Foot on Step/Stool: 4  Standing Unsupported One Foot in Front: 3  Stand on One Le  Total: 49/56         56=Maximum possible score;   0-20=High fall risk  21-40=Moderate fall risk   41-56=Low fall risk        Physical Therapy Evaluation Charge Determination   History Examination Presentation Decision-Making   LOW Complexity : Zero comorbidities / personal factors that will impact the outcome / POC LOW Complexity : 1-2 Standardized tests and measures addressing body structure, function, activity limitation and / or participation in recreation  LOW Complexity : Stable, uncomplicated  Other outcome measures Nunez  LOW       Based on the above components, the patient evaluation is determined to be of the following complexity level: LOW     Pain Rating:  No pain reported    Activity Tolerance:   Good      After treatment patient left in no apparent distress: Supine in bed, Call bell within reach, and Side rails x 3    COMMUNICATION/EDUCATION:   The patients plan of care was discussed with: Physical therapist, Registered nurse, Physician, and Case management. Patient was educated regarding her deficit(s) of balance as this relates to her diagnosis of complex migraine. She demonstrated Good understanding as evidenced by teach back learning. Patient and/or family was verbally educated on the BE FAST acronym for signs/symptoms of CVA and TIA. BE FAST was written on patient's communication board  for visual education and reinforcement. All questions answered with patient indicating good understanding. Fall prevention education was provided and the patient/caregiver indicated understanding., Patient/family have participated as able in goal setting and plan of care. , and Patient/family agree to work toward stated goals and plan of care.     Thank you for this referral.  Jeferson Friedman, PT   Time Calculation: 17 mins

## 2023-04-21 NOTE — PROGRESS NOTES
PADDY   RUR  OBS     Patient ready for discharge from        Fax Consult information to [facility] Neurology office  [phone]   to do follow-up call next week. Appointments on AVS.     Do    Icon Checkbox     these medications from [facility]  amitriptyline  butalbital-acetaminophen-caff  methylPREDNISolone  Icon Checkbox    Follow up with Soniya Gallegos MD  Specialty: [facility] Medicine  [address]  Next steps: Follow up on 4/26/2023  [address] ,[address] [facility] follow up appointment scheduled for 4/26 at 10:30 AM with Dr. Cristina Huynh. , Please wear a face mask, Arrive 15-30 min early, Bring ID and insurance card. [facility]  Next steps: Follow up on 5/24/2023  [address]   Home, [facility] [place]  PCP appointment scheduled for 5/24 at 2:30 pm with Dr. Cori Armando., Please wear a face mask, Arrive 15-30 min early, Bring ID and insurance card. Icon Checkbox    Follow up with [facility] Neurology on 6/19/2023  [address]   [phone]  Neurology appointment scheduled for 6/19 at 3:30 pm., Please wear a face mask, Arrive 15-30 min early, Bring ID and insurance card. Icon Location   Go    [id number] ESTABLISHED PATIENT 11:20 AM   Clement Boateng MD  [address] [facility] [address]   [address]   [address] [facility] [address] [phone]       Care Management Interventions  PCP Verified by CM:  Yes  Mode of Transport at Discharge: Self  Transition of Care Consult (CM Consult): Discharge Planning  Discharge Durable Medical Equipment: No  Health Maintenance Reviewed: Yes  Speech Therapy Consult: No  Support Systems: Spouse/Significant Other  Confirm Follow Up Transport: Self  The Plan for Transition of Care is Related to the Following Treatment Goals : PCP appt, specialist appt, discharge planning  The Patient and/or Patient Representative was Provided with a Choice of Provider and Agrees with the Discharge Plan?: Yes  Freedom of Choice List was Provided with Basic Dialogue that Supports the Patient's Individualized Plan of Care/Goals, Treatment Preferences and Shares the Quality Data Associated with the Providers?: Yes  Discharge Location  Patient Expects to be Discharged to[de-identified] Home with outpatient services      [facility] FRAN RN   [phone]
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
## 2023-04-24 ENCOUNTER — TELEPHONE (OUTPATIENT)
Dept: CASE MANAGEMENT | Age: 48
End: 2023-04-24

## 2023-04-24 LAB
ATRIAL RATE: 75 BPM
CALCULATED P AXIS, ECG09: 39 DEGREES
CALCULATED R AXIS, ECG10: 13 DEGREES
CALCULATED T AXIS, ECG11: 23 DEGREES
DIAGNOSIS, 93000: NORMAL
P-R INTERVAL, ECG05: 166 MS
Q-T INTERVAL, ECG07: 414 MS
QRS DURATION, ECG06: 84 MS
QTC CALCULATION (BEZET), ECG08: 462 MS
VENTRICULAR RATE, ECG03: 75 BPM

## 2023-04-24 NOTE — TELEPHONE ENCOUNTER
CM called patient by telephone to perform post discharge assessment and for the purpose of follow up call from inpatient discharge to check on environmental challenges/medications/appointment follow up/and questions/concerns. CM left VM for patient to return call. CM will attempt a 2nd call.     [address] [facility], [address] [phone]

## 2023-05-02 ENCOUNTER — TELEPHONE (OUTPATIENT)
Dept: CASE MANAGEMENT | Age: 48
End: 2023-05-02

## 2023-06-06 NOTE — BH NOTES
GROUP THERAPY PROGRESS NOTE    Patient is participating in treatment team group. Group time: 60 minutes    Personal goal for participation: To participate in treatment plan and discharge     Goal orientation: Personal    Group therapy participation: active     Therapeutic interventions reviewed and discussed: Group members met with their treatment team individually and discussed their treatment plan with their provider, , nurse, and pharmacist. Each patient was given the opportunity to ask questions related to their discharge and/or medications. Impression of participation: Patient participated in treatment team. Engaged in conversation and discussion. Asked questions about treatment plan and discharge.     FRAN Montague, Supervisee in Social Work Subjective   Patient ID: Kaity Rodney is a 80 y.o. female who presents for right leg pain (Went to [facility] and had US right leg on 6/2/23).  HPI  Patient presents today for follow-up hospital.  She was in [facility] ER last week.  She was told she had a superficial thrombophlebitis posterior to her right knee she also had a Baker's cyst.  She was told to get a follow-up ultrasound this week to assure no progression.  She was told to use warm compresses Tylenol and continue her Celebrex.  Patient states the pain started a few weeks ago on her right knee she noticed it when she was standing for a long time at an event.  She does not think she twisted it.  Then she started to get pain when she would stand up from a seated position or stand for a long period of time.  She has no redness or warmth but she has some pain when you push posterior to her right knee there are some also some fullness that is noted there.  Patient' elbow bundle branch block sees Dr. Chun and but only on half dose of Lasix she has a history of a bundle branch block hypertension.  Her blood pressure is high today and she states it was high last week she has not talked to Dr. Chun about it she last saw him in April and is due to see him again in October.  Does have some allergies for which she takes Claritin just regular as well as azelastine nasal spray she is had some stuffiness and runny nose as well with the allergies.  She has no nausea vomiting or diarrhea.  She has no chest pains or heart racing.  Her blood pressure was checked only at the ER and in here but not at home  She needs a repeat order for an ultrasound this week at [facility] she thought it was walk-in but her daughter does work at [facility] ER and can check on scheduling for    Review of Systems  Review of systems was performed and is otherwise negative except as noted in HPI.  Objective   BP (!) 174/98   Pulse 68   Temp 36.6 °C (97.9 °F)   Resp 16   Wt  77.1 kg (170 lb)   BMI 33.20 kg/m²    Physical Exam  CONSTITUTIONAL - well nourished, well developed, looks like stated age, in no acute distress, not ill-appearing, and not tired appearing  SKIN - normal skin color and pigmentation, normal skin turgor without rash, lesions, or nodules visualized  HEAD - no trauma, normocephalic  EYES -normal  ENT - swelling of bilateral nares fluid posterior to bilateral TMs   NECK - supple without rigidity, no neck mass was observed, no thyromegaly or thyroid nodules  CHEST - clear to auscultation, no wheezing, no crackles and no rales, good effort  CARDIAC - regular rate and regular rhythm, no skipped beats, no murmur  ABDOMEN - no organomegaly, soft, nontender, nondistended, normal bowel sounds, no guarding/rebound/rigidity  EXTREMITIES - no edema, no deformities she has some slight edema posterior to her right knee as well as palpable venous irregularities bilaterally  NEUROLOGICAL -normal grossly intact  PSYCHIATRIC - alert, pleasant and cordial, age-appropriate  LYMPHATIC- no cervical lymphadenopathy    Assessment/Plan   Diagnoses and all orders for this visit:  Sacroiliitis, not elsewhere classified (CMS/HCC)  Hypertension, unspecified type  -     potassium chloride CR (Klor-Con M20) 20 mEq ER tablet; Take 1 tablet (20 mEq) by mouth once daily.  Thrombophlebitis of superficial veins of right lower extremity  -     Vascular US lower extremity venous duplex right; Future  Hypercholesterolemia  -     Lipid Panel; Future  -     Comprehensive Metabolic Panel; Future    Patient needs a follow-up for the thrombophlebitis with an ultrasound this was ordered but that scheduled at [facility] and then call me the next day and I will look at the results  She is refill on her potassium  She is to call Dr. Taylor regarding her hypertension  She has blood work in August before her appointment  She will call with issues  Back pain has been stable but she does have some arthritis pain she may  need to see Ortho  Zakiya Burr MD

## 2023-06-25 ENCOUNTER — HOSPITAL ENCOUNTER (EMERGENCY)
Facility: HOSPITAL | Age: 48
Discharge: HOME | End: 2023-06-25
Payer: MEDICAID

## 2023-06-25 VITALS
TEMPERATURE: 99.7 F | BODY MASS INDEX: 25.58 KG/M2 | HEIGHT: 62 IN | RESPIRATION RATE: 18 BRPM | HEART RATE: 100 BPM | SYSTOLIC BLOOD PRESSURE: 149 MMHG | WEIGHT: 139 LBS | OXYGEN SATURATION: 98 % | DIASTOLIC BLOOD PRESSURE: 98 MMHG

## 2023-06-25 DIAGNOSIS — R11.0 NAUSEA: ICD-10-CM

## 2023-06-25 DIAGNOSIS — M79.10 MYALGIA: Primary | ICD-10-CM

## 2023-06-25 DIAGNOSIS — R10.84 GENERALIZED ABDOMINAL PAIN: ICD-10-CM

## 2023-06-25 LAB
ALBUMIN SERPL-MCNC: 3.5 G/DL (ref 3.5–5)
ALBUMIN/GLOB SERPL: 0.9 (ref 1.1–2.2)
ALP SERPL-CCNC: 57 U/L (ref 45–117)
ALT SERPL-CCNC: 50 U/L (ref 12–78)
ANION GAP SERPL CALC-SCNC: 9 MMOL/L (ref 5–15)
APPEARANCE UR: CLEAR
AST SERPL-CCNC: 27 U/L (ref 15–37)
BACTERIA URNS QL MICRO: NEGATIVE /HPF
BASOPHILS # BLD: 0 K/UL (ref 0–0.1)
BASOPHILS NFR BLD: 0 % (ref 0–1)
BILIRUB SERPL-MCNC: 0.5 MG/DL (ref 0.2–1)
BILIRUB UR QL: NEGATIVE
BUN SERPL-MCNC: 12 MG/DL (ref 6–20)
BUN/CREAT SERPL: 12 (ref 12–20)
CALCIUM SERPL-MCNC: 8.4 MG/DL (ref 8.5–10.1)
CHLORIDE SERPL-SCNC: 99 MMOL/L (ref 97–108)
CO2 SERPL-SCNC: 29 MMOL/L (ref 21–32)
COLOR UR: NORMAL
CREAT SERPL-MCNC: 1 MG/DL (ref 0.55–1.02)
DIFFERENTIAL METHOD BLD: ABNORMAL
EOSINOPHIL # BLD: 0 K/UL (ref 0–0.4)
EOSINOPHIL NFR BLD: 0 % (ref 0–7)
EPITH CASTS URNS QL MICRO: NORMAL /LPF
ERYTHROCYTE [DISTWIDTH] IN BLOOD BY AUTOMATED COUNT: 12.2 % (ref 11.5–14.5)
FLUAV RNA SPEC QL NAA+PROBE: NOT DETECTED
FLUBV RNA SPEC QL NAA+PROBE: NOT DETECTED
GLOBULIN SER CALC-MCNC: 3.8 G/DL (ref 2–4)
GLUCOSE SERPL-MCNC: 87 MG/DL (ref 65–100)
GLUCOSE UR STRIP.AUTO-MCNC: NEGATIVE MG/DL
HCT VFR BLD AUTO: 39.6 % (ref 35–47)
HGB BLD-MCNC: 13.9 G/DL (ref 11.5–16)
HGB UR QL STRIP: NEGATIVE
IMM GRANULOCYTES # BLD AUTO: 0 K/UL (ref 0–0.04)
IMM GRANULOCYTES NFR BLD AUTO: 0 % (ref 0–0.5)
KETONES UR QL STRIP.AUTO: NEGATIVE MG/DL
LEUKOCYTE ESTERASE UR QL STRIP.AUTO: NEGATIVE
LYMPHOCYTES # BLD: 0.8 K/UL (ref 0.8–3.5)
LYMPHOCYTES NFR BLD: 7 % (ref 12–49)
MCH RBC QN AUTO: 31.8 PG (ref 26–34)
MCHC RBC AUTO-ENTMCNC: 35.1 G/DL (ref 30–36.5)
MCV RBC AUTO: 90.6 FL (ref 80–99)
MONOCYTES # BLD: 0.8 K/UL (ref 0–1)
MONOCYTES NFR BLD: 7 % (ref 5–13)
NEUTS SEG # BLD: 9.2 K/UL (ref 1.8–8)
NEUTS SEG NFR BLD: 86 % (ref 32–75)
NITRITE UR QL STRIP.AUTO: NEGATIVE
NRBC # BLD: 0 K/UL (ref 0–0.01)
NRBC BLD-RTO: 0 PER 100 WBC
PH UR STRIP: 7.5 (ref 5–8)
PLATELET # BLD AUTO: 364 K/UL (ref 150–400)
PMV BLD AUTO: 9.2 FL (ref 8.9–12.9)
POTASSIUM SERPL-SCNC: 3.5 MMOL/L (ref 3.5–5.1)
PROT SERPL-MCNC: 7.3 G/DL (ref 6.4–8.2)
PROT UR STRIP-MCNC: NEGATIVE MG/DL
RBC # BLD AUTO: 4.37 M/UL (ref 3.8–5.2)
RBC #/AREA URNS HPF: NORMAL /HPF (ref 0–5)
RBC MORPH BLD: ABNORMAL
SARS-COV-2 RNA RESP QL NAA+PROBE: NOT DETECTED
SODIUM SERPL-SCNC: 137 MMOL/L (ref 136–145)
SP GR UR REFRACTOMETRY: 1.01
URINE CULTURE IF INDICATED: NORMAL
UROBILINOGEN UR QL STRIP.AUTO: 0.2 EU/DL (ref 0.2–1)
WBC # BLD AUTO: 10.8 K/UL (ref 3.6–11)
WBC URNS QL MICRO: NORMAL /HPF (ref 0–4)

## 2023-06-25 PROCEDURE — 87636 SARSCOV2 & INF A&B AMP PRB: CPT

## 2023-06-25 PROCEDURE — 80053 COMPREHEN METABOLIC PANEL: CPT

## 2023-06-25 PROCEDURE — 99283 EMERGENCY DEPT VISIT LOW MDM: CPT

## 2023-06-25 PROCEDURE — 6370000000 HC RX 637 (ALT 250 FOR IP)

## 2023-06-25 PROCEDURE — 36415 COLL VENOUS BLD VENIPUNCTURE: CPT

## 2023-06-25 PROCEDURE — 85025 COMPLETE CBC W/AUTO DIFF WBC: CPT

## 2023-06-25 PROCEDURE — 81001 URINALYSIS AUTO W/SCOPE: CPT

## 2023-06-25 RX ORDER — ONDANSETRON 4 MG/1
4 TABLET, ORALLY DISINTEGRATING ORAL ONCE
Status: COMPLETED | OUTPATIENT
Start: 2023-06-25 | End: 2023-06-25

## 2023-06-25 RX ORDER — ACETAMINOPHEN 500 MG
1000 TABLET ORAL ONCE
Qty: 2 TABLET | Refills: 0 | Status: SHIPPED | OUTPATIENT
Start: 2023-06-25 | End: 2023-06-25 | Stop reason: ALTCHOICE

## 2023-06-25 RX ORDER — ACETAMINOPHEN 500 MG
1000 TABLET ORAL ONCE
Status: COMPLETED | OUTPATIENT
Start: 2023-06-25 | End: 2023-06-25

## 2023-06-25 RX ORDER — ONDANSETRON 4 MG/1
4 TABLET, ORALLY DISINTEGRATING ORAL 3 TIMES DAILY PRN
Qty: 21 TABLET | Refills: 0 | Status: SHIPPED | OUTPATIENT
Start: 2023-06-25

## 2023-06-25 RX ADMIN — ONDANSETRON 4 MG: 4 TABLET, ORALLY DISINTEGRATING ORAL at 16:10

## 2023-06-25 RX ADMIN — ACETAMINOPHEN 1000 MG: 500 TABLET ORAL at 16:54

## 2023-06-25 ASSESSMENT — PAIN SCALES - GENERAL
PAINLEVEL_OUTOF10: 9
PAINLEVEL_OUTOF10: 8

## 2023-06-25 ASSESSMENT — ENCOUNTER SYMPTOMS
SHORTNESS OF BREATH: 0
ABDOMINAL PAIN: 0
SORE THROAT: 0
VOMITING: 0
COUGH: 0
SINUS PRESSURE: 0
CHEST TIGHTNESS: 0
NAUSEA: 1
SINUS PAIN: 0
WHEEZING: 0
RHINORRHEA: 0

## 2023-06-25 ASSESSMENT — PAIN DESCRIPTION - ORIENTATION: ORIENTATION: OTHER (COMMENT)

## 2023-06-25 ASSESSMENT — PAIN DESCRIPTION - DESCRIPTORS
DESCRIPTORS: ACHING
DESCRIPTORS: ACHING

## 2023-06-25 ASSESSMENT — LIFESTYLE VARIABLES
HOW OFTEN DO YOU HAVE A DRINK CONTAINING ALCOHOL: 2-4 TIMES A MONTH
HOW MANY STANDARD DRINKS CONTAINING ALCOHOL DO YOU HAVE ON A TYPICAL DAY: 3 OR 4

## 2023-06-25 ASSESSMENT — PAIN - FUNCTIONAL ASSESSMENT: PAIN_FUNCTIONAL_ASSESSMENT: 0-10

## 2023-06-25 ASSESSMENT — PAIN DESCRIPTION - PAIN TYPE: TYPE: ACUTE PAIN

## 2023-06-25 ASSESSMENT — PAIN DESCRIPTION - LOCATION
LOCATION: HEAD
LOCATION: ABDOMEN;HEAD

## 2023-07-11 ENCOUNTER — OFFICE VISIT (OUTPATIENT)
Age: 48
End: 2023-07-11

## 2023-07-11 VITALS
WEIGHT: 136 LBS | BODY MASS INDEX: 25.03 KG/M2 | HEART RATE: 78 BPM | SYSTOLIC BLOOD PRESSURE: 142 MMHG | HEIGHT: 62 IN | OXYGEN SATURATION: 98 % | DIASTOLIC BLOOD PRESSURE: 86 MMHG

## 2023-07-11 DIAGNOSIS — I35.1 NONRHEUMATIC AORTIC VALVE INSUFFICIENCY: ICD-10-CM

## 2023-07-11 DIAGNOSIS — I10 PRIMARY HYPERTENSION: Primary | ICD-10-CM

## 2023-07-11 DIAGNOSIS — R00.2 PALPITATIONS: ICD-10-CM

## 2023-07-11 RX ORDER — AMLODIPINE BESYLATE 10 MG/1
10 TABLET ORAL DAILY
Qty: 90 TABLET | Refills: 2 | Status: SHIPPED | OUTPATIENT
Start: 2023-07-11

## 2023-07-11 RX ORDER — HYDROCHLOROTHIAZIDE 12.5 MG/1
12.5 CAPSULE, GELATIN COATED ORAL DAILY
Qty: 90 CAPSULE | Refills: 2 | Status: SHIPPED | OUTPATIENT
Start: 2023-07-11

## 2023-07-11 RX ORDER — ATORVASTATIN CALCIUM 80 MG/1
80 TABLET, FILM COATED ORAL NIGHTLY
Qty: 90 TABLET | Refills: 2 | Status: SHIPPED | OUTPATIENT
Start: 2023-07-11

## 2023-07-11 RX ORDER — DOXAZOSIN 2 MG/1
2 TABLET ORAL NIGHTLY
Qty: 90 TABLET | Refills: 2 | Status: SHIPPED | OUTPATIENT
Start: 2023-07-11

## 2023-07-11 RX ORDER — CLOPIDOGREL BISULFATE 75 MG/1
75 TABLET ORAL DAILY
Qty: 90 TABLET | Refills: 2 | Status: SHIPPED | OUTPATIENT
Start: 2023-07-11

## 2023-07-11 ASSESSMENT — PATIENT HEALTH QUESTIONNAIRE - PHQ9
SUM OF ALL RESPONSES TO PHQ QUESTIONS 1-9: 0
1. LITTLE INTEREST OR PLEASURE IN DOING THINGS: 0
SUM OF ALL RESPONSES TO PHQ9 QUESTIONS 1 & 2: 0
SUM OF ALL RESPONSES TO PHQ QUESTIONS 1-9: 0
2. FEELING DOWN, DEPRESSED OR HOPELESS: 0

## 2023-07-11 NOTE — PROGRESS NOTES
HISTORY OF PRESENT ILLNESS  Odalis Verduzco is a 53 y.o. female     SUMMARY:   Patient Active Problem List   Diagnosis    Nonrheumatic aortic valve insufficiency    Bipolar disorder (HCC)    Schizoaffective disorder (HCC)    Hypertension    Numbness    Acute CVA (cerebrovascular accident) ([address])    Migraines       Current Outpatient Medications   Medication Sig Dispense Refill    amLODIPine (NORVASC) 10 MG tablet Take 1 tablet by mouth daily 90 tablet 2    atorvastatin (LIPITOR) 80 MG tablet Take 1 tablet by mouth nightly 90 tablet 2    clopidogrel (PLAVIX) 75 MG tablet Take 1 tablet by mouth daily 90 tablet 2    doxazosin (CARDURA) 2 MG tablet Take 1 tablet by mouth nightly 90 tablet 2    hydroCHLOROthiazide (MICROZIDE) 12.5 MG capsule Take 1 capsule by mouth daily 90 capsule 2    ondansetron (ZOFRAN-ODT) 4 MG disintegrating tablet Take 1 tablet by mouth 3 times daily as needed for Nausea or Vomiting 21 tablet 0    aspirin 81 MG chewable tablet Take 1 tablet by mouth daily      butalbital-APAP-caffeine -40 MG CAPS per capsule Take 1 capsule by mouth every 6 hours as needed      cariprazine hcl (VRAYLAR) 3 MG CAPS capsule Take 1 capsule by mouth daily      cyclobenzaprine (FLEXERIL) 10 MG tablet Take 1 tablet by mouth 3 times daily as needed      diclofenac sodium (VOLTAREN) 1 % GEL Apply topically 4 times daily as needed      diphenhydrAMINE-zinc acetate (BENADRYL ITCH STOPPING) 1-0.1 % cream Apply topically 3 times daily as needed      fluticasone (FLONASE) 50 MCG/ACT nasal spray 2 sprays by Nasal route daily as needed      hydrOXYzine HCl (ATARAX) 25 MG tablet Take 1 tablet by mouth 3 times daily as needed      ibuprofen (ADVIL;MOTRIN) 400 MG tablet Take 1 tablet by mouth every 6 hours as needed      lidocaine (LIDODERM) 5 % Place 1 patch onto the skin daily as needed      pantoprazole (PROTONIX) 20 MG tablet Take 1 tablet by mouth every morning (before breakfast)       No current

## 2023-07-30 ENCOUNTER — HOSPITAL ENCOUNTER (EMERGENCY)
Facility: HOSPITAL | Age: 48
Discharge: HOME | End: 2023-07-30
Attending: STUDENT IN AN ORGANIZED HEALTH CARE EDUCATION/TRAINING PROGRAM
Payer: MEDICAID

## 2023-07-30 ENCOUNTER — HOSPITAL ENCOUNTER (EMERGENCY)
Facility: HOSPITAL | Age: 48
Discharge: ANOTHER HOSPITAL | End: 2023-07-30
Attending: EMERGENCY MEDICINE
Payer: MEDICAID

## 2023-07-30 VITALS
BODY MASS INDEX: 24.87 KG/M2 | WEIGHT: 136 LBS | TEMPERATURE: 98.4 F | RESPIRATION RATE: 16 BRPM | OXYGEN SATURATION: 99 % | HEART RATE: 89 BPM | DIASTOLIC BLOOD PRESSURE: 109 MMHG | SYSTOLIC BLOOD PRESSURE: 160 MMHG

## 2023-07-30 VITALS
OXYGEN SATURATION: 97 % | DIASTOLIC BLOOD PRESSURE: 104 MMHG | HEART RATE: 80 BPM | RESPIRATION RATE: 16 BRPM | TEMPERATURE: 98.3 F | SYSTOLIC BLOOD PRESSURE: 170 MMHG

## 2023-07-30 DIAGNOSIS — R10.30 INGUINAL PAIN, UNSPECIFIED LATERALITY: Primary | ICD-10-CM

## 2023-07-30 DIAGNOSIS — Z13.9 ENCOUNTER FOR MEDICAL SCREENING EXAMINATION: ICD-10-CM
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
## 2023-07-30 LAB
ALBUMIN SERPL-MCNC: 4.2 G/DL (ref 3.5–5)
ALBUMIN/GLOB SERPL: 1 (ref 1.1–2.2)
ALP SERPL-CCNC: 72 U/L (ref 45–117)
ALT SERPL-CCNC: 43 U/L (ref 12–78)
AMPHET UR QL SCN: NEGATIVE
ANION GAP SERPL CALC-SCNC: 11 MMOL/L (ref 5–15)
APPEARANCE UR: CLEAR
AST SERPL-CCNC: 39 U/L (ref 15–37)
BACTERIA URNS QL MICRO: NEGATIVE /HPF
BARBITURATES UR QL SCN: NEGATIVE
BASOPHILS # BLD: 0 K/UL (ref 0–0.1)
BASOPHILS NFR BLD: 1 % (ref 0–1)
BENZODIAZ UR QL: NEGATIVE
BILIRUB SERPL-MCNC: 0.6 MG/DL (ref 0.2–1)
BILIRUB UR QL: NEGATIVE
BUN SERPL-MCNC: 13 MG/DL (ref 6–20)
BUN/CREAT SERPL: 13 (ref 12–20)
CALCIUM SERPL-MCNC: 9.3 MG/DL (ref 8.5–10.1)
CANNABINOIDS UR QL SCN: NEGATIVE
CHLORIDE SERPL-SCNC: 103 MMOL/L (ref 97–108)
CLUE CELLS VAG QL WET PREP: NORMAL
CO2 SERPL-SCNC: 26 MMOL/L (ref 21–32)
COCAINE UR QL SCN: POSITIVE
COLOR UR: ABNORMAL
CREAT SERPL-MCNC: 1.04 MG/DL (ref 0.55–1.02)
DIFFERENTIAL METHOD BLD: ABNORMAL
EOSINOPHIL # BLD: 0 K/UL (ref 0–0.4)
EOSINOPHIL NFR BLD: 0 % (ref 0–7)
EPITH CASTS URNS QL MICRO: ABNORMAL /LPF
ERYTHROCYTE [DISTWIDTH] IN BLOOD BY AUTOMATED COUNT: 12.4 % (ref 11.5–14.5)
GLOBULIN SER CALC-MCNC: 4.2 G/DL (ref 2–4)
GLUCOSE SERPL-MCNC: 165 MG/DL (ref 65–100)
GLUCOSE UR STRIP.AUTO-MCNC: NEGATIVE MG/DL
HBV SURFACE AB SER QL: NONREACTIVE
HBV SURFACE AB SER-ACNC: <3.1 MIU/ML
HBV SURFACE AG SER QL: 0.14 INDEX
HBV SURFACE AG SER QL: NEGATIVE
HCG SERPL QL: NEGATIVE
HCG UR QL: NEGATIVE
HCT VFR BLD AUTO: 43.2 % (ref 35–47)
HCV AB SERPL QL IA: NONREACTIVE
HGB BLD-MCNC: 14.6 G/DL (ref 11.5–16)
HGB UR QL STRIP: NEGATIVE
HIV 1+2 AB+HIV1 P24 AG SERPL QL IA: NONREACTIVE
HIV 1/2 RESULT COMMENT: NORMAL
IMM GRANULOCYTES # BLD AUTO: 0 K/UL (ref 0–0.04)
IMM GRANULOCYTES NFR BLD AUTO: 0 % (ref 0–0.5)
KETONES UR QL STRIP.AUTO: NEGATIVE MG/DL
KOH PREP SPEC: NORMAL
LEUKOCYTE ESTERASE UR QL STRIP.AUTO: ABNORMAL
LYMPHOCYTES # BLD: 2.5 K/UL (ref 0.8–3.5)
LYMPHOCYTES NFR BLD: 31 % (ref 12–49)
Lab: ABNORMAL
MCH RBC QN AUTO: 31.1 PG (ref 26–34)
MCHC RBC AUTO-ENTMCNC: 33.8 G/DL (ref 30–36.5)
MCV RBC AUTO: 91.9 FL (ref 80–99)
METHADONE UR QL: NEGATIVE
MONOCYTES # BLD: 0.5 K/UL (ref 0–1)
MONOCYTES NFR BLD: 6 % (ref 5–13)
NEUTS SEG # BLD: 4.9 K/UL (ref 1.8–8)
NEUTS SEG NFR BLD: 62 % (ref 32–75)
NITRITE UR QL STRIP.AUTO: NEGATIVE
NRBC # BLD: 0 K/UL (ref 0–0.01)
NRBC BLD-RTO: 0 PER 100 WBC
OPIATES UR QL: NEGATIVE
PCP UR QL: NEGATIVE
PH UR STRIP: 7 (ref 5–8)
PLATELET # BLD AUTO: 445 K/UL (ref 150–400)
PMV BLD AUTO: 9.4 FL (ref 8.9–12.9)
POTASSIUM SERPL-SCNC: 3.1 MMOL/L (ref 3.5–5.1)
PROT SERPL-MCNC: 8.4 G/DL (ref 6.4–8.2)
PROT UR STRIP-MCNC: ABNORMAL MG/DL
RBC # BLD AUTO: 4.7 M/UL (ref 3.8–5.2)
RBC #/AREA URNS HPF: ABNORMAL /HPF (ref 0–5)
SERVICE CMNT-IMP: NORMAL
SODIUM SERPL-SCNC: 140 MMOL/L (ref 136–145)
SP GR UR REFRACTOMETRY: 1.02
T VAGINALIS VAG QL WET PREP: NORMAL
URINE CULTURE IF INDICATED: ABNORMAL
UROBILINOGEN UR QL STRIP.AUTO: 1 EU/DL (ref 0.2–1)
WBC # BLD AUTO: 7.9 K/UL (ref 3.6–11)
WBC URNS QL MICRO: ABNORMAL /HPF (ref 0–4)

## 2023-07-30 PROCEDURE — 86706 HEP B SURFACE ANTIBODY: CPT

## 2023-07-30 PROCEDURE — 2500000003 HC RX 250 WO HCPCS

## 2023-07-30 PROCEDURE — 36415 COLL VENOUS BLD VENIPUNCTURE: CPT

## 2023-07-30 PROCEDURE — 87389 HIV-1 AG W/HIV-1&-2 AB AG IA: CPT

## 2023-07-30 PROCEDURE — 99281 EMR DPT VST MAYX REQ PHY/QHP: CPT

## 2023-07-30 PROCEDURE — 86803 HEPATITIS C AB TEST: CPT

## 2023-07-30 PROCEDURE — 2720000011 HC SANE KIT SUPPLY STERILE

## 2023-07-30 PROCEDURE — 86704 HEP B CORE ANTIBODY TOTAL: CPT

## 2023-07-30 PROCEDURE — 87210 SMEAR WET MOUNT SALINE/INK: CPT

## 2023-07-30 PROCEDURE — 85025 COMPLETE CBC W/AUTO DIFF WBC: CPT

## 2023-07-30 PROCEDURE — 4500000002 HC ER NO CHARGE

## 2023-07-30 PROCEDURE — 81001 URINALYSIS AUTO W/SCOPE: CPT

## 2023-07-30 PROCEDURE — 96372 THER/PROPH/DIAG INJ SC/IM: CPT

## 2023-07-30 PROCEDURE — 99284 EMERGENCY DEPT VISIT MOD MDM: CPT

## 2023-07-30 PROCEDURE — 99285 EMERGENCY DEPT VISIT HI MDM: CPT

## 2023-07-30 PROCEDURE — 6360000002 HC RX W HCPCS: Performed by: STUDENT IN AN ORGANIZED HEALTH CARE EDUCATION/TRAINING PROGRAM

## 2023-07-30 PROCEDURE — 87491 CHLMYD TRACH DNA AMP PROBE: CPT

## 2023-07-30 PROCEDURE — 87591 N.GONORRHOEAE DNA AMP PROB: CPT

## 2023-07-30 PROCEDURE — 81025 URINE PREGNANCY TEST: CPT

## 2023-07-30 PROCEDURE — 87340 HEPATITIS B SURFACE AG IA: CPT

## 2023-07-30 PROCEDURE — 6370000000 HC RX 637 (ALT 250 FOR IP): Performed by: STUDENT IN AN ORGANIZED HEALTH CARE EDUCATION/TRAINING PROGRAM

## 2023-07-30 PROCEDURE — 86592 SYPHILIS TEST NON-TREP QUAL: CPT

## 2023-07-30 PROCEDURE — 84703 CHORIONIC GONADOTROPIN ASSAY: CPT

## 2023-07-30 PROCEDURE — 80307 DRUG TEST PRSMV CHEM ANLYZR: CPT

## 2023-07-30 PROCEDURE — 80053 COMPREHEN METABOLIC PANEL: CPT

## 2023-07-30 RX ORDER — CEFTRIAXONE 500 MG/1
500 INJECTION, POWDER, FOR SOLUTION INTRAMUSCULAR; INTRAVENOUS ONCE
Status: COMPLETED | OUTPATIENT
Start: 2023-07-30 | End: 2023-07-30

## 2023-07-30 RX ORDER — LEVONORGESTREL 1.5 MG/1
1.5 TABLET ORAL ONCE
Status: COMPLETED | OUTPATIENT
Start: 2023-07-30 | End: 2023-07-30

## 2023-07-30 RX ORDER — EMTRICITABINE AND TENOFOVIR DISOPROXIL FUMARATE 200; 300 MG/1; MG/1
1 TABLET, FILM COATED ORAL ONCE
Status: COMPLETED | OUTPATIENT
Start: 2023-07-30 | End: 2023-07-30

## 2023-07-30 RX ORDER — ONDANSETRON 4 MG/1
4 TABLET, ORALLY DISINTEGRATING ORAL 3 TIMES DAILY PRN
Qty: 15 TABLET | Refills: 0 | Status: SHIPPED | OUTPATIENT
Start: 2023-07-30 | End: 2023-08-04

## 2023-07-30 RX ORDER — AZITHROMYCIN 250 MG/1
1000 TABLET, FILM COATED ORAL ONCE
Status: COMPLETED | OUTPATIENT
Start: 2023-07-30 | End: 2023-07-30

## 2023-07-30 RX ORDER — ONDANSETRON 4 MG/1
4 TABLET, ORALLY DISINTEGRATING ORAL ONCE
Status: COMPLETED | OUTPATIENT
Start: 2023-07-30 | End: 2023-07-30

## 2023-07-30 RX ORDER — HYDROCODONE BITARTRATE AND ACETAMINOPHEN 5; 325 MG/1; MG/1
1 TABLET ORAL
Status: COMPLETED | OUTPATIENT
Start: 2023-07-30 | End: 2023-07-30

## 2023-07-30 RX ORDER — LIDOCAINE HYDROCHLORIDE 10 MG/ML
INJECTION, SOLUTION EPIDURAL; INFILTRATION; INTRACAUDAL; PERINEURAL
Status: COMPLETED
Start: 2023-07-30 | End: 2023-07-30

## 2023-07-30 RX ORDER — EMTRICITABINE AND TENOFOVIR DISOPROXIL FUMARATE 200; 300 MG/1; MG/1
1 TABLET, FILM COATED ORAL DAILY
Qty: 28 TABLET | Refills: 0 | Status: SHIPPED | OUTPATIENT
Start: 2023-07-30 | End: 2023-08-27

## 2023-07-30 RX ADMIN — ONDANSETRON 4 MG: 4 TABLET, ORALLY DISINTEGRATING ORAL at 20:24

## 2023-07-30 RX ADMIN — EMTRICITABINE AND TENOFOVIR DISOPROXIL FUMARATE 1 TABLET: 200; 300 TABLET, FILM COATED ORAL at 20:24

## 2023-07-30 RX ADMIN — LIDOCAINE HYDROCHLORIDE 5 ML: 10 INJECTION, SOLUTION EPIDURAL; INFILTRATION; INTRACAUDAL; PERINEURAL at 20:24

## 2023-07-30 RX ADMIN — AZITHROMYCIN MONOHYDRATE 1000 MG: 250 TABLET ORAL at 20:24

## 2023-07-30 RX ADMIN — CEFTRIAXONE SODIUM 500 MG: 500 INJECTION, POWDER, FOR SOLUTION INTRAMUSCULAR; INTRAVENOUS at 20:24

## 2023-07-30 RX ADMIN — HYDROCODONE BITARTRATE AND ACETAMINOPHEN 1 TABLET: 5; 325 TABLET ORAL at 20:27

## 2023-07-30 RX ADMIN — LEVONORGESTREL 1.5 MG: 1.5 TABLET ORAL at 20:24

## 2023-07-30 RX ADMIN — RALTEGRAVIR 400 MG: 400 TABLET, FILM COATED ORAL at 20:24

## 2023-07-30 ASSESSMENT — LIFESTYLE VARIABLES
HOW MANY STANDARD DRINKS CONTAINING ALCOHOL DO YOU HAVE ON A TYPICAL DAY: 3 OR 4
HOW OFTEN DO YOU HAVE A DRINK CONTAINING ALCOHOL: 4 OR MORE TIMES A WEEK

## 2023-07-30 ASSESSMENT — PAIN - FUNCTIONAL ASSESSMENT: PAIN_FUNCTIONAL_ASSESSMENT: 0-10

## 2023-07-30 ASSESSMENT — PAIN DESCRIPTION - PAIN TYPE
TYPE: ACUTE PAIN
TYPE: ACUTE PAIN

## 2023-07-30 ASSESSMENT — PAIN DESCRIPTION - DESCRIPTORS
DESCRIPTORS: ACHING
DESCRIPTORS: ACHING

## 2023-07-30 ASSESSMENT — PAIN DESCRIPTION - LOCATION
LOCATION: GROIN;NECK
LOCATION: GROIN;NECK

## 2023-07-30 ASSESSMENT — PAIN SCALES - GENERAL
PAINLEVEL_OUTOF10: 8
PAINLEVEL_OUTOF10: 8
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
## 2023-07-30 NOTE — ED PROVIDER NOTES
as needed    CARIPRAZINE HCL (VRAYLAR) 3 MG CAPS CAPSULE    Take 1 capsule by mouth daily    CLOPIDOGREL (PLAVIX) 75 MG TABLET    Take 1 tablet by mouth daily    CYCLOBENZAPRINE (FLEXERIL) 10 MG TABLET    Take 1 tablet by mouth 3 times daily as needed    DICLOFENAC SODIUM (VOLTAREN) 1 % GEL    Apply topically 4 times daily as needed    DIPHENHYDRAMINE-ZINC ACETATE (BENADRYL ITCH STOPPING) 1-0.1 % CREAM    Apply topically 3 times daily as needed    DOXAZOSIN (CARDURA) 2 MG TABLET    Take 1 tablet by mouth nightly    FLUTICASONE (FLONASE) 50 MCG/ACT NASAL SPRAY    2 sprays by Nasal route daily as needed    HYDROCHLOROTHIAZIDE (MICROZIDE) 12.5 MG CAPSULE    Take 1 capsule by mouth daily    HYDROXYZINE HCL (ATARAX) 25 MG TABLET    Take 1 tablet by mouth 3 times daily as needed    IBUPROFEN (ADVIL;MOTRIN) 400 MG TABLET    Take 1 tablet by mouth every 6 hours as needed    LIDOCAINE (LIDODERM) 5 %    Place 1 patch onto the skin daily as needed    ONDANSETRON (ZOFRAN-ODT) 4 MG DISINTEGRATING TABLET    Take 1 tablet by mouth 3 times daily as needed for Nausea or Vomiting    PANTOPRAZOLE (PROTONIX) 20 MG TABLET    Take 1 tablet by mouth every morning (before breakfast)       SCREENINGS               No data recorded         PHYSICAL EXAM      ED Triage Vitals [07/30/23 1350]   Enc Vitals Group      BP (!) 183/119      Pulse (!) 118      Respirations 14      Temp       Temp Source Oral      SpO2 99 %      Weight - Scale 136 lb (61.7 kg)      Height       Head Circumference       Peak Flow       Pain Score       Pain Loc       Pain Edu? Excl. in [address]? Physical Exam  Vitals and nursing note reviewed. Constitutional:       Comments: 78-year-old female, resting on stretcher, no acute distress although appears mildly anxious   HENT:      Head: Normocephalic and atraumatic. Nose: Nose normal.   Cardiovascular:      Rate and Rhythm: Normal rate and regular rhythm.    Pulmonary:      Effort: Pulmonary effort is

## 2023-07-30 NOTE — ED NOTES
This RN assumes role as preceptor to [employer]. This RN reviewed all assessments, charting, medication administration, and skills performed by the preceptee.         Lissette Penn RN  07/30/23 1796

## 2023-07-30 NOTE — ED NOTES
TRANSFER - OUT REPORT:    Verbal report given to [address] [place] [address] Road [address] on Deirdre Glynn  being transferred to [facility] ED for routine progression of patient care       Report consisted of patient's Situation, Background, Assessment and   Recommendations(SBAR). Information from the following report(s) Nurse Handoff Report, ED Encounter Summary, ED SBAR, and Recent Results was reviewed with the receiving nurse. Lines:       Opportunity for questions and clarification was provided. Patient transported with: RAA with EMTALA paper work.              Ana Hayes RN  07/30/23 7946

## 2023-07-31 LAB — RPR SER QL: NONREACTIVE
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
## 2023-08-01 LAB — HBV CORE AB SERPL QL IA: NEGATIVE

## 2023-08-04 LAB
C TRACH RRNA SPEC QL NAA+PROBE: NORMAL
N GONORRHOEA RRNA SPEC QL NAA+PROBE: NORMAL
SPECIMEN SOURCE: NORMAL

## 2023-09-13 ENCOUNTER — HOSPITAL ENCOUNTER (OUTPATIENT)
Facility: HOSPITAL | Age: 48
Discharge: HOME | End: 2023-09-16
Payer: MEDICAID

## 2023-09-13 DIAGNOSIS — M50.30 DEGENERATION OF CERVICAL INTERVERTEBRAL DISC: ICD-10-CM

## 2023-09-13 PROCEDURE — 72148 MRI LUMBAR SPINE W/O DYE: CPT

## 2024-01-11 ENCOUNTER — OFFICE VISIT (OUTPATIENT)
Age: 49
End: 2024-01-11
Payer: MEDICAID

## 2024-01-11 VITALS
OXYGEN SATURATION: 97 % | SYSTOLIC BLOOD PRESSURE: 140 MMHG | HEART RATE: 77 BPM | HEIGHT: 62 IN | BODY MASS INDEX: 25.4 KG/M2 | DIASTOLIC BLOOD PRESSURE: 102 MMHG | WEIGHT: 138 LBS

## 2024-01-11 DIAGNOSIS — I10 PRIMARY HYPERTENSION: Primary | ICD-10-CM

## 2024-01-11 DIAGNOSIS — I35.1 NONRHEUMATIC AORTIC VALVE INSUFFICIENCY: ICD-10-CM

## 2024-01-11 DIAGNOSIS — I35.1 AORTIC VALVE INSUFFICIENCY, ETIOLOGY OF CARDIAC VALVE DISEASE UNSPECIFIED: ICD-10-CM

## 2024-01-11 PROCEDURE — 3077F SYST BP >= 140 MM HG: CPT | Performed by: SPECIALIST

## 2024-01-11 PROCEDURE — 3080F DIAST BP >= 90 MM HG: CPT | Performed by: SPECIALIST

## 2024-01-11 PROCEDURE — 99214 OFFICE O/P EST MOD 30 MIN: CPT | Performed by: SPECIALIST

## 2024-01-11 RX ORDER — LOSARTAN POTASSIUM 50 MG/1
50 TABLET ORAL DAILY
Qty: 90 TABLET | Refills: 3 | Status: SHIPPED | OUTPATIENT
Start: 2024-01-11

## 2024-01-11 RX ORDER — AMITRIPTYLINE HYDROCHLORIDE 25 MG/1
TABLET, FILM COATED ORAL
COMMUNITY
Start: 2023-12-07

## 2024-01-11 RX ORDER — METHOCARBAMOL 750 MG/1
TABLET, FILM COATED ORAL
COMMUNITY
Start: 2023-12-08

## 2024-01-11 RX ORDER — TRIAMCINOLONE ACETONIDE 1 MG/G
OINTMENT TOPICAL
COMMUNITY
Start: 2023-11-08

## 2024-01-11 RX ORDER — TRAZODONE HYDROCHLORIDE 100 MG/1
TABLET ORAL
COMMUNITY
Start: 2023-12-19

## 2024-01-11 RX ORDER — KETOCONAZOLE 20 MG/G
CREAM TOPICAL 2 TIMES DAILY
COMMUNITY
Start: 2023-12-04

## 2024-01-11 RX ORDER — KETOCONAZOLE 20 MG/ML
SHAMPOO TOPICAL
COMMUNITY
Start: 2023-12-04

## 2024-01-11 RX ORDER — PRAZOSIN HYDROCHLORIDE 1 MG/1
CAPSULE ORAL
COMMUNITY
Start: 2023-12-08

## 2024-01-11 RX ORDER — PALIPERIDONE 6 MG/1
TABLET, EXTENDED RELEASE ORAL
COMMUNITY
Start: 2024-01-09

## 2024-01-11 NOTE — PROGRESS NOTES
HISTORY OF PRESENT ILLNESS  Rubi Bridges is a 48 y.o. female     SUMMARY:   Patient Active Problem List   Diagnosis    Nonrheumatic aortic valve insufficiency    Bipolar disorder (HCC)    Schizoaffective disorder (HCC)    Hypertension    Numbness    Acute CVA (cerebrovascular accident) ([facility])    Migraines            CARDIOLOGY STUDIES TO DATE:  07/15/20  echo normal lvef, lvh, mild to mod aortic regurg, mild tr  11/21  echo normal lv size and lvef, mild lvh, mod aortic regurg  10/22 echo normal lvef, lvh mild to mod aortic regurg    1/23 event monitor  24 events were transmitted. 22 patient triggered; 2 auto trigger  Patient monitored for 4d 23h 1  928 PACs with PAC burden of <1%  824 PVCs with PVC burden of <1%    1/23 normal exercise cardiolyte    Chief Complaint   Patient presents with    6 Month Follow-Up       HPI :  She is doing great with no cardiac complaints or problems with her medications.  Her blood pressure is elevated today but she has not taken any medications yet.  Turns out it has been running a little high at home and she is wondering if she needs changes in her medications.  She is still very active exercising regularly without any difficulty.  Her mood is better than it has been in some time because she says she and her adult children finally moved out and she broke up with her fiancé.  She is due to get blood work for PCP in the near future.    CARDIAC ROS:   negative for chest pain, claudication, dyspnea, irregular heart beat, lower extremity edema, orthopnea, paroxysmal nocturnal dyspnea, and syncope    Family History   Problem Relation Age of Onset    Diabetes Mother     Hypertension Mother     Stroke Mother     Drug Abuse Father        Past Medical History:   Diagnosis Date    Angina at rest     Breast pain     since 2016, right breast, on and off    Chiari malformation     Hypertension     Other ill-defined conditions(799.89)     Other ill-defined conditions(799.89)     cardiac
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
## 2024-02-06 ENCOUNTER — HOSPITAL ENCOUNTER (OUTPATIENT)
Facility: HOSPITAL | Age: 49
Discharge: HOME | End: 2024-02-08
Attending: SPECIALIST
Payer: MEDICAID

## 2024-02-06 DIAGNOSIS — I35.1 AORTIC VALVE INSUFFICIENCY, ETIOLOGY OF CARDIAC VALVE DISEASE UNSPECIFIED: ICD-10-CM

## 2024-02-06 LAB
ECHO AO ROOT DIAM: 3 CM
ECHO AR MAX VEL PISA: 4.9 M/S
ECHO AV AREA PEAK VELOCITY: 3.2 CM2
ECHO AV PEAK GRADIENT: 7 MMHG
ECHO AV PEAK VELOCITY: 1.3 M/S
ECHO AV REGURGITANT PHT: 575.7 MILLISECOND
ECHO AV VELOCITY RATIO: 0.92
ECHO LA DIAMETER: 2.9 CM
ECHO LA TO AORTIC ROOT RATIO: 0.97
ECHO LV INTERNAL DIMENSION DIASTOLIC: 1.2 CM (ref 3.9–5.3)
ECHO LV INTERNAL DIMENSION DIASTOLIC: 4.5 CM (ref 3.9–5.3)
ECHO LV INTERNAL DIMENSION SYSTOLIC: 2.8 CM
ECHO LV IVSD: 1 CM (ref 0.6–0.9)
ECHO LV IVSD: 1.1 CM (ref 0.6–0.9)
ECHO LV POSTERIOR WALL DIASTOLIC: 1.3 CM (ref 0.6–0.9)
ECHO LVOT AREA: 3.5 CM2
ECHO LVOT DIAM: 2.1 CM
ECHO LVOT PEAK GRADIENT: 6 MMHG
ECHO LVOT PEAK VELOCITY: 1.2 M/S
ECHO MV A VELOCITY: 0.59 M/S
ECHO MV E DECELERATION TIME (DT): 161.8 MS
ECHO MV E VELOCITY: 0.81 M/S
ECHO MV E/A RATIO: 1.37
ECHO PV MAX VELOCITY: 0.8 M/S
ECHO PV PEAK GRADIENT: 2 MMHG

## 2024-02-06 PROCEDURE — 93306 TTE W/DOPPLER COMPLETE: CPT | Performed by: SPECIALIST

## 2024-02-06 PROCEDURE — 93306 TTE W/DOPPLER COMPLETE: CPT

## 2024-02-10 ENCOUNTER — APPOINTMENT (OUTPATIENT)
Facility: HOSPITAL | Age: 49
End: 2024-02-10
Payer: MEDICAID

## 2024-02-10 ENCOUNTER — HOSPITAL ENCOUNTER (EMERGENCY)
Facility: HOSPITAL | Age: 49
Discharge: HOME | End: 2024-02-10
Payer: MEDICAID

## 2024-02-10 VITALS
HEIGHT: 62 IN | RESPIRATION RATE: 18 BRPM | OXYGEN SATURATION: 100 % | TEMPERATURE: 97.9 F | WEIGHT: 138 LBS | DIASTOLIC BLOOD PRESSURE: 98 MMHG | SYSTOLIC BLOOD PRESSURE: 145 MMHG | BODY MASS INDEX: 25.4 KG/M2 | HEART RATE: 84 BPM
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
## 2024-02-10 DIAGNOSIS — S00.83XA TRAUMATIC HEMATOMA OF FOREHEAD, INITIAL ENCOUNTER: ICD-10-CM

## 2024-02-10 DIAGNOSIS — M25.512 ACUTE PAIN OF LEFT SHOULDER: ICD-10-CM

## 2024-02-10 PROCEDURE — 4500000002 HC ER NO CHARGE

## 2024-02-10 PROCEDURE — 99282 EMERGENCY DEPT VISIT SF MDM: CPT

## 2024-02-10 PROCEDURE — 73030 X-RAY EXAM OF SHOULDER: CPT

## 2024-02-10 PROCEDURE — 72125 CT NECK SPINE W/O DYE: CPT

## 2024-02-10 PROCEDURE — 70450 CT HEAD/BRAIN W/O DYE: CPT

## 2024-02-10 PROCEDURE — 99284 EMERGENCY DEPT VISIT MOD MDM: CPT

## 2024-02-10 ASSESSMENT — PAIN DESCRIPTION - LOCATION: LOCATION: FACE

## 2024-02-10 ASSESSMENT — ENCOUNTER SYMPTOMS
ABDOMINAL PAIN: 0
SHORTNESS OF BREATH: 0
BACK PAIN: 0

## 2024-02-10 ASSESSMENT — PAIN - FUNCTIONAL ASSESSMENT: PAIN_FUNCTIONAL_ASSESSMENT: 0-10

## 2024-02-10 ASSESSMENT — PAIN DESCRIPTION - ORIENTATION: ORIENTATION: RIGHT

## 2024-02-10 ASSESSMENT — PAIN DESCRIPTION - DESCRIPTORS: DESCRIPTORS: ACHING;SORE
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
## 2024-02-21 ENCOUNTER — OFFICE VISIT (OUTPATIENT)
Age: 49
End: 2024-02-21
Payer: MEDICAID

## 2024-02-21 VITALS
HEIGHT: 62 IN | BODY MASS INDEX: 25.21 KG/M2 | SYSTOLIC BLOOD PRESSURE: 142 MMHG | DIASTOLIC BLOOD PRESSURE: 100 MMHG | WEIGHT: 137 LBS

## 2024-02-21 DIAGNOSIS — I10 PRIMARY HYPERTENSION: Primary | ICD-10-CM

## 2024-02-21 DIAGNOSIS — E78.2 MIXED HYPERLIPIDEMIA: ICD-10-CM

## 2024-02-21 DIAGNOSIS — R00.2 PALPITATIONS: ICD-10-CM

## 2024-02-21 DIAGNOSIS — I35.1 NONRHEUMATIC AORTIC VALVE INSUFFICIENCY: ICD-10-CM

## 2024-02-21 PROCEDURE — 3077F SYST BP >= 140 MM HG: CPT | Performed by: SPECIALIST

## 2024-02-21 PROCEDURE — 3080F DIAST BP >= 90 MM HG: CPT | Performed by: SPECIALIST

## 2024-02-21 PROCEDURE — 99214 OFFICE O/P EST MOD 30 MIN: CPT | Performed by: SPECIALIST
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
## 2024-02-23 ENCOUNTER — OFFICE VISIT (OUTPATIENT)
Age: 49
End: 2024-02-23
Payer: MEDICAID

## 2024-02-23 VITALS
DIASTOLIC BLOOD PRESSURE: 100 MMHG | BODY MASS INDEX: 25.21 KG/M2 | HEIGHT: 62 IN | WEIGHT: 137 LBS | SYSTOLIC BLOOD PRESSURE: 154 MMHG

## 2024-02-23 DIAGNOSIS — N76.0 ACUTE VAGINITIS: ICD-10-CM

## 2024-02-23 DIAGNOSIS — Z11.3 SCREENING FOR STDS (SEXUALLY TRANSMITTED DISEASES): ICD-10-CM

## 2024-02-23 DIAGNOSIS — Z01.419 ENCOUNTER FOR GYNECOLOGICAL EXAMINATION WITHOUT ABNORMAL FINDING: ICD-10-CM

## 2024-02-23 DIAGNOSIS — Z12.31 ENCOUNTER FOR SCREENING MAMMOGRAM FOR MALIGNANT NEOPLASM OF BREAST: Primary | ICD-10-CM

## 2024-02-23 DIAGNOSIS — N92.6 MISSED MENSES: ICD-10-CM

## 2024-02-23 DIAGNOSIS — Z12.11 SCREENING FOR COLON CANCER: ICD-10-CM

## 2024-02-23 LAB
HCG, PREGNANCY, URINE, POC: NEGATIVE
VALID INTERNAL CONTROL, POC: YES

## 2024-02-23 PROCEDURE — 81025 URINE PREGNANCY TEST: CPT | Performed by: OBSTETRICS & GYNECOLOGY

## 2024-02-23 PROCEDURE — 3077F SYST BP >= 140 MM HG: CPT | Performed by: OBSTETRICS & GYNECOLOGY

## 2024-02-23 PROCEDURE — 3080F DIAST BP >= 90 MM HG: CPT | Performed by: OBSTETRICS & GYNECOLOGY

## 2024-02-23 PROCEDURE — 99386 PREV VISIT NEW AGE 40-64: CPT | Performed by: OBSTETRICS & GYNECOLOGY

## 2024-02-23 RX ORDER — MEDROXYPROGESTERONE ACETATE 10 MG/1
10 TABLET ORAL DAILY
Qty: 10 TABLET | Refills: 0 | Status: SHIPPED | OUTPATIENT
Start: 2024-02-23 | End: 2024-03-04

## 2024-02-23 SDOH — ECONOMIC STABILITY: FOOD INSECURITY: WITHIN THE PAST 12 MONTHS, YOU WORRIED THAT YOUR FOOD WOULD RUN OUT BEFORE YOU GOT MONEY TO BUY MORE.: NEVER TRUE

## 2024-02-23 SDOH — ECONOMIC STABILITY: HOUSING INSECURITY
IN THE LAST 12 MONTHS, WAS THERE A TIME WHEN YOU DID NOT HAVE A STEADY PLACE TO SLEEP OR SLEPT IN A SHELTER (INCLUDING NOW)?: NO

## 2024-02-23 SDOH — ECONOMIC STABILITY: INCOME INSECURITY: HOW HARD IS IT FOR YOU TO PAY FOR THE VERY BASICS LIKE FOOD, HOUSING, MEDICAL CARE, AND HEATING?: HARD

## 2024-02-23 SDOH — ECONOMIC STABILITY: FOOD INSECURITY: WITHIN THE PAST 12 MONTHS, THE FOOD YOU BOUGHT JUST DIDN'T LAST AND YOU DIDN'T HAVE MONEY TO GET MORE.: NEVER TRUE

## 2024-02-23 ASSESSMENT — PATIENT HEALTH QUESTIONNAIRE - PHQ9
4. FEELING TIRED OR HAVING LITTLE ENERGY: 1
SUM OF ALL RESPONSES TO PHQ QUESTIONS 1-9: 4
3. TROUBLE FALLING OR STAYING ASLEEP: 3
5. POOR APPETITE OR OVEREATING: 0
10. IF YOU CHECKED OFF ANY PROBLEMS, HOW DIFFICULT HAVE THESE PROBLEMS MADE IT FOR YOU TO DO YOUR WORK, TAKE CARE OF THINGS AT HOME, OR GET ALONG WITH OTHER PEOPLE: 0
9. THOUGHTS THAT YOU WOULD BE BETTER OFF DEAD, OR OF HURTING YOURSELF: 0
2. FEELING DOWN, DEPRESSED OR HOPELESS: 0
SUM OF ALL RESPONSES TO PHQ9 QUESTIONS 1 & 2: 0
8. MOVING OR SPEAKING SO SLOWLY THAT OTHER PEOPLE COULD HAVE NOTICED. OR THE OPPOSITE, BEING SO FIGETY OR RESTLESS THAT YOU HAVE BEEN MOVING AROUND A LOT MORE THAN USUAL: 0
1. LITTLE INTEREST OR PLEASURE IN DOING THINGS: 0
SUM OF ALL RESPONSES TO PHQ QUESTIONS 1-9: 4
SUM OF ALL RESPONSES TO PHQ QUESTIONS 1-9: 4
7. TROUBLE CONCENTRATING ON THINGS, SUCH AS READING THE NEWSPAPER OR WATCHING TELEVISION: 0
6. FEELING BAD ABOUT YOURSELF - OR THAT YOU ARE A FAILURE OR HAVE LET YOURSELF OR YOUR FAMILY DOWN: 0
SUM OF ALL RESPONSES TO PHQ QUESTIONS 1-9: 4
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
## 2024-02-25 LAB
BACTERIA GENITAL AEROBE CULT: ABNORMAL
BACTERIA GENITAL AEROBE CULT: ABNORMAL

## 2024-02-27 RX ORDER — AMOXICILLIN 500 MG/1
500 CAPSULE ORAL 2 TIMES DAILY
Qty: 14 CAPSULE | Refills: 0 | Status: SHIPPED | OUTPATIENT
Start: 2024-02-27 | End: 2024-03-05

## 2024-02-28 LAB
A VAGINAE DNA VAG QL NAA+PROBE: ABNORMAL SCORE
BVAB2 DNA VAG QL NAA+PROBE: ABNORMAL SCORE
C ALBICANS DNA VAG QL NAA+PROBE: NEGATIVE
C GLABRATA DNA VAG QL NAA+PROBE: NEGATIVE
C TRACH DNA VAG QL NAA+PROBE: NEGATIVE
M GENITALIUM DNA SPEC QL NAA+PROBE: NEGATIVE
M HOMINIS DNA SPEC QL NAA+PROBE: POSITIVE
MEGA1 DNA VAG QL NAA+PROBE: ABNORMAL SCORE
N GONORRHOEA DNA VAG QL NAA+PROBE: NEGATIVE
T VAGINALIS DNA VAG QL NAA+PROBE: NEGATIVE
UREAPLASMA DNA SPEC QL NAA+PROBE: NEGATIVE

## 2024-02-29 LAB
BACTERIA GENITAL AEROBE CULT: ABNORMAL
BACTERIA GENITAL AEROBE CULT: ABNORMAL

## 2024-02-29 RX ORDER — DOXYCYCLINE HYCLATE 100 MG
100 TABLET ORAL 2 TIMES DAILY
Qty: 14 TABLET | Refills: 0 | Status: SHIPPED | OUTPATIENT
Start: 2024-02-29 | End: 2024-03-07

## 2024-03-01 ENCOUNTER — HOSPITAL ENCOUNTER (EMERGENCY)
Facility: HOSPITAL | Age: 49
Discharge: HOME | End: 2024-03-01
Payer: MEDICAID

## 2024-03-01 VITALS
OXYGEN SATURATION: 100 % | RESPIRATION RATE: 16 BRPM | BODY MASS INDEX: 25.4 KG/M2 | SYSTOLIC BLOOD PRESSURE: 141 MMHG | HEIGHT: 62 IN | WEIGHT: 138 LBS | HEART RATE: 87 BPM | TEMPERATURE: 98.7 F | DIASTOLIC BLOOD PRESSURE: 91 MMHG

## 2024-03-01 DIAGNOSIS — F17.200 SMOKING ADDICTION: ICD-10-CM

## 2024-03-01 DIAGNOSIS — G56.22 ULNAR NEUROPATHY AT ELBOW OF LEFT UPPER EXTREMITY: ICD-10-CM

## 2024-03-01 DIAGNOSIS — M25.522 LEFT ELBOW PAIN: Primary | ICD-10-CM

## 2024-03-01 PROCEDURE — 99283 EMERGENCY DEPT VISIT LOW MDM: CPT

## 2024-03-01 RX ORDER — METHYLPREDNISOLONE 4 MG/1
TABLET ORAL
Qty: 1 KIT | Refills: 0 | Status: SHIPPED | OUTPATIENT
Start: 2024-03-01 | End: 2024-03-07

## 2024-03-01 RX ORDER — GABAPENTIN 100 MG/1
100 CAPSULE ORAL 3 TIMES DAILY
Qty: 21 CAPSULE | Refills: 0 | Status: SHIPPED | OUTPATIENT
Start: 2024-03-01 | End: 2024-03-08

## 2024-03-01 ASSESSMENT — VISUAL ACUITY: OU: 1

## 2024-03-01 ASSESSMENT — PAIN - FUNCTIONAL ASSESSMENT: PAIN_FUNCTIONAL_ASSESSMENT: 0-10

## 2024-03-01 ASSESSMENT — PAIN SCALES - GENERAL: PAINLEVEL_OUTOF10: 8

## 2024-03-01 ASSESSMENT — PAIN DESCRIPTION - DESCRIPTORS: DESCRIPTORS: ACHING;SORE

## 2024-03-01 ASSESSMENT — PAIN DESCRIPTION - ORIENTATION: ORIENTATION: LEFT

## 2024-03-01 ASSESSMENT — PAIN DESCRIPTION - LOCATION: LOCATION: SHOULDER;HAND

## 2024-03-01 NOTE — ED NOTES
Discharge instructions were given to the patient by Provider.     The patient left the Emergency Department ambulatory, alert and oriented and in no acute distress with 2 prescriptions. The patient was encouraged to call or return to the ED for worsening issues or problems and was encouraged to schedule a follow up appointment for continuing care.     The patient verbalized understanding of discharge instructions and prescriptions, all questions were answered. The patient has no further concerns at this time.
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
## 2024-03-01 NOTE — ED PROVIDER NOTES
tablet  Commonly known as: PROTONIX     prazosin 1 MG capsule  Commonly known as: MINIPRESS     traZODone 100 MG tablet  Commonly known as: DESYREL     triamcinolone 0.1 % ointment  Commonly known as: KENALOG           * This list has 2 medication(s) that are the same as other medications prescribed for you. Read the directions carefully, and ask your doctor or other care provider to review them with you.                   Where to Get Your Medications        These medications were sent to [facility]/pharmacy #[id number] - [place], [place] - [address] - P [phone] - F [phone] [address] [address]      Phone: [phone]   gabapentin 100 MG capsule  methylPREDNISolone 4 MG tablet           DISCONTINUED MEDICATIONS:  Current Discharge Medication List        I have seen and evaluated the patient autonomously. My supervision physician was on site and available for consultation if needed.     I am the Primary Clinician of Record.   ANT Puri (electronically signed)    (Please note that parts of this dictation were completed with voice recognition software. Quite often unanticipated grammatical, syntax, homophones, and other interpretive errors are inadvertently transcribed by the computer software. Please disregards these errors. Please excuse any errors that have escaped final proofreading.)       Rolf Yepez PA  03/01/24 1203

## 2024-03-02 LAB
CYTOLOGIST CVX/VAG CYTO: NORMAL
CYTOLOGY CVX/VAG DOC CYTO: NORMAL
CYTOLOGY CVX/VAG DOC THIN PREP: NORMAL
DX ICD CODE: NORMAL
HPV GENOTYPE REFLEX: NORMAL
HPV I/H RISK 4 DNA CVX QL PROBE+SIG AMP: NEGATIVE
Lab: NORMAL
OTHER STN SPEC: NORMAL
STAT OF ADQ CVX/VAG CYTO-IMP: NORMAL

## 2024-03-06 ENCOUNTER — TELEPHONE (OUTPATIENT)
Age: 49
End: 2024-03-06

## 2024-03-06 NOTE — TELEPHONE ENCOUNTER
Patient says the meds that were recently started have given her a yeast infection and she needs some medication to treat the yeast infection. Patient wants to know if something can be called into her pharmacy for her.     [facility] [address]

## 2024-04-03 ENCOUNTER — APPOINTMENT (OUTPATIENT)
Facility: HOSPITAL | Age: 49
End: 2024-04-03
Payer: MEDICAID

## 2024-04-03 ENCOUNTER — HOSPITAL ENCOUNTER (EMERGENCY)
Facility: HOSPITAL | Age: 49
Discharge: HOME | End: 2024-04-03
Payer: MEDICAID

## 2024-04-03 VITALS
RESPIRATION RATE: 16 BRPM | BODY MASS INDEX: 25.11 KG/M2 | TEMPERATURE: 98.3 F | WEIGHT: 160 LBS | SYSTOLIC BLOOD PRESSURE: 135 MMHG | DIASTOLIC BLOOD PRESSURE: 76 MMHG | HEIGHT: 67 IN | HEART RATE: 76 BPM | OXYGEN SATURATION: 100 %

## 2024-04-03 DIAGNOSIS — N30.00 ACUTE CYSTITIS WITHOUT HEMATURIA: Primary | ICD-10-CM

## 2024-04-03 DIAGNOSIS — Z20.2 POSSIBLE EXPOSURE TO STD: ICD-10-CM

## 2024-04-03 LAB
ALBUMIN SERPL-MCNC: 3.6 G/DL (ref 3.5–5)
ALBUMIN/GLOB SERPL: 1 (ref 1.1–2.2)
ALP SERPL-CCNC: 56 U/L (ref 45–117)
ALT SERPL-CCNC: 31 U/L (ref 12–78)
ANION GAP SERPL CALC-SCNC: 8 MMOL/L (ref 5–15)
APPEARANCE UR: CLEAR
AST SERPL-CCNC: 18 U/L (ref 15–37)
BACTERIA URNS QL MICRO: ABNORMAL /HPF
BASOPHILS # BLD: 0 K/UL (ref 0–0.1)
BASOPHILS NFR BLD: 1 % (ref 0–1)
BILIRUB SERPL-MCNC: 0.3 MG/DL (ref 0.2–1)
BILIRUB UR QL: NEGATIVE
BUN SERPL-MCNC: 16 MG/DL (ref 6–20)
BUN/CREAT SERPL: 15 (ref 12–20)
CALCIUM SERPL-MCNC: 8.5 MG/DL (ref 8.5–10.1)
CHLORIDE SERPL-SCNC: 102 MMOL/L (ref 97–108)
CLUE CELLS VAG QL WET PREP: NORMAL
CO2 SERPL-SCNC: 30 MMOL/L (ref 21–32)
COLOR UR: ABNORMAL
CREAT SERPL-MCNC: 1.08 MG/DL (ref 0.55–1.02)
DIFFERENTIAL METHOD BLD: ABNORMAL
EOSINOPHIL # BLD: 0.2 K/UL (ref 0–0.4)
EOSINOPHIL NFR BLD: 3 % (ref 0–7)
EPITH CASTS URNS QL MICRO: ABNORMAL /LPF
ERYTHROCYTE [DISTWIDTH] IN BLOOD BY AUTOMATED COUNT: 11.6 % (ref 11.5–14.5)
GLOBULIN SER CALC-MCNC: 3.5 G/DL (ref 2–4)
GLUCOSE SERPL-MCNC: 107 MG/DL (ref 65–100)
GLUCOSE UR STRIP.AUTO-MCNC: NEGATIVE MG/DL
HCG UR QL: NEGATIVE
HCT VFR BLD AUTO: 38.3 % (ref 35–47)
HGB BLD-MCNC: 13.6 G/DL (ref 11.5–16)
HGB UR QL STRIP: NEGATIVE
IMM GRANULOCYTES # BLD AUTO: 0 K/UL (ref 0–0.04)
IMM GRANULOCYTES NFR BLD AUTO: 0 % (ref 0–0.5)
KETONES UR QL STRIP.AUTO: 15 MG/DL
LEUKOCYTE ESTERASE UR QL STRIP.AUTO: ABNORMAL
LIPASE SERPL-CCNC: 42 U/L (ref 13–75)
LYMPHOCYTES # BLD: 2.2 K/UL (ref 0.8–3.5)
LYMPHOCYTES NFR BLD: 25 % (ref 12–49)
MCH RBC QN AUTO: 32.7 PG (ref 26–34)
MCHC RBC AUTO-ENTMCNC: 35.5 G/DL (ref 30–36.5)
MCV RBC AUTO: 92.1 FL (ref 80–99)
MONOCYTES # BLD: 0.7 K/UL (ref 0–1)
MONOCYTES NFR BLD: 8 % (ref 5–13)
MUCOUS THREADS URNS QL MICRO: ABNORMAL /LPF
NEUTS SEG # BLD: 5.5 K/UL (ref 1.8–8)
NEUTS SEG NFR BLD: 63 % (ref 32–75)
NITRITE UR QL STRIP.AUTO: NEGATIVE
NRBC # BLD: 0 K/UL (ref 0–0.01)
NRBC BLD-RTO: 0 PER 100 WBC
PH UR STRIP: 5.5 (ref 5–8)
PLATELET # BLD AUTO: 425 K/UL (ref 150–400)
PMV BLD AUTO: 9.2 FL (ref 8.9–12.9)
POTASSIUM SERPL-SCNC: 3.1 MMOL/L (ref 3.5–5.1)
PROT SERPL-MCNC: 7.1 G/DL (ref 6.4–8.2)
PROT UR STRIP-MCNC: ABNORMAL MG/DL
RBC # BLD AUTO: 4.16 M/UL (ref 3.8–5.2)
RBC #/AREA URNS HPF: ABNORMAL /HPF (ref 0–5)
SODIUM SERPL-SCNC: 140 MMOL/L (ref 136–145)
SP GR UR REFRACTOMETRY: 1.02
T VAGINALIS VAG QL WET PREP: NORMAL
URINE CULTURE IF INDICATED: ABNORMAL
UROBILINOGEN UR QL STRIP.AUTO: 1 EU/DL (ref 0.2–1)
WBC # BLD AUTO: 8.7 K/UL (ref 3.6–11)
WBC URNS QL MICRO: ABNORMAL /HPF (ref 0–4)
YEAST: NORMAL

## 2024-04-03 PROCEDURE — 6370000000 HC RX 637 (ALT 250 FOR IP)

## 2024-04-03 PROCEDURE — 87491 CHLMYD TRACH DNA AMP PROBE: CPT

## 2024-04-03 PROCEDURE — 96372 THER/PROPH/DIAG INJ SC/IM: CPT

## 2024-04-03 PROCEDURE — 96374 THER/PROPH/DIAG INJ IV PUSH: CPT

## 2024-04-03 PROCEDURE — 87186 SC STD MICRODIL/AGAR DIL: CPT

## 2024-04-03 PROCEDURE — 76705 ECHO EXAM OF ABDOMEN: CPT

## 2024-04-03 PROCEDURE — 81001 URINALYSIS AUTO W/SCOPE: CPT

## 2024-04-03 PROCEDURE — 87210 SMEAR WET MOUNT SALINE/INK: CPT

## 2024-04-03 PROCEDURE — 36415 COLL VENOUS BLD VENIPUNCTURE: CPT

## 2024-04-03 PROCEDURE — 87086 URINE CULTURE/COLONY COUNT: CPT

## 2024-04-03 PROCEDURE — 85025 COMPLETE CBC W/AUTO DIFF WBC: CPT

## 2024-04-03 PROCEDURE — 99284 EMERGENCY DEPT VISIT MOD MDM: CPT

## 2024-04-03 PROCEDURE — 6360000002 HC RX W HCPCS

## 2024-04-03 PROCEDURE — 83690 ASSAY OF LIPASE: CPT

## 2024-04-03 PROCEDURE — 87088 URINE BACTERIA CULTURE: CPT

## 2024-04-03 PROCEDURE — 87591 N.GONORRHOEAE DNA AMP PROB: CPT

## 2024-04-03 PROCEDURE — 80053 COMPREHEN METABOLIC PANEL: CPT

## 2024-04-03 PROCEDURE — 2500000003 HC RX 250 WO HCPCS

## 2024-04-03 PROCEDURE — 81025 URINE PREGNANCY TEST: CPT

## 2024-04-03 RX ORDER — AZITHROMYCIN 500 MG/1
1000 TABLET, FILM COATED ORAL
Status: COMPLETED | OUTPATIENT
Start: 2024-04-03 | End: 2024-04-03

## 2024-04-03 RX ORDER — PENICILLIN V POTASSIUM 500 MG/1
500 TABLET ORAL 2 TIMES DAILY
Qty: 20 TABLET | Refills: 0 | Status: SHIPPED | OUTPATIENT
Start: 2024-04-03 | End: 2024-04-13

## 2024-04-03 RX ORDER — FLUCONAZOLE 150 MG/1
150 TABLET ORAL ONCE
Qty: 1 TABLET | Refills: 0 | Status: SHIPPED | OUTPATIENT
Start: 2024-04-03 | End: 2024-04-03

## 2024-04-03 RX ORDER — ONDANSETRON 2 MG/ML
4 INJECTION INTRAMUSCULAR; INTRAVENOUS ONCE
Status: COMPLETED | OUTPATIENT
Start: 2024-04-03 | End: 2024-04-03

## 2024-04-03 RX ADMIN — LIDOCAINE HYDROCHLORIDE 500 MG: 10 INJECTION, SOLUTION EPIDURAL; INFILTRATION; INTRACAUDAL; PERINEURAL at 16:55

## 2024-04-03 RX ADMIN — ONDANSETRON 4 MG: 2 INJECTION INTRAMUSCULAR; INTRAVENOUS at 16:52

## 2024-04-03 RX ADMIN — AZITHROMYCIN 1000 MG: 500 TABLET, FILM COATED ORAL at 17:32

## 2024-04-03 ASSESSMENT — PAIN DESCRIPTION - LOCATION: LOCATION: ABDOMEN

## 2024-04-03 ASSESSMENT — PAIN - FUNCTIONAL ASSESSMENT: PAIN_FUNCTIONAL_ASSESSMENT: 0-10

## 2024-04-03 ASSESSMENT — PAIN DESCRIPTION - DESCRIPTORS: DESCRIPTORS: ACHING

## 2024-04-03 ASSESSMENT — PAIN DESCRIPTION - ORIENTATION: ORIENTATION: RIGHT

## 2024-04-03 ASSESSMENT — PAIN SCALES - GENERAL: PAINLEVEL_OUTOF10: 5

## 2024-04-03 NOTE — ED NOTES
Pt presents to ED complaining of nausea x3 days and RLQ pain that is intermittent x2 days. Pt states that her boyfriend told her she needed to get tested for sti's as well. Pt also states she has had urinary frequency and noticed two raised bumps on her right labia that she would like to have looked at. Pt denies abdominal tenderness upon palpation during RN assessment. Pt is alert and oriented x 4, RR even and unlabored, skin is warm and dry. Pt appears in NAD at this time. Assessment completed and pt updated on plan of care.  Call bell in reach.  Emergency Department Nursing Plan of Care  The Nursing Plan of Care is developed from the Nursing assessment and Emergency Department Attending provider initial evaluation.  The plan of care may be reviewed in the “ED Provider note”.  The Plan of Care was developed with the following considerations:  Patient / Family readiness to learn indicated by:Refer to Medical chart in [facility]  Persons(s) to be included in education: Refer to Medical chart in [facility]  Barriers to Learning/Limitations:Normal

## 2024-04-03 NOTE — ED PROVIDER NOTES
Index  [RT] Pamela Chen, APRN - CNP   Pt instructed that results will be sent to [facility] in 2-3 days for gonorrhea and chlamydia. Resulted labs listed above, if any, and were reviewed with patient, questions and concerns were addressed. They have been advised to wait at least one week before resuming in sexual activity.  If results come back positive pt should inform partner(s) as well.  Patient should return to the ED immediately for any new or worsening symptoms.  The patient verbalizes understanding and agrees to plan of care.      Disposition Considerations (Tests not done, Shared Decision Making, Pt Expectation of Test or Tx.): As above     FINAL IMPRESSION     1. Acute cystitis without hematuria    2. Possible exposure to STD          DISPOSITION/PLAN   DISPOSITION Decision To Discharge 04/03/2024 06:32:07 PM      Discharge Note: The patient is stable for discharge home. The signs, symptoms, diagnosis, and discharge instructions have been discussed, understanding conveyed, and agreed upon. The patient is to follow up as recommended or return to ER should their symptoms worsen.      PATIENT REFERRED TO:  Tiburcio Albright MD  [address] [place] [address] [id number] [phone]    Go in 1 week      [facility] EMERGENCY DEPT  [address] [place] [id number]  [phone]    If symptoms worsen       DISCHARGE MEDICATIONS:     Medication List        START taking these medications      fluconazole 150 MG tablet  Commonly known as: DIFLUCAN  Take 1 tablet by mouth once for 1 dose     penicillin v potassium 500 MG tablet  Commonly known as: VEETID  Take 1 tablet by mouth 2 times daily for 10 days            ASK your doctor about these medications      albuterol sulfate  (90 Base) MCG/ACT inhaler  Commonly known as: Ventolin HFA  Inhale 2 puffs into the lungs 4 times daily as needed for Wheezing     amLODIPine 10 MG tablet  Commonly known as: NORVASC  Take 1 tablet by mouth daily     aspirin 81 MG

## 2024-04-03 NOTE — ED NOTES
Discharge instructions were given to the patient by SAGE Carranza.     The patient left the Emergency Department alert and oriented and in no acute distress with 2 prescriptions. The patient was encouraged to call or return to the ED for worsening issues or problems and was encouraged to schedule a follow up appointment for continuing care.     Ambulation assessment completed before discharge.  Pt left Emergency Department ambulating at baseline with no ortho devices  Ortho device education: none    The patient verbalized understanding of discharge instructions and prescriptions, all questions were answered. The patient has no further concerns at this time.

## 2024-04-03 NOTE — DISCHARGE INSTRUCTIONS
Please follow-up with your gynecologist in 1 week.  Return to the emergency department for worsening symptoms.
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
## 2024-04-05 LAB
BACTERIA SPEC CULT: ABNORMAL
CC UR VC: ABNORMAL
SERVICE CMNT-IMP: ABNORMAL

## 2024-04-05 RX ORDER — NITROFURANTOIN 25; 75 MG/1; MG/1
100 CAPSULE ORAL 2 TIMES DAILY
Qty: 10 CAPSULE | Refills: 0 | Status: SHIPPED | OUTPATIENT
Start: 2024-04-05 | End: 2024-04-10

## 2024-05-01 VITALS
DIASTOLIC BLOOD PRESSURE: 92 MMHG | HEART RATE: 71 BPM | WEIGHT: 138.5 LBS | HEIGHT: 62 IN | OXYGEN SATURATION: 99 % | SYSTOLIC BLOOD PRESSURE: 143 MMHG | BODY MASS INDEX: 25.49 KG/M2 | TEMPERATURE: 97.7 F | RESPIRATION RATE: 19 BRPM

## 2024-05-01 PROCEDURE — 99284 EMERGENCY DEPT VISIT MOD MDM: CPT

## 2024-05-01 ASSESSMENT — PAIN - FUNCTIONAL ASSESSMENT: PAIN_FUNCTIONAL_ASSESSMENT: 0-10

## 2024-05-01 ASSESSMENT — PAIN DESCRIPTION - DESCRIPTORS: DESCRIPTORS: ACHING;THROBBING

## 2024-05-01 ASSESSMENT — PAIN DESCRIPTION - LOCATION: LOCATION: SHOULDER;NECK

## 2024-05-01 ASSESSMENT — PAIN DESCRIPTION - ORIENTATION: ORIENTATION: RIGHT

## 2024-05-02 ENCOUNTER — HOSPITAL ENCOUNTER (EMERGENCY)
Facility: HOSPITAL | Age: 49
Discharge: HOME | End: 2024-05-02
Attending: EMERGENCY MEDICINE
Payer: MEDICAID

## 2024-05-02 ENCOUNTER — APPOINTMENT (OUTPATIENT)
Facility: HOSPITAL | Age: 49
End: 2024-05-02
Payer: MEDICAID

## 2024-05-02 DIAGNOSIS — M25.511 ACUTE PAIN OF RIGHT SHOULDER: ICD-10-CM

## 2024-05-02 DIAGNOSIS — M19.011 ARTHRITIS OF RIGHT ACROMIOCLAVICULAR JOINT: Primary | ICD-10-CM

## 2024-05-02 LAB
EKG ATRIAL RATE: 68 BPM
EKG DIAGNOSIS: NORMAL
EKG P AXIS: 26 DEGREES
EKG P-R INTERVAL: 154 MS
EKG Q-T INTERVAL: 412 MS
EKG QRS DURATION: 78 MS
EKG QTC CALCULATION (BAZETT): 438 MS
EKG R AXIS: 2 DEGREES
EKG T AXIS: 22 DEGREES
EKG VENTRICULAR RATE: 68 BPM

## 2024-05-02 PROCEDURE — 73030 X-RAY EXAM OF SHOULDER: CPT

## 2024-05-02 PROCEDURE — 93005 ELECTROCARDIOGRAM TRACING: CPT | Performed by: EMERGENCY MEDICINE

## 2024-05-02 PROCEDURE — 6360000002 HC RX W HCPCS: Performed by: EMERGENCY MEDICINE

## 2024-05-02 PROCEDURE — 96372 THER/PROPH/DIAG INJ SC/IM: CPT

## 2024-05-02 PROCEDURE — 6370000000 HC RX 637 (ALT 250 FOR IP): Performed by: EMERGENCY MEDICINE

## 2024-05-02 RX ORDER — OXYCODONE HYDROCHLORIDE 5 MG/1
5 TABLET ORAL
Status: COMPLETED | OUTPATIENT
Start: 2024-05-02 | End: 2024-05-02

## 2024-05-02 RX ORDER — DEXAMETHASONE SODIUM PHOSPHATE 10 MG/ML
10 INJECTION, SOLUTION INTRAMUSCULAR; INTRAVENOUS ONCE
Status: COMPLETED | OUTPATIENT
Start: 2024-05-02 | End: 2024-05-02

## 2024-05-02 RX ORDER — METHYLPREDNISOLONE 4 MG/1
TABLET ORAL
Qty: 1 KIT | Refills: 0 | Status: SHIPPED | OUTPATIENT
Start: 2024-05-02 | End: 2024-05-08

## 2024-05-02 RX ORDER — OXYCODONE HYDROCHLORIDE 5 MG/1
5 TABLET ORAL EVERY 6 HOURS PRN
Qty: 7 TABLET | Refills: 0 | Status: SHIPPED | OUTPATIENT
Start: 2024-05-02 | End: 2024-05-05

## 2024-05-02 RX ADMIN — OXYCODONE 5 MG: 5 TABLET ORAL at 00:57

## 2024-05-02 RX ADMIN — DEXAMETHASONE SODIUM PHOSPHATE 10 MG: 10 INJECTION, SOLUTION INTRAMUSCULAR; INTRAVENOUS at 00:57

## 2024-05-02 ASSESSMENT — PAIN SCALES - GENERAL: PAINLEVEL_OUTOF10: 10

## 2024-05-02 NOTE — ED NOTES
Discharge instructions were given to the patient by Allen OQUENDO     The patient left the Emergency Department alert and oriented and in no acute distress with 2 prescriptions. The patient was encouraged to call or return to the ED for worsening issues or problems and was encouraged to schedule a follow up appointment for continuing care.     Ambulation assessment completed before discharge.  Pt left Emergency Department ambulating at baseline with no ortho devices  Ortho device education: none    The patient verbalized understanding of discharge instructions and prescriptions, all questions were answered. The patient has no further concerns at this time.
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
## 2024-05-02 NOTE — ED TRIAGE NOTES
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 Pt denies injury/trauma  Pt endorses taking Robaxin, Tylenol, and ibuprofen at 7pm

## 2024-05-03 ENCOUNTER — TELEPHONE (OUTPATIENT)
Age: 49
End: 2024-05-03

## 2024-05-03 NOTE — TELEPHONE ENCOUNTER
Lvm for patient to call our office back. Nature of call to schedule a ED follow up appointment.        ----- Message from Ritchie Bautista DO sent at 5/2/2024  1:56 PM EDT -----  Please schedule.    ----- Message -----  From: Duane Camacho MD  Sent: 5/2/2024   1:44 PM EDT  To: Ritchie Bautista DO

## 2024-05-07 ENCOUNTER — TELEPHONE (OUTPATIENT)
Age: 49
End: 2024-05-07

## 2024-05-07 NOTE — TELEPHONE ENCOUNTER
Patient called in wanting to a rx to be prescribed for he reoccurring BV. She states that she has not had any sexual partners since her last visit with Dr. Albright and that there should be notes in her chart regarding this, so that she does not have to come in for any extra appts. Please send to the pharmacy on file. Thank you.

## 2024-05-08 NOTE — TELEPHONE ENCOUNTER
Pt advised that she will need an appointment if she would like a prescription for BV.  Pt declines scheduling appt at this time.  Pt advised to try OTC rephresh.  Pt verbalized understanding.

## 2024-07-19 LAB
HBV SURFACE AG SERPL QL IA: NEGATIVE
HIV 1+2 AB+HIV1 P24 AG SERPL QL IA: NON REACTIVE
HSV1 IGG SER IA-ACNC: <0.91 INDEX (ref 0–0.9)
HSV2 IGG SER IA-ACNC: 18.8 INDEX (ref 0–0.9)

## 2024-07-20 LAB — TREPONEMA PALLIDUM IGG+IGM AB [PRESENCE] IN SERUM OR PLASMA BY IMMUNOASSAY: NON REACTIVE

## 2024-09-14 ENCOUNTER — APPOINTMENT (OUTPATIENT)
Facility: HOSPITAL | Age: 49
End: 2024-09-14
Payer: MEDICAID

## 2024-09-14 ENCOUNTER — HOSPITAL ENCOUNTER (EMERGENCY)
Facility: HOSPITAL | Age: 49
Discharge: HOME | End: 2024-09-14
Payer: MEDICAID

## 2024-09-14 VITALS
HEIGHT: 62 IN | WEIGHT: 126 LBS | RESPIRATION RATE: 17 BRPM | BODY MASS INDEX: 23.19 KG/M2 | OXYGEN SATURATION: 97 % | SYSTOLIC BLOOD PRESSURE: 144 MMHG | DIASTOLIC BLOOD PRESSURE: 94 MMHG | TEMPERATURE: 98.6 F | HEART RATE: 86 BPM

## 2024-09-14 DIAGNOSIS — T07.XXXA CONTUSION OF MULTIPLE SITES: ICD-10-CM

## 2024-09-14 DIAGNOSIS — S10.91XA ABRASION, NECK W/O INFECTION: Primary | ICD-10-CM

## 2024-09-14 DIAGNOSIS — M25.511 ACUTE PAIN OF RIGHT SHOULDER: ICD-10-CM
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
## 2024-09-14 LAB
ANION GAP SERPL CALC-SCNC: 9 MMOL/L (ref 2–12)
BUN SERPL-MCNC: 16 MG/DL (ref 6–20)
BUN/CREAT SERPL: 17 (ref 12–20)
CALCIUM SERPL-MCNC: 9 MG/DL (ref 8.5–10.1)
CHLORIDE SERPL-SCNC: 101 MMOL/L (ref 97–108)
CO2 SERPL-SCNC: 31 MMOL/L (ref 21–32)
CREAT SERPL-MCNC: 0.95 MG/DL (ref 0.55–1.02)
GLUCOSE SERPL-MCNC: 92 MG/DL (ref 65–100)
HCG UR QL: NEGATIVE
POTASSIUM SERPL-SCNC: 3.6 MMOL/L (ref 3.5–5.1)
SODIUM SERPL-SCNC: 141 MMOL/L (ref 136–145)

## 2024-09-14 PROCEDURE — 6360000004 HC RX CONTRAST MEDICATION: Performed by: NURSE PRACTITIONER

## 2024-09-14 PROCEDURE — 70498 CT ANGIOGRAPHY NECK: CPT

## 2024-09-14 PROCEDURE — 6370000000 HC RX 637 (ALT 250 FOR IP): Performed by: NURSE PRACTITIONER

## 2024-09-14 PROCEDURE — 99285 EMERGENCY DEPT VISIT HI MDM: CPT

## 2024-09-14 PROCEDURE — 80048 BASIC METABOLIC PNL TOTAL CA: CPT

## 2024-09-14 PROCEDURE — 70450 CT HEAD/BRAIN W/O DYE: CPT

## 2024-09-14 PROCEDURE — 99281 EMR DPT VST MAYX REQ PHY/QHP: CPT

## 2024-09-14 PROCEDURE — 4500000002 HC ER NO CHARGE

## 2024-09-14 PROCEDURE — 81025 URINE PREGNANCY TEST: CPT

## 2024-09-14 PROCEDURE — 36415 COLL VENOUS BLD VENIPUNCTURE: CPT

## 2024-09-14 RX ORDER — LIDOCAINE HYDROCHLORIDE 20 MG/ML
15 SOLUTION OROPHARYNGEAL
Status: DISCONTINUED | OUTPATIENT
Start: 2024-09-14 | End: 2024-09-14 | Stop reason: HOSPADM

## 2024-09-14 RX ORDER — SENNOSIDES 8.6 MG
650 CAPSULE ORAL EVERY 8 HOURS PRN
Qty: 30 TABLET | Refills: 3 | Status: SHIPPED | OUTPATIENT
Start: 2024-09-14

## 2024-09-14 RX ORDER — ACETAMINOPHEN 500 MG
1000 TABLET ORAL
Status: COMPLETED | OUTPATIENT
Start: 2024-09-14 | End: 2024-09-14

## 2024-09-14 RX ORDER — IOPAMIDOL 755 MG/ML
100 INJECTION, SOLUTION INTRAVASCULAR
Status: COMPLETED | OUTPATIENT
Start: 2024-09-14 | End: 2024-09-14

## 2024-09-14 RX ORDER — LIDOCAINE 4 G/G
1 PATCH TOPICAL DAILY
Qty: 15 PATCH | Refills: 0 | Status: SHIPPED | OUTPATIENT
Start: 2024-09-14 | End: 2024-09-29

## 2024-09-14 RX ADMIN — ACETAMINOPHEN 1000 MG: 500 TABLET ORAL at 14:07

## 2024-09-14 RX ADMIN — IOPAMIDOL 100 ML: 755 INJECTION, SOLUTION INTRAVENOUS at 15:02

## 2024-09-14 ASSESSMENT — PAIN DESCRIPTION - LOCATION
LOCATION: NECK;THROAT
LOCATION: NECK

## 2024-09-14 ASSESSMENT — PAIN DESCRIPTION - DESCRIPTORS
DESCRIPTORS: SHARP
DESCRIPTORS: THROBBING;TENDER

## 2024-09-14 ASSESSMENT — ENCOUNTER SYMPTOMS
SHORTNESS OF BREATH: 0
ABDOMINAL PAIN: 0
BACK PAIN: 0
ROS SKIN COMMENTS: ABRASIONS

## 2024-09-14 ASSESSMENT — PAIN DESCRIPTION - PAIN TYPE: TYPE: ACUTE PAIN

## 2024-09-14 ASSESSMENT — PAIN DESCRIPTION - ORIENTATION: ORIENTATION: LEFT

## 2024-09-14 ASSESSMENT — PAIN SCALES - GENERAL
PAINLEVEL_OUTOF10: 9
PAINLEVEL_OUTOF10: 9

## 2024-09-14 ASSESSMENT — PAIN - FUNCTIONAL ASSESSMENT: PAIN_FUNCTIONAL_ASSESSMENT: 0-10

## 2024-09-16 ENCOUNTER — HOSPITAL ENCOUNTER (EMERGENCY)
Facility: HOSPITAL | Age: 49
Discharge: HOME | End: 2024-09-16
Attending: EMERGENCY MEDICINE
Payer: MEDICAID

## 2024-09-16 VITALS
BODY MASS INDEX: 24.46 KG/M2 | TEMPERATURE: 98 F | HEART RATE: 83 BPM | WEIGHT: 132.94 LBS | OXYGEN SATURATION: 98 % | RESPIRATION RATE: 18 BRPM | DIASTOLIC BLOOD PRESSURE: 96 MMHG | SYSTOLIC BLOOD PRESSURE: 148 MMHG | HEIGHT: 62 IN

## 2024-09-16 DIAGNOSIS — Z04.89 FORENSIC EXAMINATION PERFORMED: Primary | ICD-10-CM

## 2024-09-16 DIAGNOSIS — G56.92 NEUROPATHY OF LEFT HAND: ICD-10-CM

## 2024-09-16 PROCEDURE — 4500000002 HC ER NO CHARGE

## 2024-09-16 PROCEDURE — 99282 EMERGENCY DEPT VISIT SF MDM: CPT

## 2024-09-16 ASSESSMENT — PAIN DESCRIPTION - ORIENTATION: ORIENTATION: LEFT

## 2024-09-16 ASSESSMENT — ENCOUNTER SYMPTOMS
SORE THROAT: 0
VOMITING: 0
COUGH: 0

## 2024-09-16 ASSESSMENT — PAIN DESCRIPTION - LOCATION: LOCATION: ARM;SHOULDER

## 2024-09-16 ASSESSMENT — PAIN - FUNCTIONAL ASSESSMENT: PAIN_FUNCTIONAL_ASSESSMENT: ACTIVITIES ARE NOT PREVENTED

## 2024-09-16 ASSESSMENT — PAIN DESCRIPTION - PAIN TYPE: TYPE: ACUTE PAIN

## 2024-09-16 ASSESSMENT — PAIN DESCRIPTION - ONSET: ONSET: PROGRESSIVE

## 2024-09-16 ASSESSMENT — PAIN DESCRIPTION - DESCRIPTORS: DESCRIPTORS: TINGLING

## 2024-09-16 ASSESSMENT — PAIN DESCRIPTION - FREQUENCY: FREQUENCY: CONTINUOUS

## 2024-09-16 ASSESSMENT — PAIN SCALES - GENERAL: PAINLEVEL_OUTOF10: 5

## 2025-02-12 ENCOUNTER — HOSPITAL ENCOUNTER (EMERGENCY)
Facility: HOSPITAL | Age: 50
Discharge: HOME | End: 2025-02-12
Payer: MEDICAID

## 2025-02-12 VITALS
BODY MASS INDEX: 24.29 KG/M2 | TEMPERATURE: 98.3 F | OXYGEN SATURATION: 100 % | SYSTOLIC BLOOD PRESSURE: 146 MMHG | DIASTOLIC BLOOD PRESSURE: 102 MMHG | WEIGHT: 132 LBS | HEART RATE: 85 BPM | RESPIRATION RATE: 20 BRPM | HEIGHT: 62 IN

## 2025-02-12 DIAGNOSIS — B96.89 BV (BACTERIAL VAGINOSIS): Primary | ICD-10-CM

## 2025-02-12 DIAGNOSIS — N76.0 BV (BACTERIAL VAGINOSIS): Primary | ICD-10-CM

## 2025-02-12 LAB
APPEARANCE UR: CLEAR
BACTERIA URNS QL MICRO: NEGATIVE /HPF
BILIRUB UR QL: NEGATIVE
CLUE CELLS VAG QL WET PREP: ABNORMAL
COLOR UR: NORMAL
EPITH CASTS URNS QL MICRO: NORMAL /LPF
GLUCOSE UR STRIP.AUTO-MCNC: NEGATIVE MG/DL
HCG UR QL: NEGATIVE
HGB UR QL STRIP: NEGATIVE
KETONES UR QL STRIP.AUTO: NEGATIVE MG/DL
LEUKOCYTE ESTERASE UR QL STRIP.AUTO: NEGATIVE
NITRITE UR QL STRIP.AUTO: NEGATIVE
PH UR STRIP: 5.5 (ref 5–8)
PROT UR STRIP-MCNC: NEGATIVE MG/DL
RBC #/AREA URNS HPF: NORMAL /HPF (ref 0–5)
SP GR UR REFRACTOMETRY: 1.01
T VAGINALIS VAG QL WET PREP: ABNORMAL
UROBILINOGEN UR QL STRIP.AUTO: 0.2 EU/DL (ref 0.2–1)
WBC URNS QL MICRO: NORMAL /HPF (ref 0–4)
YEAST WET PREP: ABNORMAL

## 2025-02-12 PROCEDURE — 81025 URINE PREGNANCY TEST: CPT

## 2025-02-12 PROCEDURE — 99283 EMERGENCY DEPT VISIT LOW MDM: CPT

## 2025-02-12 PROCEDURE — 87491 CHLMYD TRACH DNA AMP PROBE: CPT

## 2025-02-12 PROCEDURE — 87210 SMEAR WET MOUNT SALINE/INK: CPT

## 2025-02-12 PROCEDURE — 81001 URINALYSIS AUTO W/SCOPE: CPT

## 2025-02-12 PROCEDURE — 87591 N.GONORRHOEAE DNA AMP PROB: CPT

## 2025-02-12 RX ORDER — METRONIDAZOLE 7.5 MG/G
1 GEL VAGINAL DAILY
Qty: 70 G | Refills: 0 | Status: SHIPPED | OUTPATIENT
Start: 2025-02-12 | End: 2025-02-17

## 2025-02-12 RX ORDER — METRONIDAZOLE 500 MG/1
500 TABLET ORAL 2 TIMES DAILY
Qty: 14 TABLET | Refills: 0 | Status: SHIPPED | OUTPATIENT
Start: 2025-02-12 | End: 2025-02-12

## 2025-02-12 ASSESSMENT — PAIN DESCRIPTION - LOCATION: LOCATION: ABDOMEN

## 2025-02-12 ASSESSMENT — PAIN DESCRIPTION - FREQUENCY: FREQUENCY: INTERMITTENT

## 2025-02-12 ASSESSMENT — LIFESTYLE VARIABLES
HOW OFTEN DO YOU HAVE A DRINK CONTAINING ALCOHOL: 2-4 TIMES A MONTH
HOW MANY STANDARD DRINKS CONTAINING ALCOHOL DO YOU HAVE ON A TYPICAL DAY: 1 OR 2

## 2025-02-12 ASSESSMENT — PAIN DESCRIPTION - DESCRIPTORS: DESCRIPTORS: CRAMPING;SHARP

## 2025-02-12 ASSESSMENT — PAIN - FUNCTIONAL ASSESSMENT: PAIN_FUNCTIONAL_ASSESSMENT: 0-10

## 2025-02-12 ASSESSMENT — PAIN SCALES - GENERAL: PAINLEVEL_OUTOF10: 7

## 2025-02-12 NOTE — ED TRIAGE NOTES
Pt came in the ED with complaints of runny vaginal discharge, itchiness started today. Pt also report abdominal pain describes as sharp and cramping LLQ.     Pt reports she was diagnosed with diverticulitis December 3rd.     Pt denies fever, nausea, vomiting and diarrhea.

## 2025-02-12 NOTE — ED NOTES
Pt presents to ED via home complaining of vaginal itchiness and vaginal runny discharge that started today. Pt reports abdominal cramping and sharp LLQ pain. Pt reports she was diagnosed with diverticulitis on December 3, 2024. Pt is alert and oriented x 4, RR even and unlabored, skin is warm and dry. Pt appears in NAD at this time. Assessment completed and pt updated on plan of care.  Call bell in reach.     Emergency Department Nursing Plan of Care  The Nursing Plan of Care is developed from the Nursing assessment and Emergency Department Attending provider initial evaluation.  The plan of care may be reviewed in the “ED Provider note”.  The Plan of Care was developed with the following considerations:  Patient / Family readiness to learn indicated by:Refer to Medical chart in [facility]  Persons(s) to be included in education: Refer to Medical chart in [facility]  Barriers to Learning/Limitations:Normal

## 2025-02-12 NOTE — ED PROVIDER NOTES
SLEEP    TRIAMCINOLONE (KENALOG) 0.1 % OINTMENT    APPLY 1 APPLICATION TOPICALLY 3 TIMES A DAY FOR 7 DAYS       SCREENINGS               No data recorded        PHYSICAL EXAM      ED Triage Vitals [02/12/25 1646]   Encounter Vitals Group      BP (!) 146/102      Systolic BP Percentile       Diastolic BP Percentile       Pulse 85      Respirations 20      Temp 98.3 °F (36.8 °C)      Temp Source Oral      SpO2 100 %      Weight - Scale 59.9 kg (132 lb)      Height 1.575 m (5' 2\")      Head Circumference       Peak Flow       Pain Score       Pain Loc       Pain Education       Exclude from Growth Chart         Physical Exam  Constitutional:       General: She is not in acute distress.     Appearance: She is well-developed. She is not ill-appearing or toxic-appearing.   HENT:      Head: Normocephalic and atraumatic.   Eyes:      Conjunctiva/sclera: Conjunctivae normal.   Cardiovascular:      Rate and Rhythm: Normal rate and regular rhythm.   Pulmonary:      Effort: Pulmonary effort is normal. No respiratory distress.   Abdominal:      General: Abdomen is flat.      Tenderness: There is generalized abdominal tenderness.      Comments: Mild generalized tenderness to palpation.  There is no guarding, rebound, rigidity.  There is no point tenderness.   Musculoskeletal:      Cervical back: Normal range of motion.   Skin:     General: Skin is warm and dry.   Neurological:      General: No focal deficit present.      Mental Status: She is alert and oriented to person, place, and time.   Psychiatric:         Mood and Affect: Mood normal.         Behavior: Behavior normal.          DIAGNOSTIC RESULTS   LABS:     Recent Results (from the past 24 hour(s))   Urinalysis with Microscopic    Collection Time: 02/12/25  5:15 PM   Result Value Ref Range    Color, UA YELLOW/STRAW      Appearance CLEAR CLEAR      Specific Gravity, UA 1.010      pH, Urine 5.5 5.0 - 8.0      Protein, UA Negative NEG mg/dL    Glucose, Ur Negative NEG mg/dL

## 2025-02-19 ENCOUNTER — TELEPHONE (OUTPATIENT)
Age: 50
End: 2025-02-19

## 2025-02-21 ENCOUNTER — OFFICE VISIT (OUTPATIENT)
Age: 50
End: 2025-02-21
Payer: MEDICAID

## 2025-02-21 ENCOUNTER — ANCILLARY PROCEDURE (OUTPATIENT)
Age: 50
End: 2025-02-21
Payer: MEDICAID

## 2025-02-21 VITALS
SYSTOLIC BLOOD PRESSURE: 132 MMHG | TEMPERATURE: 98.2 F | HEART RATE: 106 BPM | OXYGEN SATURATION: 97 % | DIASTOLIC BLOOD PRESSURE: 95 MMHG | BODY MASS INDEX: 25.97 KG/M2 | RESPIRATION RATE: 18 BRPM | WEIGHT: 142 LBS

## 2025-02-21 DIAGNOSIS — R41.0 CONFUSION: ICD-10-CM

## 2025-02-21 DIAGNOSIS — R41.0 CONFUSION: Primary | ICD-10-CM

## 2025-02-21 DIAGNOSIS — R41.3 MEMORY DIFFICULTIES: Primary | ICD-10-CM

## 2025-02-21 DIAGNOSIS — R41.3 MEMORY DIFFICULTIES: ICD-10-CM

## 2025-02-21 LAB
T4 FREE SERPL-MCNC: 0.8 NG/DL (ref 0.8–1.5)
TSH SERPL DL<=0.05 MIU/L-ACNC: 0.57 UIU/ML (ref 0.36–3.74)
VIT B12 SERPL-MCNC: 956 PG/ML (ref 193–986)

## 2025-02-21 PROCEDURE — 95816 EEG AWAKE AND DROWSY: CPT | Performed by: PSYCHIATRY & NEUROLOGY

## 2025-02-21 PROCEDURE — 96132 NRPSYC TST EVAL PHYS/QHP 1ST: CPT | Performed by: PSYCHIATRY & NEUROLOGY

## 2025-02-21 PROCEDURE — 93880 EXTRACRANIAL BILAT STUDY: CPT | Performed by: PSYCHIATRY & NEUROLOGY

## 2025-02-21 PROCEDURE — 99204 OFFICE O/P NEW MOD 45 MIN: CPT | Performed by: PSYCHIATRY & NEUROLOGY

## 2025-02-21 PROCEDURE — 96138 PSYCL/NRPSYC TECH 1ST: CPT | Performed by: PSYCHIATRY & NEUROLOGY

## 2025-02-21 ASSESSMENT — PATIENT HEALTH QUESTIONNAIRE - PHQ9
SUM OF ALL RESPONSES TO PHQ QUESTIONS 1-9: 0
1. LITTLE INTEREST OR PLEASURE IN DOING THINGS: NOT AT ALL
SUM OF ALL RESPONSES TO PHQ QUESTIONS 1-9: 0
SUM OF ALL RESPONSES TO PHQ9 QUESTIONS 1 & 2: 0
2. FEELING DOWN, DEPRESSED OR HOPELESS: NOT AT ALL

## 2025-02-21 NOTE — PROCEDURES
Cognitive Testing Evaluation    Introduction:    NATANAEL DIAZ  1975  Female  This 49 year old female was administered a battery of neurocognitive testing on 02/21/2025.    Reason for Testing:  Memory difficulties    Tests Administered:  Trails A, Trails B, Stroop, Digit Symbol Substitution, Immediate Recognition, Delayed Recognition  The active test administration time was 12 minutes    Test Results:  Cognitive testing was provided via a battery of cognitive assessments. The pattern of test scores indicate that results are valid.    A Clinical Report with further description of scores and results is also available.    Overall: Patient tested in the 3rd percentile (scaled standard score of 73).  Trails A: Patient tested in the 53rd percentile (scaled standard score of 101).  Trails B: Patient tested in the 5th percentile (scaled standard score of 75).  Stroop: Patient tested in the 25th percentile (scaled standard score of 90).  Digit Symbol Substitution: Patient tested in the 32nd percentile (scaled standard score of 93).  Immediate Recognition: Patient tested in the 1st percentile (scaled standard score of 66).  Delayed Recognition: Patient tested in the 1st percentile (scaled standard score of 66).    Interpretation of Test Scores:  Examination of individual component tests shows:  Attention - Trails A: Unlikely Impairment  Mental Flexibility - Trails B: Possible Impairment  Executive Function - Stroop: Unlikely Impairment  Processing Speed - Digit Symbol Substitution: Unlikely Impairment  Memory - Immediate Recognition: Likely Impairment  Memory - Delayed Recognition: Likely Impairment    The patient's overall cognitive test performance was a standard score of 73 out of 200, which is in the 3rd percentile when compared to individuals of a similar age. These results suggest the patient's presence of cognitive impairment is likely.

## 2025-02-21 NOTE — PATIENT INSTRUCTIONS
PRIOR AUTHORIZATION FOR MEDICATIONS    If you were prescribed medication during your visit, it may require a prior authorization which is a determination of the medication coverage made by your insurance plan.     This process can take 14 business days for most medications prescribed by our Neurologists.      Botox injections (for therapeutic use) can take up to 8 weeks.    If you haven't heard from our office or your pharmacy within the time outlined above, please contact our office.        [place] Test Result Communication    Test results are available in VDP.  VDP is the patient portal into our electronic health record.  This feature allows patients to see diagnostic test results, immunizations, allergies, past medical and surgical history, current medications, and send messages directly to providers.  Our team members at the  can provide additional information and assist with registration.  The VDP support team can be reached at [phone].    In some cases, a provider might need time to explain the results in detail during a follow-up appointment.  This might include additional information or context that will help patients understand the reason for next steps in the plan of care recommended by their provider.    If a patient chooses to receive diagnostic testing at an imaging center outside of the [facility] network, it is the patient's responsibility to bring the imaging report and disc to their [facility] follow-up appointment.    If the test results reveal anything that is particularly noteworthy, we will contact you to discuss the matter and, if necessary, schedule a follow-up appointment at an earlier date.    If you have not received your test results by VDP or other communication within 7 days, please contact our office.  An inquiry can be sent to your provider using VDP.  Alternatively, appointments can be scheduled via

## 2025-02-21 NOTE — PROGRESS NOTES
[facility] at [facility] Neurology Clinics at [address] [place] [address] [place] [address] [address] [place] [address] [phone] Office  [phone] Facsimile           Referring:     Chief Complaint   Patient presents with    New Patient    Memory Loss     1st noticed about 3 yrs ago with STM.  Will lose track during conversations, while during work.  States she was dx'd ADHD during childhood.  Did not have consistent mental health care so this is untreated.     History of Present Illness  The patient is a 49-year-old lady who presents today for initial neurologic consultation regarding worsening memory difficulty.    She has been experiencing increased forgetfulness over the past 3 years, characterized by episodes such as misplacing her keys, leaving home without essential items like work shoes, and forgetting to clock in or out at work. She also reports instances of losing her train of thought during conversations. These cognitive difficulties have led to significant frustration and irritability. Her coworkers and children have observed these changes, initially dismissing them as humorous but now expressing concern. She has a family history of dementia in her great-grandmother and mental health issues. She also suffers from untreated ADHD, which may contribute to her symptoms. Her impulsivity is uncontrollable, often leading to distractions from her intended tasks. She struggles with maintaining focus for reading and has lost interest in watching television.  She will become confused as to what she was supposed to be undertaking as a task because she will get easily distracted she recently lost her job due to her inability to remain still and her frequent interruptions.    FAMILY HISTORY  Her great grandmother had dementia. Her grandmother was in a nursing home but was not very forgetful. There

## 2025-02-21 NOTE — PROGRESS NOTES
[address] (16 minute minimum)  20 minutes were spent administering cognitive testing by medical assistant/nurse.

## 2025-02-28 ENCOUNTER — HOSPITAL ENCOUNTER (OUTPATIENT)
Facility: HOSPITAL | Age: 50
Discharge: HOME | End: 2025-02-28
Attending: PSYCHIATRY & NEUROLOGY
Payer: MEDICAID

## 2025-02-28 DIAGNOSIS — R41.3 MEMORY DIFFICULTIES: ICD-10-CM

## 2025-02-28 DIAGNOSIS — R41.0 CONFUSION: ICD-10-CM

## 2025-02-28 PROCEDURE — 70551 MRI BRAIN STEM W/O DYE: CPT

## 2025-03-04 ENCOUNTER — PROCEDURE VISIT (OUTPATIENT)
Age: 50
End: 2025-03-04
Payer: MEDICAID

## 2025-03-04 DIAGNOSIS — R41.0 CONFUSION: Primary | ICD-10-CM

## 2025-03-04 PROCEDURE — 95819 EEG AWAKE AND ASLEEP: CPT | Performed by: PSYCHIATRY & NEUROLOGY

## 2025-07-24 ENCOUNTER — HOSPITAL ENCOUNTER (EMERGENCY)
Facility: HOSPITAL | Age: 50
Discharge: HOME | End: 2025-07-24
Payer: MEDICAID

## 2025-07-24 VITALS
WEIGHT: 142.2 LBS | BODY MASS INDEX: 26.01 KG/M2 | RESPIRATION RATE: 18 BRPM | DIASTOLIC BLOOD PRESSURE: 90 MMHG | HEART RATE: 84 BPM | TEMPERATURE: 98 F | OXYGEN SATURATION: 98 % | SYSTOLIC BLOOD PRESSURE: 132 MMHG

## 2025-07-24 DIAGNOSIS — L30.9 DERMATITIS: ICD-10-CM

## 2025-07-24 DIAGNOSIS — R21 RASH AND OTHER NONSPECIFIC SKIN ERUPTION: Primary | ICD-10-CM

## 2025-07-24 DIAGNOSIS — Z86.59 HISTORY OF SCHIZOAFFECTIVE DISORDER: ICD-10-CM

## 2025-07-24 DIAGNOSIS — Z87.2 HISTORY OF ECZEMA: ICD-10-CM

## 2025-07-24 PROCEDURE — 99283 EMERGENCY DEPT VISIT LOW MDM: CPT

## 2025-07-24 PROCEDURE — 6360000002 HC RX W HCPCS: Performed by: PHYSICIAN ASSISTANT

## 2025-07-24 RX ORDER — METHYLPREDNISOLONE 4 MG/1
TABLET ORAL
Qty: 1 KIT | Refills: 0 | Status: SHIPPED | OUTPATIENT
Start: 2025-07-24

## 2025-07-24 RX ORDER — LEVOCETIRIZINE DIHYDROCHLORIDE 5 MG/1
5 TABLET, FILM COATED ORAL NIGHTLY
Qty: 30 TABLET | Refills: 0 | Status: SHIPPED | OUTPATIENT
Start: 2025-07-24

## 2025-07-24 RX ORDER — DEXAMETHASONE SODIUM PHOSPHATE 10 MG/ML
10 INJECTION, SOLUTION INTRAMUSCULAR; INTRAVENOUS ONCE
Status: COMPLETED | OUTPATIENT
Start: 2025-07-24 | End: 2025-07-24

## 2025-07-24 RX ORDER — FLUCONAZOLE 150 MG/1
150 TABLET ORAL ONCE
Qty: 1 TABLET | Refills: 0 | Status: SHIPPED | OUTPATIENT
Start: 2025-07-24 | End: 2025-07-24

## 2025-07-24 RX ORDER — DIPHENHYDRAMINE HCL 25 MG
25 CAPSULE ORAL EVERY 6 HOURS PRN
Qty: 20 CAPSULE | Refills: 0 | Status: SHIPPED | OUTPATIENT
Start: 2025-07-24 | End: 2025-07-29

## 2025-07-24 RX ORDER — TRIAMCINOLONE ACETONIDE 0.25 MG/G
OINTMENT TOPICAL
Qty: 454 G | Refills: 0 | Status: SHIPPED | OUTPATIENT
Start: 2025-07-24 | End: 2025-07-31

## 2025-07-24 RX ADMIN — DEXAMETHASONE SODIUM PHOSPHATE 10 MG: 10 INJECTION, SOLUTION INTRAMUSCULAR; INTRAVENOUS at 16:04

## 2025-07-24 ASSESSMENT — PAIN - FUNCTIONAL ASSESSMENT: PAIN_FUNCTIONAL_ASSESSMENT: NONE - DENIES PAIN

## 2025-07-24 ASSESSMENT — LIFESTYLE VARIABLES
HOW OFTEN DO YOU HAVE A DRINK CONTAINING ALCOHOL: MONTHLY OR LESS
HOW MANY STANDARD DRINKS CONTAINING ALCOHOL DO YOU HAVE ON A TYPICAL DAY: 1 OR 2

## 2025-07-24 NOTE — ED PROVIDER NOTES
[facility] EMERGENCY DEPARTMENT  EMERGENCY DEPARTMENT ENCOUNTER       Pt Name: Rubi Bridges  MRN: [id number]  Birthdate 1975  Date of evaluation: 7/24/2025  Provider: ANT Escamilla   PCP: No primary care provider on file.  Note Started: 3:53 PM EDT 7/24/25     CHIEF COMPLAINT       Chief Complaint   Patient presents with    Rash        HISTORY OF PRESENT ILLNESS: 1 or more elements      History From: Patient  None     Rubi Bridges is a 50 y.o. female with history of Chiari malformation, hypertension, migraine headaches, schizophrenia, and additional history as noted below, who presents to the ED for evaluation of itchy rash to bilateral arms and legs the past several days. States she has a history of eczema, but has been using her eczema topicals with minimal improvement.  States she did start using a new face wash recently.  Denies any medication changes.  Denies any lip or tongue swelling, sore throat, difficulty swallowing.  Denies any fevers, chest pain, shortness of breath, abdominal pain, nausea vomiting, diarrhea.      Nursing Notes were all reviewed and agreed with or any disagreements were addressed in the HPI.     REVIEW OF SYSTEMS      Review of Systems   All other systems reviewed and are negative.       Positives and Pertinent negatives as per HPI.    PAST HISTORY     Past Medical History:  Past Medical History:   Diagnosis Date    Abnormal Pap smear of cervix     Angina at rest     Anxiety     Aortic valve regurgitation     Breast pain     since 2016, right breast, on and off    Cancer (HCC)     Cardiac arrhythmia     Chiari malformation     Depression     Ectopic pregnancy 2001    History of substance abuse ([facility])     HSV-2 infection     Hypertension     Migraine     Migraines     Neuropathy     Rh incompatibility     Schizoaffective disorder ([facility])     Stroke ([facility]) 10/2022    CVA, per chart encounter    Suicidal ideations     Trauma        Past Surgical History:  Past Surgical

## 2025-07-24 NOTE — ED NOTES
Discharge instructions were given to the patient by STEVE SANTIAGO RN.     The patient left the Emergency Department alert and oriented and in no acute distress with 4 prescriptions. The patient was encouraged to call or return to the ED for worsening issues or problems and was encouraged to schedule a follow up appointment for continuing care.     Ambulation assessment completed before discharge.  Pt left Emergency Department ambulating at baseline with no ortho devices  Ortho device education: none    The patient verbalized understanding of discharge instructions and prescriptions, all questions were answered. The patient has no further concerns at this time.

## 2025-07-24 NOTE — DISCHARGE INSTRUCTIONS
Thank You!    It was a pleasure taking care of you in our Emergency Department today. We know that when you come to [facility], you are entrusting us with your health, comfort, and safety. Our clinicians honor that trust, and truly appreciate the opportunity to care for you and your loved ones.    If you receive a survey about your Emergency Department experience today, please fill it out.  We value your feedback. Thank you.      Silvia Fraga PA-C    ___________________________________  I have included a copy of your lab results and/or radiologic studies from today's visit so you can have them easily available at your follow-up visit.   No results found for this or any previous visit (from the past 12 hours).    No orders to display     [unfilled]